# Patient Record
Sex: FEMALE | Race: WHITE | NOT HISPANIC OR LATINO | Employment: UNEMPLOYED | URBAN - METROPOLITAN AREA
[De-identification: names, ages, dates, MRNs, and addresses within clinical notes are randomized per-mention and may not be internally consistent; named-entity substitution may affect disease eponyms.]

---

## 2017-06-10 ENCOUNTER — HOSPITAL ENCOUNTER (OUTPATIENT)
Dept: MEDSURG UNIT | Facility: HOSPITAL | Age: 82
End: 2017-06-11
Attending: INTERNAL MEDICINE | Admitting: INTERNAL MEDICINE

## 2017-06-10 LAB
ABS NEUT (OLG): 1.69 X10(3)/MCL (ref 2.1–9.2)
ALBUMIN SERPL-MCNC: 3.4 GM/DL (ref 3.4–5)
ALBUMIN/GLOB SERPL: 0.5 {RATIO}
ALP SERPL-CCNC: 72 UNIT/L (ref 38–126)
ALT SERPL-CCNC: 15 UNIT/L (ref 12–78)
APTT PPP: 29.5 SECOND(S) (ref 20.6–36)
AST SERPL-CCNC: 20 UNIT/L (ref 15–37)
BILIRUB SERPL-MCNC: 0.3 MG/DL (ref 0.2–1)
BILIRUBIN DIRECT+TOT PNL SERPL-MCNC: 0.1 MG/DL (ref 0–0.5)
BILIRUBIN DIRECT+TOT PNL SERPL-MCNC: 0.2 MG/DL (ref 0–0.8)
BNP BLD-MCNC: 46 PG/ML (ref 0–125)
BUN SERPL-MCNC: 16 MG/DL (ref 7–18)
CALCIUM SERPL-MCNC: 8.8 MG/DL (ref 8.5–10.1)
CHLORIDE SERPL-SCNC: 107 MMOL/L (ref 98–107)
CHOLEST SERPL-MCNC: 177 MG/DL (ref 0–200)
CHOLEST/HDLC SERPL: 4.7 {RATIO} (ref 0–4)
CK MB SERPL-MCNC: 0.8 NG/ML (ref 0.5–3.6)
CK MB SERPL-MCNC: <0.5 NG/ML (ref 0.5–3.6)
CK SERPL-CCNC: 74 UNIT/L (ref 26–192)
CK SERPL-CCNC: 94 UNIT/L (ref 26–192)
CO2 SERPL-SCNC: 23 MMOL/L (ref 21–32)
CREAT SERPL-MCNC: 1.01 MG/DL (ref 0.55–1.02)
EOSINOPHIL NFR BLD MANUAL: 3 % (ref 0–8)
ERYTHROCYTE [DISTWIDTH] IN BLOOD BY AUTOMATED COUNT: 15 % (ref 11.5–17)
GLOBULIN SER-MCNC: 6.8 GM/DL (ref 2.4–3.5)
GLUCOSE SERPL-MCNC: 97 MG/DL (ref 74–106)
HCT VFR BLD AUTO: 33.1 % (ref 37–47)
HDLC SERPL-MCNC: 38 MG/DL (ref 35–60)
HGB BLD-MCNC: 10.6 GM/DL (ref 12–16)
INR PPP: 1.16 (ref 0–1.27)
LDLC SERPL CALC-MCNC: 108 MG/DL (ref 0–129)
LYMPHOCYTES NFR BLD MANUAL: 24 % (ref 13–40)
LYMPHOCYTES NFR BLD MANUAL: 6 %
MAGNESIUM SERPL-MCNC: 2 MG/DL (ref 1.8–2.4)
MCH RBC QN AUTO: 27 PG (ref 27–31)
MCHC RBC AUTO-ENTMCNC: 32 GM/DL (ref 33–36)
MCV RBC AUTO: 84.2 FL (ref 80–94)
MONOCYTES NFR BLD MANUAL: 9 % (ref 2–11)
NEUTROPHILS NFR BLD MANUAL: 58 % (ref 47–80)
PLATELET # BLD AUTO: 163 X10(3)/MCL (ref 130–400)
PLATELET # BLD EST: NORMAL 10*3/UL
PMV BLD AUTO: 10.2 FL (ref 7.4–10.4)
POTASSIUM SERPL-SCNC: 3.9 MMOL/L (ref 3.5–5.1)
PROT SERPL-MCNC: 10.2 GM/DL (ref 6.4–8.2)
PROTHROMBIN TIME: 14.6 SECOND(S) (ref 12.1–14.2)
RBC # BLD AUTO: 3.93 X10(6)/MCL (ref 4.2–5.4)
SODIUM SERPL-SCNC: 139 MMOL/L (ref 136–145)
TRIGL SERPL-MCNC: 154 MG/DL (ref 30–150)
TROPONIN I SERPL-MCNC: <0.02 NG/ML (ref 0.02–0.49)
VLDLC SERPL CALC-MCNC: 31 MG/DL
WBC # SPEC AUTO: 3.2 X10(3)/MCL (ref 4.5–11.5)

## 2017-06-11 LAB
ABS NEUT (OLG): 2.5 X10(3)/MCL (ref 2.1–9.2)
BASOPHILS # BLD AUTO: 0 X10(3)/MCL (ref 0–0.2)
BASOPHILS NFR BLD AUTO: 0 %
CK MB SERPL-MCNC: 0.6 NG/ML (ref 0.5–3.6)
CK SERPL-CCNC: 117 UNIT/L (ref 26–192)
EOSINOPHIL # BLD AUTO: 0 X10(3)/MCL (ref 0–0.9)
EOSINOPHIL NFR BLD AUTO: 1 %
ERYTHROCYTE [DISTWIDTH] IN BLOOD BY AUTOMATED COUNT: 15.1 % (ref 11.5–17)
FERRITIN SERPL-MCNC: 52.6 NG/ML (ref 8–388)
HCT VFR BLD AUTO: 33.8 % (ref 37–47)
HGB BLD-MCNC: 10.8 GM/DL (ref 12–16)
IRON SATN MFR SERPL: 20 % (ref 20–50)
IRON SERPL-MCNC: 63 MCG/DL (ref 50–175)
LYMPHOCYTES # BLD AUTO: 1.1 X10(3)/MCL (ref 0.6–4.6)
LYMPHOCYTES NFR BLD AUTO: 28 %
MCH RBC QN AUTO: 26.6 PG (ref 27–31)
MCHC RBC AUTO-ENTMCNC: 32 GM/DL (ref 33–36)
MCV RBC AUTO: 83.3 FL (ref 80–94)
MONOCYTES # BLD AUTO: 0.3 X10(3)/MCL (ref 0.1–1.3)
MONOCYTES NFR BLD AUTO: 8 %
NEUTROPHILS # BLD AUTO: 2.5 X10(3)/MCL (ref 2.1–9.2)
NEUTROPHILS NFR BLD AUTO: 62 %
PLATELET # BLD AUTO: 180 X10(3)/MCL (ref 130–400)
PMV BLD AUTO: 10.5 FL (ref 9.4–12.4)
RBC # BLD AUTO: 4.06 X10(6)/MCL (ref 4.2–5.4)
RET# (OHS): 0.04 X10^6/ML (ref 0.02–0.08)
RETICULOCYTE COUNT AUTOMATED (OLG): 1.1 % (ref 1.1–2.1)
TIBC SERPL-MCNC: 315 MCG/DL (ref 250–450)
TRANSFERRIN SERPL-MCNC: 276 MG/DL (ref 200–360)
TROPONIN I SERPL-MCNC: <0.02 NG/ML (ref 0.02–0.49)
WBC # SPEC AUTO: 4 X10(3)/MCL (ref 4.5–11.5)

## 2017-06-21 ENCOUNTER — HISTORICAL (OUTPATIENT)
Dept: URGENT CARE | Facility: CLINIC | Age: 82
End: 2017-06-21

## 2017-06-21 LAB
ABS NEUT (OLG): 2.06 X10(3)/MCL (ref 2.1–9.2)
EOSINOPHIL NFR BLD MANUAL: 4 % (ref 0–8)
ERYTHROCYTE [DISTWIDTH] IN BLOOD BY AUTOMATED COUNT: 15 % (ref 11.5–17)
HCT VFR BLD AUTO: 34.7 % (ref 37–47)
HGB BLD-MCNC: 10.7 GM/DL (ref 12–16)
LYMPHOCYTES NFR BLD MANUAL: 23 % (ref 13–40)
LYMPHOCYTES NFR BLD MANUAL: 3 %
MCH RBC QN AUTO: 27.1 PG (ref 27–31)
MCHC RBC AUTO-ENTMCNC: 30.8 GM/DL (ref 33–36)
MCV RBC AUTO: 87.8 FL (ref 80–94)
METAMYELOCYTES NFR BLD MANUAL: 1 %
MONOCYTES NFR BLD MANUAL: 7 % (ref 2–11)
NEUTROPHILS NFR BLD MANUAL: 61 % (ref 47–80)
PLATELET # BLD AUTO: 203 X10(3)/MCL (ref 130–400)
PLATELET # BLD EST: NORMAL 10*3/UL
PMV BLD AUTO: 11.2 FL (ref 7.4–10.4)
RBC # BLD AUTO: 3.95 X10(6)/MCL (ref 4.2–5.4)
TSH SERPL-ACNC: 3.49 MIU/ML (ref 0.36–3.74)
WBC # SPEC AUTO: 3.2 X10(3)/MCL (ref 4.5–11.5)

## 2017-08-02 ENCOUNTER — HISTORICAL (OUTPATIENT)
Dept: LAB | Facility: HOSPITAL | Age: 82
End: 2017-08-02

## 2017-08-02 LAB
ABS NEUT (OLG): 2.18 X10(3)/MCL (ref 2.1–9.2)
ANISOCYTOSIS BLD QL SMEAR: 1
EOSINOPHIL NFR BLD MANUAL: 2 % (ref 0–8)
ERYTHROCYTE [DISTWIDTH] IN BLOOD BY AUTOMATED COUNT: 15.6 % (ref 11.5–17)
HCT VFR BLD AUTO: 35.4 % (ref 37–47)
HGB BLD-MCNC: 11.4 GM/DL (ref 12–16)
LYMPHOCYTES NFR BLD MANUAL: 17 % (ref 13–40)
LYMPHOCYTES NFR BLD MANUAL: 3 %
MCH RBC QN AUTO: 28.1 PG (ref 27–31)
MCHC RBC AUTO-ENTMCNC: 32.2 GM/DL (ref 33–36)
MCV RBC AUTO: 87.4 FL (ref 80–94)
MONOCYTES NFR BLD MANUAL: 6 % (ref 2–11)
NEUTROPHILS NFR BLD MANUAL: 72 % (ref 47–80)
PLATELET # BLD AUTO: 173 X10(3)/MCL (ref 130–400)
PLATELET # BLD EST: NORMAL 10*3/UL
PMV BLD AUTO: 10.6 FL (ref 7.4–10.4)
RBC # BLD AUTO: 4.05 X10(6)/MCL (ref 4.2–5.4)
WBC # SPEC AUTO: 3.5 X10(3)/MCL (ref 4.5–11.5)

## 2017-10-04 ENCOUNTER — HISTORICAL (OUTPATIENT)
Dept: LAB | Facility: HOSPITAL | Age: 82
End: 2017-10-04

## 2017-10-04 LAB
ABS NEUT (OLG): 2.24 X10(3)/MCL (ref 2.1–9.2)
EOSINOPHIL NFR BLD MANUAL: 2 % (ref 0–8)
ERYTHROCYTE [DISTWIDTH] IN BLOOD BY AUTOMATED COUNT: 14.6 % (ref 11.5–17)
HCT VFR BLD AUTO: 36.3 % (ref 37–47)
HGB BLD-MCNC: 11.6 GM/DL (ref 12–16)
LYMPHOCYTES NFR BLD MANUAL: 15 % (ref 13–40)
LYMPHOCYTES NFR BLD MANUAL: 5 %
MCH RBC QN AUTO: 28.2 PG (ref 27–31)
MCHC RBC AUTO-ENTMCNC: 32 GM/DL (ref 33–36)
MCV RBC AUTO: 88.1 FL (ref 80–94)
MONOCYTES NFR BLD MANUAL: 6 % (ref 2–11)
NEUTROPHILS NFR BLD MANUAL: 71 % (ref 47–80)
PLATELET # BLD AUTO: 162 X10(3)/MCL (ref 130–400)
PLATELET # BLD EST: NORMAL 10*3/UL
PMV BLD AUTO: 10.9 FL (ref 7.4–10.4)
RBC # BLD AUTO: 4.12 X10(6)/MCL (ref 4.2–5.4)
WBC # SPEC AUTO: 3.4 X10(3)/MCL (ref 4.5–11.5)

## 2018-01-09 ENCOUNTER — HISTORICAL (OUTPATIENT)
Dept: LAB | Facility: HOSPITAL | Age: 83
End: 2018-01-09

## 2018-01-09 LAB
ABS NEUT (OLG): 2.54 X10(3)/MCL (ref 2.1–9.2)
EOSINOPHIL NFR BLD MANUAL: 1 % (ref 0–8)
ERYTHROCYTE [DISTWIDTH] IN BLOOD BY AUTOMATED COUNT: 14 % (ref 11.5–17)
HCT VFR BLD AUTO: 35.4 % (ref 37–47)
HGB BLD-MCNC: 11.3 GM/DL (ref 12–16)
LYMPHOCYTES NFR BLD MANUAL: 19 % (ref 13–40)
MCH RBC QN AUTO: 28.4 PG (ref 27–31)
MCHC RBC AUTO-ENTMCNC: 31.9 GM/DL (ref 33–36)
MCV RBC AUTO: 88.9 FL (ref 80–94)
MONOCYTES NFR BLD MANUAL: 8 % (ref 2–11)
NEUTROPHILS NFR BLD MANUAL: 72 % (ref 47–80)
PLATELET # BLD AUTO: 175 X10(3)/MCL (ref 130–400)
PLATELET # BLD EST: NORMAL 10*3/UL
PMV BLD AUTO: 10.7 FL (ref 7.4–10.4)
RBC # BLD AUTO: 3.98 X10(6)/MCL (ref 4.2–5.4)
WBC # SPEC AUTO: 3.6 X10(3)/MCL (ref 4.5–11.5)

## 2018-07-26 ENCOUNTER — HISTORICAL (OUTPATIENT)
Dept: INFUSION THERAPY | Facility: HOSPITAL | Age: 83
End: 2018-07-26

## 2018-07-26 LAB
ABS NEUT (OLG): 2.2 X10(3)/MCL (ref 1.5–6.9)
ALBUMIN SERPL-MCNC: 3.5 GM/DL (ref 3.4–5)
ALBUMIN/GLOB SERPL: 0.5 RATIO
ALP SERPL-CCNC: 56 UNIT/L (ref 30–113)
ALT SERPL-CCNC: 19 UNIT/L (ref 10–45)
AST SERPL-CCNC: 22 UNIT/L (ref 15–37)
BASOPHILS # BLD AUTO: 0 X10(3)/MCL (ref 0–0.1)
BASOPHILS NFR BLD AUTO: 0 % (ref 0–1)
BILIRUB SERPL-MCNC: 0.3 MG/DL (ref 0.1–0.9)
BILIRUBIN DIRECT+TOT PNL SERPL-MCNC: 0.1 MG/DL (ref 0–0.3)
BILIRUBIN DIRECT+TOT PNL SERPL-MCNC: 0.2 MG/DL
BUN SERPL-MCNC: 18 MG/DL (ref 10–20)
CALCIUM SERPL-MCNC: 9.2 MG/DL (ref 8–10.5)
CHLORIDE SERPL-SCNC: 104 MMOL/L (ref 100–108)
CO2 SERPL-SCNC: 28 MMOL/L (ref 21–35)
CREAT SERPL-MCNC: 1.21 MG/DL (ref 0.7–1.3)
EOSINOPHIL # BLD AUTO: 0 X10(3)/MCL (ref 0–0.6)
EOSINOPHIL NFR BLD AUTO: 2 % (ref 0–5)
ERYTHROCYTE [DISTWIDTH] IN BLOOD BY AUTOMATED COUNT: 14.6 % (ref 11.5–17)
FERRITIN SERPL-MCNC: 105 NG/ML (ref 3–252)
FOLATE SERPL-MCNC: >20 NG/ML (ref 8.6–58.9)
GLOBULIN SER-MCNC: 6.8 GM/DL
GLUCOSE SERPL-MCNC: 94 MG/DL (ref 75–116)
HCT VFR BLD AUTO: 33.9 % (ref 36–48)
HGB BLD-MCNC: 10.7 GM/DL (ref 12–16)
IRON SATN MFR SERPL: 14 % (ref 15–55)
IRON SERPL-MCNC: 36 MCG/DL (ref 50–170)
LDH SERPL-CCNC: 151 UNIT/L (ref 112–240)
LYMPHOCYTES # BLD AUTO: 0.8 X10(3)/MCL (ref 0.5–4.1)
LYMPHOCYTES NFR BLD AUTO: 23.5 % (ref 15–40)
MCH RBC QN AUTO: 28 PG (ref 27–34)
MCHC RBC AUTO-ENTMCNC: 32 GM/DL (ref 31–36)
MCV RBC AUTO: 89 FL (ref 80–99)
MONOCYTES # BLD AUTO: 0.2 X10(3)/MCL (ref 0–1.1)
MONOCYTES NFR BLD AUTO: 7 % (ref 4–12)
NEUTROPHILS # BLD AUTO: 2.2 X10(3)/MCL (ref 1.5–6.9)
NEUTROPHILS NFR BLD AUTO: 68 % (ref 43–75)
PLATELET # BLD AUTO: 180 X10(3)/MCL (ref 140–400)
PMV BLD AUTO: 10 FL (ref 6.8–10)
POTASSIUM SERPL-SCNC: 4 MMOL/L (ref 3.6–5.2)
PROT SERPL-MCNC: 10 GM/DL
PROT SERPL-MCNC: 10.3 GM/DL (ref 6.4–8.2)
RBC # BLD AUTO: 3.8 X10(6)/MCL (ref 4.2–5.4)
RET# (OHS): 0.05 X10(6)/MCL
RETICULOCYTE COUNT AUTOMATED (OLG): 1.22 % (ref 0.5–1.5)
SODIUM SERPL-SCNC: 136 MMOL/L (ref 135–145)
TIBC SERPL-MCNC: 217 MCG/DL (ref 150–375)
TIBC SERPL-MCNC: 253 MCG/DL (ref 250–450)
VIT B12 SERPL-MCNC: 795 PG/ML (ref 193–986)
WBC # SPEC AUTO: 3.2 X10(3)/MCL (ref 4.5–11.5)

## 2018-10-23 ENCOUNTER — HISTORICAL (OUTPATIENT)
Dept: ADMINISTRATIVE | Facility: HOSPITAL | Age: 83
End: 2018-10-23

## 2018-10-23 LAB
ABS NEUT (OLG): 2.79 X10(3)/MCL (ref 2.1–9.2)
EOSINOPHIL NFR BLD MANUAL: 6 % (ref 0–8)
ERYTHROCYTE [DISTWIDTH] IN BLOOD BY AUTOMATED COUNT: 13.4 % (ref 11.5–17)
FERRITIN SERPL-MCNC: 91.4 NG/ML (ref 8–388)
HCT VFR BLD AUTO: 35.4 % (ref 37–47)
HGB BLD-MCNC: 10.9 GM/DL (ref 12–16)
IRON SATN MFR SERPL: 21.4 % (ref 20–50)
IRON SERPL-MCNC: 60 MCG/DL (ref 50–175)
LYMPHOCYTES NFR BLD MANUAL: 8 % (ref 13–40)
MCH RBC QN AUTO: 27.7 PG (ref 27–31)
MCHC RBC AUTO-ENTMCNC: 30.8 GM/DL (ref 33–36)
MCV RBC AUTO: 90.1 FL (ref 80–94)
MONOCYTES NFR BLD MANUAL: 5 % (ref 2–11)
NEUTROPHILS # BLD AUTO: 2.76 X10(3)/MCL (ref 2.1–9.2)
NEUTROPHILS NFR BLD MANUAL: 80 % (ref 47–80)
PLATELET # BLD AUTO: 200 X10(3)/MCL (ref 130–400)
PLATELET # BLD EST: NORMAL 10*3/UL
PMV BLD AUTO: 10.5 FL (ref 9.4–12.4)
RBC # BLD AUTO: 3.93 X10(6)/MCL (ref 4.2–5.4)
TIBC SERPL-MCNC: 280 MCG/DL (ref 250–450)
TRANSFERRIN SERPL-MCNC: 255 MG/DL (ref 200–360)
WBC # SPEC AUTO: 3.8 X10(3)/MCL (ref 4.5–11.5)

## 2019-01-28 ENCOUNTER — HISTORICAL (OUTPATIENT)
Dept: ADMINISTRATIVE | Facility: HOSPITAL | Age: 84
End: 2019-01-28

## 2019-01-28 LAB
ABS NEUT (OLG): 2.71 X10(3)/MCL (ref 2.1–9.2)
ALBUMIN SERPL-MCNC: 3.5 GM/DL (ref 3.4–5)
ALBUMIN/GLOB SERPL: 0.5 {RATIO}
ALP SERPL-CCNC: 58 UNIT/L (ref 38–126)
ALT SERPL-CCNC: 16 UNIT/L (ref 12–78)
AST SERPL-CCNC: 16 UNIT/L (ref 15–37)
BILIRUB SERPL-MCNC: 0.4 MG/DL (ref 0.2–1)
BILIRUBIN DIRECT+TOT PNL SERPL-MCNC: 0.1 MG/DL (ref 0–0.5)
BILIRUBIN DIRECT+TOT PNL SERPL-MCNC: 0.3 MG/DL (ref 0–0.8)
BUN SERPL-MCNC: 27 MG/DL (ref 7–18)
CALCIUM SERPL-MCNC: 9.4 MG/DL (ref 8.5–10.1)
CHLORIDE SERPL-SCNC: 106 MMOL/L (ref 98–107)
CO2 SERPL-SCNC: 28 MMOL/L (ref 21–32)
CREAT SERPL-MCNC: 1.07 MG/DL (ref 0.55–1.02)
EOSINOPHIL # BLD AUTO: 0 X10(3)/MCL (ref 0–0.9)
EOSINOPHIL NFR BLD AUTO: 1 %
ERYTHROCYTE [DISTWIDTH] IN BLOOD BY AUTOMATED COUNT: 14.3 % (ref 11.5–17)
FERRITIN SERPL-MCNC: 95.9 NG/ML (ref 8–388)
GLOBULIN SER-MCNC: 6.6 GM/DL (ref 2.4–3.5)
GLUCOSE SERPL-MCNC: 81 MG/DL (ref 74–106)
HCT VFR BLD AUTO: 36.1 % (ref 37–47)
HGB BLD-MCNC: 11.3 GM/DL (ref 12–16)
IRON SATN MFR SERPL: 20.9 % (ref 20–50)
IRON SERPL-MCNC: 62 MCG/DL (ref 50–175)
LYMPHOCYTES # BLD AUTO: 0.7 X10(3)/MCL (ref 0.6–4.6)
LYMPHOCYTES NFR BLD AUTO: 20 %
MCH RBC QN AUTO: 27.8 PG (ref 27–31)
MCHC RBC AUTO-ENTMCNC: 31.3 GM/DL (ref 33–36)
MCV RBC AUTO: 88.7 FL (ref 80–94)
MONOCYTES # BLD AUTO: 0.2 X10(3)/MCL (ref 0.1–1.3)
MONOCYTES NFR BLD AUTO: 7 %
NEUTROPHILS # BLD AUTO: 2.71 X10(3)/MCL (ref 2.1–9.2)
NEUTROPHILS NFR BLD AUTO: 72 %
PLATELET # BLD AUTO: 173 X10(3)/MCL (ref 130–400)
PMV BLD AUTO: 10.2 FL (ref 9.4–12.4)
POTASSIUM SERPL-SCNC: 4.3 MMOL/L (ref 3.5–5.1)
PROT SERPL-MCNC: 10.1 GM/DL (ref 6.4–8.2)
RBC # BLD AUTO: 4.07 X10(6)/MCL (ref 4.2–5.4)
SODIUM SERPL-SCNC: 139 MMOL/L (ref 136–145)
TIBC SERPL-MCNC: 297 MCG/DL (ref 250–450)
TRANSFERRIN SERPL-MCNC: 253 MG/DL (ref 200–360)
TRANSFERRIN SERPL-MCNC: 265 MG/DL (ref 200–360)
WBC # SPEC AUTO: 3.8 X10(3)/MCL (ref 4.5–11.5)

## 2019-07-23 ENCOUNTER — HISTORICAL (OUTPATIENT)
Dept: ADMINISTRATIVE | Facility: HOSPITAL | Age: 84
End: 2019-07-23

## 2019-07-23 LAB
ABS NEUT (OLG): 2.62 X10(3)/MCL (ref 2.1–9.2)
ALBUMIN SERPL-MCNC: 3.7 GM/DL (ref 3.4–5)
ALBUMIN/GLOB SERPL: 0.6 {RATIO}
ALP SERPL-CCNC: 54 UNIT/L (ref 38–126)
ALT SERPL-CCNC: 16 UNIT/L (ref 12–78)
AST SERPL-CCNC: 17 UNIT/L (ref 15–37)
BASOPHILS # BLD AUTO: 0 X10(3)/MCL (ref 0–0.2)
BASOPHILS NFR BLD AUTO: 0 %
BILIRUB SERPL-MCNC: 0.4 MG/DL (ref 0.2–1)
BILIRUBIN DIRECT+TOT PNL SERPL-MCNC: 0.1 MG/DL (ref 0–0.2)
BILIRUBIN DIRECT+TOT PNL SERPL-MCNC: 0.3 MG/DL (ref 0–0.8)
BUN SERPL-MCNC: 26 MG/DL (ref 7–18)
CALCIUM SERPL-MCNC: 9.3 MG/DL (ref 8.5–10.1)
CHLORIDE SERPL-SCNC: 105 MMOL/L (ref 98–107)
CO2 SERPL-SCNC: 27 MMOL/L (ref 21–32)
CREAT SERPL-MCNC: 1.23 MG/DL (ref 0.55–1.02)
EOSINOPHIL # BLD AUTO: 0 X10(3)/MCL (ref 0–0.9)
EOSINOPHIL NFR BLD AUTO: 1 %
ERYTHROCYTE [DISTWIDTH] IN BLOOD BY AUTOMATED COUNT: 14.4 % (ref 11.5–17)
GLOBULIN SER-MCNC: 6.1 GM/DL (ref 2.4–3.5)
GLUCOSE SERPL-MCNC: 91 MG/DL (ref 74–106)
HCT VFR BLD AUTO: 35.3 % (ref 37–47)
HGB BLD-MCNC: 10.8 GM/DL (ref 12–16)
LYMPHOCYTES # BLD AUTO: 0.8 X10(3)/MCL (ref 0.6–4.6)
LYMPHOCYTES NFR BLD AUTO: 21 %
MCH RBC QN AUTO: 27.3 PG (ref 27–31)
MCHC RBC AUTO-ENTMCNC: 30.6 GM/DL (ref 33–36)
MCV RBC AUTO: 89.4 FL (ref 80–94)
MONOCYTES # BLD AUTO: 0.3 X10(3)/MCL (ref 0.1–1.3)
MONOCYTES NFR BLD AUTO: 7 %
NEUTROPHILS # BLD AUTO: 2.62 X10(3)/MCL (ref 2.1–9.2)
NEUTROPHILS NFR BLD AUTO: 70 %
PLATELET # BLD AUTO: 161 X10(3)/MCL (ref 130–400)
PMV BLD AUTO: 10 FL (ref 9.4–12.4)
POTASSIUM SERPL-SCNC: 4.6 MMOL/L (ref 3.5–5.1)
PROT SERPL-MCNC: 9.8 GM/DL (ref 6.4–8.2)
RBC # BLD AUTO: 3.95 X10(6)/MCL (ref 4.2–5.4)
SODIUM SERPL-SCNC: 138 MMOL/L (ref 136–145)
WBC # SPEC AUTO: 3.8 X10(3)/MCL (ref 4.5–11.5)

## 2021-07-13 ENCOUNTER — HISTORICAL (OUTPATIENT)
Dept: CARDIOLOGY | Facility: HOSPITAL | Age: 86
End: 2021-07-13

## 2021-07-13 LAB
ABS NEUT (OLG): 2.5 X10(3)/MCL (ref 2.1–9.2)
ALBUMIN SERPL-MCNC: 3.3 GM/DL (ref 3.4–4.8)
ALBUMIN/GLOB SERPL: 0.5 RATIO (ref 1.1–2)
ALP SERPL-CCNC: 63 UNIT/L (ref 40–150)
ALT SERPL-CCNC: 10 UNIT/L (ref 0–55)
AST SERPL-CCNC: 20 UNIT/L (ref 5–34)
BILIRUB SERPL-MCNC: 0.5 MG/DL
BILIRUBIN DIRECT+TOT PNL SERPL-MCNC: 0.2 MG/DL (ref 0–0.5)
BILIRUBIN DIRECT+TOT PNL SERPL-MCNC: 0.3 MG/DL (ref 0–0.8)
BUN SERPL-MCNC: 25 MG/DL (ref 9.8–20.1)
CALCIUM SERPL-MCNC: 9.6 MG/DL (ref 8.4–10.2)
CHLORIDE SERPL-SCNC: 104 MMOL/L (ref 98–107)
CHOLEST SERPL-MCNC: 156 MG/DL
CHOLEST/HDLC SERPL: 4 {RATIO} (ref 0–5)
CO2 SERPL-SCNC: 28 MMOL/L (ref 23–31)
CREAT SERPL-MCNC: 1.16 MG/DL (ref 0.55–1.02)
EOSINOPHIL # BLD AUTO: 0.1 X10(3)/MCL (ref 0–0.9)
EOSINOPHIL NFR BLD AUTO: 3 %
ERYTHROCYTE [DISTWIDTH] IN BLOOD BY AUTOMATED COUNT: 14.4 % (ref 11.5–17)
GLOBULIN SER-MCNC: 6.5 GM/DL (ref 2.4–3.5)
GLUCOSE SERPL-MCNC: 86 MG/DL (ref 82–115)
HCT VFR BLD AUTO: 33.7 % (ref 37–47)
HDLC SERPL-MCNC: 39 MG/DL (ref 35–60)
HGB BLD-MCNC: 10.5 GM/DL (ref 12–16)
INR PPP: 1.2 (ref 0–1.3)
LDLC SERPL CALC-MCNC: 100 MG/DL (ref 50–140)
LYMPHOCYTES # BLD AUTO: 0.5 X10(3)/MCL (ref 0.6–4.6)
LYMPHOCYTES NFR BLD AUTO: 16 %
MCH RBC QN AUTO: 27.8 PG (ref 27–31)
MCHC RBC AUTO-ENTMCNC: 31.2 GM/DL (ref 33–36)
MCV RBC AUTO: 89.2 FL (ref 80–94)
MONOCYTES # BLD AUTO: 0.2 X10(3)/MCL (ref 0.1–1.3)
MONOCYTES NFR BLD AUTO: 5 %
NEUTROPHILS # BLD AUTO: 2.5 X10(3)/MCL (ref 2.1–9.2)
NEUTROPHILS NFR BLD AUTO: 75 %
PLATELET # BLD AUTO: 155 X10(3)/MCL (ref 130–400)
PMV BLD AUTO: 10.6 FL (ref 9.4–12.4)
POTASSIUM SERPL-SCNC: 4.3 MMOL/L (ref 3.5–5.1)
PROT SERPL-MCNC: 9.8 GM/DL (ref 5.8–7.6)
PROTHROMBIN TIME: 14.6 SECOND(S) (ref 12.5–14.5)
RBC # BLD AUTO: 3.78 X10(6)/MCL (ref 4.2–5.4)
SODIUM SERPL-SCNC: 140 MMOL/L (ref 136–145)
TRIGL SERPL-MCNC: 86 MG/DL (ref 37–140)
VLDLC SERPL CALC-MCNC: 17 MG/DL
WBC # SPEC AUTO: 3.3 X10(3)/MCL (ref 4.5–11.5)

## 2022-04-11 ENCOUNTER — HISTORICAL (OUTPATIENT)
Dept: ADMINISTRATIVE | Facility: HOSPITAL | Age: 87
End: 2022-04-11
Payer: MEDICARE

## 2022-04-27 VITALS
DIASTOLIC BLOOD PRESSURE: 68 MMHG | SYSTOLIC BLOOD PRESSURE: 128 MMHG | HEIGHT: 64 IN | OXYGEN SATURATION: 98 % | BODY MASS INDEX: 24.1 KG/M2 | WEIGHT: 141.13 LBS

## 2022-05-02 ENCOUNTER — HOSPITAL ENCOUNTER (EMERGENCY)
Facility: HOSPITAL | Age: 87
Discharge: HOME | End: 2022-05-03
Attending: STUDENT IN AN ORGANIZED HEALTH CARE EDUCATION/TRAINING PROGRAM
Payer: MEDICARE

## 2022-05-02 DIAGNOSIS — N93.9 VAGINAL BLEEDING: Primary | ICD-10-CM

## 2022-05-02 DIAGNOSIS — R53.1 WEAK: ICD-10-CM

## 2022-05-02 DIAGNOSIS — N39.0 URINARY TRACT INFECTION WITHOUT HEMATURIA, SITE UNSPECIFIED: ICD-10-CM

## 2022-05-02 LAB
ALBUMIN SERPL-MCNC: 3.1 GM/DL (ref 3.4–4.8)
ALBUMIN/GLOB SERPL: 0.5 RATIO (ref 1.1–2)
ALP SERPL-CCNC: 57 UNIT/L (ref 40–150)
ALT SERPL-CCNC: 6 UNIT/L (ref 0–55)
APPEARANCE UR: ABNORMAL
AST SERPL-CCNC: 19 UNIT/L (ref 5–34)
BACTERIA #/AREA URNS AUTO: ABNORMAL /HPF
BASOPHILS # BLD AUTO: 0.01 X10(3)/MCL (ref 0–0.2)
BASOPHILS NFR BLD AUTO: 0.3 %
BILIRUB UR QL STRIP.AUTO: NEGATIVE MG/DL
BILIRUBIN DIRECT+TOT PNL SERPL-MCNC: 0.1 MG/DL (ref 0–0.8)
BILIRUBIN DIRECT+TOT PNL SERPL-MCNC: 0.2 MG/DL (ref 0–0.5)
BILIRUBIN DIRECT+TOT PNL SERPL-MCNC: 0.3 MG/DL
BUN SERPL-MCNC: 20.8 MG/DL (ref 9.8–20.1)
CALCIUM SERPL-MCNC: 9.6 MG/DL (ref 8.4–10.2)
CHLORIDE SERPL-SCNC: 106 MMOL/L (ref 98–107)
CO2 SERPL-SCNC: 24 MMOL/L (ref 23–31)
COLOR UR AUTO: YELLOW
CREAT SERPL-MCNC: 0.97 MG/DL (ref 0.55–1.02)
EOSINOPHIL # BLD AUTO: 0.08 X10(3)/MCL (ref 0–0.9)
EOSINOPHIL NFR BLD AUTO: 2.2 %
ERYTHROCYTE [DISTWIDTH] IN BLOOD BY AUTOMATED COUNT: 14.3 % (ref 11.5–17)
GLOBULIN SER-MCNC: 6.2 GM/DL (ref 2.4–3.5)
GLUCOSE SERPL-MCNC: 97 MG/DL (ref 82–115)
GLUCOSE UR QL STRIP.AUTO: NORMAL MG/DL
HCT VFR BLD AUTO: 31.7 % (ref 37–47)
HGB BLD-MCNC: 10.1 GM/DL (ref 12–16)
HYALINE CASTS #/AREA URNS LPF: ABNORMAL /LPF
IMM GRANULOCYTES # BLD AUTO: 0.01 X10(3)/MCL (ref 0–0.02)
IMM GRANULOCYTES NFR BLD AUTO: 0.3 % (ref 0–0.43)
KETONES UR QL STRIP.AUTO: NEGATIVE MG/DL
LEUKOCYTE ESTERASE UR QL STRIP.AUTO: 500 UNIT/L
LYMPHOCYTES # BLD AUTO: 0.61 X10(3)/MCL (ref 0.6–4.6)
LYMPHOCYTES NFR BLD AUTO: 17 %
MCH RBC QN AUTO: 27.6 PG (ref 27–31)
MCHC RBC AUTO-ENTMCNC: 31.9 MG/DL (ref 33–36)
MCV RBC AUTO: 86.6 FL (ref 80–94)
MONOCYTES # BLD AUTO: 0.27 X10(3)/MCL (ref 0.1–1.3)
MONOCYTES NFR BLD AUTO: 7.5 %
NEUTROPHILS # BLD AUTO: 2.6 X10(3)/MCL (ref 2.1–9.2)
NEUTROPHILS NFR BLD AUTO: 72.7 %
NITRITE UR QL STRIP.AUTO: NEGATIVE
NRBC BLD AUTO-RTO: 0 %
PH UR STRIP.AUTO: 5.5 [PH]
PLATELET # BLD AUTO: 153 X10(3)/MCL (ref 130–400)
PMV BLD AUTO: 10.3 FL (ref 9.4–12.4)
POTASSIUM SERPL-SCNC: 4.3 MMOL/L (ref 3.5–5.1)
PROT SERPL-MCNC: 9.3 GM/DL (ref 5.8–7.6)
PROT UR QL STRIP.AUTO: ABNORMAL MG/DL
RBC # BLD AUTO: 3.66 X10(6)/MCL (ref 4.2–5.4)
RBC UR QL AUTO: ABNORMAL UNIT/L
SODIUM SERPL-SCNC: 136 MMOL/L (ref 136–145)
SP GR UR STRIP.AUTO: 1.02
SQUAMOUS #/AREA URNS LPF: ABNORMAL /HPF
UROBILINOGEN UR STRIP-ACNC: 0.2 MG/DL
WBC # SPEC AUTO: 3.6 X10(3)/MCL (ref 4.5–11.5)

## 2022-05-02 PROCEDURE — 81001 URINALYSIS AUTO W/SCOPE: CPT | Performed by: STUDENT IN AN ORGANIZED HEALTH CARE EDUCATION/TRAINING PROGRAM

## 2022-05-02 PROCEDURE — 36415 COLL VENOUS BLD VENIPUNCTURE: CPT | Performed by: STUDENT IN AN ORGANIZED HEALTH CARE EDUCATION/TRAINING PROGRAM

## 2022-05-02 PROCEDURE — 85025 COMPLETE CBC W/AUTO DIFF WBC: CPT | Performed by: STUDENT IN AN ORGANIZED HEALTH CARE EDUCATION/TRAINING PROGRAM

## 2022-05-02 PROCEDURE — 99284 EMERGENCY DEPT VISIT MOD MDM: CPT | Mod: 25

## 2022-05-02 PROCEDURE — 80053 COMPREHEN METABOLIC PANEL: CPT | Performed by: STUDENT IN AN ORGANIZED HEALTH CARE EDUCATION/TRAINING PROGRAM

## 2022-05-02 PROCEDURE — 93005 ELECTROCARDIOGRAM TRACING: CPT

## 2022-05-03 VITALS
DIASTOLIC BLOOD PRESSURE: 70 MMHG | TEMPERATURE: 98 F | HEIGHT: 64 IN | BODY MASS INDEX: 24.81 KG/M2 | OXYGEN SATURATION: 98 % | RESPIRATION RATE: 18 BRPM | SYSTOLIC BLOOD PRESSURE: 159 MMHG | HEART RATE: 61 BPM | WEIGHT: 145.31 LBS

## 2022-05-03 RX ORDER — CEPHALEXIN 500 MG/1
500 CAPSULE ORAL 4 TIMES DAILY
Qty: 20 CAPSULE | Refills: 0 | Status: SHIPPED | OUTPATIENT
Start: 2022-05-03 | End: 2022-05-08

## 2022-05-04 NOTE — ED PROVIDER NOTES
"Encounter Date: 5/2/2022       History     Chief Complaint   Patient presents with    Female  Problem     States has vaginal device for "falling bladder or uterus" daughter unsure of which.  States unable to reinsert today due to excessive vaginal bleeding.      Weakness     86-year-old female presents to ED vaginal bleeding and feeling "off". Daughter bedside. patient states she believes she had a uterine prolapse in the past.  Requires a device inserted vaginally to prevent it from continually prolapsing. states 2-3 days ago she started feeling off.  Unable to elaborate/described.  No chest pain or pressure, no shortness of breath, no abdominal pains, no fever chills etc.  Denies any neuro deficits.  states 1-2 days ago she started to have some mild vaginal bleeding.  States she began spotting in her underwear.  She states she's advised by her doctor to occassionally remove and clean the vaginal plug. States she noticed some additional bleeding then, not gross blood. denies any new gross uterine prolapse, no vaginal trauma, no urinary changes, no dysuria hematuria or other complaints at this time. States she was concerned and did not replace the vaginal plug. Then presented. No other concerns at this time.        Review of patient's allergies indicates:   Allergen Reactions    Sulfa (sulfonamide antibiotics)      Past Medical History:   Diagnosis Date    Hypertension      Past Surgical History:   Procedure Laterality Date    APPENDECTOMY      CHOLECYSTECTOMY      HYSTERECTOMY       History reviewed. No pertinent family history.  Social History     Tobacco Use    Smoking status: Never Smoker    Smokeless tobacco: Never Used   Substance Use Topics    Alcohol use: Not Currently    Drug use: Never     Review of Systems   Constitutional: Positive for activity change and fatigue. Negative for chills, diaphoresis and fever.   HENT: Negative for congestion, rhinorrhea, sinus pain and sore throat.    Eyes: " Negative for pain, discharge and itching.   Respiratory: Negative for cough, chest tightness and shortness of breath.    Cardiovascular: Negative for chest pain and palpitations.   Gastrointestinal: Negative for abdominal pain, nausea and vomiting.   Genitourinary: Positive for vaginal bleeding. Negative for difficulty urinating, dysuria, flank pain, hematuria, vaginal discharge and vaginal pain.   Musculoskeletal: Negative for myalgias and neck pain.   Skin: Negative for color change and rash.   Neurological: Negative for dizziness and headaches.   Psychiatric/Behavioral: Negative for confusion. The patient is not hyperactive.        Physical Exam     Initial Vitals [05/02/22 2100]   BP Pulse Resp Temp SpO2   (!) 149/89 69 18 98.4 °F (36.9 °C) 98 %      MAP       --         Physical Exam    Vitals reviewed.  Constitutional: She appears well-developed and well-nourished. She is not diaphoretic. No distress.   HENT:   Head: Normocephalic and atraumatic.   Hard of hearing   Eyes: Conjunctivae and EOM are normal. Pupils are equal, round, and reactive to light.   Neck: Neck supple. No tracheal deviation present.   Cardiovascular: Normal rate, regular rhythm, normal heart sounds and intact distal pulses.   Pulmonary/Chest: Breath sounds normal. No respiratory distress. She has no wheezes. She has no rhonchi. She has no rales.   Abdominal: Abdomen is soft. She exhibits no distension. There is no abdominal tenderness. There is no rebound and no guarding.   Genitourinary:    Vagina normal.      No vaginal discharge.      Genitourinary Comments: Exam performed with nurse present. External exam benign. bi tiffani nonpainful, no prolapse appreciated, no bleeding.     Musculoskeletal:      Cervical back: Neck supple.     Neurological: She is alert and oriented to person, place, and time. GCS score is 15. GCS eye subscore is 4. GCS verbal subscore is 5. GCS motor subscore is 6.   Skin: Skin is warm and dry. Capillary refill takes  less than 2 seconds. No rash noted.   Psychiatric: She has a normal mood and affect. Her behavior is normal. Judgment and thought content normal.         ED Course   Procedures  Labs Reviewed   COMPREHENSIVE METABOLIC PANEL - Abnormal; Notable for the following components:       Result Value    Blood Urea Nitrogen 20.8 (*)     Protein Total 9.3 (*)     Albumin Level 3.1 (*)     Globulin 6.2 (*)     Albumin/Globulin Ratio 0.5 (*)     All other components within normal limits   URINALYSIS, REFLEX TO URINE CULTURE - Abnormal; Notable for the following components:    Appearance, UA Cloudy (*)     Protein, UA 1+ (*)     Blood, UA 1+ (*)     Leukocyte Esterase,   (*)     Bacteria, UA Occ (*)     Squamous Epithelial Cells, UA Trace (*)     All other components within normal limits    Narrative:     RBC=6-10   CBC WITH DIFFERENTIAL - Abnormal; Notable for the following components:    WBC 3.6 (*)     RBC 3.66 (*)     Hgb 10.1 (*)     Hct 31.7 (*)     MCHC 31.9 (*)     All other components within normal limits   CBC W/ AUTO DIFFERENTIAL    Narrative:     The following orders were created for panel order CBC auto differential.  Procedure                               Abnormality         Status                     ---------                               -----------         ------                     CBC with Differential[[id number]]        Abnormal            Final result                 Please view results for these tests on the individual orders.   EXTRA TUBES    Narrative:     The following orders were created for panel order EXTRA TUBES.  Procedure                               Abnormality         Status                     ---------                               -----------         ------                     Light Blue Top Hold[[id number]]                              In process                 Gold Top Hold[[id number]]                                                                 Please view results for these tests  "on the individual orders.   LIGHT BLUE TOP HOLD        ECG Results          EKG 12-lead (In process)  Result time 05/03/22 07:34:39    In process by Interface, Lab In [facility] (05/03/22 07:34:39)                 Narrative:    Test Reason : R53.1,    Vent. Rate : 060 BPM     Atrial Rate : 060 BPM     P-R Int : 130 ms          QRS Dur : 104 ms      QT Int : 446 ms       P-R-T Axes : 034 -24 039 degrees     QTc Int : 446 ms    Normal sinus rhythm  Moderate voltage criteria for LVH, may be normal variant  Borderline Abnormal ECG  No previous ECGs available    Referred By: [email or web address]   SELF           Confirmed By:                             Imaging Results    None          Medications - No data to display  Medical Decision Making:   Clinical Tests:   Lab Tests: Ordered and Reviewed  Medical Tests: Ordered and Reviewed  86-year-old female presents with daughter for "feeling off" and vaginal bleeding.  vitals stable.  Essentially no acute findings on exam.  Abdomen soft.  Pelvic exam no prolapse, bleeding, or tenderness.  Patient reportedly grossly at baseline per family in regards to mentation and strength.  Analysis of labs demonstrate a mild anemia as well as a urinary tract infection which could explain the patient's symptoms/fatigue.  Will treat at this time.  Patient is to use the vaginal plug again as recommended by their physician and given very strict return precautions if vaginal bleeding worsens. (jhony)                      Clinical Impression:   Final diagnoses:  [R53.1] Weak  [N93.9] Vaginal bleeding (Primary)  [N39.0] Urinary tract infection without hematuria, site unspecified          ED Disposition Condition    Discharge Stable        ED Prescriptions     Medication Sig Dispense Start Date End Date Auth. Provider    cephALEXin (KEFLEX) 500 MG capsule Take 1 capsule (500 mg total) by mouth 4 (four) times daily. for 5 days 20 capsule 5/3/2022 5/8/2022 Josias Wilhelm MD        Follow-up Information     Follow " up With Specialties Details Why Contact Info    [facility] Emergency Medicine  As needed, If symptoms worsen [address] [facility] [address] [phone]           Josias Wilhelm MD  05/16/22 212

## 2022-07-22 ENCOUNTER — HOSPITAL ENCOUNTER (EMERGENCY)
Facility: HOSPITAL | Age: 87
Discharge: HOME | End: 2022-07-22
Attending: EMERGENCY MEDICINE
Payer: MEDICARE

## 2022-07-22 VITALS
HEART RATE: 70 BPM | OXYGEN SATURATION: 98 % | TEMPERATURE: 99 F | DIASTOLIC BLOOD PRESSURE: 78 MMHG | WEIGHT: 151.69 LBS | HEIGHT: 66 IN | RESPIRATION RATE: 18 BRPM | BODY MASS INDEX: 24.38 KG/M2 | SYSTOLIC BLOOD PRESSURE: 132 MMHG

## 2022-07-22 DIAGNOSIS — N93.9 VAGINAL BLEEDING: Primary | ICD-10-CM

## 2022-07-22 DIAGNOSIS — T83.9XXA PROBLEM WITH VAGINAL PESSARY, INITIAL ENCOUNTER: ICD-10-CM

## 2022-07-22 PROCEDURE — 99282 EMERGENCY DEPT VISIT SF MDM: CPT

## 2022-07-22 RX ORDER — ESCITALOPRAM OXALATE 10 MG/1
TABLET ORAL
COMMUNITY
Start: 2021-07-13 | End: 2022-11-29 | Stop reason: SDUPTHER

## 2022-07-22 RX ORDER — PANTOPRAZOLE SODIUM 40 MG/1
TABLET, DELAYED RELEASE ORAL
COMMUNITY
End: 2022-10-31 | Stop reason: SDUPTHER

## 2022-07-22 RX ORDER — PRAVASTATIN SODIUM 20 MG/1
TABLET ORAL
COMMUNITY
End: 2022-10-31 | Stop reason: SDUPTHER

## 2022-07-22 RX ORDER — FLUTICASONE PROPIONATE 50 MCG
1 SPRAY, SUSPENSION (ML) NASAL DAILY
COMMUNITY
Start: 2022-04-22 | End: 2023-01-30 | Stop reason: SDUPTHER

## 2022-07-22 RX ORDER — AZELASTINE 1 MG/ML
SPRAY, METERED NASAL
COMMUNITY
Start: 2021-07-13 | End: 2023-01-30 | Stop reason: SDUPTHER

## 2022-07-22 RX ORDER — ACETAMINOPHEN 500 MG
10 TABLET ORAL
COMMUNITY
Start: 2021-07-13 | End: 2023-01-30 | Stop reason: SDUPTHER

## 2022-07-22 RX ORDER — AMLODIPINE AND BENAZEPRIL HYDROCHLORIDE 5; 20 MG/1; MG/1
1 CAPSULE ORAL DAILY
COMMUNITY
Start: 2022-07-19 | End: 2022-10-31 | Stop reason: SDUPTHER

## 2022-07-22 RX ORDER — PEDIATRIC MULTIVITAMIN NO.238
TABLET,CHEWABLE ORAL
COMMUNITY
End: 2023-04-24 | Stop reason: SDUPTHER

## 2022-07-22 NOTE — ED PROVIDER NOTES
Encounter Date: 7/22/2022       History     Chief Complaint   Patient presents with    Vaginal Bleeding     C/o pessary placed into vagina and taken out today to clean it. When she took it out there was vaginal bleeding with pain.      87 YO WF in ER with complaints of vaginal bleeding after taking out her pessary. She had it placed about 1 year ago and takes it out every 6 weeks to clean it. Her next GYN appt is in September. Denies fever, chills, chest pain, SOB, abdominal pain, N/V/D, HA or dizziness. No other complaints.    The history is provided by the patient.     Review of patient's allergies indicates:   Allergen Reactions    Sulfa (sulfonamide antibiotics)      Past Medical History:   Diagnosis Date    Hypertension      Past Surgical History:   Procedure Laterality Date    APPENDECTOMY      CHOLECYSTECTOMY      HYSTERECTOMY       History reviewed. No pertinent family history.  Social History     Tobacco Use    Smoking status: Never Smoker    Smokeless tobacco: Never Used   Substance Use Topics    Alcohol use: Not Currently    Drug use: Never     Review of Systems   Constitutional: Negative for chills and fever.   HENT: Negative for congestion and sore throat.    Respiratory: Negative for shortness of breath.    Cardiovascular: Negative for chest pain.   Gastrointestinal: Negative for abdominal pain, diarrhea, nausea and vomiting.   Genitourinary: Positive for vaginal bleeding. Negative for dysuria.   Musculoskeletal: Negative for back pain.   Skin: Negative for rash.   Neurological: Negative for dizziness, weakness, light-headedness and headaches.   Hematological: Does not bruise/bleed easily.   All other systems reviewed and are negative.      Physical Exam     Initial Vitals [07/22/22 1811]   BP Pulse Resp Temp SpO2   (!) 164/71 72 20 99.3 °F (37.4 °C) 97 %      MAP       --         Physical Exam    Nursing note and vitals reviewed.  Constitutional: She appears well-developed and  well-nourished. She is not diaphoretic. No distress.   HENT:   Head: Normocephalic and atraumatic.   Mouth/Throat: Oropharynx is clear and moist.   Eyes: Conjunctivae are normal.   Neck: Neck supple.   Cardiovascular: Normal rate, regular rhythm, normal heart sounds and intact distal pulses.   Pulmonary/Chest: Breath sounds normal.   Abdominal: Abdomen is soft.   Genitourinary:    Vaginal bleeding ( minimal blood noted in vaginal vault, small skin tear to left vaginal wall without active bleeding) present.      No vaginal discharge or tenderness.   There is bleeding ( minimal blood noted in vaginal vault, small skin tear to left vaginal wall without active bleeding) in the vagina. No tenderness in the vagina.    No foreign body in the vagina.         Musculoskeletal:      Cervical back: Neck supple.     Neurological: She is alert and oriented to person, place, and time. She has normal strength.   Skin: Skin is warm and dry.   Psychiatric: She has a normal mood and affect.         ED Course   Procedures  Labs Reviewed - No data to display       Imaging Results    None          Medications - No data to display                       Clinical Impression:   Final diagnoses:  [N93.9] Vaginal bleeding (Primary)  [T83.9XXA] Problem with vaginal pessary, initial encounter          ED Disposition Condition    Discharge Stable        ED Prescriptions     None        Follow-up Information     Follow up With Specialties Details Why Contact Info    [facility] Emergency Dept Emergency Medicine In 3 days As needed, If symptoms worsen [address] [facility] [address] [phone]    [facility]  Go in 1 week Gynecology Clinic, they will call you with an appointment [address] [facility] [address]           POLI Harris  07/22/22 1383

## 2022-07-23 NOTE — DISCHARGE INSTRUCTIONS
Do not put pessary back in.    Follow up with GYN next week, they will call you with an appointment.

## 2022-07-28 ENCOUNTER — HOSPITAL ENCOUNTER (EMERGENCY)
Facility: HOSPITAL | Age: 87
Discharge: HOME | End: 2022-07-28
Attending: INTERNAL MEDICINE
Payer: MEDICARE

## 2022-07-28 VITALS
HEART RATE: 60 BPM | HEIGHT: 66 IN | RESPIRATION RATE: 18 BRPM | TEMPERATURE: 99 F | BODY MASS INDEX: 24 KG/M2 | WEIGHT: 149.31 LBS | DIASTOLIC BLOOD PRESSURE: 59 MMHG | SYSTOLIC BLOOD PRESSURE: 121 MMHG | OXYGEN SATURATION: 98 %

## 2022-07-28 DIAGNOSIS — N30.01 ACUTE CYSTITIS WITH HEMATURIA: Primary | ICD-10-CM

## 2022-07-28 DIAGNOSIS — D50.9 MICROCYTIC HYPOCHROMIC ANEMIA: ICD-10-CM

## 2022-07-28 LAB
APPEARANCE UR: ABNORMAL
BACTERIA #/AREA URNS AUTO: ABNORMAL /HPF
BASOPHILS # BLD AUTO: 0.01 X10(3)/MCL (ref 0–0.2)
BASOPHILS NFR BLD AUTO: 0.3 %
BILIRUB UR QL STRIP.AUTO: NEGATIVE MG/DL
COLOR UR AUTO: ABNORMAL
EOSINOPHIL # BLD AUTO: 0.1 X10(3)/MCL (ref 0–0.9)
EOSINOPHIL NFR BLD AUTO: 2.8 %
ERYTHROCYTE [DISTWIDTH] IN BLOOD BY AUTOMATED COUNT: 14.3 % (ref 11.5–17)
GLUCOSE UR QL STRIP.AUTO: NEGATIVE MG/DL
HCT VFR BLD AUTO: 32.8 % (ref 37–47)
HGB BLD-MCNC: 10.2 GM/DL (ref 12–16)
IMM GRANULOCYTES # BLD AUTO: 0.03 X10(3)/MCL (ref 0–0.04)
IMM GRANULOCYTES NFR BLD AUTO: 0.8 %
KETONES UR QL STRIP.AUTO: NEGATIVE MG/DL
LEUKOCYTE ESTERASE UR QL STRIP.AUTO: 500 UNIT/L
LYMPHOCYTES # BLD AUTO: 0.73 X10(3)/MCL (ref 0.6–4.6)
LYMPHOCYTES NFR BLD AUTO: 20.5 %
MCH RBC QN AUTO: 27.3 PG (ref 27–31)
MCHC RBC AUTO-ENTMCNC: 31.1 MG/DL (ref 33–36)
MCV RBC AUTO: 87.7 FL (ref 80–94)
MONOCYTES # BLD AUTO: 0.2 X10(3)/MCL (ref 0.1–1.3)
MONOCYTES NFR BLD AUTO: 5.6 %
MUCOUS THREADS URNS QL MICRO: ABNORMAL /LPF
NEUTROPHILS # BLD AUTO: 2.5 X10(3)/MCL (ref 2.1–9.2)
NEUTROPHILS NFR BLD AUTO: 70 %
NITRITE UR QL STRIP.AUTO: NEGATIVE
NRBC BLD AUTO-RTO: 0 %
PH UR STRIP.AUTO: 7.5 [PH]
PLATELET # BLD AUTO: 163 X10(3)/MCL (ref 130–400)
PMV BLD AUTO: 11 FL (ref 7.4–10.4)
PROT UR QL STRIP.AUTO: ABNORMAL MG/DL
RBC # BLD AUTO: 3.74 X10(6)/MCL (ref 4.2–5.4)
RBC #/AREA URNS AUTO: >=100 /HPF
RBC UR QL AUTO: NEGATIVE UNIT/L
SP GR UR STRIP.AUTO: 1.02
SQUAMOUS #/AREA URNS LPF: ABNORMAL /HPF
UROBILINOGEN UR STRIP-ACNC: NORMAL MG/DL
WBC # SPEC AUTO: 3.6 X10(3)/MCL (ref 4.5–11.5)
WBC #/AREA URNS AUTO: >=100 /HPF

## 2022-07-28 PROCEDURE — 36415 COLL VENOUS BLD VENIPUNCTURE: CPT | Performed by: INTERNAL MEDICINE

## 2022-07-28 PROCEDURE — 85025 COMPLETE CBC W/AUTO DIFF WBC: CPT | Performed by: INTERNAL MEDICINE

## 2022-07-28 PROCEDURE — 99283 EMERGENCY DEPT VISIT LOW MDM: CPT | Mod: 25

## 2022-07-28 PROCEDURE — 81001 URINALYSIS AUTO W/SCOPE: CPT | Performed by: INTERNAL MEDICINE

## 2022-07-28 RX ORDER — AMOXICILLIN AND CLAVULANATE POTASSIUM 500; 125 MG/1; MG/1
1 TABLET, FILM COATED ORAL 2 TIMES DAILY
Qty: 14 TABLET | Refills: 0 | Status: SHIPPED | OUTPATIENT
Start: 2022-07-28 | End: 2022-08-04

## 2022-07-28 NOTE — ED PROVIDER NOTES
Encounter Date: 7/28/2022       History     Chief Complaint   Patient presents with    Vaginal Bleeding     Increasing since last ER visit for same complaint 1 week ago. Also reports increasing fatigue.      Presents with vaginal bleeding, states every time she urinate there is blood, this was associated in the past to uterine prolapse but she was instructed to stop using the pasary due to a lesion on her vaginal canal. Has an appointment with Gyn on Monday but is feeling weak.    The history is provided by the patient and a relative.   Vaginal Bleeding  This is a recurrent problem. The current episode started more than 1 week ago. The problem occurs constantly. The problem has been gradually worsening. Associated symptoms include abdominal pain. Pertinent negatives include no chest pain, no headaches and no shortness of breath. Exacerbated by: Urination. Nothing relieves the symptoms. She has tried nothing for the symptoms.     Review of patient's allergies indicates:   Allergen Reactions    Sulfa (sulfonamide antibiotics)      Past Medical History:   Diagnosis Date    Hypertension      Past Surgical History:   Procedure Laterality Date    APPENDECTOMY      CHOLECYSTECTOMY      HYSTERECTOMY       No family history on file.  Social History     Tobacco Use    Smoking status: Never Smoker    Smokeless tobacco: Never Used   Substance Use Topics    Alcohol use: Not Currently    Drug use: Never     Review of Systems   Constitutional: Positive for fatigue. Negative for fever.   HENT: Negative for sore throat.    Respiratory: Negative for shortness of breath.    Cardiovascular: Negative for chest pain.   Gastrointestinal: Positive for abdominal pain. Negative for nausea.   Genitourinary: Positive for dysuria and vaginal bleeding.   Musculoskeletal: Negative for back pain.   Skin: Negative for rash.   Neurological: Positive for weakness. Negative for headaches.   Hematological: Does not bruise/bleed easily.   All  other systems reviewed and are negative.      Physical Exam     Initial Vitals [07/28/22 0908]   BP Pulse Resp Temp SpO2   135/62 68 18 99.4 °F (37.4 °C) 97 %      MAP       --         Physical Exam    Nursing note and vitals reviewed.  Constitutional: She appears well-developed and well-nourished. No distress.   HENT:   Head: Normocephalic and atraumatic.   Mouth/Throat: Oropharynx is clear and moist.   Eyes: Conjunctivae are normal. Pupils are equal, round, and reactive to light.   Neck: Neck supple.   Normal range of motion.  Cardiovascular: Normal rate, regular rhythm and intact distal pulses.   Murmur heard.  Pulmonary/Chest: Breath sounds normal.   Abdominal: Abdomen is soft. Bowel sounds are normal. She exhibits no distension. There is no abdominal tenderness. There is no rebound and no guarding.   Genitourinary:    Vagina normal.      No vaginal discharge.      Genitourinary Comments: Pelvic exam reveal no blood on vaginal vault, small ulcerations on the wall without bleeding, normal external genitalia, no hemorrhoids     Musculoskeletal:         General: No edema. Normal range of motion.      Cervical back: Normal range of motion and neck supple.     Neurological: She is alert and oriented to person, place, and time. She has normal strength.   Skin: Skin is warm and dry. No rash noted.   Psychiatric: Her behavior is normal.         ED Course   Procedures  Labs Reviewed   CBC WITH DIFFERENTIAL - Abnormal; Notable for the following components:       Result Value    WBC 3.6 (*)     RBC 3.74 (*)     Hgb 10.2 (*)     Hct 32.8 (*)     MCHC 31.1 (*)     MPV 11.0 (*)     All other components within normal limits   URINALYSIS - Abnormal; Notable for the following components:    Color, UA Red (*)     Appearance, UA Turbid (*)     Protein, UA 3+ (*)     Leukocyte Esterase,   (*)     WBC, UA >=100 (*)     Bacteria, UA Moderate (*)     Mucous, UA Large (*)     RBC, UA >=100 (*)     All other components within  normal limits   CULTURE, URINE   CBC W/ AUTO DIFFERENTIAL    Narrative:     The following orders were created for panel order CBC auto differential.  Procedure                               Abnormality         Status                     ---------                               -----------         ------                     CBC with Differential[[id number]]        Abnormal            Final result                 Please view results for these tests on the individual orders.   EXTRA TUBES    Narrative:     The following orders were created for panel order EXTRA TUBES.  Procedure                               Abnormality         Status                     ---------                               -----------         ------                     Light Blue Top Hold[[id number]]                              In process                 Light Green Top Hold[[id number]]                             In process                 Lavender Top Hold[[id number]]                                In process                 Gold Top Hold[[id number]]                                                               Pink Top Hold[[id number]]                                    In process                   Please view results for these tests on the individual orders.   LIGHT BLUE TOP HOLD   LIGHT GREEN TOP HOLD   LAVENDER TOP HOLD   GOLD TOP HOLD   PINK TOP HOLD          Imaging Results    None          Medications - No data to display                       Clinical Impression:   Final diagnoses:  [N30.01] Acute cystitis with hematuria (Primary)  [D50.9] Microcytic hypochromic anemia          ED Disposition Condition    Discharge Stable        ED Prescriptions     Medication Sig Dispense Start Date End Date Auth. Provider    amoxicillin-clavulanate 500-125mg (AUGMENTIN) 500-125 mg Tab Take 1 tablet (500 mg total) by mouth 2 (two) times daily. for 7 days 14 tablet 7/28/2022 8/4/2022 Blue Rebollar MD        Follow-up Information     Follow up  With Specialties Details Why Contact Info    [facility] Emergency Medicine  If symptoms worsen [address] [facility] [address] [phone]    [facility]  Go in 1 week  [address] [facility] [address] [phone]           Blue Rebollar MD  07/28/22 1058

## 2022-07-30 LAB — BACTERIA UR CULT: NO GROWTH

## 2022-08-01 ENCOUNTER — OFFICE VISIT (OUTPATIENT)
Dept: GYNECOLOGY | Facility: CLINIC | Age: 87
End: 2022-08-01
Payer: MEDICARE

## 2022-08-01 VITALS
TEMPERATURE: 98 F | HEART RATE: 68 BPM | RESPIRATION RATE: 20 BRPM | OXYGEN SATURATION: 97 % | BODY MASS INDEX: 24.09 KG/M2 | DIASTOLIC BLOOD PRESSURE: 61 MMHG | WEIGHT: 149.25 LBS | SYSTOLIC BLOOD PRESSURE: 101 MMHG

## 2022-08-01 DIAGNOSIS — N95.2 ATROPHY OF VAGINA: ICD-10-CM

## 2022-08-01 DIAGNOSIS — N81.10 VAGINAL PROLAPSE: ICD-10-CM

## 2022-08-01 DIAGNOSIS — N95.2 ATROPHY OF VAGINA: Primary | ICD-10-CM

## 2022-08-01 DIAGNOSIS — N81.10 VAGINAL PROLAPSE: Primary | ICD-10-CM

## 2022-08-01 DIAGNOSIS — R31.9 HEMATURIA, UNSPECIFIED TYPE: ICD-10-CM

## 2022-08-01 DIAGNOSIS — I10 HYPERTENSION, UNSPECIFIED TYPE: ICD-10-CM

## 2022-08-01 DIAGNOSIS — Z46.89 PESSARY MAINTENANCE: ICD-10-CM

## 2022-08-01 LAB
APPEARANCE UR: ABNORMAL
BACTERIA #/AREA URNS AUTO: ABNORMAL /HPF
BILIRUB UR QL STRIP.AUTO: NEGATIVE MG/DL
COLOR UR AUTO: ABNORMAL
GLUCOSE UR QL STRIP.AUTO: NEGATIVE MG/DL
KETONES UR QL STRIP.AUTO: ABNORMAL MG/DL
LEUKOCYTE ESTERASE UR QL STRIP.AUTO: 500 UNIT/L
NITRITE UR QL STRIP.AUTO: NEGATIVE
PH UR STRIP.AUTO: 6 [PH]
PROT UR QL STRIP.AUTO: ABNORMAL MG/DL
RBC #/AREA URNS AUTO: >=100 /HPF
RBC UR QL AUTO: ABNORMAL UNIT/L
SP GR UR STRIP.AUTO: 1.03
UROBILINOGEN UR STRIP-ACNC: ABNORMAL MG/DL
WBC #/AREA URNS AUTO: ABNORMAL /HPF

## 2022-08-01 PROCEDURE — 81001 URINALYSIS AUTO W/SCOPE: CPT

## 2022-08-01 PROCEDURE — 99214 OFFICE O/P EST MOD 30 MIN: CPT | Mod: PBBFAC

## 2022-08-01 RX ORDER — ESTRADIOL 0.1 MG/G
1 CREAM VAGINAL
Qty: 42.5 G | Refills: 1 | Status: SHIPPED | OUTPATIENT
Start: 2022-08-01 | End: 2023-01-30 | Stop reason: SDUPTHER

## 2022-08-01 RX ORDER — ASPIRIN 325 MG
TABLET ORAL
COMMUNITY
End: 2022-10-31

## 2022-08-01 NOTE — PROGRESS NOTES
Premarin cream not covered by insurance. Rx for vaginal estrace cream sent.    Shonna Guardado MD, PGY-3  [facility]  08/01/2022 4:22 PM

## 2022-08-01 NOTE — ASSESSMENT & PLAN NOTE
Patient desires to transfer all care to [facility]. Referral for family medicine placed.

## 2022-08-01 NOTE — ASSESSMENT & PLAN NOTE
Previously managed with 2.5 Gellhorn pessary. Prolapse on exam today appears to be stage 2, patient without bothersome symptoms and doesn't want to replace at this time.    Counseled patient and daughter that the prolapse may return and she may desire management in the future. Daughter will call clinic if want to discuss management with pessary. Offered in office changes/cleaning in the future if needed.

## 2022-08-01 NOTE — ASSESSMENT & PLAN NOTE
No UCx sent from ED. Will send urine for culture today. Urine appears bloody. Patient referred to urology for evaluation of hematuria.

## 2022-08-01 NOTE — ASSESSMENT & PLAN NOTE
Likely cause of vaginal laceration with pessary removal identified by ED. Counseled patient that even though she does not want to replace the pessary at this time, if she does wish to use one in the future, treating with vaginal estrogen at this time will help revitalize her tissue and reduce risk of lacerations with pessary use in the future.

## 2022-08-01 NOTE — PROGRESS NOTES
[facility] GYNECOLOGY CLINIC NOTE     Fifi Chacon is a 86 y.o.  presenting to GYN clinic for ED f/u for pessary issue.    Patient took her pessary out to clean it and noticed pain and vaginal bleeding. She presented to the ED and small left vaginal wall laceration was noted. It was not bleeding at the time. She was instructed to not wear the pessary and f/u in clinic. The patient presented again to the ED  for bleeding with urination. Cath UA significant for protein, no blood but >100 RBCs, 500 LE, moderate bacteria, few squams. Patient was treated for UTI with Augmentin, currently on treatment.    Today she continues to reports bleeding with urination. She says her urine is dark and has been dark for a while. Denies vaginal pain at this time. Patient does not desire to continue to use the pessary at this time. She was previously fitted for a 2.5 Gellhorn pessary and was removing/cleaning/replacing on her own. She reports it was painful to replace the pessary. It hasn't been in since  and denies symptoms of prolapse, vaginal bulge, trouble urinating or with bowel movements. She has used vaginal estrogen in the past but hasn't in a while.     Past Medical History:   Diagnosis Date    GERD (gastroesophageal reflux disease)     Hyperlipidemia     Hypertension       Past Surgical History:   Procedure Laterality Date    APPENDECTOMY      CHOLECYSTECTOMY      HYSTERECTOMY        OB History    Para Term  AB Living   4 2 2   2     SAB IAB Ectopic Multiple Live Births                  # Outcome Date GA Lbr Wilbert/2nd Weight Sex Delivery Anes PTL Lv   4 AB            3 AB            2 Term            1 Term              Current Outpatient Medications   Medication Instructions    amlodipine-benazepril 5-20 mg (LOTREL) 5-20 mg per capsule 1 capsule, Oral, Daily    amoxicillin-clavulanate 500-125mg (AUGMENTIN) 500-125 mg Tab 500 mg, Oral, 2 times daily    aspirin 325 MG tablet aspirin 325 mg  tablet   Take 1 tablet every day by oral route.    azelastine (ASTELIN) 137 mcg (0.1 %) nasal spray Nasal    conjugated estrogens (PREMARIN) 1 g, Vaginal, Daily    EScitalopram oxalate (LEXAPRO) 10 MG tablet escitalopram 10 mg tablet   Take 1 tablet every day by oral route for 90 days.    fluticasone propionate (FLONASE) 50 mcg/actuation nasal spray 1 spray, Each Nostril, Daily    melatonin (MELATIN) 5 mg, Oral    multivit with min-folic acid 200 mcg Chew One A Day Vitamin    pantoprazole (PROTONIX) 40 MG tablet pantoprazole 40 mg tablet,delayed release    pravastatin (PRAVACHOL) 20 MG tablet pravastatin 20 mg tablet   Take 1 tablet every day by oral route.     Social History     Tobacco Use    Smoking status: Never Smoker    Smokeless tobacco: Never Used   Substance Use Topics    Alcohol use: Not Currently    Drug use: Never       Review of Systems  Pertinent items are noted in HPI.     Objective:     /61 (BP Location: Right arm, Patient Position: Sitting, BP Method: Large (Automatic))   Pulse 68   Temp 98.2 °F (36.8 °C) (Oral)   Resp 20   Wt 67.7 kg (149 lb 4 oz)   SpO2 97%   BMI 24.09 kg/m²     Physical Exam:  Gen: Well-nourished, well-developed female appearing stated age. Alert, cooperative, in no acute distress.  Extrem: Extremities normal, atraumatic, non-tender calves.  External genitalia: Normal female genitalia without lesion, discharge or tenderness.   Speculum Exam: Vaginal vault without discharge, nonodorous, no lesions/masses seen. Anterior vaginal wall visible at hymen but does not protrude past with valsalva. Cervix surgically absent. No vaginal side wall laceration visible.  Bimanual deferred  Note: RN chaperone present for entirety of exam.    Relevant Labs:   22 H/H 10.2/32.8    Per POP-Q exam on 2021:      Assessment:       86 y.o.  here for vaginal prolapse (anterior and apical) previously managed with 2.5 Gellhorn pessary.       Plan:     Problem List  Items Addressed This Visit        Cardiac/Vascular    Hypertension     Patient desires to transfer all care to [facility]. Referral for family medicine placed.           Relevant Orders    Ambulatory referral/consult to Family Practice       Renal/    Vaginal prolapse - Primary     Previously managed with 2.5 Gellhorn pessary. Prolapse on exam today appears to be stage 2, patient without bothersome symptoms and doesn't want to replace at this time.    Counseled patient and daughter that the prolapse may return and she may desire management in the future. Daughter will call clinic if want to discuss management with pessary. Offered in office changes/cleaning in the future if needed.            Relevant Medications    conjugated estrogens (PREMARIN) vaginal cream    Pessary maintenance    Hematuria     No UCx sent from ED. Will send urine for culture today. Urine appears bloody. Patient referred to urology for evaluation of hematuria.           Relevant Orders    Ambulatory referral/consult to Urology    Urinalysis    Urine Culture High Risk    Atrophy of vagina     Likely cause of vaginal laceration with pessary removal identified by ED. Counseled patient that even though she does not want to replace the pessary at this time, if she does wish to use one in the future, treating with vaginal estrogen at this time will help revitalize her tissue and reduce risk of lacerations with pessary use in the future.           Relevant Medications    conjugated estrogens (PREMARIN) vaginal cream           Return to clinic PRN    Discussed patient and plan with Dr. Provost Shonna Guardado MD, PGY-3  [facility]  08/01/2022 12:00 PM

## 2022-08-02 DIAGNOSIS — R31.9 HEMATURIA, UNSPECIFIED TYPE: Primary | ICD-10-CM

## 2022-08-03 LAB — BACTERIA UR CULT: NO GROWTH

## 2022-09-02 ENCOUNTER — OFFICE VISIT (OUTPATIENT)
Dept: GYNECOLOGY | Facility: CLINIC | Age: 87
End: 2022-09-02
Payer: MEDICARE

## 2022-09-02 VITALS
HEIGHT: 66 IN | DIASTOLIC BLOOD PRESSURE: 72 MMHG | OXYGEN SATURATION: 97 % | SYSTOLIC BLOOD PRESSURE: 138 MMHG | BODY MASS INDEX: 24.11 KG/M2 | TEMPERATURE: 63 F | WEIGHT: 150 LBS | HEART RATE: 63 BPM | RESPIRATION RATE: 18 BRPM

## 2022-09-02 DIAGNOSIS — N81.10 PELVIC ORGAN PROLAPSE QUANTIFICATION STAGE 2 CYSTOCELE: ICD-10-CM

## 2022-09-02 PROCEDURE — 99214 OFFICE O/P EST MOD 30 MIN: CPT | Mod: PBBFAC

## 2022-09-02 NOTE — PROGRESS NOTES
[facility] GYNECOLOGY CLINIC NOTE     Fifi Chacon is a 86 y.o.  with stage 2 POP previously managed with 2.5 gellhorn pessary however patient decided to remove at last visit. Presenting for follow up.     Pt presents with no complaints today, no issues. Has been using vaginal estrogen cream occasionally.       Gynecology  OB History          4    Para   2    Term   2            AB   2    Living             SAB        IAB        Ectopic        Multiple        Live Births                    Past Medical History:   Diagnosis Date    GERD (gastroesophageal reflux disease)     Hyperlipidemia     Hypertension       Past Surgical History:   Procedure Laterality Date    APPENDECTOMY      CHOLECYSTECTOMY      HYSTERECTOMY        Current Outpatient Medications   Medication Instructions    amlodipine-benazepril 5-20 mg (LOTREL) 5-20 mg per capsule 1 capsule, Oral, Daily    aspirin 325 MG tablet aspirin 325 mg tablet   Take 1 tablet every day by oral route.    azelastine (ASTELIN) 137 mcg (0.1 %) nasal spray Nasal    EScitalopram oxalate (LEXAPRO) 10 MG tablet escitalopram 10 mg tablet   Take 1 tablet every day by oral route for 90 days.    estradioL (ESTRACE) 1 g, Vaginal, Twice weekly    fluticasone propionate (FLONASE) 50 mcg/actuation nasal spray 1 spray, Each Nostril, Daily    melatonin (MELATIN) 5 mg, Oral    multivit with min-folic acid 200 mcg Chew One A Day Vitamin    pantoprazole (PROTONIX) 40 MG tablet pantoprazole 40 mg tablet,delayed release    pravastatin (PRAVACHOL) 20 MG tablet pravastatin 20 mg tablet   Take 1 tablet every day by oral route.     Social History     Tobacco Use    Smoking status: Never    Smokeless tobacco: Never   Substance Use Topics    Alcohol use: Not Currently    Drug use: Never       Review of Systems  A comprehensive review of systems was negative.     Objective:     /72 (BP Location: Left arm, Patient Position: Sitting, BP Method: Medium (Automatic))   Pulse 63   " Temp (!) 63 °F (17.2 °C)   Resp 18   Ht 5' 6" (1.676 m)   Wt 68 kg (150 lb)   SpO2 97%   BMI 24.21 kg/m²   Physical Exam:  Gen: Well-nourished, well-developed female appearing stated age. Alert, cooperative, in no acute distress.  Abdomen: Soft, non-tender, no masses.  Extrem: Extremities normal, atraumatic, non-tender calves.        Assessment:       86 y.o.  with POP presents for follow up   1. Pelvic organ prolapse quantification stage 2 cystocele  Ambulatory referral/consult to Gynecology             Plan:     No issues today, does not desire any further management at this time for POP.       Problem List Items Addressed This Visit    None  Visit Diagnoses       Pelvic organ prolapse quantification stage 2 cystocele                Return to clinic PRN    Discussed patient and plan with Provost Jeet Giang MD   [facility], PGY4        "

## 2022-09-21 NOTE — PROGRESS NOTES
Chief Complaint: No chief complaint on file.      HPI:  Patient is a 86-year-old female referred to Urology for hematuria.  Patient has a history of a stage II prolapse bladder, recently visited emergency room with an ulcer to the vaginal area due to 2.5 Gelhorn pessary which was believed to be the cause of pain & bleeding which was removed.  On 05/03/2022 patient seen in ER for vaginal bleeding, patient seen by Gyn 2 weeks later Dx with dehydration. with Today patient presents with intermitted non-sensory incontinence, denies dysuria, urinary urgency, frequency,  retention, gross hematuria, nocturia. Patient denies family history of urologic pathology.       Allergies:  Review of patient's allergies indicates:   Allergen Reactions    Sulfa (sulfonamide antibiotics)        Medications:  Current Outpatient Medications   Medication Sig Dispense Refill    amlodipine-benazepril 5-20 mg (LOTREL) 5-20 mg per capsule Take 1 capsule by mouth once daily.      aspirin 325 MG tablet aspirin 325 mg tablet   Take 1 tablet every day by oral route.      azelastine (ASTELIN) 137 mcg (0.1 %) nasal spray by Nasal route.      EScitalopram oxalate (LEXAPRO) 10 MG tablet escitalopram 10 mg tablet   Take 1 tablet every day by oral route for 90 days.      estradioL (ESTRACE) 0.01 % (0.1 mg/gram) vaginal cream Place 1 g vaginally twice a week. 42.5 g 1    fluticasone propionate (FLONASE) 50 mcg/actuation nasal spray 1 spray by Each Nostril route once daily.      melatonin (MELATIN) 5 mg Take 5 mg by mouth.      multivit with min-folic acid 200 mcg Chew One A Day Vitamin      pantoprazole (PROTONIX) 40 MG tablet pantoprazole 40 mg tablet,delayed release      pravastatin (PRAVACHOL) 20 MG tablet pravastatin 20 mg tablet   Take 1 tablet every day by oral route.       No current facility-administered medications for this visit.       Review of Systems:  General: No fever, chills, fatigability, or weight loss.  Skin: No rashes, itching, or  changes in color or texture of skin.  Chest: Denies AGUAYO, cyanosis, wheezing, cough, and sputum production.  Abdomen: Appetite fine. No weight loss. Denies diarrhea, abdominal pain, hematemesis, or blood in stool.  Musculoskeletal: No joint stiffness or swelling. Denies back pain.  : As above.  All other review of systems negative.    PMH:  Past Medical History:   Diagnosis Date    GERD (gastroesophageal reflux disease)     Hyperlipidemia     Hypertension        PSH:  Past Surgical History:   Procedure Laterality Date    APPENDECTOMY      CHOLECYSTECTOMY      HYSTERECTOMY         FamHx:  Family History   Problem Relation Age of Onset    Hypertension Mother     Cancer Mother     Hypertension Sister     Hypertension Sister        SocHx:  Social History     Socioeconomic History    Marital status:    Tobacco Use    Smoking status: Never    Smokeless tobacco: Never   Substance and Sexual Activity    Alcohol use: Not Currently    Drug use: Never    Sexual activity: Not Currently       Physical Exam:  There were no vitals filed for this visit.  General: A&Ox3, no apparent distress, no deformities  Neck: No masses, normal thyroid  Lungs: CTA montrell, no use of accessory muscles  Heart: RRR, no arrhythmias  Abdomen: Soft, NT, ND, no masses, no hernias, no hepatosplenomegaly  Lymphatic: Neck and groin nodes negative  Skin: The skin is warm and dry. No jaundice.  Ext: No c/c/e.      Labs: None         Imaging: none      Impression: Hematuria      Plan:  UA culture and sensitivity, schedule CT abdomen and pelvis with and without contrast, schedule patient for Cysto next available.   will notify patient of UA results.

## 2022-09-22 ENCOUNTER — OFFICE VISIT (OUTPATIENT)
Dept: UROLOGY | Facility: CLINIC | Age: 87
End: 2022-09-22
Payer: MEDICARE

## 2022-09-22 VITALS
HEIGHT: 66 IN | OXYGEN SATURATION: 98 % | RESPIRATION RATE: 18 BRPM | SYSTOLIC BLOOD PRESSURE: 143 MMHG | TEMPERATURE: 98 F | WEIGHT: 150 LBS | DIASTOLIC BLOOD PRESSURE: 94 MMHG | HEART RATE: 70 BPM | BODY MASS INDEX: 24.11 KG/M2

## 2022-09-22 DIAGNOSIS — R31.9 HEMATURIA, UNSPECIFIED TYPE: ICD-10-CM

## 2022-09-22 PROCEDURE — 99204 OFFICE O/P NEW MOD 45 MIN: CPT | Mod: S$PBB,,, | Performed by: NURSE PRACTITIONER

## 2022-09-22 PROCEDURE — 99204 PR OFFICE/OUTPT VISIT, NEW, LEVL IV, 45-59 MIN: ICD-10-PCS | Mod: S$PBB,,, | Performed by: NURSE PRACTITIONER

## 2022-09-22 PROCEDURE — 99215 OFFICE O/P EST HI 40 MIN: CPT | Mod: PBBFAC | Performed by: NURSE PRACTITIONER

## 2022-09-22 NOTE — PATIENT INSTRUCTIONS
Pt seen by Richmond PALMA. Orders for urine culture will be placed. CT also ordered. Informed pt that centralized scheduling would be contacting her to set up date. RTC for next available cysto. Pt education given verbal. TM

## 2022-09-28 ENCOUNTER — LAB VISIT (OUTPATIENT)
Dept: LAB | Facility: HOSPITAL | Age: 87
End: 2022-09-28
Attending: NURSE PRACTITIONER
Payer: MEDICARE

## 2022-09-28 DIAGNOSIS — R31.9 HEMATURIA, UNSPECIFIED TYPE: ICD-10-CM

## 2022-09-28 LAB
APPEARANCE UR: ABNORMAL
BACTERIA #/AREA URNS AUTO: ABNORMAL /HPF
BILIRUB UR QL STRIP.AUTO: NEGATIVE MG/DL
COLOR UR AUTO: ABNORMAL
GLUCOSE UR QL STRIP.AUTO: NORMAL MG/DL
HYALINE CASTS #/AREA URNS LPF: ABNORMAL /LPF
KETONES UR QL STRIP.AUTO: NEGATIVE MG/DL
LEUKOCYTE ESTERASE UR QL STRIP.AUTO: 500 UNIT/L
NITRITE UR QL STRIP.AUTO: NEGATIVE
PH UR STRIP.AUTO: 6 [PH]
PROT UR QL STRIP.AUTO: NEGATIVE MG/DL
RBC #/AREA URNS AUTO: ABNORMAL /HPF
RBC UR QL AUTO: NEGATIVE UNIT/L
SP GR UR STRIP.AUTO: 1.01
SQUAMOUS #/AREA URNS LPF: ABNORMAL /HPF
UROBILINOGEN UR STRIP-ACNC: NORMAL MG/DL
WBC #/AREA URNS AUTO: >100 /HPF

## 2022-09-28 PROCEDURE — 87088 URINE BACTERIA CULTURE: CPT

## 2022-09-28 PROCEDURE — 81001 URINALYSIS AUTO W/SCOPE: CPT

## 2022-09-30 LAB — BACTERIA UR CULT: NO GROWTH

## 2022-10-03 ENCOUNTER — TELEPHONE (OUTPATIENT)
Dept: UROLOGY | Facility: CLINIC | Age: 87
End: 2022-10-03
Payer: MEDICARE

## 2022-10-03 NOTE — TELEPHONE ENCOUNTER
Spoke to patient regarding UA negative for culture and sensitivity encouraged patient started to have patient follow-up with CT to be performed in also cysto when scheduled.

## 2022-10-05 ENCOUNTER — HOSPITAL ENCOUNTER (OUTPATIENT)
Dept: RADIOLOGY | Facility: HOSPITAL | Age: 87
Discharge: HOME | End: 2022-10-05
Attending: NURSE PRACTITIONER
Payer: MEDICARE

## 2022-10-05 DIAGNOSIS — R31.9 HEMATURIA, UNSPECIFIED TYPE: ICD-10-CM

## 2022-10-05 DIAGNOSIS — R31.29 OTHER MICROSCOPIC HEMATURIA: Primary | ICD-10-CM

## 2022-10-05 LAB
CREAT SERPL-MCNC: 1.4 MG/DL (ref 0.55–1.02)
GFR SERPLBLD CREATININE-BSD FMLA CKD-EPI: 37 MLS/MIN/1.73/M2

## 2022-10-05 PROCEDURE — 25500020 PHARM REV CODE 255: Performed by: NURSE PRACTITIONER

## 2022-10-05 PROCEDURE — 82565 ASSAY OF CREATININE: CPT | Performed by: NURSE PRACTITIONER

## 2022-10-05 PROCEDURE — 74178 CT ABD&PLV WO CNTR FLWD CNTR: CPT | Mod: TC

## 2022-10-05 PROCEDURE — 36415 COLL VENOUS BLD VENIPUNCTURE: CPT | Performed by: NURSE PRACTITIONER

## 2022-10-05 RX ADMIN — IOHEXOL 100 ML: 350 INJECTION, SOLUTION INTRAVENOUS at 09:10

## 2022-10-31 ENCOUNTER — OFFICE VISIT (OUTPATIENT)
Dept: FAMILY MEDICINE | Facility: CLINIC | Age: 87
End: 2022-10-31
Payer: MEDICARE

## 2022-10-31 VITALS
OXYGEN SATURATION: 98 % | HEIGHT: 66 IN | DIASTOLIC BLOOD PRESSURE: 71 MMHG | HEART RATE: 66 BPM | WEIGHT: 152.13 LBS | BODY MASS INDEX: 24.45 KG/M2 | TEMPERATURE: 98 F | SYSTOLIC BLOOD PRESSURE: 129 MMHG

## 2022-10-31 DIAGNOSIS — Z23 IMMUNIZATION DUE: ICD-10-CM

## 2022-10-31 DIAGNOSIS — Z00.00 MEDICARE ANNUAL WELLNESS VISIT, INITIAL: ICD-10-CM

## 2022-10-31 DIAGNOSIS — K21.9 GASTROESOPHAGEAL REFLUX DISEASE, UNSPECIFIED WHETHER ESOPHAGITIS PRESENT: ICD-10-CM

## 2022-10-31 DIAGNOSIS — I10 HYPERTENSION, UNSPECIFIED TYPE: Primary | ICD-10-CM

## 2022-10-31 DIAGNOSIS — R73.09 ELEVATED GLUCOSE: ICD-10-CM

## 2022-10-31 DIAGNOSIS — E55.9 VITAMIN D DEFICIENCY: ICD-10-CM

## 2022-10-31 PROBLEM — F32.A DEPRESSIVE DISORDER: Status: ACTIVE | Noted: 2022-10-31

## 2022-10-31 PROBLEM — N13.4 HYDROURETER: Status: ACTIVE | Noted: 2022-10-31

## 2022-10-31 PROBLEM — F41.9 ANXIETY: Status: ACTIVE | Noted: 2022-10-31

## 2022-10-31 PROBLEM — Z86.39 HISTORY OF IRON DEFICIENCY: Status: ACTIVE | Noted: 2022-10-31

## 2022-10-31 PROBLEM — M06.9 RHEUMATOID ARTHRITIS: Status: ACTIVE | Noted: 2022-10-31

## 2022-10-31 LAB
ALBUMIN SERPL-MCNC: 3.5 GM/DL (ref 3.4–4.8)
ALBUMIN/GLOB SERPL: 0.5 RATIO (ref 1.1–2)
ALP SERPL-CCNC: 65 UNIT/L (ref 40–150)
ALT SERPL-CCNC: 11 UNIT/L (ref 0–55)
AST SERPL-CCNC: 23 UNIT/L (ref 5–34)
BASOPHILS # BLD AUTO: 0.01 X10(3)/MCL (ref 0–0.2)
BASOPHILS NFR BLD AUTO: 0.3 %
BILIRUBIN DIRECT+TOT PNL SERPL-MCNC: 0.4 MG/DL
BUN SERPL-MCNC: 19.9 MG/DL (ref 9.8–20.1)
CALCIUM SERPL-MCNC: 9.9 MG/DL (ref 8.4–10.2)
CHLORIDE SERPL-SCNC: 104 MMOL/L (ref 98–107)
CHOLEST SERPL-MCNC: 157 MG/DL
CHOLEST/HDLC SERPL: 5 {RATIO} (ref 0–5)
CO2 SERPL-SCNC: 26 MMOL/L (ref 23–31)
CREAT SERPL-MCNC: 1.08 MG/DL (ref 0.55–1.02)
DEPRECATED CALCIDIOL+CALCIFEROL SERPL-MC: 37 NG/ML (ref 30–80)
EOSINOPHIL # BLD AUTO: 0.03 X10(3)/MCL (ref 0–0.9)
EOSINOPHIL NFR BLD AUTO: 0.9 %
ERYTHROCYTE [DISTWIDTH] IN BLOOD BY AUTOMATED COUNT: 14.4 % (ref 11.5–17)
EST. AVERAGE GLUCOSE BLD GHB EST-MCNC: 102.5 MG/DL
GFR SERPLBLD CREATININE-BSD FMLA CKD-EPI: 50 MLS/MIN/1.73/M2
GLOBULIN SER-MCNC: 6.6 GM/DL (ref 2.4–3.5)
GLUCOSE SERPL-MCNC: 87 MG/DL (ref 82–115)
HBA1C MFR BLD: 5.2 %
HCT VFR BLD AUTO: 35.6 % (ref 37–47)
HDLC SERPL-MCNC: 33 MG/DL (ref 35–60)
HGB BLD-MCNC: 11 GM/DL (ref 12–16)
IMM GRANULOCYTES # BLD AUTO: 0.01 X10(3)/MCL (ref 0–0.04)
IMM GRANULOCYTES NFR BLD AUTO: 0.3 %
LDLC SERPL CALC-MCNC: 85 MG/DL (ref 50–140)
LYMPHOCYTES # BLD AUTO: 0.61 X10(3)/MCL (ref 0.6–4.6)
LYMPHOCYTES NFR BLD AUTO: 17.9 %
MCH RBC QN AUTO: 27.7 PG (ref 27–31)
MCHC RBC AUTO-ENTMCNC: 30.9 MG/DL (ref 33–36)
MCV RBC AUTO: 89.7 FL (ref 80–94)
MONOCYTES # BLD AUTO: 0.24 X10(3)/MCL (ref 0.1–1.3)
MONOCYTES NFR BLD AUTO: 7.1 %
NEUTROPHILS # BLD AUTO: 2.5 X10(3)/MCL (ref 2.1–9.2)
NEUTROPHILS NFR BLD AUTO: 73.5 %
NRBC BLD AUTO-RTO: 0 %
PLATELET # BLD AUTO: 161 X10(3)/MCL (ref 130–400)
PMV BLD AUTO: 10.9 FL (ref 7.4–10.4)
POTASSIUM SERPL-SCNC: 4.1 MMOL/L (ref 3.5–5.1)
PROT SERPL-MCNC: 10.1 GM/DL (ref 5.8–7.6)
RBC # BLD AUTO: 3.97 X10(6)/MCL (ref 4.2–5.4)
SODIUM SERPL-SCNC: 139 MMOL/L (ref 136–145)
T4 FREE SERPL-MCNC: 0.92 NG/DL (ref 0.7–1.48)
TRIGL SERPL-MCNC: 193 MG/DL (ref 37–140)
TSH SERPL-ACNC: 5.38 UIU/ML (ref 0.35–4.94)
VLDLC SERPL CALC-MCNC: 39 MG/DL
WBC # SPEC AUTO: 3.4 X10(3)/MCL (ref 4.5–11.5)

## 2022-10-31 PROCEDURE — 80053 COMPREHEN METABOLIC PANEL: CPT

## 2022-10-31 PROCEDURE — 99215 OFFICE O/P EST HI 40 MIN: CPT | Mod: PBBFAC,25

## 2022-10-31 PROCEDURE — 84439 ASSAY OF FREE THYROXINE: CPT

## 2022-10-31 PROCEDURE — G0008 ADMIN INFLUENZA VIRUS VAC: HCPCS | Mod: PBBFAC

## 2022-10-31 PROCEDURE — 84443 ASSAY THYROID STIM HORMONE: CPT

## 2022-10-31 PROCEDURE — 83036 HEMOGLOBIN GLYCOSYLATED A1C: CPT

## 2022-10-31 PROCEDURE — 82306 VITAMIN D 25 HYDROXY: CPT

## 2022-10-31 PROCEDURE — 80061 LIPID PANEL: CPT

## 2022-10-31 PROCEDURE — 36415 COLL VENOUS BLD VENIPUNCTURE: CPT

## 2022-10-31 PROCEDURE — 85025 COMPLETE CBC W/AUTO DIFF WBC: CPT

## 2022-10-31 PROCEDURE — 90677 PCV20 VACCINE IM: CPT | Mod: PBBFAC

## 2022-10-31 RX ORDER — NAPROXEN SODIUM 220 MG/1
81 TABLET, FILM COATED ORAL DAILY
Qty: 90 TABLET | Refills: 3 | Status: SHIPPED | OUTPATIENT
Start: 2022-10-31 | End: 2023-01-30 | Stop reason: SDUPTHER

## 2022-10-31 RX ORDER — PRAVASTATIN SODIUM 20 MG/1
TABLET ORAL
Qty: 90 TABLET | Refills: 2 | Status: SHIPPED | OUTPATIENT
Start: 2022-10-31 | End: 2022-11-07

## 2022-10-31 RX ORDER — ASPIRIN 325 MG
TABLET ORAL
Qty: 90 TABLET | Refills: 2 | Status: CANCELLED | OUTPATIENT
Start: 2022-10-31

## 2022-10-31 RX ORDER — PANTOPRAZOLE SODIUM 40 MG/1
TABLET, DELAYED RELEASE ORAL
Qty: 90 TABLET | Refills: 2 | Status: SHIPPED | OUTPATIENT
Start: 2022-10-31 | End: 2022-11-07

## 2022-10-31 RX ORDER — AMLODIPINE AND BENAZEPRIL HYDROCHLORIDE 5; 20 MG/1; MG/1
1 CAPSULE ORAL DAILY
Qty: 90 CAPSULE | Refills: 2 | Status: SHIPPED | OUTPATIENT
Start: 2022-10-31 | End: 2023-01-30 | Stop reason: SDUPTHER

## 2022-10-31 NOTE — PROGRESS NOTES
"[facility] Family Medicine Office Visit Note    Subjective:      Patient ID: Fifi Chacon is a 86 y.o. female.    Chief Complaint: Sore Throat (Complain of sore throat.) and Hypertension    HPI  86 y.o. female presents to [facility] clinic to establish care. She is doing well today accompanied by her daughter who lives in [place]. Patient lives in [place] and is cared for by her son.    Acute concerns:  Pain in throat has been present for years. She does have a history of allergic rhinitis with postnasal drip for which she uses Flonase for relief. States that throat and mouth feel dry at times.     Chronic conditions  Hypertension currently on amlodipine-benzapril 5-20 mg  Also taking  however does not know why she is on such a high dose so will decrease dose today given recent hematuria receiving treatment by Urology.     Health Maintenance   Vaccinations: influenza and PCV-20 today  ASCVD: will calculate at next visit    Social Hx  Tobacco usage: never  Alcohol usage: never  Illicit drugs: never  Lives with and cared for by son    Medical Hx  Vaginal prolapse  HTN  GERD    Surgical Hx  Partial Hysterectomy 50 years ago  Cholecystectomy  Tonsillectomy  Appendectomy  Cataracts      Review of Systems:  As per HPI.    Objective:     /71 (BP Location: Left arm, Patient Position: Sitting, BP Method: Large (Automatic))   Pulse 66   Temp 98.1 °F (36.7 °C) (Oral)   Ht 5' 6" (1.676 m)   Wt 69 kg (152 lb 1.9 oz)   SpO2 98%   BMI 24.55 kg/m²     Physical Exam:  Gen: alert and oriented, NAD  CV: RRR, systolic murmur heard best at right sternal 2nd intercostal space, no LE edema, radial and dorsalis pedis pulses equal and present bilaterally  Resp: CTAB, breathing nonlabored on room air  Abd: Soft, non-distended, non-tender, bowel sounds normoactive  Skin: venous stasis changes to bilateral lower extremities.  MSK: ambulating spontaneously with appropriate ROM in all extremities      Current Outpatient " Medications:     amlodipine-benazepril 5-20 mg (LOTREL) 5-20 mg per capsule, Take 1 capsule by mouth once daily., Disp: 90 capsule, Rfl: 2    aspirin 325 MG tablet, aspirin 325 mg tablet  Take 1 tablet every day by oral route., Disp: , Rfl:     azelastine (ASTELIN) 137 mcg (0.1 %) nasal spray, by Nasal route., Disp: , Rfl:     EScitalopram oxalate (LEXAPRO) 10 MG tablet, escitalopram 10 mg tablet  Take 1 tablet every day by oral route for 90 days., Disp: , Rfl:     estradioL (ESTRACE) 0.01 % (0.1 mg/gram) vaginal cream, Place 1 g vaginally twice a week., Disp: 42.5 g, Rfl: 1    fluticasone propionate (FLONASE) 50 mcg/actuation nasal spray, 1 spray by Each Nostril route once daily., Disp: , Rfl:     melatonin (MELATIN) 5 mg, Take 5 mg by mouth., Disp: , Rfl:     multivit with min-folic acid 200 mcg Chew, One A Day Vitamin, Disp: , Rfl:     pantoprazole (PROTONIX) 40 MG tablet, pantoprazole 40 mg tablet,delayed release Strength: 40 mg, Disp: 90 tablet, Rfl: 2    pravastatin (PRAVACHOL) 20 MG tablet, pravastatin 20 mg tablet  Take 1 tablet every day by oral route. Strength: 20 mg, Disp: 90 tablet, Rfl: 2        Assessment/Plan:     1. Hypertension, unspecified type  - Discussed healthy eating, low Na diet  - Continue Lotrel 5-20 mg qd, Pravastatin 20 mg qd,    - Discontinue  mg, begin ASA 81 mg. Patient unsure why she is on high dose aspirin, cannot obtain more information after thorough chart review. Will decrease dose for now which should help with hematuria.     1. Medicare annual wellness visit, initial  - CBC, CMP, TSH, T4, A1c, Vit D, lipid panel ordered    3. Immunization due  - Influenza and PCV-20 administered today    4. Gastroesophageal reflux disease, unspecified whether esophagitis present  - Continue Protonix 40 mg, reflux well controlled      Follow-up: 3 months, will need karen Forte MD  [facility] - PGY-1

## 2022-11-02 NOTE — PROGRESS NOTES
I SAW AND EVALUATED THE PATIENT. I DISCUSSED WITH THE RESIDENT AND AGREE WITH THE RESIDENT'S FINDINGS AND CARE PLAN AS DOCUMENTED IN THE RESIDENT'S NOTE.

## 2022-11-07 RX ORDER — PRAVASTATIN SODIUM 20 MG/1
TABLET ORAL
Qty: 90 TABLET | Refills: 2 | Status: SHIPPED | OUTPATIENT
Start: 2022-11-07 | End: 2023-01-30 | Stop reason: SDUPTHER

## 2022-11-07 RX ORDER — PANTOPRAZOLE SODIUM 40 MG/1
TABLET, DELAYED RELEASE ORAL
Qty: 90 TABLET | Refills: 2 | Status: SHIPPED | OUTPATIENT
Start: 2022-11-07 | End: 2023-01-30 | Stop reason: SDUPTHER

## 2022-12-01 RX ORDER — ESCITALOPRAM OXALATE 10 MG/1
10 TABLET ORAL DAILY
Qty: 90 TABLET | Refills: 0 | Status: SHIPPED | OUTPATIENT
Start: 2022-12-01 | End: 2023-01-30 | Stop reason: SDUPTHER

## 2023-01-30 ENCOUNTER — OFFICE VISIT (OUTPATIENT)
Dept: FAMILY MEDICINE | Facility: CLINIC | Age: 88
End: 2023-01-30
Payer: MEDICARE

## 2023-01-30 VITALS
DIASTOLIC BLOOD PRESSURE: 70 MMHG | WEIGHT: 141 LBS | BODY MASS INDEX: 22.66 KG/M2 | RESPIRATION RATE: 17 BRPM | HEIGHT: 66 IN | SYSTOLIC BLOOD PRESSURE: 135 MMHG | OXYGEN SATURATION: 98 % | HEART RATE: 67 BPM | TEMPERATURE: 98 F

## 2023-01-30 DIAGNOSIS — K21.9 GASTROESOPHAGEAL REFLUX DISEASE, UNSPECIFIED WHETHER ESOPHAGITIS PRESENT: ICD-10-CM

## 2023-01-30 DIAGNOSIS — F32.A DEPRESSION, UNSPECIFIED DEPRESSION TYPE: ICD-10-CM

## 2023-01-30 DIAGNOSIS — E03.8 SUBCLINICAL HYPOTHYROIDISM: ICD-10-CM

## 2023-01-30 DIAGNOSIS — I10 HYPERTENSION, UNSPECIFIED TYPE: ICD-10-CM

## 2023-01-30 DIAGNOSIS — N81.10 VAGINAL PROLAPSE: ICD-10-CM

## 2023-01-30 DIAGNOSIS — N95.2 ATROPHY OF VAGINA: ICD-10-CM

## 2023-01-30 DIAGNOSIS — Z23 IMMUNIZATION DUE: Primary | ICD-10-CM

## 2023-01-30 PROCEDURE — 90750 HZV VACC RECOMBINANT IM: CPT | Mod: PBBFAC

## 2023-01-30 PROCEDURE — 90471 IMMUNIZATION ADMIN: CPT | Mod: PBBFAC

## 2023-01-30 PROCEDURE — 99214 OFFICE O/P EST MOD 30 MIN: CPT | Mod: PBBFAC,25

## 2023-01-30 RX ORDER — ESCITALOPRAM OXALATE 10 MG/1
10 TABLET ORAL DAILY
Qty: 90 TABLET | Refills: 4 | Status: SHIPPED | OUTPATIENT
Start: 2023-01-30 | End: 2023-04-24 | Stop reason: SDUPTHER

## 2023-01-30 RX ORDER — PRAVASTATIN SODIUM 20 MG/1
TABLET ORAL
Qty: 90 TABLET | Refills: 4 | Status: SHIPPED | OUTPATIENT
Start: 2023-01-30 | End: 2023-04-24 | Stop reason: SDUPTHER

## 2023-01-30 RX ORDER — AMLODIPINE AND BENAZEPRIL HYDROCHLORIDE 5; 20 MG/1; MG/1
1 CAPSULE ORAL DAILY
Qty: 90 CAPSULE | Refills: 4 | Status: SHIPPED | OUTPATIENT
Start: 2023-01-30 | End: 2023-04-24 | Stop reason: SDUPTHER

## 2023-01-30 RX ORDER — FLUTICASONE PROPIONATE 50 MCG
1 SPRAY, SUSPENSION (ML) NASAL DAILY
Qty: 16 G | Refills: 2 | Status: SHIPPED | OUTPATIENT
Start: 2023-01-30 | End: 2023-04-24 | Stop reason: SDUPTHER

## 2023-01-30 RX ORDER — NAPROXEN SODIUM 220 MG/1
81 TABLET, FILM COATED ORAL DAILY
Qty: 90 TABLET | Refills: 4 | Status: SHIPPED | OUTPATIENT
Start: 2023-01-30 | End: 2023-04-24 | Stop reason: SDUPTHER

## 2023-01-30 RX ORDER — AZELASTINE 1 MG/ML
2 SPRAY, METERED NASAL 2 TIMES DAILY
Qty: 30 ML | Refills: 4 | Status: SHIPPED | OUTPATIENT
Start: 2023-01-30 | End: 2023-04-24 | Stop reason: SDUPTHER

## 2023-01-30 RX ORDER — ACETAMINOPHEN 500 MG
5 TABLET ORAL NIGHTLY
Qty: 90 TABLET | Refills: 4 | Status: SHIPPED | OUTPATIENT
Start: 2023-01-30 | End: 2023-04-24 | Stop reason: SDUPTHER

## 2023-01-30 RX ORDER — ESTRADIOL 0.1 MG/G
1 CREAM VAGINAL
Qty: 42.5 G | Refills: 1 | Status: SHIPPED | OUTPATIENT
Start: 2023-01-30 | End: 2023-04-24 | Stop reason: SDUPTHER

## 2023-01-30 RX ORDER — PANTOPRAZOLE SODIUM 40 MG/1
TABLET, DELAYED RELEASE ORAL
Qty: 90 TABLET | Refills: 4 | Status: SHIPPED | OUTPATIENT
Start: 2023-01-30 | End: 2023-04-24 | Stop reason: SDUPTHER

## 2023-01-30 RX ADMIN — Medication 0.5 ML: at 12:01

## 2023-01-30 NOTE — PROGRESS NOTES
[facility] Family Medicine Office Visit Note    Subjective:      Chief Complaint: Hypertension, Gastroesophageal Reflux, and Medication Refill    HPI  Fifi Chacon is an 87 y.o. female who presents to [facility] for routine follow up and medication refills. Lives in [place] with her son, accompanied by daughter who lives in [place].    Acute concerns  Daughter reports short term memory loss; patient seems to forget what she was trying to say during conversation. This is a longstanding problem that seems to be getting gradually worse over the past year. No acute changes in mentation or loss of memory. Patient states that she has a notebook that she uses to keep track of things, occasionally reads through the notebook for improved memory. Denies acute visual changes or headaches.      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 No apparent issues at this time. Will need to monitor as Lexapro is metabolized by cytochrome P450.     Chronic conditions  - HTN: currently adherent to treatment with amlodipine-benzapril 5-20 mg. BP within normal limits today.   - Urinary issues: previously had chronic hematuria; reports no hematuria today. Has an appointment with urology, Dr. Bethany Luo on 3/10/23.   - Depression: mood stable on Lexapro 10 mg. Has good support system at home.     Review of Systems   Constitutional:  Negative for malaise/fatigue and weight loss.   HENT:  Negative for hearing loss and sore throat.    Respiratory:  Negative for cough and shortness of breath.    Cardiovascular:  Negative for chest pain, palpitations and leg swelling.   Gastrointestinal:  Negative for blood in stool, constipation, diarrhea, heartburn, nausea and vomiting.   Genitourinary:  Negative for dysuria and hematuria.        Difficulty starting stream   Musculoskeletal:  Negative for neck pain.   Neurological:  Negative for weakness and headaches.   Endo/Heme/Allergies:  Negative for  "polydipsia.   Psychiatric/Behavioral:  Positive for memory loss. Negative for depression and suicidal ideas. The patient does not have insomnia.       Objective:     /70 (BP Location: Left arm, Patient Position: Sitting, BP Method: Medium (Automatic))   Pulse 67   Temp 98.1 °F (36.7 °C) (Oral)   Resp 17   Ht 5' 5.98" (1.676 m)   Wt 64 kg (141 lb)   SpO2 98%   BMI 22.77 kg/m²     Physical Exam:  Gen: Pleasant female, mild hearing impairment, alert and oriented  HENT: thyroid midline, no palpable nodules.   CV: RRR, grade III systolic murmur heard best at right sternal 2nd intercostal space. No LE edema. DP pulses 2+ bilaterally.  Resp: CTAB, breathing nonlabored on room air  Abd: Soft, non-distended, non-tender, bowel sounds normoactive  Skin: Warm and dry  MSK: ambulating with use of quad cane      Current Outpatient Medications:     multivit with min-folic acid 200 mcg Chew, One A Day Vitamin, Disp: , Rfl:     amlodipine-benazepril 5-20 mg (LOTREL) 5-20 mg per capsule, Take 1 capsule by mouth once daily., Disp: 90 capsule, Rfl: 4    aspirin 81 MG Chew, Take 1 tablet (81 mg total) by mouth once daily., Disp: 90 tablet, Rfl: 4    azelastine (ASTELIN) 137 mcg (0.1 %) nasal spray, 2 sprays (274 mcg total) by Nasal route 2 (two) times daily., Disp: 30 mL, Rfl: 4    EScitalopram oxalate (LEXAPRO) 10 MG tablet, Take 1 tablet (10 mg total) by mouth once daily., Disp: 90 tablet, Rfl: 4    estradioL (ESTRACE) 0.01 % (0.1 mg/gram) vaginal cream, Place 1 g vaginally twice a week., Disp: 42.5 g, Rfl: 1    fluticasone propionate (FLONASE) 50 mcg/actuation nasal spray, 1 spray (50 mcg total) by Each Nostril route once daily., Disp: 16 g, Rfl: 2    melatonin (MELATIN) 5 mg, Take 1 tablet (5 mg total) by mouth nightly., Disp: 90 tablet, Rfl: 4    pantoprazole (PROTONIX) 40 MG tablet, pantoprazole 40 mg tablet,delayed release, Strength: 40 mg, Disp: 90 tablet, Rfl: 4    pravastatin (PRAVACHOL) 20 MG tablet, pravastatin " 20 mg tablet  Take 1 tablet every day by oral route. 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 Refills sent    Atrophy of vagina  - Follows with [facility] gyn  - EstradioL 0.01% vaginal cream, refills sent    Gastroesophageal reflux disease, unspecified whether esophagitis present  - No current symptoms, well controlled  - Continue Protonix 40 mg qd, refills sent    Depression  - Mood stable, no SI, good support system  - Continue Lexapro 10 mg, refills sent    Subclinical hypothyroidism  - TSH 5.3808, T4 0.92  - Patient asymptomatic, no treatment indicated  - Continue to monitor, will recheck at next visit    Healthcare maintenance   - Shingrix dose 1 given today, will need next dose at appointment in 3 months  - Previous labs reviewed with patient and daughter    Follow-up: 3 months, will need TSH, T4, and 2nd dose of Shingrix    Cayden Forte MD  [facility] Family Medicine - PGY-1    Yes

## 2023-01-31 NOTE — PROGRESS NOTES
Discussed / evaluated with resident 01-30-23.  Occasionally forgetful during conversation.  No fatigue / chest pain / dyspnea.  Resides with son.    PE  Gen: NAD, pleasant.  No abn noted during casual conversation. Wheelchair; accompanied by daughter.  HEENT: no carotid bruits / thyromegaly.  Chest: lungs clear.  CV: Reg. rhythm; 2-3/6 systolic murmur loudest at right 2nd ICS.    Resident's note reviewed 01-31-23.  Agree with assessment.  Recommend cognitive testing / workup.  Professional services provided in an outpatient primary care center affiliated with a teaching institution.

## 2023-03-10 ENCOUNTER — PROCEDURE VISIT (OUTPATIENT)
Dept: UROLOGY | Facility: CLINIC | Age: 88
End: 2023-03-10
Payer: MEDICARE

## 2023-03-10 VITALS
HEIGHT: 65 IN | OXYGEN SATURATION: 98 % | BODY MASS INDEX: 25.49 KG/M2 | RESPIRATION RATE: 18 BRPM | HEART RATE: 84 BPM | WEIGHT: 153 LBS | DIASTOLIC BLOOD PRESSURE: 82 MMHG | SYSTOLIC BLOOD PRESSURE: 151 MMHG

## 2023-03-10 DIAGNOSIS — R31.0 GROSS HEMATURIA: Primary | ICD-10-CM

## 2023-03-10 DIAGNOSIS — N81.12 CYSTOCELE, LATERAL: ICD-10-CM

## 2023-03-10 DIAGNOSIS — N28.1 RENAL CYST: ICD-10-CM

## 2023-03-10 PROCEDURE — 52000 CYSTOURETHROSCOPY: CPT | Mod: S$PBB,,, | Performed by: UROLOGY

## 2023-03-10 PROCEDURE — 99499 NO LOS: ICD-10-PCS | Mod: S$PBB,,, | Performed by: UROLOGY

## 2023-03-10 PROCEDURE — 52000 CYSTOURETHROSCOPY: CPT | Mod: PBBFAC | Performed by: UROLOGY

## 2023-03-10 PROCEDURE — 99499 UNLISTED E&M SERVICE: CPT | Mod: S$PBB,,, | Performed by: UROLOGY

## 2023-03-10 PROCEDURE — 52000 PR CYSTOURETHROSCOPY: ICD-10-PCS | Mod: S$PBB,,, | Performed by: UROLOGY

## 2023-03-10 RX ORDER — LIDOCAINE HYDROCHLORIDE 20 MG/ML
JELLY TOPICAL
Status: COMPLETED | OUTPATIENT
Start: 2023-03-10 | End: 2023-03-10

## 2023-03-10 RX ORDER — CIPROFLOXACIN 500 MG/1
500 TABLET ORAL
Status: COMPLETED | OUTPATIENT
Start: 2023-03-10 | End: 2023-03-10

## 2023-03-10 RX ADMIN — CIPROFLOXACIN 500 MG: 500 TABLET ORAL at 02:03

## 2023-03-10 RX ADMIN — LIDOCAINE HYDROCHLORIDE: 20 JELLY TOPICAL at 02:03

## 2023-03-10 NOTE — PROGRESS NOTES
Pt seen by Dr. Jameson. Cysto performed in clinic. Consents signed and obtained by staff. Pt received medication per procedure protocol. Ciprofloxacin HCl tablet 500 mg & LIDOcaine HCl 2% urojet administered and tolerated well. RTC 3 months. Pt education given both written and verbal.

## 2023-03-10 NOTE — PROCEDURES
CC:  Cystoscopy    HPI:  Fifi Chacon is a 87 y.o. female here for cystoscopy for hematuria.  She had gross hematuria and was seen in the emergency room.  She was treated for urinary tract infection at that time.  She has not had any further gross hematuria.  She does have intermittent urinary tract infections.  She has a cystocele and does have some difficulty emptying the bladder at times.    Imaging:  CT - 5 October 2022:  Simple cysts bilaterally in the kidneys.  There are some right parapelvic cysts as well.      ROS:  All systems reviewed and are negative except as documented in HPI and/or Assessment and Plan.     Patient Active Problem List:     Patient Active Problem List   Diagnosis    Vaginal prolapse    Pessary maintenance    Hematuria    Hypertension    Atrophy of vagina    Gastroesophageal reflux disease    Benign essential hypertension    History of iron deficiency    Hydroureter    Rheumatoid arthritis    Vitamin D deficiency    Anxiety    Depressive disorder        Past Medical History:  Past Medical History:   Diagnosis Date    GERD (gastroesophageal reflux disease)     Hyperlipidemia     Hypertension         Past Surgical History:  Past Surgical History:   Procedure Laterality Date    APPENDECTOMY      CHOLECYSTECTOMY      HYSTERECTOMY          Family History:  Family History   Problem Relation Age of Onset    Hypertension Mother     Cancer Mother     Hypertension Sister     Hypertension Sister         Social History:  Social History     Socioeconomic History    Marital status:    Tobacco Use    Smoking status: Never    Smokeless tobacco: Never   Substance and Sexual Activity    Alcohol use: Not Currently    Drug use: Never    Sexual activity: Not Currently        Allergies:  Review of patient's allergies indicates:   Allergen Reactions    Sulfa (sulfonamide antibiotics)         Objective:  Vitals:    03/10/23 1340   BP: (!) 151/82   Pulse: 84   Resp: 18     General:  Well developed, well  nourished adult female in no acute distress  Abdomen: Soft, nontender, no masses  Extremities:  No clubbing, cyanosis, or edema  Neurologic:  Grossly intact  Musculoskeletal:  Normal tone  :  External genitalia is normal without lesions.  Vagina is normal.      Cystoscopy:        - Urethral meatus:  No strictures        - Urethra:  Normal without strictures or lesions        - Bladder neck:  Normal        - Bladder:  No mucosal abnormalities.  There is some debris in the bladder.        - Ureteral orifices:  On the trigone with clear efflux bilaterally    The patient tolerated the procedure well without complications.  She was given Cipro 500mg, one tablet in the clinic.   The urethra was anesthetized with 2% Lidocaine Jelly, Urojet.      Assessment:  1. Gross hematuria    2. Renal cyst    3. Cystocele, lateral     Plan:  The gross hematuria was likely due to a urinary tract infection.  I can not find any urologic pathology.  Observation of the renal cysts.  The cystocele is probably responsible for incomplete bladder emptying and therefore the urinary tract infections.  She is not interested in any referrals for correction of this.

## 2023-04-24 ENCOUNTER — OFFICE VISIT (OUTPATIENT)
Dept: FAMILY MEDICINE | Facility: CLINIC | Age: 88
End: 2023-04-24
Payer: MEDICARE

## 2023-04-24 VITALS
DIASTOLIC BLOOD PRESSURE: 72 MMHG | BODY MASS INDEX: 25.39 KG/M2 | HEART RATE: 75 BPM | RESPIRATION RATE: 18 BRPM | WEIGHT: 152.38 LBS | SYSTOLIC BLOOD PRESSURE: 129 MMHG | HEIGHT: 65 IN | OXYGEN SATURATION: 97 % | TEMPERATURE: 98 F

## 2023-04-24 DIAGNOSIS — N81.10 VAGINAL PROLAPSE: ICD-10-CM

## 2023-04-24 DIAGNOSIS — E03.8 SUBCLINICAL HYPOTHYROIDISM: Primary | ICD-10-CM

## 2023-04-24 DIAGNOSIS — Z74.09 IMPAIRED MOBILITY: ICD-10-CM

## 2023-04-24 DIAGNOSIS — I10 HYPERTENSION, UNSPECIFIED TYPE: ICD-10-CM

## 2023-04-24 DIAGNOSIS — R29.6 RECURRENT FALLS WHILE WALKING: ICD-10-CM

## 2023-04-24 DIAGNOSIS — K21.9 GASTROESOPHAGEAL REFLUX DISEASE, UNSPECIFIED WHETHER ESOPHAGITIS PRESENT: ICD-10-CM

## 2023-04-24 DIAGNOSIS — N95.2 ATROPHY OF VAGINA: ICD-10-CM

## 2023-04-24 DIAGNOSIS — S50.811A ABRASION OF RIGHT FOREARM, INITIAL ENCOUNTER: ICD-10-CM

## 2023-04-24 DIAGNOSIS — Z23 IMMUNIZATION DUE: ICD-10-CM

## 2023-04-24 LAB
T4 FREE SERPL-MCNC: 1.16 NG/DL (ref 0.7–1.48)
TSH SERPL-ACNC: 4.94 UIU/ML (ref 0.35–4.94)

## 2023-04-24 PROCEDURE — 36415 COLL VENOUS BLD VENIPUNCTURE: CPT

## 2023-04-24 PROCEDURE — 84443 ASSAY THYROID STIM HORMONE: CPT

## 2023-04-24 PROCEDURE — 90750 HZV VACC RECOMBINANT IM: CPT | Mod: PBBFAC

## 2023-04-24 PROCEDURE — 90715 TDAP VACCINE 7 YRS/> IM: CPT | Mod: PBBFAC

## 2023-04-24 PROCEDURE — 84439 ASSAY OF FREE THYROXINE: CPT

## 2023-04-24 PROCEDURE — 99214 OFFICE O/P EST MOD 30 MIN: CPT | Mod: PBBFAC,25

## 2023-04-24 PROCEDURE — 90472 IMMUNIZATION ADMIN EACH ADD: CPT | Mod: PBBFAC

## 2023-04-24 PROCEDURE — 90471 IMMUNIZATION ADMIN: CPT | Mod: PBBFAC

## 2023-04-24 RX ORDER — FLUTICASONE PROPIONATE 50 MCG
1 SPRAY, SUSPENSION (ML) NASAL DAILY
Qty: 16 G | Refills: 2 | Status: SHIPPED | OUTPATIENT
Start: 2023-04-24 | End: 2023-10-23

## 2023-04-24 RX ORDER — PEDIATRIC MULTIVITAMIN NO.238
TABLET,CHEWABLE ORAL
Qty: 90 TABLET | Refills: 4 | Status: SHIPPED | OUTPATIENT
Start: 2023-04-24 | End: 2024-01-10 | Stop reason: SDUPTHER

## 2023-04-24 RX ORDER — ESCITALOPRAM OXALATE 10 MG/1
10 TABLET ORAL DAILY
Qty: 90 TABLET | Refills: 4 | Status: SHIPPED | OUTPATIENT
Start: 2023-04-24 | End: 2024-01-10 | Stop reason: SDUPTHER

## 2023-04-24 RX ORDER — ACETAMINOPHEN 500 MG
5 TABLET ORAL NIGHTLY
Qty: 90 TABLET | Refills: 4 | Status: SHIPPED | OUTPATIENT
Start: 2023-04-24 | End: 2023-10-23 | Stop reason: SDUPTHER

## 2023-04-24 RX ORDER — AZELASTINE 1 MG/ML
2 SPRAY, METERED NASAL 2 TIMES DAILY
Qty: 30 ML | Refills: 4 | Status: SHIPPED | OUTPATIENT
Start: 2023-04-24 | End: 2023-10-23

## 2023-04-24 RX ORDER — PANTOPRAZOLE SODIUM 40 MG/1
TABLET, DELAYED RELEASE ORAL
Qty: 90 TABLET | Refills: 4 | Status: SHIPPED | OUTPATIENT
Start: 2023-04-24 | End: 2024-01-10 | Stop reason: SDUPTHER

## 2023-04-24 RX ORDER — AMLODIPINE AND BENAZEPRIL HYDROCHLORIDE 5; 20 MG/1; MG/1
1 CAPSULE ORAL DAILY
Qty: 90 CAPSULE | Refills: 4 | Status: ON HOLD | OUTPATIENT
Start: 2023-04-24 | End: 2023-10-14 | Stop reason: HOSPADM

## 2023-04-24 RX ORDER — PRAVASTATIN SODIUM 20 MG/1
TABLET ORAL
Qty: 90 TABLET | Refills: 4 | Status: SHIPPED | OUTPATIENT
Start: 2023-04-24 | End: 2024-01-10 | Stop reason: SDUPTHER

## 2023-04-24 RX ORDER — NAPROXEN SODIUM 220 MG/1
81 TABLET, FILM COATED ORAL DAILY
Qty: 90 TABLET | Refills: 4 | Status: SHIPPED | OUTPATIENT
Start: 2023-04-24 | End: 2024-01-10 | Stop reason: SDUPTHER

## 2023-04-24 RX ORDER — ESTRADIOL 0.1 MG/G
1 CREAM VAGINAL
Qty: 42.5 G | Refills: 1 | Status: ON HOLD | OUTPATIENT
Start: 2023-04-24 | End: 2023-10-14 | Stop reason: HOSPADM

## 2023-04-24 RX ADMIN — TETANUS TOXOID, REDUCED DIPHTHERIA TOXOID AND ACELLULAR PERTUSSIS VACCINE, ADSORBED 0.5 ML: 5; 2.5; 8; 8; 2.5 SUSPENSION INTRAMUSCULAR at 03:04

## 2023-04-24 RX ADMIN — ZOSTER VACCINE RECOMBINANT, ADJUVANTED 0.5 ML: KIT at 02:04

## 2023-04-24 NOTE — PROGRESS NOTES
"[facility]    Subjective:      Chief Complaint: Fall and Medication Refill    HPI   Fifi Chacon is a 87 y.o. female who presents to [facility] for routine follow-up.    Acute concerns  Sustained 2 falls over the few weeks. Both times patient tripped over her feet and fell straight forward onto her knees. She was able to rise without assistance and was not down for any prolonged period of time. Has small resolving bruising over her bilateral patella but does not feel unsteady during ambulation. Denies LOC, dizziness, lightheadedness, palpitations, chest pain, changes in vision, changes in mentation, fatigue, weakness.     C/o difficulty falling asleep and staying asleep for many years, has been treating with 10-15 mg of melatonin over the same time period.     Chronic conditions  HTN: well controlled with amlodipine-benzapril 5-20 mg.    Hematuria/urinary issues: cytoscopy performed by Dr. Bethany Luo, no signs of urologic pathology to explain hematuria. Patient has had no hematuria for months. Cystocele visualized but not interested in correction at this time. Denies recent hematuria, urinary symptoms.     Depression: mood stable on Lexapro 10 mg. Has strong support system at home. Denies SI.    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 [facility] following.        Review of Systems  As per HPI.    Objective:     /72 (BP Location: Left arm, Patient Position: Sitting, BP Method: Medium (Automatic))   Pulse 75   Temp 98.4 °F (36.9 °C) (Oral)   Resp 18   Ht 5' 5" (1.651 m)   Wt 69.1 kg (152 lb 6.4 oz)   SpO2 97%   BMI 25.36 kg/m²     Physical Exam:  Gen: pleasant female accompanied by daughter. Mild hearing impairment. No acute distress.  CV: RRR, grade III systolic murmur heard best at 2nd intercostal space right sternal border. No LE edema.  Resp: CTAB, breathing non labored on room air.  Abd: Soft, non-distended, " non-tender, bowel sounds normoactive.  Skin: Warm and dry  MSK: Ambulating with use of walking cane, appropriate ROM in all extremities.      Current Outpatient Medications:     amlodipine-benazepril 5-20 mg (LOTREL) 5-20 mg per capsule, Take 1 capsule by mouth once daily., Disp: 90 capsule, Rfl: 4    aspirin 81 MG Chew, Take 1 tablet (81 mg total) by mouth once daily., Disp: 90 tablet, Rfl: 4    azelastine (ASTELIN) 137 mcg (0.1 %) nasal spray, 2 sprays (274 mcg total) by Nasal route 2 (two) times daily., Disp: 30 mL, Rfl: 4    EScitalopram oxalate (LEXAPRO) 10 MG tablet, Take 1 tablet (10 mg total) by mouth once daily., Disp: 90 tablet, Rfl: 4    estradioL (ESTRACE) 0.01 % (0.1 mg/gram) vaginal cream, Place 1 g vaginally twice a week., Disp: 42.5 g, Rfl: 1    fluticasone propionate (FLONASE) 50 mcg/actuation nasal spray, 1 spray (50 mcg total) by Each Nostril route once daily., Disp: 16 g, Rfl: 2    melatonin (MELATIN) 5 mg, Take 1 tablet (5 mg total) by mouth nightly., Disp: 90 tablet, Rfl: 4    multivit with min-folic acid 200 mcg Chew, One A Day Vitamin, Disp: 90 tablet, Rfl: 4    pantoprazole (PROTONIX) 40 MG tablet, pantoprazole 40 mg tablet,delayed release, Strength: 40 mg, Disp: 90 tablet, Rfl: 4    pravastatin (PRAVACHOL) 20 MG tablet, pravastatin 20 mg tablet  Take 1 tablet every day by oral route. Strength: 20 mg, Disp: 90 tablet, Rfl: 4     Assessment/Plan:      Systolic heart murmur  - Daughter had records sent to [facility] however I have not received. I will call [facility] to obtain records, permission granted by patient and daughter.   - May need further work-up if unable to obtain records  - ASA 81 mg qd, pravastatin 20 mg qd     Hypertension  - BP normotensive today  - Continue amlodipine-benazepril 5-20 mg qd, ASA 81 mg qd    Subclinical hypothyroidism  - Ordered today: TSH 4.944, T4 1.16  - No treatment indicated as patient is asymptomatic  - Continue to monitor for symptoms     Atrophy of vagina  -  Follows with [facility]  - EstradioL 0.01% vaginal cream     Gastroesophageal reflux disease, unspecified whether esophagitis present  - No current symptoms, well controlled  - Continue Protonix 40 mg qd     Depression  - Mood stable, no SI, good support system  - Continue Lexapro 10 mg     Memory loss  - Chronic decline in short term memory, 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

## 2023-04-26 NOTE — PROGRESS NOTES
I reviewed History, PE, A/P and medical record.  Services provided in outpatient department of a teaching hospital/facility, I was immediately available.  I agree with resident. Care provided was reasonable and necessary.   I evaluated the patient with resident at time of visit.    Patient declined gait training/PT. Uses quad cane. Has a walker. Requested wheelchair for use outside of the home.  Recommend close follow up.

## 2023-06-09 ENCOUNTER — OFFICE VISIT (OUTPATIENT)
Dept: UROLOGY | Facility: CLINIC | Age: 88
End: 2023-06-09
Payer: MEDICARE

## 2023-06-09 VITALS
HEART RATE: 77 BPM | BODY MASS INDEX: 24.66 KG/M2 | OXYGEN SATURATION: 96 % | DIASTOLIC BLOOD PRESSURE: 64 MMHG | HEIGHT: 65 IN | SYSTOLIC BLOOD PRESSURE: 131 MMHG | RESPIRATION RATE: 20 BRPM | WEIGHT: 148 LBS | TEMPERATURE: 98 F

## 2023-06-09 DIAGNOSIS — N28.1 RENAL CYST: Primary | ICD-10-CM

## 2023-06-09 PROCEDURE — 99215 OFFICE O/P EST HI 40 MIN: CPT | Mod: PBBFAC | Performed by: NURSE PRACTITIONER

## 2023-06-09 PROCEDURE — 99213 OFFICE O/P EST LOW 20 MIN: CPT | Mod: S$PBB,,, | Performed by: NURSE PRACTITIONER

## 2023-06-09 PROCEDURE — 99213 PR OFFICE/OUTPT VISIT, EST, LEVL III, 20-29 MIN: ICD-10-PCS | Mod: S$PBB,,, | Performed by: NURSE PRACTITIONER

## 2023-06-09 NOTE — PROGRESS NOTES
Chief Complaint:   Chief Complaint   Patient presents with    Hematuria       HPI:  Patient is a 7-year-old female who is here for a follow-up post cystoscope for gross hematuria, cystocele, renal cysts.  Patient underwent a cystoscope with Dr. Luo which revealed gross hematuria was likely to urinary tract infections there was no urologic pathology diagnosed.  We will observe patient for renal cyst.  Patient does have a history of a cystocele and responsible for incomplete bladder emptying and urinary tract infections patient refuses to undergo any surgical procedure to correct this due to her age.      Allergies:  Review of patient's allergies indicates:   Allergen Reactions    Sulfa (sulfonamide antibiotics)        Medications:  Current Outpatient Medications   Medication Sig Dispense Refill    amlodipine-benazepril 5-20 mg (LOTREL) 5-20 mg per capsule Take 1 capsule by mouth once daily. 90 capsule 4    aspirin 81 MG Chew Take 1 tablet (81 mg total) by mouth once daily. 90 tablet 4    azelastine (ASTELIN) 137 mcg (0.1 %) nasal spray 2 sprays (274 mcg total) by Nasal route 2 (two) times daily. 30 mL 4    EScitalopram oxalate (LEXAPRO) 10 MG tablet Take 1 tablet (10 mg total) by mouth once daily. 90 tablet 4    fluticasone propionate (FLONASE) 50 mcg/actuation nasal spray 1 spray (50 mcg total) by Each Nostril route once daily. 16 g 2    melatonin (MELATIN) 5 mg Take 1 tablet (5 mg total) by mouth nightly. 90 tablet 4    multivit with min-folic acid 200 mcg Chew One A Day Vitamin 90 tablet 4    pantoprazole (PROTONIX) 40 MG tablet pantoprazole 40 mg tablet,delayed release, Strength: 40 mg 90 tablet 4    pravastatin (PRAVACHOL) 20 MG tablet pravastatin 20 mg tablet  Take 1 tablet every day by oral route. Strength: 20 mg 90 tablet 4    estradioL (ESTRACE) 0.01 % (0.1 mg/gram) vaginal cream Place 1 g vaginally twice a week. (Patient not taking: Reported on 6/9/2023) 42.5 g 1     No current facility-administered  medications for this visit.       Review of Systems:  General: No fever, chills, fatigability, or weight loss.  Skin: No rashes, itching, or changes in color or texture of skin.  Chest: Denies AGUAYO, cyanosis, wheezing, cough, and sputum production.  Abdomen: Appetite fine. No weight loss. Denies diarrhea, abdominal pain, hematemesis, or blood in stool.  Musculoskeletal: No joint stiffness or swelling. Denies back pain.  : As above.  All other review of systems negative.    PMH:  Past Medical History:   Diagnosis Date    GERD (gastroesophageal reflux disease)     Hyperlipidemia     Hypertension        PSH:  Past Surgical History:   Procedure Laterality Date    APPENDECTOMY      CHOLECYSTECTOMY      HYSTERECTOMY         FamHx:  Family History   Problem Relation Age of Onset    Hypertension Mother     Cancer Mother     Hypertension Sister     Hypertension Sister        SocHx:  Social History     Socioeconomic History    Marital status:    Tobacco Use    Smoking status: Never     Passive exposure: Never    Smokeless tobacco: Never   Substance and Sexual Activity    Alcohol use: Not Currently    Drug use: Never    Sexual activity: Not Currently       Physical Exam:  Vitals:    06/09/23 1259   BP: 131/64   Pulse: 77   Resp: 20   Temp: 98.2 °F (36.8 °C)     General: A&Ox3, no apparent distress, no deformities  Neck: No masses, normal thyroid  Lungs: CTA montrell, no use of accessory muscles  Heart: RRR, no arrhythmias  Abdomen: Soft, NT, ND, no masses, no hernias, no hepatosplenomegaly  Lymphatic: Neck and groin nodes negative  Skin: The skin is warm and dry. No jaundice.  Ext: No c/c/e.    Impression:  Microscopic hematuria, cystocele, renal cyst      Plan:  Instructed patient will monitor renal cyst, cystocele and microscopic hematuria instructed patient on timed voiding every 2-3 hrs, double voiding, avoidance of bladder irritants such as alcohol, citrus foods, chocolate, caffeinated drinks, energy drinks, spicy  foods, sugar, caffeine products, sodas. Instructed patient to avoid drinking fluids 1-2 hours prior to bedtime & void immediately before bedtime.  RTC 6 months with renal ultrasound.

## 2023-06-19 ENCOUNTER — HOSPITAL ENCOUNTER (OUTPATIENT)
Dept: RADIOLOGY | Facility: HOSPITAL | Age: 88
Discharge: HOME | End: 2023-06-19
Attending: NURSE PRACTITIONER
Payer: MEDICARE

## 2023-06-19 DIAGNOSIS — N28.1 RENAL CYST: ICD-10-CM

## 2023-06-19 PROCEDURE — 76775 US EXAM ABDO BACK WALL LIM: CPT | Mod: TC

## 2023-09-14 ENCOUNTER — HOSPITAL ENCOUNTER (EMERGENCY)
Facility: HOSPITAL | Age: 88
Discharge: HOME | End: 2023-09-14
Attending: EMERGENCY MEDICINE
Payer: MEDICARE

## 2023-09-14 VITALS
WEIGHT: 158 LBS | TEMPERATURE: 98 F | RESPIRATION RATE: 20 BRPM | OXYGEN SATURATION: 99 % | BODY MASS INDEX: 26.32 KG/M2 | DIASTOLIC BLOOD PRESSURE: 69 MMHG | SYSTOLIC BLOOD PRESSURE: 130 MMHG | HEART RATE: 88 BPM

## 2023-09-14 DIAGNOSIS — S40.021A CONTUSION OF RIGHT UPPER ARM, INITIAL ENCOUNTER: Primary | ICD-10-CM

## 2023-09-14 DIAGNOSIS — R07.9 CHEST PAIN: ICD-10-CM

## 2023-09-14 DIAGNOSIS — M79.603 ARM PAIN: ICD-10-CM

## 2023-09-14 PROCEDURE — 99284 EMERGENCY DEPT VISIT MOD MDM: CPT | Mod: 25

## 2023-09-14 PROCEDURE — 25000003 PHARM REV CODE 250: Performed by: EMERGENCY MEDICINE

## 2023-09-14 RX ORDER — TIZANIDINE 2 MG/1
2 TABLET ORAL EVERY 8 HOURS
Qty: 15 TABLET | Refills: 0 | Status: SHIPPED | OUTPATIENT
Start: 2023-09-14 | End: 2023-09-19

## 2023-09-14 RX ORDER — ACETAMINOPHEN 500 MG
1000 TABLET ORAL
Status: COMPLETED | OUTPATIENT
Start: 2023-09-14 | End: 2023-09-14

## 2023-09-14 RX ADMIN — ACETAMINOPHEN 1000 MG: 500 TABLET ORAL at 10:09

## 2023-09-14 NOTE — ED PROVIDER NOTES
Encounter Date: 9/14/2023       History     Chief Complaint   Patient presents with    Fall     REPORTS SLIP AND FALL ON MONDAY WHILE TRYING TO GET INTO BED.  CO RT SHOULDER AND RIB PAIN.  DENIES HEAD INJURY, NO LOC.       Right shoulder, right chest wall contusion after mechanical fall from standing to bed; normal distal neurovascular symptoms      Arm Injury   The incident occurred two days ago. The injury mechanism was a fall. Context: ground level fall. No protective equipment was used. She came to the ER via personal transport. There is an injury to the Chest. There is an injury to the Right shoulder. There have been no prior injuries to these areas. She has been Behaving normally. She has received no recent medical care. Pertinent negatives include no altered mental status, no numbness, no abdominal pain, no bowel incontinence, no nausea, no vomiting, no bladder incontinence, no focal weakness and no decreased responsiveness.     Review of patient's allergies indicates:   Allergen Reactions    Sulfa (sulfonamide antibiotics)      Past Medical History:   Diagnosis Date    GERD (gastroesophageal reflux disease)     Hyperlipidemia     Hypertension      Past Surgical History:   Procedure Laterality Date    APPENDECTOMY      CHOLECYSTECTOMY      HYSTERECTOMY       Family History   Problem Relation Age of Onset    Hypertension Mother     Cancer Mother     Hypertension Sister     Hypertension Sister      Social History     Tobacco Use    Smoking status: Never     Passive exposure: Never    Smokeless tobacco: Never   Substance Use Topics    Alcohol use: Not Currently    Drug use: Never     Review of Systems   Constitutional:  Negative for decreased responsiveness.   Gastrointestinal:  Negative for abdominal pain, bowel incontinence, nausea and vomiting.   Genitourinary:  Negative for bladder incontinence.   Neurological:  Negative for focal weakness and numbness.   All other systems reviewed and are  negative.      Physical Exam     Initial Vitals [09/14/23 1006]   BP Pulse Resp Temp SpO2   130/69 74 18 98.1 °F (36.7 °C) 96 %      MAP       --         Physical Exam    Nursing note and vitals reviewed.  Constitutional: She appears well-developed and well-nourished. She is not diaphoretic. No distress.   HENT:   Head: Normocephalic and atraumatic.   Right Ear: External ear normal.   Left Ear: External ear normal.   Eyes: EOM are normal. Pupils are equal, round, and reactive to light. Right eye exhibits no discharge. Left eye exhibits no discharge.   Neck: Neck supple. No thyromegaly present. No tracheal deviation present. No JVD present.   Normal range of motion.  Cardiovascular:  Normal rate, regular rhythm, normal heart sounds and intact distal pulses.     Exam reveals no gallop and no friction rub.       No murmur heard.  Pulmonary/Chest: Breath sounds normal. No stridor. No respiratory distress. She has no wheezes. She has no rhonchi. She has no rales.   Abdominal: Abdomen is soft. Bowel sounds are normal. She exhibits no distension. There is no abdominal tenderness. There is no rebound and no guarding.   Musculoskeletal:         General: No tenderness or edema. Normal range of motion.      Cervical back: Normal range of motion and neck supple.     Neurological: She is alert and oriented to person, place, and time. She has normal strength. No cranial nerve deficit or sensory deficit. GCS score is 15. GCS eye subscore is 4. GCS verbal subscore is 5. GCS motor subscore is 6.   Skin: Skin is warm and dry. Capillary refill takes less than 2 seconds. No rash and no abscess noted. No erythema. No pallor.   Psychiatric: She has a normal mood and affect. Her behavior is normal. Judgment and thought content normal.         ED Course   Procedures  Labs Reviewed - No data to display       Imaging Results              X-Ray Shoulder Complete 2 View Right (Final result)  Result time 09/14/23 12:44:01      Final result by  Nohemy Hilton MD (09/14/23 12:44:01)                   Impression:      No acute bony abnormality.      Electronically signed by: Nohemy Hilton  Date:    09/14/2023  Time:    12:44               Narrative:    EXAMINATION:  XR SHOULDER COMPLETE 2 OR MORE VIEWS RIGHT    CLINICAL HISTORY:  Pain in arm, unspecified    COMPARISON:  None.    FINDINGS:  There is no acute fracture or malalignment.  There are mild degenerative changes of the shoulder with joint space narrowing and osteophyte formation.  The soft tissues are unremarkable.                                       X-Ray Chest AP Portable (Final result)  Result time 09/14/23 12:34:34      Final result by Garcia Nathan MD (09/14/23 12:34:34)                   Impression:      No active pulmonary disease with some increase interstitial markings.    Mild cardiomegaly      Electronically signed by: Garcia Nathan  Date:    09/14/2023  Time:    12:34               Narrative:    EXAMINATION:  XR CHEST AP PORTABLE    CLINICAL HISTORY:  Chest pain, unspecified    TECHNIQUE:  Single frontal view of the chest was performed.    COMPARISON:  None    FINDINGS:  Examination reveals mediastinal silhouette to be within normal limits cardiac silhouette is mildly enlarged lung fields reveal some increase interstitial markings with no definite focal consolidative changes atelectases effusions or pneumothoraces.    There is some prominence of the soft tissues in the right paratracheal region likely related to tortuous brachycephalic vessels                                    X-Rays:   Independently Interpreted Readings:   Other Readings:  Cxr and right shoulder xr normal    Medications   acetaminophen tablet 1,000 mg (1,000 mg Oral Given 9/14/23 1030)     Medical Decision Making  Musclr strain, contusion    Amount and/or Complexity of Data Reviewed  Radiology: ordered and independent interpretation performed. Decision-making details documented in ED  Course.    Risk  OTC drugs.  Prescription drug management.                               Clinical Impression:   Final diagnoses:  [R07.9] Chest pain  [M79.603] Arm pain  [S40.021A] Contusion of right upper arm, initial encounter (Primary)        ED Disposition Condition    Discharge Stable          ED Prescriptions       Medication Sig Dispense Start Date End Date Auth. Provider    tiZANidine (ZANAFLEX) 2 MG tablet Take 1 tablet (2 mg total) by mouth every 8 (eight) hours. for 5 days 15 tablet 9/14/2023 9/19/2023 Juan F Hopkins MD          Follow-up Information       Follow up With Specialties Details Why Contact Info    [facility] Dept Emergency Medicine  As needed, If symptoms worsen [address] [facility] [address] [phone]    Cayden Forte MD Family Medicine Call   [address] [facility] [address]  [phone]               Juan F Hopkins MD  09/14/23 4248

## 2023-10-10 ENCOUNTER — HOSPITAL ENCOUNTER (INPATIENT)
Facility: HOSPITAL | Age: 88
LOS: 4 days | Discharge: HOME HEALTH | DRG: 281 | End: 2023-10-14
Attending: STUDENT IN AN ORGANIZED HEALTH CARE EDUCATION/TRAINING PROGRAM | Admitting: FAMILY MEDICINE
Payer: MEDICARE

## 2023-10-10 DIAGNOSIS — I48.91 ATRIAL FIBRILLATION: ICD-10-CM

## 2023-10-10 DIAGNOSIS — I21.4 NSTEMI (NON-ST ELEVATED MYOCARDIAL INFARCTION): Primary | ICD-10-CM

## 2023-10-10 DIAGNOSIS — R07.9 CHEST PAIN: ICD-10-CM

## 2023-10-10 DIAGNOSIS — I48.91 ATRIAL FIBRILLATION WITH RVR: ICD-10-CM

## 2023-10-10 DIAGNOSIS — R07.9 CHEST PAIN, UNSPECIFIED TYPE: ICD-10-CM

## 2023-10-10 DIAGNOSIS — R00.0 RAPID HEART BEAT: ICD-10-CM

## 2023-10-10 DIAGNOSIS — R06.02 SOB (SHORTNESS OF BREATH): ICD-10-CM

## 2023-10-10 PROBLEM — I25.10 CAD (CORONARY ARTERY DISEASE): Status: ACTIVE | Noted: 2023-10-10

## 2023-10-10 PROBLEM — I20.0 UNSTABLE ANGINA: Status: ACTIVE | Noted: 2023-10-10

## 2023-10-10 LAB
ALBUMIN SERPL-MCNC: 3.3 G/DL (ref 3.4–4.8)
ALBUMIN/GLOB SERPL: 0.5 RATIO (ref 1.1–2)
ALP SERPL-CCNC: 62 UNIT/L (ref 40–150)
ALT SERPL-CCNC: 8 UNIT/L (ref 0–55)
APTT PPP: 33.8 SECONDS (ref 23.2–33.7)
AST SERPL-CCNC: 27 UNIT/L (ref 5–34)
BASOPHILS # BLD AUTO: 0.01 X10(3)/MCL
BASOPHILS NFR BLD AUTO: 0.3 %
BILIRUB SERPL-MCNC: 0.4 MG/DL
BNP BLD-MCNC: 166.9 PG/ML
BUN SERPL-MCNC: 22.5 MG/DL (ref 9.8–20.1)
CALCIUM SERPL-MCNC: 9.6 MG/DL (ref 8.4–10.2)
CHLORIDE SERPL-SCNC: 107 MMOL/L (ref 98–107)
CO2 SERPL-SCNC: 23 MMOL/L (ref 23–31)
CREAT SERPL-MCNC: 1.28 MG/DL (ref 0.55–1.02)
EOSINOPHIL # BLD AUTO: 0.03 X10(3)/MCL (ref 0–0.9)
EOSINOPHIL NFR BLD AUTO: 0.8 %
ERYTHROCYTE [DISTWIDTH] IN BLOOD BY AUTOMATED COUNT: 15.4 % (ref 11.5–17)
GFR SERPLBLD CREATININE-BSD FMLA CKD-EPI: 41 MLS/MIN/1.73/M2
GLOBULIN SER-MCNC: 6.6 GM/DL (ref 2.4–3.5)
GLUCOSE SERPL-MCNC: 109 MG/DL (ref 82–115)
HCT VFR BLD AUTO: 33.1 % (ref 37–47)
HGB BLD-MCNC: 10.6 G/DL (ref 12–16)
IMM GRANULOCYTES # BLD AUTO: 0.02 X10(3)/MCL (ref 0–0.04)
IMM GRANULOCYTES NFR BLD AUTO: 0.5 %
INR PPP: 1.1
INR PPP: 1.2
LYMPHOCYTES # BLD AUTO: 0.32 X10(3)/MCL (ref 0.6–4.6)
LYMPHOCYTES NFR BLD AUTO: 8.6 %
MAGNESIUM SERPL-MCNC: 1.8 MG/DL (ref 1.6–2.6)
MCH RBC QN AUTO: 27.1 PG (ref 27–31)
MCHC RBC AUTO-ENTMCNC: 32 G/DL (ref 33–36)
MCV RBC AUTO: 84.7 FL (ref 80–94)
MONOCYTES # BLD AUTO: 0.2 X10(3)/MCL (ref 0.1–1.3)
MONOCYTES NFR BLD AUTO: 5.3 %
NEUTROPHILS # BLD AUTO: 3.16 X10(3)/MCL (ref 2.1–9.2)
NEUTROPHILS NFR BLD AUTO: 84.5 %
NRBC BLD AUTO-RTO: 0 %
PHOSPHATE SERPL-MCNC: 2.5 MG/DL (ref 2.3–4.7)
PLATELET # BLD AUTO: 158 X10(3)/MCL (ref 130–400)
PMV BLD AUTO: 11.2 FL (ref 7.4–10.4)
POTASSIUM SERPL-SCNC: 4.3 MMOL/L (ref 3.5–5.1)
PROT SERPL-MCNC: 9.9 GM/DL (ref 5.8–7.6)
PROTHROMBIN TIME: 14.6 SECONDS (ref 11.4–14)
PROTHROMBIN TIME: 15.6 SECONDS (ref 11.4–14)
RBC # BLD AUTO: 3.91 X10(6)/MCL (ref 4.2–5.4)
SODIUM SERPL-SCNC: 137 MMOL/L (ref 136–145)
T4 FREE SERPL-MCNC: 1.07 NG/DL (ref 0.7–1.48)
TROPONIN I SERPL-MCNC: 0.03 NG/ML (ref 0–0.04)
TROPONIN I SERPL-MCNC: 1.49 NG/ML (ref 0–0.04)
TSH SERPL-ACNC: 5.55 UIU/ML (ref 0.35–4.94)
WBC # SPEC AUTO: 3.74 X10(3)/MCL (ref 4.5–11.5)

## 2023-10-10 PROCEDURE — 25000003 PHARM REV CODE 250: Performed by: STUDENT IN AN ORGANIZED HEALTH CARE EDUCATION/TRAINING PROGRAM

## 2023-10-10 PROCEDURE — 99285 EMERGENCY DEPT VISIT HI MDM: CPT | Mod: 25

## 2023-10-10 PROCEDURE — 83880 ASSAY OF NATRIURETIC PEPTIDE: CPT | Performed by: EMERGENCY MEDICINE

## 2023-10-10 PROCEDURE — 85025 COMPLETE CBC W/AUTO DIFF WBC: CPT | Performed by: EMERGENCY MEDICINE

## 2023-10-10 PROCEDURE — 21400001 HC TELEMETRY ROOM

## 2023-10-10 PROCEDURE — 83735 ASSAY OF MAGNESIUM: CPT | Performed by: STUDENT IN AN ORGANIZED HEALTH CARE EDUCATION/TRAINING PROGRAM

## 2023-10-10 PROCEDURE — 93005 ELECTROCARDIOGRAM TRACING: CPT

## 2023-10-10 PROCEDURE — 85610 PROTHROMBIN TIME: CPT | Performed by: EMERGENCY MEDICINE

## 2023-10-10 PROCEDURE — 63600175 PHARM REV CODE 636 W HCPCS: Performed by: STUDENT IN AN ORGANIZED HEALTH CARE EDUCATION/TRAINING PROGRAM

## 2023-10-10 PROCEDURE — 84100 ASSAY OF PHOSPHORUS: CPT | Performed by: STUDENT IN AN ORGANIZED HEALTH CARE EDUCATION/TRAINING PROGRAM

## 2023-10-10 PROCEDURE — 80053 COMPREHEN METABOLIC PANEL: CPT | Performed by: EMERGENCY MEDICINE

## 2023-10-10 PROCEDURE — 84484 ASSAY OF TROPONIN QUANT: CPT | Performed by: EMERGENCY MEDICINE

## 2023-10-10 PROCEDURE — 85730 THROMBOPLASTIN TIME PARTIAL: CPT | Performed by: STUDENT IN AN ORGANIZED HEALTH CARE EDUCATION/TRAINING PROGRAM

## 2023-10-10 PROCEDURE — 84443 ASSAY THYROID STIM HORMONE: CPT | Performed by: STUDENT IN AN ORGANIZED HEALTH CARE EDUCATION/TRAINING PROGRAM

## 2023-10-10 PROCEDURE — 84484 ASSAY OF TROPONIN QUANT: CPT | Performed by: STUDENT IN AN ORGANIZED HEALTH CARE EDUCATION/TRAINING PROGRAM

## 2023-10-10 PROCEDURE — 85610 PROTHROMBIN TIME: CPT | Performed by: STUDENT IN AN ORGANIZED HEALTH CARE EDUCATION/TRAINING PROGRAM

## 2023-10-10 PROCEDURE — 84439 ASSAY OF FREE THYROXINE: CPT | Performed by: STUDENT IN AN ORGANIZED HEALTH CARE EDUCATION/TRAINING PROGRAM

## 2023-10-10 RX ORDER — TALC
6 POWDER (GRAM) TOPICAL NIGHTLY
Status: DISCONTINUED | OUTPATIENT
Start: 2023-10-10 | End: 2023-10-14 | Stop reason: HOSPADM

## 2023-10-10 RX ORDER — METOPROLOL TARTRATE 1 MG/ML
5 INJECTION, SOLUTION INTRAVENOUS EVERY 5 MIN PRN
Status: DISCONTINUED | OUTPATIENT
Start: 2023-10-10 | End: 2023-10-14 | Stop reason: HOSPADM

## 2023-10-10 RX ORDER — IBUPROFEN 200 MG
16 TABLET ORAL
Status: DISCONTINUED | OUTPATIENT
Start: 2023-10-10 | End: 2023-10-14 | Stop reason: HOSPADM

## 2023-10-10 RX ORDER — NAPROXEN SODIUM 220 MG/1
243 TABLET, FILM COATED ORAL
Status: COMPLETED | OUTPATIENT
Start: 2023-10-10 | End: 2023-10-10

## 2023-10-10 RX ORDER — ACETAMINOPHEN 325 MG/1
650 TABLET ORAL EVERY 4 HOURS PRN
Status: DISCONTINUED | OUTPATIENT
Start: 2023-10-10 | End: 2023-10-14 | Stop reason: HOSPADM

## 2023-10-10 RX ORDER — METOPROLOL TARTRATE 25 MG/1
12.5 TABLET ORAL 2 TIMES DAILY
Status: DISCONTINUED | OUTPATIENT
Start: 2023-10-10 | End: 2023-10-12

## 2023-10-10 RX ORDER — ASPIRIN 81 MG/1
81 TABLET ORAL DAILY
Status: DISCONTINUED | OUTPATIENT
Start: 2023-10-11 | End: 2023-10-14 | Stop reason: HOSPADM

## 2023-10-10 RX ORDER — ESCITALOPRAM OXALATE 10 MG/1
10 TABLET ORAL DAILY
Status: DISCONTINUED | OUTPATIENT
Start: 2023-10-11 | End: 2023-10-14 | Stop reason: HOSPADM

## 2023-10-10 RX ORDER — LISINOPRIL 10 MG/1
20 TABLET ORAL DAILY
Status: DISCONTINUED | OUTPATIENT
Start: 2023-10-11 | End: 2023-10-12

## 2023-10-10 RX ORDER — AMLODIPINE BESYLATE 5 MG/1
5 TABLET ORAL DAILY
Status: DISCONTINUED | OUTPATIENT
Start: 2023-10-11 | End: 2023-10-11

## 2023-10-10 RX ORDER — HEPARIN SODIUM,PORCINE/D5W 25000/250
0-40 INTRAVENOUS SOLUTION INTRAVENOUS CONTINUOUS
Status: DISCONTINUED | OUTPATIENT
Start: 2023-10-10 | End: 2023-10-12

## 2023-10-10 RX ORDER — GLUCAGON 1 MG
1 KIT INJECTION
Status: DISCONTINUED | OUTPATIENT
Start: 2023-10-10 | End: 2023-10-14 | Stop reason: HOSPADM

## 2023-10-10 RX ORDER — SODIUM CHLORIDE 0.9 % (FLUSH) 0.9 %
10 SYRINGE (ML) INJECTION EVERY 12 HOURS PRN
Status: DISCONTINUED | OUTPATIENT
Start: 2023-10-10 | End: 2023-10-14 | Stop reason: HOSPADM

## 2023-10-10 RX ORDER — DOCUSATE SODIUM 100 MG/1
200 CAPSULE, LIQUID FILLED ORAL NIGHTLY
Status: DISCONTINUED | OUTPATIENT
Start: 2023-10-10 | End: 2023-10-14 | Stop reason: HOSPADM

## 2023-10-10 RX ORDER — IBUPROFEN 200 MG
24 TABLET ORAL
Status: DISCONTINUED | OUTPATIENT
Start: 2023-10-10 | End: 2023-10-14 | Stop reason: HOSPADM

## 2023-10-10 RX ORDER — NALOXONE HCL 0.4 MG/ML
0.02 VIAL (ML) INJECTION
Status: DISCONTINUED | OUTPATIENT
Start: 2023-10-10 | End: 2023-10-14 | Stop reason: HOSPADM

## 2023-10-10 RX ADMIN — APIXABAN 2.5 MG: 2.5 TABLET, FILM COATED ORAL at 08:10

## 2023-10-10 RX ADMIN — SODIUM CHLORIDE 500 ML: 9 INJECTION, SOLUTION INTRAVENOUS at 05:10

## 2023-10-10 RX ADMIN — METOPROLOL TARTRATE 12.5 MG: 25 TABLET, FILM COATED ORAL at 08:10

## 2023-10-10 RX ADMIN — ASPIRIN 243 MG: 81 TABLET, CHEWABLE ORAL at 01:10

## 2023-10-10 RX ADMIN — HEPARIN SODIUM 12 UNITS/KG/HR: 10000 INJECTION, SOLUTION INTRAVENOUS at 10:10

## 2023-10-10 RX ADMIN — DOCUSATE SODIUM 200 MG: 100 CAPSULE, LIQUID FILLED ORAL at 08:10

## 2023-10-10 RX ADMIN — Medication 6 MG: at 08:10

## 2023-10-10 NOTE — H&P
"[facility]  History & Physical      Attending Physician: Ricardo Monson,*  Resident: Krishan        Subjective:      Brief HPI:  Pt is a 86 y/o female with PMH of HTN, CAD, and GERD presents to ED with chest pain, SOB and palpitations. Symptoms began while watching TV with a sudden pressure chest pain. Pt felt " she was going to die." She reports palpitations and difficulty breathing. She confirms following with cardiology and having similar pain in the past. She denies fever, decrease intake, and syncope. Denies any recent falls or changes in medication. Denies recent illness. Until chest pain began she was at usual baseline.       Review of Systems:  ROS completed and negative except as indicated above.     Objective:     Last 24 Hour Vital Signs:  BP  Min: 111/54  Max: 140/88  Temp  Av.2 °F (36.8 °C)  Min: 98.2 °F (36.8 °C)  Max: 98.2 °F (36.8 °C)  Pulse  Av.1  Min: 58  Max: 158  Resp  Av.8  Min: 15  Max: 24  SpO2  Av.5 %  Min: 95 %  Max: 98 %  Height  Av' 5.35" (166 cm)  Min: 5' 5.35" (166 cm)  Max: 5' 5.35" (166 cm)  Weight  Av kg (156 lb 8.4 oz)  Min: 71 kg (156 lb 8.4 oz)  Max: 71 kg (156 lb 8.4 oz)  No intake/output data recorded.    Physical Exam  Constitutional:       General: She is not in acute distress.     Appearance: She is not ill-appearing.   HENT:      Head: Normocephalic and atraumatic.      Mouth/Throat:      Mouth: Mucous membranes are moist.   Cardiovascular:      Rate and Rhythm: Normal rate. Rhythm irregular.      Pulses: Normal pulses.      Heart sounds: Murmur heard.      Comments: Symmetrical pulse B/L. Decreased PT pulse. 2+ DP. Systolic murmur  Abdominal:      General: Bowel sounds are normal. There is no distension.      Palpations: Abdomen is soft.      Tenderness: There is no abdominal tenderness.   Musculoskeletal:      Right lower leg: No edema.      Left lower leg: No edema.   Skin:     General: Skin is warm and dry.   Neurological:    "   General: No focal deficit present.      Mental Status: She is oriented to person, place, and time.           Laboratory:  Most Recent Data:  CBC:   Lab Results   Component Value Date    WBC 3.74 (L) 10/10/2023    HGB 10.6 (L) 10/10/2023    HCT 33.1 (L) 10/10/2023     10/10/2023    MCV 84.7 10/10/2023    RDW 15.4 10/10/2023     WBC Differential:   Recent Labs   Lab 10/10/23  1341   WBC 3.74*   HGB 10.6*   HCT 33.1*      MCV 84.7     BMP:   Lab Results   Component Value Date     10/10/2023    K 4.3 10/10/2023     (H) 01/21/2022    CO2 23 10/10/2023    BUN 22.5 (H) 10/10/2023    CREATININE 1.28 (H) 10/10/2023    CALCIUM 9.6 10/10/2023    MG 2.0 06/10/2017     LFTs:   Lab Results   Component Value Date    ALBUMIN 3.3 (L) 10/10/2023    BILITOT 0.4 10/10/2023    AST 27 10/10/2023    ALKPHOS 62 10/10/2023    ALT 8 10/10/2023     Coags:   Lab Results   Component Value Date    INR 1.1 10/10/2023    PROTIME 14.6 (H) 10/10/2023    PTT 29.5 06/10/2017     FLP:   Lab Results   Component Value Date    CHOL 157 10/31/2022    HDL 33 (L) 10/31/2022    TRIG 193 (H) 10/31/2022     DM:   Lab Results   Component Value Date    HGBA1C 5.2 10/31/2022    CREATININE 1.28 (H) 10/10/2023     Thyroid:   Lab Results   Component Value Date    TSH 5.553 (H) 10/10/2023      Anemia:   Lab Results   Component Value Date    IRON 62 01/28/2019    TIBC 297 01/28/2019    FERRITIN 95.9 01/28/2019       Lab Results   Component Value Date    AXYKYUEM98 795 07/26/2018       Lab Results   Component Value Date    FOLATE >20.0 07/26/2018        Cardiac:   Lab Results   Component Value Date    TROPONINI 0.032 10/10/2023    .9 (H) 10/10/2023         Other Results:  EKG (my interpretation): atrial fibrillation, rate 143    Radiology:  Imaging Results              X-Ray Chest AP Portable (Final result)  Result time 10/10/23 14:05:57      Final result by Wan Abreu MD (10/10/23 14:05:57)                   Impression:      NO  ACUTE CARDIOPULMONARY PROCESS IDENTIFIED.      Electronically signed by: Wan Abreu  Date:    10/10/2023  Time:    14:05               Narrative:    EXAMINATION:  XR CHEST AP PORTABLE    CLINICAL HISTORY:  Chest pain, unspecified    TECHNIQUE:  One view    COMPARISON:  September 14, 2023.    FINDINGS:  Cardiopericardial silhouette enlarged appearance is similar.  Right upper paratracheal opacity is similar.  No acute dense focal or segmental consolidation, congestive process, pleural effusions or pneumothorax.                                      Current Medications:     Infusions:       Scheduled:   apixaban  2.5 mg Oral BID    docusate sodium  200 mg Oral QHS    [START ON 10/11/2023] EScitalopram oxalate  10 mg Oral Daily    melatonin  6 mg Oral Nightly    metoprolol tartrate  12.5 mg Oral BID    [START ON 10/11/2023] multivitamin  1 tablet Oral Daily    sodium chloride 0.9%  500 mL Intravenous Once        PRN:  acetaminophen, dextrose 10%, dextrose 10%, glucagon (human recombinant), glucose, glucose, metoprolol, naloxone, sodium chloride 0.9%        Assessment & Plan:     Atrial fibrillation w/ RVR  Unstable Angina  CAD- cath 2021  Systolic Murmur  - Admit to telemetry with continuous cardiac monitoring  - Received ASA in ED  - Metoprolol 12.5 mg  - Trend troponin and EKG q6hr  - CMP, Mg, Phos daily  - CHADSVasc 4 points. Will begin Eliquis.     HTN  - Home med not on formulary. Continue with amlodipine 5 mg and lisinopril 20 mg.     GRACIE  - 500 mL bolus given  - avoid nephrotoxic agents    Chronic Normocytic anemia  - H/H stable  - pt declines chronic use of iron supplementation    Depression  - Continue Lexapro 10 mg    Memory loss  - Delirium precautions  - PT/OT consulted  - CM consulted      CODE STATUS: Full  Access: PIV  Antibiotics: none  Diet: heart healthy  DVT Prophylaxis: Eliquis  GI Prophylaxis: Protonix  Fluids: 500 mL Bolus          Qing Nickerson MD  [facility] HO-3

## 2023-10-10 NOTE — Clinical Note
115 ml of contrast were injected throughout the case. 10 mL of contrast was the total wasted during the case. 125 mL was the total amount used during the case.

## 2023-10-10 NOTE — Clinical Note
"Subjective:       Patient ID: Tico Reeves is a 73 y.o. male.    Chief Complaint: Abdominal Pain    Patient is brought in with today with his wife Matilda.  Patient has generalized abdominal pain worse to the upper aspect of the abdomen ongoing since yesterday.  He feels bloated with frequent belching.  He has a history of chronic constipation.  He feels that his bowels are backed up.  He did not sleep last night due to the pain. No fever.  No pain generalized to the left lower quadrant.  No blood in the stool or urine.  He thinks he might have a bowel movement yesterday but he is not sure.  Ms. Grey is not sure either.      Abdominal Pain   This is a new problem. The current episode started yesterday. The onset quality is sudden. The problem occurs constantly. The problem has been waxing and waning. The pain is located in the generalized abdominal region, LUQ and RUQ. The pain is moderate. The quality of the pain is aching, cramping and a sensation of fullness. The abdominal pain does not radiate. Associated symptoms include anorexia, belching, constipation and nausea. Pertinent negatives include no arthralgias, diarrhea, dysuria, fever, flatus, frequency, headaches, hematochezia, hematuria, melena, myalgias, vomiting or weight loss. The pain is aggravated by movement. The pain is relieved by belching. He has tried nothing for the symptoms. The treatment provided no relief. There is no history of abdominal surgery or colon cancer.       BP (!) 148/70   Pulse 76   Temp 98.1 °F (36.7 °C) (Tympanic)   Ht 5' 10" (1.778 m)   Wt 72 kg (158 lb 11.7 oz)   BMI 22.78 kg/m²     Review of Systems   Constitutional: Positive for activity change, appetite change and fatigue. Negative for chills, diaphoresis, fever, unexpected weight change and weight loss.   HENT: Negative for congestion, ear pain, nosebleeds, postnasal drip, rhinorrhea, sinus pressure, sneezing, sore throat and trouble swallowing.    Eyes: Negative for " The catheter was inserted into the ostium   right coronary artery. photophobia, pain and visual disturbance.   Respiratory: Negative for apnea, cough, choking, chest tightness, shortness of breath and wheezing.    Cardiovascular: Negative for chest pain, palpitations and leg swelling.   Gastrointestinal: Positive for abdominal pain, anorexia, constipation and nausea. Negative for abdominal distention, anal bleeding, blood in stool, diarrhea, flatus, hematochezia, melena, rectal pain and vomiting.   Genitourinary: Negative for decreased urine volume, difficulty urinating, dysuria, frequency, hematuria and urgency.   Musculoskeletal: Negative for arthralgias, gait problem, joint swelling and myalgias.   Skin: Negative for rash.   Neurological: Negative for dizziness, tremors, seizures, syncope, weakness, light-headedness, numbness and headaches.   Psychiatric/Behavioral: Positive for sleep disturbance. Negative for agitation, confusion, decreased concentration and hallucinations. The patient is not nervous/anxious.        Objective:      Physical Exam   Constitutional: He is oriented to person, place, and time. He appears well-developed and well-nourished. He is cooperative. He appears ill. No distress.   In wheelchair   HENT:   Head: Normocephalic and atraumatic.   Eyes: Conjunctivae are normal. Right eye exhibits no discharge. Left eye exhibits no discharge.   Cardiovascular: Normal rate, regular rhythm and normal heart sounds.   No murmur heard.  Pulmonary/Chest: Effort normal and breath sounds normal. No respiratory distress. He has no wheezes. He has no rales. He exhibits no tenderness.   Abdominal: Soft. Normal appearance. He exhibits no distension, no pulsatile liver, no fluid wave and no ascites. Bowel sounds are decreased. There is no hepatosplenomegaly. There is generalized tenderness. There is no rigidity, no rebound, no guarding, no CVA tenderness, no tenderness at McBurney's point and negative Joel's sign.   Musculoskeletal: Normal range of motion.   At normal MS  baseline   Neurological: He is alert and oriented to person, place, and time.   Skin: Skin is warm and dry. No rash noted. He is not diaphoretic.   Psychiatric: He has a normal mood and affect. His behavior is normal. Judgment and thought content normal.   Nursing note and vitals reviewed.      Assessment:       1. Chronic constipation    2. Nausea        Plan:       Tico was seen today for abdominal pain.    Diagnoses and all orders for this visit:    Chronic constipation  Xray shows lots of retained stool and gas, will do glycerin x 1 today with 1 bottle of mg citrate for clean out. This should improve symptoms. If not cleaned out enough, will repeat glycerin tomorrow as well.     Nausea  -     ondansetron (ZOFRAN-ODT) 4 MG TbDL; Take 1 tablet (4 mg total) by mouth every 8 (eight) hours as needed (nausea).  -     ondansetron disintegrating tablet 4 mg    X-Ray Abdomen AP 1 View  Narrative: EXAMINATION:  XR ABDOMEN AP 1 VIEW    CLINICAL HISTORY:  Other constipation    TECHNIQUE:  AP View(s) of the abdomen was performed.    COMPARISON:  05/15/2018    FINDINGS:  Primarily colonic gas noted without evidence of small bowel obstruction.  Mild to moderate fecal material present as well within the colon.  There is moderate distension of the stomach.  No suspicious mass-effect or calcification.  Osseous structures demonstrate degenerative findings.  Impression: Colonic gas and constipation.    Electronically signed by: Surjit Puentes MD  Date:    11/07/2018  Time:    08:32

## 2023-10-10 NOTE — Clinical Note
The radial band was applied to the right radial artery. 15 cc's of air were inserted into the closure device. Fingers cool to touch. Cap refill <3 sec. Move fingers w/ out any pain or discomfort.

## 2023-10-10 NOTE — Clinical Note
The catheter insertion attempt was made into the and was inserted over the wire into the ostium   left main. The catheter was unable to engage the area..

## 2023-10-10 NOTE — ED PROVIDER NOTES
Encounter Date: 10/10/2023       History     Chief Complaint   Patient presents with    Palpitations     Pt reports minor palpitations that started this morning.      Patient presents to the emergency department due to chest pain and palpitations.  She states she was feeling fine last few days until this morning when she was suddenly felt like her heart was beating out of her chest and she was having pain on the left side of her chest.  She was also dizzy nauseous.  Now that she was arrived to the ER, she feels much better.  She states the chest pain has now resolved.  Has a history of hypertension high cholesterol, denies any cardiac history or history of cardiac stents.  No known history of AFib.    The history is provided by the patient and a relative. History limited by: Poor historian.     Review of patient's allergies indicates:   Allergen Reactions    Sulfa (sulfonamide antibiotics)      Other reaction(s): Not available     Past Medical History:   Diagnosis Date    GERD (gastroesophageal reflux disease)     Hyperlipidemia     Hypertension      Past Surgical History:   Procedure Laterality Date    APPENDECTOMY      CHOLECYSTECTOMY      HYSTERECTOMY       Family History   Problem Relation Age of Onset    Hypertension Mother     Cancer Mother     Hypertension Sister     Hypertension Sister      Social History     Tobacco Use    Smoking status: Never     Passive exposure: Never    Smokeless tobacco: Never   Substance Use Topics    Alcohol use: Not Currently    Drug use: Never     Review of Systems   Constitutional:  Positive for diaphoresis. Negative for chills and fever.   HENT:  Negative for congestion and sore throat.    Respiratory:  Positive for shortness of breath. Negative for cough.    Cardiovascular:  Positive for palpitations. Negative for chest pain.   Gastrointestinal:  Positive for nausea. Negative for abdominal pain.   Genitourinary:  Negative for dysuria and hematuria.   Musculoskeletal:  Negative  for arthralgias and myalgias.   Neurological:  Positive for dizziness, weakness and light-headedness.       Physical Exam     Initial Vitals [10/10/23 1223]   BP Pulse Resp Temp SpO2   (!) 140/88 (!) 158 20 98.2 °F (36.8 °C) 96 %      MAP       --         Physical Exam    Nursing note and vitals reviewed.  Constitutional: She appears well-developed and well-nourished.   HENT:   Head: Normocephalic and atraumatic.   Eyes: EOM are normal. Pupils are equal, round, and reactive to light.   Neck: Neck supple.   Normal range of motion.  Cardiovascular:  Normal rate and regular rhythm.           Pulmonary/Chest: Breath sounds normal. No respiratory distress.   Abdominal: Abdomen is soft. There is no abdominal tenderness.   Musculoskeletal:         General: No edema. Normal range of motion.      Cervical back: Normal range of motion and neck supple.     Neurological: She is alert and oriented to person, place, and time.   Skin: Skin is warm and dry.         ED Course   Procedures  Labs Reviewed   COMPREHENSIVE METABOLIC PANEL - Abnormal; Notable for the following components:       Result Value    Blood Urea Nitrogen 22.5 (*)     Creatinine 1.28 (*)     Protein Total 9.9 (*)     Albumin Level 3.3 (*)     Globulin 6.6 (*)     Albumin/Globulin Ratio 0.5 (*)     All other components within normal limits   B-TYPE NATRIURETIC PEPTIDE - Abnormal; Notable for the following components:    Natriuretic Peptide 166.9 (*)     All other components within normal limits   PROTIME-INR - Abnormal; Notable for the following components:    PT 14.6 (*)     All other components within normal limits   CBC WITH DIFFERENTIAL - Abnormal; Notable for the following components:    WBC 3.74 (*)     RBC 3.91 (*)     Hgb 10.6 (*)     Hct 33.1 (*)     MCHC 32.0 (*)     MPV 11.2 (*)     Lymph # 0.32 (*)     All other components within normal limits   TSH - Abnormal; Notable for the following components:    TSH 5.553 (*)     All other components within  normal limits   TROPONIN I - Normal   CBC W/ AUTO DIFFERENTIAL    Narrative:     The following orders were created for panel order CBC auto differential.  Procedure                               Abnormality         Status                     ---------                               -----------         ------                     CBC with Differential[[id number]]        Abnormal            Final result                 Please view results for these tests on the individual orders.   EXTRA TUBES    Narrative:     The following orders were created for panel order EXTRA TUBES.  Procedure                               Abnormality         Status                     ---------                               -----------         ------                     Red Top Hold[[id number]]                                     In process                 Gold Top Hold[[id number]]                                    In process                   Please view results for these tests on the individual orders.   RED TOP HOLD   GOLD TOP HOLD   T4, FREE     EKG Readings: (Independently Interpreted)   Initial Reading: No STEMI. Rhythm: Atrial Fibrillation. Heart Rate: 143. Ectopy: No Ectopy. Conduction: Normal. ST Segment Depression: I, II, III, AVF, V4, V5 and V6. Axis: Normal. Clinical Impression: Atrial Fibrillation with RVR   Other EKG Interpretations: 1303 - rate 80s, rhythm sinus rhythm, normal axis, PVC present     ECG Results              EKG 12-lead (Shortness of Breath) Age > 50 (In process)  Result time 10/10/23 13:56:28      In process by Interface, Lab In [facility] (10/10/23 13:56:28)                   Narrative:    Test Reason : R06.02,    Vent. Rate : 080 BPM     Atrial Rate : 080 BPM     P-R Int : 154 ms          QRS Dur : 098 ms      QT Int : 396 ms       P-R-T Axes : 065 -09 018 degrees     QTc Int : 456 ms    Sinus rhythm with occasional Premature ventricular complexes  Minimal voltage criteria for LVH, may be normal variant  Septal  infarct (cited on or before 10-OCT-2023)  Abnormal ECG  When compared with ECG of 10-OCT-2023 12:35,  Sinus rhythm has replaced Atrial fibrillation  Vent. rate has decreased BY  63 BPM  Serial changes of evolving Septal infarct Present    Referred By: [email or web address]   SELF           Confirmed By:                                      EKG 12-lead (Rapid Heart Beat / Palpitations) Age > 50 (Final result)  Result time 10/10/23 13:28:20      Final result by Interface, Lab In [facility] (10/10/23 13:28:20)                   Narrative:    Test Reason : R00.0,    Vent. Rate : 143 BPM     Atrial Rate : 141 BPM     P-R Int : 000 ms          QRS Dur : 100 ms      QT Int : 264 ms       P-R-T Axes : 000 005 209 degrees     QTc Int : 407 ms    Atrial fibrillation with rapid ventricular response  Marked ST abnormality, possible inferolateral subendocardial injury  Abnormal ECG  Confirmed by Nino Velez MD ([id number]) on 10/10/2023 1:28:09 PM    Referred By:             Confirmed By:Nino Velez MD                                  Imaging Results              X-Ray Chest AP Portable (Final result)  Result time 10/10/23 14:05:57      Final result by Wan Abreu MD (10/10/23 14:05:57)                   Impression:      NO ACUTE CARDIOPULMONARY PROCESS IDENTIFIED.      Electronically signed by: Wan Abreu  Date:    10/10/2023  Time:    14:05               Narrative:    EXAMINATION:  XR CHEST AP PORTABLE    CLINICAL HISTORY:  Chest pain, unspecified    TECHNIQUE:  One view    COMPARISON:  September 14, 2023.    FINDINGS:  Cardiopericardial silhouette enlarged appearance is similar.  Right upper paratracheal opacity is similar.  No acute dense focal or segmental consolidation, congestive process, pleural effusions or pneumothorax.                                       Medications   aspirin chewable tablet 243 mg (243 mg Oral Given 10/10/23 1322)     Medical Decision Making  On arrival patient was in AFib with RVR with rates in the 150s  otherwise vital signs are stable.  Prior to the administration of rate control agents, patient spontaneously converted to normal sinus rhythm.  Your symptoms improved greatly after this as well.  CBC, CMP were generally unremarkable and troponin with his within normal limits.  Repeat EKGs showed no acute ischemic changes.  Chest x-ray is generally clear, she was given aspirin and admitted to the family Medicine Service.    Amount and/or Complexity of Data Reviewed  Labs: ordered. Decision-making details documented in ED Course.  Radiology: ordered. Decision-making details documented in ED Course.  ECG/medicine tests: ordered and independent interpretation performed. Decision-making details documented in ED Course.  Discussion of management or test interpretation with external provider(s): Discussed with the family medicine team patient's presentation, ED findings, and treatments    Risk  OTC drugs.  Decision regarding hospitalization.                               Clinical Impression:   Final diagnoses:  [R00.0] Rapid heart beat  [R07.9] Chest pain  [R06.02] SOB (shortness of breath)  [I48.91] Atrial fibrillation with RVR (Primary)  [R07.9] Chest pain, unspecified type        ED Disposition Condition    Admit Stable                Ambrosio Rosas MD  10/10/23 [id number]

## 2023-10-10 NOTE — Clinical Note
The catheter was repositioned into the ostium   left main. An angiography was performed of the left coronary arteries. Multiple views were taken. The angiography was performed via power injection.  The result was suboptimal..

## 2023-10-11 LAB
ALBUMIN SERPL-MCNC: 2.9 G/DL (ref 3.4–4.8)
ALBUMIN/GLOB SERPL: 0.5 RATIO (ref 1.1–2)
ALP SERPL-CCNC: 53 UNIT/L (ref 40–150)
ALT SERPL-CCNC: 6 UNIT/L (ref 0–55)
APTT PPP: 116.8 SECONDS (ref 23.2–33.7)
APTT PPP: 55.4 SECONDS (ref 23.2–33.7)
APTT PPP: 66.6 SECONDS (ref 23.2–33.7)
AST SERPL-CCNC: 22 UNIT/L (ref 5–34)
AV INDEX (PROSTH): 0.22
AV MEAN GRADIENT: 59 MMHG
AV PEAK GRADIENT: 89 MMHG
AV REGURGITATION PRESSURE HALF TIME: 379.86 MS
AV VALVE AREA BY VELOCITY RATIO: 0.66 CM²
AV VALVE AREA: 0.66 CM²
AV VELOCITY RATIO: 0.22
BASOPHILS # BLD AUTO: 0.01 X10(3)/MCL
BASOPHILS NFR BLD AUTO: 0.3 %
BILIRUB SERPL-MCNC: 0.6 MG/DL
BSA FOR ECHO PROCEDURE: 1.74 M2
BUN SERPL-MCNC: 18.1 MG/DL (ref 9.8–20.1)
CALCIUM SERPL-MCNC: 9.1 MG/DL (ref 8.4–10.2)
CHLORIDE SERPL-SCNC: 110 MMOL/L (ref 98–107)
CHOLEST SERPL-MCNC: 130 MG/DL
CHOLEST/HDLC SERPL: 4 {RATIO} (ref 0–5)
CO2 SERPL-SCNC: 24 MMOL/L (ref 23–31)
CREAT SERPL-MCNC: 1.12 MG/DL (ref 0.55–1.02)
CV ECHO LV RWT: 0.37 CM
DOP CALC AO PEAK VEL: 4.72 M/S
DOP CALC AO VTI: 112.7 CM
DOP CALC LVOT AREA: 3 CM2
DOP CALC LVOT DIAMETER: 1.94 CM
DOP CALC LVOT PEAK VEL: 1.06 M/S
DOP CALC LVOT STROKE VOLUME: 74.75 CM3
DOP CALC MV VTI: 38.8 CM
DOP CALCLVOT PEAK VEL VTI: 25.3 CM
E WAVE DECELERATION TIME: 314.05 MSEC
E/A RATIO: 1.2
E/E' RATIO: 22.18 M/S
ECHO LV POSTERIOR WALL: 0.89 CM (ref 0.6–1.1)
EOSINOPHIL # BLD AUTO: 0.1 X10(3)/MCL (ref 0–0.9)
EOSINOPHIL NFR BLD AUTO: 3.1 %
ERYTHROCYTE [DISTWIDTH] IN BLOOD BY AUTOMATED COUNT: 15.3 % (ref 11.5–17)
FRACTIONAL SHORTENING: 42 % (ref 28–44)
GFR SERPLBLD CREATININE-BSD FMLA CKD-EPI: 48 MLS/MIN/1.73/M2
GLOBULIN SER-MCNC: 5.5 GM/DL (ref 2.4–3.5)
GLUCOSE SERPL-MCNC: 87 MG/DL (ref 82–115)
HCT VFR BLD AUTO: 30.7 % (ref 37–47)
HDLC SERPL-MCNC: 35 MG/DL (ref 35–60)
HGB BLD-MCNC: 9.6 G/DL (ref 12–16)
IMM GRANULOCYTES # BLD AUTO: 0.02 X10(3)/MCL (ref 0–0.04)
IMM GRANULOCYTES NFR BLD AUTO: 0.6 %
INTERVENTRICULAR SEPTUM: 1.5 CM (ref 0.6–1.1)
LDLC SERPL CALC-MCNC: 76 MG/DL (ref 50–140)
LEFT ATRIUM SIZE: 3.99 CM
LEFT INTERNAL DIMENSION IN SYSTOLE: 2.8 CM (ref 2.1–4)
LEFT VENTRICLE DIASTOLIC VOLUME INDEX: 63.44 ML/M2
LEFT VENTRICLE DIASTOLIC VOLUME: 109.75 ML
LEFT VENTRICLE MASS INDEX: 128 G/M2
LEFT VENTRICLE SYSTOLIC VOLUME INDEX: 17.1 ML/M2
LEFT VENTRICLE SYSTOLIC VOLUME: 29.53 ML
LEFT VENTRICULAR INTERNAL DIMENSION IN DIASTOLE: 4.84 CM (ref 3.5–6)
LEFT VENTRICULAR MASS: 220.71 G
LV LATERAL E/E' RATIO: 17.43 M/S
LV SEPTAL E/E' RATIO: 30.5 M/S
LVOT MG: 2.62 MMHG
LVOT MV: 0.75 CM/S
LYMPHOCYTES # BLD AUTO: 0.5 X10(3)/MCL (ref 0.6–4.6)
LYMPHOCYTES NFR BLD AUTO: 15.7 %
MAGNESIUM SERPL-MCNC: 1.7 MG/DL (ref 1.6–2.6)
MCH RBC QN AUTO: 26.8 PG (ref 27–31)
MCHC RBC AUTO-ENTMCNC: 31.3 G/DL (ref 33–36)
MCV RBC AUTO: 85.8 FL (ref 80–94)
MONOCYTES # BLD AUTO: 0.2 X10(3)/MCL (ref 0.1–1.3)
MONOCYTES NFR BLD AUTO: 6.3 %
MV MEAN GRADIENT: 2 MMHG
MV PEAK A VEL: 1.02 M/S
MV PEAK E VEL: 1.22 M/S
MV PEAK GRADIENT: 6 MMHG
MV STENOSIS PRESSURE HALF TIME: 91.07 MS
MV VALVE AREA BY CONTINUITY EQUATION: 1.93 CM2
MV VALVE AREA P 1/2 METHOD: 2.42 CM2
NEUTROPHILS # BLD AUTO: 2.36 X10(3)/MCL (ref 2.1–9.2)
NEUTROPHILS NFR BLD AUTO: 74 %
NRBC BLD AUTO-RTO: 0 %
PHOSPHATE SERPL-MCNC: 2.6 MG/DL (ref 2.3–4.7)
PISA AR MAX VEL: 4.21 M/S
PISA TR MAX VEL: 3.22 M/S
PLATELET # BLD AUTO: 142 X10(3)/MCL (ref 130–400)
PMV BLD AUTO: 10.8 FL (ref 7.4–10.4)
POTASSIUM SERPL-SCNC: 4.2 MMOL/L (ref 3.5–5.1)
PROT SERPL-MCNC: 8.4 GM/DL (ref 5.8–7.6)
RA PRESSURE ESTIMATED: 3 MMHG
RBC # BLD AUTO: 3.58 X10(6)/MCL (ref 4.2–5.4)
RIGHT VENTRICULAR END-DIASTOLIC DIMENSION: 2.9 CM
RV TB RVSP: 6 MMHG
SODIUM SERPL-SCNC: 139 MMOL/L (ref 136–145)
TDI LATERAL: 0.07 M/S
TDI SEPTAL: 0.04 M/S
TDI: 0.06 M/S
TR MAX PG: 41 MMHG
TRIGL SERPL-MCNC: 93 MG/DL (ref 37–140)
TROPONIN I SERPL-MCNC: 0.64 NG/ML (ref 0–0.04)
TROPONIN I SERPL-MCNC: 0.68 NG/ML (ref 0–0.04)
TROPONIN I SERPL-MCNC: 1.18 NG/ML (ref 0–0.04)
TV REST PULMONARY ARTERY PRESSURE: 44 MMHG
VLDLC SERPL CALC-MCNC: 19 MG/DL
WBC # SPEC AUTO: 3.19 X10(3)/MCL (ref 4.5–11.5)
Z-SCORE OF LEFT VENTRICULAR DIMENSION IN END DIASTOLE: 0.1
Z-SCORE OF LEFT VENTRICULAR DIMENSION IN END SYSTOLE: -0.46

## 2023-10-11 PROCEDURE — 80061 LIPID PANEL: CPT | Performed by: STUDENT IN AN ORGANIZED HEALTH CARE EDUCATION/TRAINING PROGRAM

## 2023-10-11 PROCEDURE — 85730 THROMBOPLASTIN TIME PARTIAL: CPT | Performed by: STUDENT IN AN ORGANIZED HEALTH CARE EDUCATION/TRAINING PROGRAM

## 2023-10-11 PROCEDURE — 85025 COMPLETE CBC W/AUTO DIFF WBC: CPT | Performed by: STUDENT IN AN ORGANIZED HEALTH CARE EDUCATION/TRAINING PROGRAM

## 2023-10-11 PROCEDURE — 93005 ELECTROCARDIOGRAM TRACING: CPT

## 2023-10-11 PROCEDURE — 84484 ASSAY OF TROPONIN QUANT: CPT | Performed by: STUDENT IN AN ORGANIZED HEALTH CARE EDUCATION/TRAINING PROGRAM

## 2023-10-11 PROCEDURE — 97162 PT EVAL MOD COMPLEX 30 MIN: CPT

## 2023-10-11 PROCEDURE — 80053 COMPREHEN METABOLIC PANEL: CPT | Performed by: STUDENT IN AN ORGANIZED HEALTH CARE EDUCATION/TRAINING PROGRAM

## 2023-10-11 PROCEDURE — 97165 OT EVAL LOW COMPLEX 30 MIN: CPT

## 2023-10-11 PROCEDURE — 21400001 HC TELEMETRY ROOM

## 2023-10-11 PROCEDURE — 83735 ASSAY OF MAGNESIUM: CPT | Performed by: STUDENT IN AN ORGANIZED HEALTH CARE EDUCATION/TRAINING PROGRAM

## 2023-10-11 PROCEDURE — 25000003 PHARM REV CODE 250: Performed by: STUDENT IN AN ORGANIZED HEALTH CARE EDUCATION/TRAINING PROGRAM

## 2023-10-11 PROCEDURE — 85730 THROMBOPLASTIN TIME PARTIAL: CPT | Performed by: FAMILY MEDICINE

## 2023-10-11 PROCEDURE — 94761 N-INVAS EAR/PLS OXIMETRY MLT: CPT

## 2023-10-11 PROCEDURE — 84100 ASSAY OF PHOSPHORUS: CPT | Performed by: STUDENT IN AN ORGANIZED HEALTH CARE EDUCATION/TRAINING PROGRAM

## 2023-10-11 RX ORDER — ATORVASTATIN CALCIUM 40 MG/1
40 TABLET, FILM COATED ORAL NIGHTLY
Status: DISCONTINUED | OUTPATIENT
Start: 2023-10-11 | End: 2023-10-12

## 2023-10-11 RX ADMIN — METOPROLOL TARTRATE 12.5 MG: 25 TABLET, FILM COATED ORAL at 09:10

## 2023-10-11 RX ADMIN — LISINOPRIL 20 MG: 10 TABLET ORAL at 09:10

## 2023-10-11 RX ADMIN — ATORVASTATIN CALCIUM 40 MG: 40 TABLET, FILM COATED ORAL at 09:10

## 2023-10-11 RX ADMIN — DOCUSATE SODIUM 200 MG: 100 CAPSULE, LIQUID FILLED ORAL at 09:10

## 2023-10-11 RX ADMIN — ASPIRIN 81 MG: 81 TABLET, COATED ORAL at 09:10

## 2023-10-11 RX ADMIN — THERA TABS 1 TABLET: TAB at 09:10

## 2023-10-11 RX ADMIN — Medication 6 MG: at 09:10

## 2023-10-11 RX ADMIN — ESCITALOPRAM OXALATE 10 MG: 10 TABLET ORAL at 09:10

## 2023-10-11 NOTE — PT/OT/SLP EVAL
Physical Therapy Evaluation    Patient Name:  Fifi Chacon   MRN:  [id number]    Recommendations:     Discharge Recommendations:   (home w/ responsible caregiver w/ HH)   Discharge Equipment Recommendations: walker, rolling, bath bench  Equipment to be obtained for discharge: rolling walker.  Barriers to discharge: fall risk, level of skilled assistance required, and decreased endurance    Assessment:     Fifi Chacon is a 87 y.o. female admitted with a medical diagnosis of Atrial fibrillation with RVR.    Atrial fibrillation w/ RVR  NSTEMI  CAD  Systolic Murmur   HTN  GRACIE  Chronic Normocytic anemia   Depression  Mild Cognitive Impairment  Patient Active Problem List   Diagnosis    Vaginal prolapse    Pessary maintenance    Hematuria    Hypertension    Atrophy of vagina    Gastroesophageal reflux disease    Benign essential hypertension    History of iron deficiency    Hydroureter    Rheumatoid arthritis    Vitamin D deficiency    Anxiety    Depressive disorder    Renal cyst    Unstable angina    CAD (coronary artery disease)    Atrial fibrillation with RVR     She presents with the following impairments/functional limitations:  impaired endurance, impaired cardiopulmonary response to activity, gait instability, impaired balance, impaired self care skills, impaired functional mobility.    Rehab Prognosis: Good.    Patient would benefit from continued skilled acute PT services to: address above listed impairments/functional limitations; receive patient/caregiver education; reduce fall risk; and maximize independency/safety with functional mobility.    -continued: sitting edge of bed, up-to-chair, ambulation, with progression of gait distance/frequency/duration and speed, as tolerated/appropriate, with assistance and supervision    Recent Surgery: * No surgery found *      Plan:     During this hospitalization, patient to be seen 3 x/week to address the identified impairments/functional limitations via therapeutic  activities, therapeutic exercises and progress toward the established goals.    Plan of Care Expires:  11/08/23    Subjective     Patients nurse Marisol not available prior to therapy session.    Patient agreeable to participate in evaluation.     Chief Complaint: none  Patient/Family Comments/goals: home  Pain/Comfort:  Pain Rating 1: 0/10  Pain Addressed 1: Nurse notified    Patients cultural, spiritual, [employer] conflicts given the current situation: no    Social History  Living Environment: Patient lives with their son in a single level home, with 3 steps steps inside, with bilat handrails, with tub-shower combo.  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  Patient does not utilized tub/shower or bath bench... patient sponge bathes and travels to [facility] home to utilize walk-in shower  Equipment Used at Home: walker, standard, wheelchair, bedside commode, bath bench, rollator, quad cane  Equipment owned (not currently used): bath bench, bedside commode, wheelchair.  Assistance Upon Discharge: family (son).    Hand dominance: right    Patient denied lightheadedness/dizziness.  Patient denied extremity tingling/numbness.  Patient denied current swallowing difficulty.  Patient denied 'new' vision impairment.    Objective:     OT Palak into room after PT initiated contact w/ patient and patients son AND after initiation of assessment (social history/PLOF/equip)    Patient found supine in bed, with HOB elevated, and bed rails up bilateral HOB, left FOB, and right FOB with telemetry, PureWick, peripheral IV, w/ patients son in room, upon PT entry to room.    General Precautions: Standard, fall   Orthopedic Precautions:N/A   Braces: N/A  Respiratory Status: room air    Vitals   At Rest  (pre-session)  BP  123/54   HR  57   O2 Sat %  96      With Activity (post-session)  BP  144/68   HR  57   O2 Sat %  98     Exams:  Orientation: Patient is oriented to Person, Place, Time (month only, NOT year), Situation  Commands: Patient follows 2-step verbal commands  Fine Motor Coordination:     -     Intact: LLE heel shin, RLE heel shin, and Rapid alternating ankle DF/PF  Sensation:    -     Intact: light/touch bilat lower extremity  BILAT UE ROM/strength - defer to OT - see OT note for details  RLE ROM: WFL  RLE Strength: WFL  LLE ROM: WFL  LLE Strength: WFL    Functional Mobility:    Bed Mobility:  Rolling Right: stand by assistance  Scooting: stand by assistance  Supine to Sit: stand by assistance  with no cues required    Transfers:  Sit to Stand: contact guard assistance with hand-held assist and rolling walker  with no cues required    Gait:  Patient ambulated 130ft with hand-held assist and rolling walker and contact guard assistance.  Patient demonstrates no loss of balance, no mis-steps, occasional unsteady gait, decreased lizbeth, decreased step length, wide base of support, decreased foot/floor clearance, and flexed posture.    Other Mobility:  not assessed    Balance:  Sit  Patient demonstrated static balance on level surface with independence with no verbal cues.  Patient demonstrated dynamic balance on level surface with supervision with no verbal cues during moderate excursions.  Stand  Patient demonstrated static balance on level surface  using hand-held assist and rolling walker with modified independence with no verbal cues.    Patient left sitting in chair with telemetry, PureWick, peripheral IV, all lines intact, call button in reach, tray table at bedside, bedside commode at bedside, pt's nurse Marisol notified, and pt's son and M.D. present.    Education     Patient was instructed to utilize staff assistance for mobility/transfers.  White board updated regarding patient's safest level of  mobility with staff assistance.    Goals     Multidisciplinary Problems       Physical Therapy Goals       Problem: Physical Therapy    Goal Priority Disciplines Outcome Goal Variances Interventions   Physical Therapy Goal     PT, PT/OT      Description: Goals to be met by: DISCHARGE     Patient will increase functional independence with mobility by performing:    -. Supine to sit with Modified [place]  -. Sit to supine with Modified [place]  -. Rolling to Left and Right with [place]  -. Sit to stand transfer with Modified [place]  -. Gait  x 130 feet with Supervision using Rolling Walker  -. Ascend/descend 3 steps with bilateral Handrails Supervision using No Assistive Device  ESTABLISHED 10/11/2023           History:     Past Medical History:   Diagnosis Date    GERD (gastroesophageal reflux disease)     Hyperlipidemia     Hypertension      Past Surgical History:   Procedure Laterality Date    APPENDECTOMY      CHOLECYSTECTOMY      HYSTERECTOMY       Time Tracking:     PT Received On: 10/11/23  PT Start Time: 1236     PT Stop Time: 1315  PT Total Time (min): 39 min     Billable Minutes: Evaluation 39    10/11/2023

## 2023-10-11 NOTE — PROGRESS NOTES
"[facility]  Progress Note      Attending Physician: Ricardo Monson,*  Resident: Krishan        Subjective:      Brief HPI:  Pt is a 86 y/o female with PMH of HTN, CAD, and GERD presents to ED with chest pain, SOB and palpitations. Symptoms began while watching TV with a sudden pressure chest pain. Pt felt " she was going to die." She reports palpitations and difficulty breathing. She confirms following with cardiology and having similar pain in the past. She denies fever, decrease intake, and syncope. Denies any recent falls or changes in medication. Denies recent illness. Until chest pain began she was at usual baseline.     Interval Hx:   No acute events overnight. No chest pain. VSS. Eating well and working with PT.     Review of Systems:  ROS completed and negative except as indicated above.     Objective:     Last 24 Hour Vital Signs:  BP  Min: 111/54  Max: 153/75  Temp  Av.1 °F (36.7 °C)  Min: 97.7 °F (36.5 °C)  Max: 98.4 °F (36.9 °C)  Pulse  Av  Min: 53  Max: 158  Resp  Av.9  Min: 15  Max: 24  SpO2  Av.2 %  Min: 94 %  Max: 98 %  Height  Av' 5.35" (166 cm)  Min: 5' 5.35" (166 cm)  Max: 5' 5.35" (166 cm)  Weight  Av.6 kg (151 lb 4.2 oz)  Min: 66.2 kg (146 lb)  Max: 71 kg (156 lb 8.4 oz)  I/O last 3 completed shifts:  In: 400 [P.O.:400]  Out: 375 [Urine:375]    Physical Exam  Constitutional:       General: She is not in acute distress.     Appearance: She is not ill-appearing.   HENT:      Head: Normocephalic and atraumatic.      Mouth/Throat:      Mouth: Mucous membranes are moist.   Cardiovascular:      Rate and Rhythm: Normal rate and regular rhythm.      Pulses: Normal pulses.      Heart sounds: Murmur heard.      Comments: Symmetrical pulse B/L. Decreased PT pulse. 2+ DP. Systolic murmur  Abdominal:      General: Bowel sounds are normal. There is no distension.      Palpations: Abdomen is soft.      Tenderness: There is no abdominal tenderness. "   Musculoskeletal:      Right lower leg: No edema.      Left lower leg: No edema.   Skin:     General: Skin is warm and dry.   Neurological:      General: No focal deficit present.      Mental Status: She is oriented to person, place, and time.           Laboratory:  Most Recent Data:  CBC:   Lab Results   Component Value Date    WBC 3.19 (L) 10/11/2023    HGB 9.6 (L) 10/11/2023    HCT 30.7 (L) 10/11/2023     10/11/2023    MCV 85.8 10/11/2023    RDW 15.3 10/11/2023     WBC Differential:   Recent Labs   Lab 10/10/23  1341 10/11/23  0359   WBC 3.74* 3.19*   HGB 10.6* 9.6*   HCT 33.1* 30.7*    142   MCV 84.7 85.8       BMP:   Lab Results   Component Value Date     10/11/2023    K 4.2 10/11/2023     (H) 01/21/2022    CO2 24 10/11/2023    BUN 18.1 10/11/2023    CREATININE 1.12 (H) 10/11/2023    CALCIUM 9.1 10/11/2023    MG 1.70 10/11/2023    PHOS 2.6 10/11/2023     LFTs:   Lab Results   Component Value Date    ALBUMIN 2.9 (L) 10/11/2023    BILITOT 0.6 10/11/2023    AST 22 10/11/2023    ALKPHOS 53 10/11/2023    ALT 6 10/11/2023     Coags:   Lab Results   Component Value Date    INR 1.2 10/10/2023    PROTIME 15.6 (H) 10/10/2023    .8 (H) 10/11/2023     FLP:   Lab Results   Component Value Date    CHOL 157 10/31/2022    HDL 33 (L) 10/31/2022    TRIG 193 (H) 10/31/2022     DM:   Lab Results   Component Value Date    HGBA1C 5.2 10/31/2022    CREATININE 1.12 (H) 10/11/2023     Thyroid:   Lab Results   Component Value Date    TSH 5.553 (H) 10/10/2023      Anemia:   Lab Results   Component Value Date    IRON 62 01/28/2019    TIBC 297 01/28/2019    FERRITIN 95.9 01/28/2019       Lab Results   Component Value Date    JRHHGBBY37 795 07/26/2018       Lab Results   Component Value Date    FOLATE >20.0 07/26/2018        Cardiac:   Lab Results   Component Value Date    TROPONINI 1.176 (H) 10/11/2023    .9 (H) 10/10/2023         Other Results:  EKG (my interpretation): atrial fibrillation, rate  143    Radiology:  Imaging Results              X-Ray Chest AP Portable (Final result)  Result time 10/10/23 14:05:57      Final result by Wan Abreu MD (10/10/23 14:05:57)                   Impression:      NO ACUTE CARDIOPULMONARY PROCESS IDENTIFIED.      Electronically signed by: Wan Abreu  Date:    10/10/2023  Time:    14:05               Narrative:    EXAMINATION:  XR CHEST AP PORTABLE    CLINICAL HISTORY:  Chest pain, unspecified    TECHNIQUE:  One view    COMPARISON:  September 14, 2023.    FINDINGS:  Cardiopericardial silhouette enlarged appearance is similar.  Right upper paratracheal opacity is similar.  No acute dense focal or segmental consolidation, congestive process, pleural effusions or pneumothorax.                                      Current Medications:     Infusions:   heparin (porcine) in D5W 10 Units/kg/hr (10/11/23 0555)        Scheduled:   amLODIPine  5 mg Oral Daily    aspirin  81 mg Oral Daily    docusate sodium  200 mg Oral QHS    EScitalopram oxalate  10 mg Oral Daily    lisinopriL  20 mg Oral Daily    melatonin  6 mg Oral Nightly    metoprolol tartrate  12.5 mg Oral BID    multivitamin  1 tablet Oral Daily        PRN:  acetaminophen, dextrose 10%, dextrose 10%, glucagon (human recombinant), glucose, glucose, heparin (PORCINE), heparin (PORCINE), metoprolol, naloxone, nitroGLYCERIN 2% TD oint, sodium chloride 0.9%        Assessment & Plan:     Atrial fibrillation w/ RVR  NSTEMI  CAD  Systolic Murmur   - Admit to telemetry with continuous cardiac monitoring  - Received ASA in ED  - Metoprolol 12.5 mg BID  - Initial troponin negative. Peak 1.48 overnight. No further chest pain.  - Heparin drip began overnight.   - CMP, Mg, Phos daily.  - CHADSVasc 4 points. HAS-BLED 2 points.  - Cardiology Consulted.   - Echo pending.    HTN  - Home med not on formulary. 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

## 2023-10-11 NOTE — CONSULTS
Cardiology Consultation    Date of Consultation: 10/11/2023    History of Present Illness:  This is a 87 y.o. female with HTN, CAD (LHC/CORs 2021 mLAD 40%, LCx luminal irreg, mRCA 70% [IFR 0.92]) and GERD who presented with chest pain, palpitation, and dyspnea.     The patient reports acute onset of nausea with chest discomfort and dyspnea with palpitations. This prompted her son to call EMS. Son reports she was clutching her chest. Denies every experiencing this in the past. Denies pre-syncope or syncope in the past or present. Denies lightheadedness or dizziness. Reports she has had AF and been on ASA therapy alone but not on anticoagulation.She was found to be in AF with RVR. ECG reviewed with inferior STD and lateral precordial STD. Troponin was initially 0.032 and peaked at 1.489. Previously underwent coronary angiogram in 2021 (LHC/CORs 2021 mLAD 40%, LCx luminal irreg, mRCA 70% [IFR 0.92]). TTE is suggestive of severe AS but awaiting final read.     Review of patient's allergies indicates:   Allergen Reactions    Sulfa (sulfonamide antibiotics)      Other reaction(s): Not available       Review of systems: 12 point review of systems conducted, negative except as stated in the HPI.     Past Medical History:   Past Medical History:   Diagnosis Date    GERD (gastroesophageal reflux disease)     Hyperlipidemia     Hypertension        Procedure History:   Past Surgical History:   Procedure Laterality Date    APPENDECTOMY      CHOLECYSTECTOMY      HYSTERECTOMY         Family History: family history includes Cancer in her mother; Hypertension in her mother, sister, and sister.    Social History:  reports that she has never smoked. She has never been exposed to tobacco smoke. She has never used smokeless tobacco. She reports that she does not currently use alcohol. She reports that she does not use drugs.    Home Medications:   Medications Prior to Admission   Medication Sig Dispense Refill Last Dose     amlodipine-benazepril 5-20 mg (LOTREL) 5-20 mg per capsule Take 1 capsule by mouth once daily. 90 capsule 4     aspirin 81 MG Chew Take 1 tablet (81 mg total) by mouth once daily. 90 tablet 4     azelastine (ASTELIN) 137 mcg (0.1 %) nasal spray 2 sprays (274 mcg total) by Nasal route 2 (two) times daily. 30 mL 4     EScitalopram oxalate (LEXAPRO) 10 MG tablet Take 1 tablet (10 mg total) by mouth once daily. 90 tablet 4     estradioL (ESTRACE) 0.01 % (0.1 mg/gram) vaginal cream Place 1 g vaginally twice a week. (Patient not taking: Reported on 6/9/2023) 42.5 g 1     fluticasone propionate (FLONASE) 50 mcg/actuation nasal spray 1 spray (50 mcg total) by Each Nostril route once daily. 16 g 2     melatonin (MELATIN) 5 mg Take 1 tablet (5 mg total) by mouth nightly. 90 tablet 4     multivit with min-folic acid 200 mcg Chew One A Day Vitamin 90 tablet 4     pantoprazole (PROTONIX) 40 MG tablet pantoprazole 40 mg tablet,delayed release, Strength: 40 mg 90 tablet 4     pravastatin (PRAVACHOL) 20 MG tablet pravastatin 20 mg tablet  Take 1 tablet every day by oral route. Strength: 20 mg 90 tablet 4        Inpatient Medications:     Current Facility-Administered Medications:     acetaminophen tablet 650 mg, 650 mg, Oral, Q4H PRN, Qing Nickerson MD    aspirin EC tablet 81 mg, 81 mg, Oral, Daily, Qing Nickerson MD, 81 mg at 10/11/23 0900    dextrose 10% bolus 125 mL 125 mL, 12.5 g, Intravenous, PRN, Ricardo Monson DO    dextrose 10% bolus 250 mL 250 mL, 25 g, Intravenous, PRN, Ricadro Monson DO    docusate sodium capsule 200 mg, 200 mg, Oral, QHS, Qing Nickesron MD, 200 mg at 10/10/23 2030    EScitalopram oxalate tablet 10 mg, 10 mg, Oral, Daily, Qing Nickerson MD, 10 mg at 10/11/23 0900    glucagon (human recombinant) injection 1 mg, 1 mg, Intramuscular, PRN, Qing Nickerson MD    glucose chewable tablet 16 g, 16 g, Oral, PRN, Qing Nickerson MD    glucose chewable tablet 24 g, 24 g, Oral,  PRN, Qing Nickerson MD    heparin 25,000 units in dextrose 5% (100 units/ml) IV bolus from bag - ADDITIONAL PRN BOLUS - 30 units/kg (max bolus 4000 units), 30 Units/kg (Adjusted), Intravenous, PRNKrishan Rachel I, MD    heparin 25,000 units in dextrose 5% (100 units/ml) IV bolus from bag - ADDITIONAL PRN BOLUS - 60 units/kg (max bolus 4000 units), 60 Units/kg (Adjusted), Intravenous, PRKrishan CUEVA Rachel I, MD    heparin 25,000 units in dextrose 5% 250 mL (100 units/mL) infusion LOW INTENSITY nomogram - LAF, 0-40 Units/kg/hr (Adjusted), Intravenous, Continuous, Qing Nickerson MD, Last Rate: 6.3 mL/hr at 10/11/23 0555, 10 Units/kg/hr at 10/11/23 0555    lisinopriL tablet 20 mg, 20 mg, Oral, Daily, Qing Nickerson MD, 20 mg at 10/11/23 0900    melatonin tablet 6 mg, 6 mg, Oral, Nightly, Qing Nickerson MD, 6 mg at 10/10/23 2030    metoprolol injection 5 mg, 5 mg, Intravenous, Q5 Min PRN, Qing Nickerson MD    metoprolol tartrate (LOPRESSOR) split tablet 12.5 mg, 12.5 mg, Oral, BID, Qing Nickerson MD, 12.5 mg at 10/11/23 0901    multivitamin tablet, 1 tablet, Oral, Daily, Qing Nickerson MD, 1 tablet at 10/11/23 0901    naloxone 0.4 mg/mL injection 0.02 mg, 0.02 mg, Intravenous, PRN, Qing Nickerson MD    nitroGLYCERIN 2% TD oint ointment 0.5 inch, 0.5 inch, Topical (Top), Q6H PRNKrishan Rachel I, MD    sodium chloride 0.9% flush 10 mL, 10 mL, Intravenous, Q12H PRN, Qing Nickerson MD     Laboratory Data:   Recent Results (from the past 24 hour(s))   Comprehensive metabolic panel    Collection Time: 10/10/23  1:40 PM   Result Value Ref Range    Sodium Level 137 136 - 145 mmol/L    Potassium Level 4.3 3.5 - 5.1 mmol/L    Chloride 107 98 - 107 mmol/L    Carbon Dioxide 23 23 - 31 mmol/L    Glucose Level 109 82 - 115 mg/dL    Blood Urea Nitrogen 22.5 (H) 9.8 - 20.1 mg/dL    Creatinine 1.28 (H) 0.55 - 1.02 mg/dL    Calcium Level Total 9.6 8.4 - 10.2 mg/dL    Protein Total 9.9 (H) 5.8 - 7.6 gm/dL     Albumin Level 3.3 (L) 3.4 - 4.8 g/dL    Globulin 6.6 (H) 2.4 - 3.5 gm/dL    Albumin/Globulin Ratio 0.5 (L) 1.1 - 2.0 ratio    Bilirubin Total 0.4 <=1.5 mg/dL    Alkaline Phosphatase 62 40 - 150 unit/L    Alanine Aminotransferase 8 0 - 55 unit/L    Aspartate Aminotransferase 27 5 - 34 unit/L    eGFR 41 mls/min/1.73/m2   Troponin I    Collection Time: 10/10/23  1:40 PM   Result Value Ref Range    Troponin-I 0.032 0.000 - 0.045 ng/mL   Protime-INR    Collection Time: 10/10/23  1:40 PM   Result Value Ref Range    PT 14.6 (H) 11.4 - 14.0 seconds    INR 1.1 <=1.3   TSH    Collection Time: 10/10/23  1:40 PM   Result Value Ref Range    TSH 5.553 (H) 0.350 - 4.940 uIU/mL   T4, Free    Collection Time: 10/10/23  1:40 PM   Result Value Ref Range    Thyroxine Free 1.07 0.70 - 1.48 ng/dL   Magnesium    Collection Time: 10/10/23  1:40 PM   Result Value Ref Range    Magnesium Level 1.80 1.60 - 2.60 mg/dL   Phosphorus    Collection Time: 10/10/23  1:40 PM   Result Value Ref Range    Phosphorus Level 2.5 2.3 - 4.7 mg/dL   Brain natriuretic peptide    Collection Time: 10/10/23  1:41 PM   Result Value Ref Range    Natriuretic Peptide 166.9 (H) <=100.0 pg/mL   CBC with Differential    Collection Time: 10/10/23  1:41 PM   Result Value Ref Range    WBC 3.74 (L) 4.50 - 11.50 x10(3)/mcL    RBC 3.91 (L) 4.20 - 5.40 x10(6)/mcL    Hgb 10.6 (L) 12.0 - 16.0 g/dL    Hct 33.1 (L) 37.0 - 47.0 %    MCV 84.7 80.0 - 94.0 fL    MCH 27.1 27.0 - 31.0 pg    MCHC 32.0 (L) 33.0 - 36.0 g/dL    RDW 15.4 11.5 - 17.0 %    Platelet 158 130 - 400 x10(3)/mcL    MPV 11.2 (H) 7.4 - 10.4 fL    Neut % 84.5 %    Lymph % 8.6 %    Mono % 5.3 %    Eos % 0.8 %    Basophil % 0.3 %    Lymph # 0.32 (L) 0.6 - 4.6 x10(3)/mcL    Neut # 3.16 2.1 - 9.2 x10(3)/mcL    Mono # 0.20 0.1 - 1.3 x10(3)/mcL    Eos # 0.03 0 - 0.9 x10(3)/mcL    Baso # 0.01 <=0.2 x10(3)/mcL    IG# 0.02 0 - 0.04 x10(3)/mcL    IG% 0.5 %    NRBC% 0.0 %   Troponin I    Collection Time: 10/10/23  7:03 PM   Result  Value Ref Range    Troponin-I 1.489 (H) 0.000 - 0.045 ng/mL   APTT    Collection Time: 10/10/23  9:28 PM   Result Value Ref Range    PTT 33.8 (H) 23.2 - 33.7 seconds   Protime-INR    Collection Time: 10/10/23  9:28 PM   Result Value Ref Range    PT 15.6 (H) 11.4 - 14.0 seconds    INR 1.2 <=1.3   Troponin I    Collection Time: 10/11/23 12:51 AM   Result Value Ref Range    Troponin-I 1.176 (H) 0.000 - 0.045 ng/mL   Comprehensive Metabolic Panel (CMP)    Collection Time: 10/11/23  3:59 AM   Result Value Ref Range    Sodium Level 139 136 - 145 mmol/L    Potassium Level 4.2 3.5 - 5.1 mmol/L    Chloride 110 (H) 98 - 107 mmol/L    Carbon Dioxide 24 23 - 31 mmol/L    Glucose Level 87 82 - 115 mg/dL    Blood Urea Nitrogen 18.1 9.8 - 20.1 mg/dL    Creatinine 1.12 (H) 0.55 - 1.02 mg/dL    Calcium Level Total 9.1 8.4 - 10.2 mg/dL    Protein Total 8.4 (H) 5.8 - 7.6 gm/dL    Albumin Level 2.9 (L) 3.4 - 4.8 g/dL    Globulin 5.5 (H) 2.4 - 3.5 gm/dL    Albumin/Globulin Ratio 0.5 (L) 1.1 - 2.0 ratio    Bilirubin Total 0.6 <=1.5 mg/dL    Alkaline Phosphatase 53 40 - 150 unit/L    Alanine Aminotransferase 6 0 - 55 unit/L    Aspartate Aminotransferase 22 5 - 34 unit/L    eGFR 48 mls/min/1.73/m2   Magnesium    Collection Time: 10/11/23  3:59 AM   Result Value Ref Range    Magnesium Level 1.70 1.60 - 2.60 mg/dL   Phosphorus    Collection Time: 10/11/23  3:59 AM   Result Value Ref Range    Phosphorus Level 2.6 2.3 - 4.7 mg/dL   APTT    Collection Time: 10/11/23  3:59 AM   Result Value Ref Range    .8 (H) 23.2 - 33.7 seconds   CBC with Differential    Collection Time: 10/11/23  3:59 AM   Result Value Ref Range    WBC 3.19 (L) 4.50 - 11.50 x10(3)/mcL    RBC 3.58 (L) 4.20 - 5.40 x10(6)/mcL    Hgb 9.6 (L) 12.0 - 16.0 g/dL    Hct 30.7 (L) 37.0 - 47.0 %    MCV 85.8 80.0 - 94.0 fL    MCH 26.8 (L) 27.0 - 31.0 pg    MCHC 31.3 (L) 33.0 - 36.0 g/dL    RDW 15.3 11.5 - 17.0 %    Platelet 142 130 - 400 x10(3)/mcL    MPV 10.8 (H) 7.4 - 10.4 fL     "Neut % 74.0 %    Lymph % 15.7 %    Mono % 6.3 %    Eos % 3.1 %    Basophil % 0.3 %    Lymph # 0.50 (L) 0.6 - 4.6 x10(3)/mcL    Neut # 2.36 2.1 - 9.2 x10(3)/mcL    Mono # 0.20 0.1 - 1.3 x10(3)/mcL    Eos # 0.10 0 - 0.9 x10(3)/mcL    Baso # 0.01 <=0.2 x10(3)/mcL    IG# 0.02 0 - 0.04 x10(3)/mcL    IG% 0.6 %    NRBC% 0.0 %   Troponin I    Collection Time: 10/11/23  7:31 AM   Result Value Ref Range    Troponin-I 0.682 (H) 0.000 - 0.045 ng/mL       Imaging:  X-Ray Chest AP Portable   Final Result      NO ACUTE CARDIOPULMONARY PROCESS IDENTIFIED.         Electronically signed by: Wan Abreu   Date:    10/10/2023   Time:    14:05          Physical Exam:   /62   Pulse (!) 54   Temp 98.4 °F (36.9 °C) (Oral)   Resp 20   Ht 5' 5.35" (1.66 m)   Wt 66.2 kg (146 lb)   SpO2 (!) 94%   BMI 24.03 kg/m²  Body mass index is 24.03 kg/m².  Physical Exam  Vitals reviewed.   Constitutional:       General: She is not in acute distress.     Appearance: Normal appearance. She is normal weight. She is not ill-appearing.   HENT:      Head: Normocephalic and atraumatic.      Right Ear: External ear normal.      Left Ear: External ear normal.      Nose: Nose normal.      Mouth/Throat:      Mouth: Mucous membranes are moist.   Eyes:      Conjunctiva/sclera: Conjunctivae normal.   Cardiovascular:      Rate and Rhythm: Normal rate. Rhythm irregular.      Pulses: Normal pulses.      Heart sounds: Murmur heard.   Pulmonary:      Effort: Pulmonary effort is normal. No respiratory distress.      Breath sounds: Normal breath sounds. No stridor. No wheezing, rhonchi or rales.   Chest:      Chest wall: No tenderness.   Abdominal:      General: Abdomen is flat. Bowel sounds are normal. There is no distension.      Palpations: Abdomen is soft.   Musculoskeletal:      Cervical back: Neck supple.      Right lower leg: No edema.      Left lower leg: No edema.   Skin:     General: Skin is warm and dry.      Capillary Refill: Capillary refill takes " less than 2 seconds.   Neurological:      Mental Status: She is alert and oriented to person, place, and time.   Psychiatric:         Mood and Affect: Mood normal.         Behavior: Behavior normal.       Impression:   87 y.o. female with HTN, CAD (C/CORs 2021 mLAD 40%, LCx luminal irreg, mRCA 70% [IFR 0.92]) and GERD who presented with chest pain, palpitation, and dyspnea found to be in AF with RVR and with an NSTEMI.     Plan:   AF with RVR  -Continue Lopressor 12.5 mg BID or consider switch to Toprol XL 25 mg daily.   -CV2 score of at least 4. Recommend AC. Consider use of Eliquis 5 mg BID for CVA risk reduction. Can use heparin or treatment dose Lovenox while inpatient.     NSTEMI  -Given clinical symptoms and troponin delta between initial and subsequent concern for Type I NSTEMI although in the setting of known CAD it is not unreasonable to conclude this may be a Type II event. For time being continue heparin ggt or treatment dose Lovenox.   -ASA 81 mg daily and high intensity statin.   -Plan for coronary angiogram on Friday.     CAD  -ASA 81 mg daily.   -Obtain lipid panel. Start high intensity statin. She is older and statin benefit at this point is multifactorial but has pleiotropic effect that is important irrespective of age.     HTN  -Continue Lisinopril 20 mg daily. Allow her to be high normal in setting of aortic stenosis.     Thank you very much for your consultation    Ike Marcos MD  [employer] [facility] PGY-5

## 2023-10-11 NOTE — PLAN OF CARE
10/11/23 1249   Discharge Assessment   Assessment Type Discharge Planning Assessment   Confirmed/corrected address, phone number and insurance Yes   Confirmed Demographics Correct on Facesheet   Source of Information family;patient   When was your last doctors appointment?   ([facility])   Reason For Admission Atrial fibrillation   R00.0 Rapid heart beat  R06.02 SOB (shortness of breath)  R07.9 Chest pain  I48.91 Atrial fibrillation with RVR  R07.9 Chest pain, unspecified type   People in Home child(kathy), adult   Facility Arrived From: Home   Do you expect to return to your current living situation? Yes   Do you have help at home or someone to help you manage your care at home? Yes   Who are your caregiver(s) and their phone number(s)? SavannahCurtis (Son)   [phone]; Balbina Brand ([phone])   Prior to hospitilization cognitive status: Alert/Oriented   Current cognitive status: Alert/Oriented   Walking or Climbing Stairs ambulation difficulty, requires equipment   Mobility Management RW, Rollator, W/C, Cane   Dressing/Bathing bathing difficulty, assistance 1 person   Dressing/Bathing Management Family   Home Accessibility wheelchair accessible   Equipment Currently Used at Home walker, rolling;rollator;wheelchair;cane, straight;bedside commode;bath bench   Readmission within 30 days? No   Patient currently being followed by outpatient case management? No   Do you currently have service(s) that help you manage your care at home? No   Do you take prescription medications? Yes   Do you have prescription coverage? Yes   Coverage [employer]   Do you have any problems affording any of your prescribed medications? No   Is the patient taking medications as prescribed? yes   Who is going to help you get home at discharge? Son   How do you get to doctors appointments? family or friend will provide   Are you on dialysis? No   DME Needed Upon Discharge  none   Discharge Plan discussed with: Patient;Adult  children   Transition of Care Barriers None   Discharge Plan A Home with family   Physical Activity   On average, how many days per week do you engage in moderate to strenuous exercise (like a brisk walk)? 0 days   On average, how many minutes do you engage in exercise at this level? 0 min   Financial Resource Strain   How hard is it for you to pay for the very basics like food, housing, medical care, and heating? Not hard   Housing Stability   In the last 12 months, was there a time when you were not able to pay the mortgage or rent on time? N   In the last 12 months, how many places have you lived? 1   In the last 12 months, was there a time when you did not have a steady place to sleep or slept in a shelter (including now)? N   Transportation Needs   In the past 12 months, has lack of transportation kept you from medical appointments or from getting medications? no   In the past 12 months, has lack of transportation kept you from meetings, work, or from getting things needed for daily living? No   Food Insecurity   Within the past 12 months, you worried that your food would run out before you got the money to buy more. Never true   Stress   Do you feel stress - tense, restless, nervous, or anxious, or unable to sleep at night because your mind is troubled all the time - these days? Not at all   Social Connections   In a typical week, how many times do you talk on the phone with family, friends, or neighbors? More than 3   How often do you get together with friends or relatives? More than 3   Do you belong to any clubs or organizations such as Jewish groups, unions, fraternal or athletic groups, or school groups? No   How often do you attend meetings of the clubs or organizations you belong to? Never   Are you , , , , never , or living with a partner?    Alcohol Use   Q1: How often do you have a drink containing alcohol? Never   Q2: How many drinks containing alcohol do  you have on a typical day when you are drinking? None   Q3: How often do you have six or more drinks on one occasion? Never   OTHER   Name(s) of People in Home Curtis Nuñez (Son)   [phone]     Pt  with 2 children; SonCurtis resides with pt as her caregiver; All DME in place; Pt receives SS benefits; CM to follow for d/c planning needs.

## 2023-10-11 NOTE — PT/OT/SLP EVAL
"Occupational Therapy   Evaluation and Discharge Note    Name: Fifi Chacon  MRN: [id number]  ED due to: chest pain, SOB, palpitations   Admitting Diagnosis: Atrial fibrillation with RVR, NSTEMI, HTN, GRACIE  Med hx: memory loss, HTN, CAD, GERD  Recent Surgery: * No surgery found *      Recommendations:     Discharge Recommendations:  (home with family support)  Discharge Equipment Recommendations: walker, rolling  Barriers to discharge:       Assessment:     Fifi Chacon is a 87 y.o. female with a medical diagnosis of Atrial fibrillation with RVR. At this time, patient is functioning at their prior level of function/ highest practical level and does not require further acute OT services.     Plan:     During this hospitalization, patient does not require further acute OT services.  Please re-consult if situation changes.    Plan of Care Reviewed with: patient, son    Subjective     Chief Complaint: no c/o  Patient/Family Comments/goals: return home    Occupational Profile:  Living Environment: resides at home , son resides with pt, 3 indoor steps with B railings (sunken living room), tub/shower combo although pt does not use  Previous level of function: pt I with ADL tasks except bathing, pt goes to daughter's house for use of walk in shower; ambulatory with quad cane, "does not like the big one (rollator)" son is available to assist as needed  Roles and Routines:   Equipment Used at home:  (w/c, BSC, rollator, quad cane, standard walker)  Assistance upon Discharge: son     Pain/Comfort:  Pain Rating 1: 0/10    Patients cultural, spiritual, Protestant conflicts given the current situation:      Objective:     Communicated with: nurse prior to session.  Patient found  sitting EOB with PT services  with peripheral IV, PureWick upon OT entry to room.    General Precautions: Standard, fall  Orthopedic Precautions:    Braces:    Respiratory Status: Room air   /54 start of session   End of session 144/68, HR 58, O2 " 98%   HR with ambulation 68-70  Occupational Performance:    Bed Mobility:    Not assessed, pt sitting EOB upon OT arrival    Functional Mobility/Transfers:  Patient completed Sit <> Stand Transfer with contact guard assistance  with  rolling walker   Patient completed Toilet Transfer Step Transfer technique with contact guard assistance with  rolling walker and grab bars  Functional Mobility: pt ambulated 130 ft with RW CGA    Activities of Daily Living:  Feeding:  modified independence .  Grooming: supervision standing  Lower Body Dressing: supervision donned B socks supervision sitting EOB  Toileting: pt currently using Purewick, education on utilizing bathroom with son/staff    Cognitive/Visual Perceptual:  Cognitive/Psychosocial Skills:     -       Follows Commands/attention:Follows one-step commands and Follows two-step commands  -       Mood/Affect/Coping skills/emotional control: Cooperative and Pleasant    Physical Exam:  Balance: -       dynamic sitting balance supervision  Upper Extremity Strength:    -       Right Upper Extremity: WNL  -       Left Upper Extremity: WNL    AMPAC 6 Click ADL:  AMPAC Total Score:      Treatment & Education:  Education on OT POC, dc from OT, recommend SBA/CGA for all mobility due to new environment, pt history of falls at home    Patient left up in chair with all lines intact, call button in reach, and son, MD rounding present    GOALS:   Multidisciplinary Problems       Occupational Therapy Goals       Not on file                    History:     Past Medical History:   Diagnosis Date    GERD (gastroesophageal reflux disease)     Hyperlipidemia     Hypertension          Past Surgical History:   Procedure Laterality Date    APPENDECTOMY      CHOLECYSTECTOMY      HYSTERECTOMY         Time Tracking:     OT Date of Treatment:    OT Start Time: 1251  OT Stop Time: 1313  OT Total Time (min): 22 min    Billable Minutes:Evaluation low comp    10/11/2023

## 2023-10-11 NOTE — PROGRESS NOTES
"Inpatient Nutrition Evaluation    Admit Date: 10/10/2023   Total duration of encounter: 1 day    Nutrition Recommendation/Prescription     Continue cardiac diet  Will order strawberry/vanilla boost --1 per day; Boost (provides 240 kcal, 10 g protein per serving)   MVI/fe  Biweekly wt  Will monitor nutrition status     Nutrition Assessment     Chart Review    Reason Seen: continuous nutrition monitoring    Malnutrition Screening Tool Results   Have you recently lost weight without trying?: No  Have you been eating poorly because of a decreased appetite?: No   MST Score: 0     Diagnosis:  Afib/ RVR, angina, CAD, systolic murmur, HTN, GRACIE, anemia, depression, memory loss     Relevant Medical History: HTN, CAD, GERD     Nutrition-Related Medications: heparin, aspirin, lisinopril, lopressor, MVI     Nutrition-Related Labs:  (10-11) H/h 9.6/30.7 (L) Gluc 87 Bun 18.1 Cr 1.1 GFR 48 K 4.2 P 2.6     Diet Order: Diet heart healthy  Oral Supplement Order: none  Appetite/Oral Intake: fair/50-75% of meals  Factors Affecting Nutritional Intake: shortness of breath  Food/Mandaen/Cultural Preferences: none reported  Food Allergies: none reported    Skin Integrity: intact  Wound(s):   none    Comments    (10/11) Pt in bed; son at bedside; reported pt "small eater"; little bits at a time; drinks 1 boost/equivalent supplement per day; no wt loss. Pt likes sweets/dessert. Uses metamucil at home to prevent constipation.     Anthropometrics    Height: 5' 5" (165.1 cm) Height Method: Stated  Last Weight: 66.2 kg (146 lb) (10/11/23 0901) Weight Method: Standard Scale  BMI (Calculated): 24.3  BMI Classification: normal (BMI 18.5-24.9)     Ideal Body Weight (IBW), Female: 125 lb     % Ideal Body Weight, Female (lb): 116.8 %                    Usual Body Weight (UBW), k.2 kg  % Usual Body Weight: 100.25     Usual Weight Provided By: patient, family/caregiver, and EMR weight history    Wt Readings from Last 5 Encounters:   10/11/23 66.2 " kg (146 lb)   09/14/23 71.7 kg (158 lb)   06/09/23 67.1 kg (148 lb)   04/24/23 69.1 kg (152 lb 6.4 oz)   03/10/23 69.4 kg (153 lb)     Weight Change(s) Since Admission:  Admit Weight: 71 kg (156 lb 8.4 oz) (10/10/23 1223)  No recetn wt loss reported     Patient Education    Education Provided: heart healthy diet  Teaching Method: explanation and printed materials  Comprehension: verbalizes understanding  Barriers to Learning: none evident  Expected Compliance: fair  Comments: All questions were answered and dietitian's contact information was provided.     Monitoring & Evaluation     Dietitian will monitor food and beverage intake, weight, food/nutrition knowledge skill, and glucose/endocrine profile.  Nutrition Risk/Follow-Up: low (follow-up in 5-7 days)  Patients assigned 'low nutrition risk' status do not qualify for a full nutritional assessment but will be monitored and re-evaluated in a 5-7 day time period. Please consult if re-evaluation needed sooner.

## 2023-10-11 NOTE — CARE UPDATE
TRANSITION OF CARE PROGRESS NOTE    I have received checkout from Dr. Qing Nickerson regarding this patient.     HO-I assessment:  Admission diagnosis: Atrial fibrillation [I48.91]  Rapid heart beat [R00.0]  SOB (shortness of breath) [R06.02]  Chest pain [R07.9]  Atrial fibrillation with RVR [I48.91]  Chest pain, unspecified type [R07.9]   Overnight management: Continue inpatient cardiac monitoring. Trending troponins. Lopressor as scheduled with additional prn ordered for heart rate greater than 145.   Code status: Full    Please call Dr. Emi Kohli at [phone] from 10/10/23 7PM to 10/11/23 7AM if any issues arise.    Kenyatta Dos Santos,   [facility], HO-I

## 2023-10-12 LAB
ALBUMIN SERPL-MCNC: 3 G/DL (ref 3.4–4.8)
ALBUMIN/GLOB SERPL: 0.5 RATIO (ref 1.1–2)
ALP SERPL-CCNC: 59 UNIT/L (ref 40–150)
ALT SERPL-CCNC: 7 UNIT/L (ref 0–55)
APTT PPP: 79 SECONDS (ref 23.2–33.7)
AST SERPL-CCNC: 22 UNIT/L (ref 5–34)
BASOPHILS # BLD AUTO: 0.01 X10(3)/MCL
BASOPHILS NFR BLD AUTO: 0.4 %
BILIRUB SERPL-MCNC: 0.4 MG/DL
BUN SERPL-MCNC: 19.9 MG/DL (ref 9.8–20.1)
CALCIUM SERPL-MCNC: 9.4 MG/DL (ref 8.4–10.2)
CHLORIDE SERPL-SCNC: 109 MMOL/L (ref 98–107)
CHOLEST SERPL-MCNC: 129 MG/DL
CHOLEST/HDLC SERPL: 4 {RATIO} (ref 0–5)
CO2 SERPL-SCNC: 22 MMOL/L (ref 23–31)
CREAT SERPL-MCNC: 1.21 MG/DL (ref 0.55–1.02)
EOSINOPHIL # BLD AUTO: 0.12 X10(3)/MCL (ref 0–0.9)
EOSINOPHIL NFR BLD AUTO: 4.9 %
ERYTHROCYTE [DISTWIDTH] IN BLOOD BY AUTOMATED COUNT: 15.3 % (ref 11.5–17)
GFR SERPLBLD CREATININE-BSD FMLA CKD-EPI: 44 MLS/MIN/1.73/M2
GLOBULIN SER-MCNC: 5.6 GM/DL (ref 2.4–3.5)
GLUCOSE SERPL-MCNC: 93 MG/DL (ref 82–115)
HCT VFR BLD AUTO: 31.4 % (ref 37–47)
HDLC SERPL-MCNC: 33 MG/DL (ref 35–60)
HGB BLD-MCNC: 9.6 G/DL (ref 12–16)
IMM GRANULOCYTES # BLD AUTO: 0.01 X10(3)/MCL (ref 0–0.04)
IMM GRANULOCYTES NFR BLD AUTO: 0.4 %
LDLC SERPL CALC-MCNC: 75 MG/DL (ref 50–140)
LYMPHOCYTES # BLD AUTO: 0.52 X10(3)/MCL (ref 0.6–4.6)
LYMPHOCYTES NFR BLD AUTO: 21.2 %
MAGNESIUM SERPL-MCNC: 1.7 MG/DL (ref 1.6–2.6)
MCH RBC QN AUTO: 26.5 PG (ref 27–31)
MCHC RBC AUTO-ENTMCNC: 30.6 G/DL (ref 33–36)
MCV RBC AUTO: 86.7 FL (ref 80–94)
MONOCYTES # BLD AUTO: 0.17 X10(3)/MCL (ref 0.1–1.3)
MONOCYTES NFR BLD AUTO: 6.9 %
NEUTROPHILS # BLD AUTO: 1.62 X10(3)/MCL (ref 2.1–9.2)
NEUTROPHILS NFR BLD AUTO: 66.2 %
NRBC BLD AUTO-RTO: 0 %
PHOSPHATE SERPL-MCNC: 2.8 MG/DL (ref 2.3–4.7)
PLATELET # BLD AUTO: 143 X10(3)/MCL (ref 130–400)
PLATELETS.RETICULATED NFR BLD AUTO: 3.7 % (ref 0.9–11.2)
PMV BLD AUTO: 10.6 FL (ref 7.4–10.4)
POTASSIUM SERPL-SCNC: 3.9 MMOL/L (ref 3.5–5.1)
PROT SERPL-MCNC: 8.6 GM/DL (ref 5.8–7.6)
RBC # BLD AUTO: 3.62 X10(6)/MCL (ref 4.2–5.4)
SODIUM SERPL-SCNC: 138 MMOL/L (ref 136–145)
TRIGL SERPL-MCNC: 103 MG/DL (ref 37–140)
VLDLC SERPL CALC-MCNC: 21 MG/DL
WBC # SPEC AUTO: 2.45 X10(3)/MCL (ref 4.5–11.5)

## 2023-10-12 PROCEDURE — 85730 THROMBOPLASTIN TIME PARTIAL: CPT | Performed by: FAMILY MEDICINE

## 2023-10-12 PROCEDURE — 84100 ASSAY OF PHOSPHORUS: CPT | Performed by: STUDENT IN AN ORGANIZED HEALTH CARE EDUCATION/TRAINING PROGRAM

## 2023-10-12 PROCEDURE — 63600175 PHARM REV CODE 636 W HCPCS: Performed by: STUDENT IN AN ORGANIZED HEALTH CARE EDUCATION/TRAINING PROGRAM

## 2023-10-12 PROCEDURE — 21400001 HC TELEMETRY ROOM

## 2023-10-12 PROCEDURE — 80053 COMPREHEN METABOLIC PANEL: CPT | Performed by: STUDENT IN AN ORGANIZED HEALTH CARE EDUCATION/TRAINING PROGRAM

## 2023-10-12 PROCEDURE — 80061 LIPID PANEL: CPT | Performed by: STUDENT IN AN ORGANIZED HEALTH CARE EDUCATION/TRAINING PROGRAM

## 2023-10-12 PROCEDURE — 25000003 PHARM REV CODE 250: Performed by: STUDENT IN AN ORGANIZED HEALTH CARE EDUCATION/TRAINING PROGRAM

## 2023-10-12 PROCEDURE — 97116 GAIT TRAINING THERAPY: CPT

## 2023-10-12 PROCEDURE — 94761 N-INVAS EAR/PLS OXIMETRY MLT: CPT

## 2023-10-12 PROCEDURE — 83735 ASSAY OF MAGNESIUM: CPT | Performed by: STUDENT IN AN ORGANIZED HEALTH CARE EDUCATION/TRAINING PROGRAM

## 2023-10-12 PROCEDURE — 85025 COMPLETE CBC W/AUTO DIFF WBC: CPT | Performed by: STUDENT IN AN ORGANIZED HEALTH CARE EDUCATION/TRAINING PROGRAM

## 2023-10-12 PROCEDURE — 97530 THERAPEUTIC ACTIVITIES: CPT

## 2023-10-12 RX ORDER — ATORVASTATIN CALCIUM 40 MG/1
80 TABLET, FILM COATED ORAL NIGHTLY
Status: DISCONTINUED | OUTPATIENT
Start: 2023-10-12 | End: 2023-10-14 | Stop reason: HOSPADM

## 2023-10-12 RX ORDER — METOPROLOL SUCCINATE 25 MG/1
25 TABLET, EXTENDED RELEASE ORAL DAILY
Status: DISCONTINUED | OUTPATIENT
Start: 2023-10-12 | End: 2023-10-13

## 2023-10-12 RX ORDER — ENOXAPARIN SODIUM 100 MG/ML
1 INJECTION SUBCUTANEOUS ONCE
Status: COMPLETED | OUTPATIENT
Start: 2023-10-12 | End: 2023-10-12

## 2023-10-12 RX ORDER — SODIUM CHLORIDE 0.9 % (FLUSH) 0.9 %
10 SYRINGE (ML) INJECTION
Status: DISCONTINUED | OUTPATIENT
Start: 2023-10-12 | End: 2023-10-14 | Stop reason: HOSPADM

## 2023-10-12 RX ORDER — SODIUM CHLORIDE 9 MG/ML
INJECTION, SOLUTION INTRAVENOUS ONCE
Status: CANCELLED | OUTPATIENT
Start: 2023-10-12 | End: 2023-10-12

## 2023-10-12 RX ORDER — LISINOPRIL 10 MG/1
10 TABLET ORAL DAILY
Status: DISCONTINUED | OUTPATIENT
Start: 2023-10-12 | End: 2023-10-14 | Stop reason: HOSPADM

## 2023-10-12 RX ADMIN — HEPARIN SODIUM 12 UNITS/KG/HR: 10000 INJECTION, SOLUTION INTRAVENOUS at 02:10

## 2023-10-12 RX ADMIN — ENOXAPARIN SODIUM 70 MG: 80 INJECTION SUBCUTANEOUS at 02:10

## 2023-10-12 RX ADMIN — LISINOPRIL 10 MG: 10 TABLET ORAL at 10:10

## 2023-10-12 RX ADMIN — DOCUSATE SODIUM 200 MG: 100 CAPSULE, LIQUID FILLED ORAL at 08:10

## 2023-10-12 RX ADMIN — ESCITALOPRAM OXALATE 10 MG: 10 TABLET ORAL at 10:10

## 2023-10-12 RX ADMIN — METOPROLOL SUCCINATE 25 MG: 25 TABLET, EXTENDED RELEASE ORAL at 09:10

## 2023-10-12 RX ADMIN — Medication 6 MG: at 08:10

## 2023-10-12 RX ADMIN — ASPIRIN 81 MG: 81 TABLET, COATED ORAL at 09:10

## 2023-10-12 RX ADMIN — ATORVASTATIN CALCIUM 80 MG: 40 TABLET, FILM COATED ORAL at 08:10

## 2023-10-12 RX ADMIN — THERA TABS 1 TABLET: TAB at 09:10

## 2023-10-12 NOTE — PROGRESS NOTES
Cardiology Progress Note    Date of Consultation: 10/11/2023    History of Present Illness:  This is a 87 y.o. female with HTN, CAD (LHC/CORs 2021 mLAD 40%, LCx luminal irreg, mRCA 70% [IFR 0.92]) and GERD who presented with chest pain, palpitation, and dyspnea.     No acute events overnight. Planning for coronary angiogram in the morning. Patient without acute complaint.     Review of patient's allergies indicates:   Allergen Reactions    Sulfa (sulfonamide antibiotics)      Other reaction(s): Not available       Review of systems: 12 point review of systems conducted, negative except as stated in the HPI.     Past Medical History:   Past Medical History:   Diagnosis Date    GERD (gastroesophageal reflux disease)     Hyperlipidemia     Hypertension        Procedure History:   Past Surgical History:   Procedure Laterality Date    APPENDECTOMY      CHOLECYSTECTOMY      HYSTERECTOMY         Family History: family history includes Cancer in her mother; Hypertension in her mother, sister, and sister.    Social History:  reports that she has never smoked. She has never been exposed to tobacco smoke. She has never used smokeless tobacco. She reports that she does not currently use alcohol. She reports that she does not use drugs.    Home Medications:   Medications Prior to Admission   Medication Sig Dispense Refill Last Dose    amlodipine-benazepril 5-20 mg (LOTREL) 5-20 mg per capsule Take 1 capsule by mouth once daily. 90 capsule 4     aspirin 81 MG Chew Take 1 tablet (81 mg total) by mouth once daily. 90 tablet 4     azelastine (ASTELIN) 137 mcg (0.1 %) nasal spray 2 sprays (274 mcg total) by Nasal route 2 (two) times daily. 30 mL 4     EScitalopram oxalate (LEXAPRO) 10 MG tablet Take 1 tablet (10 mg total) by mouth once daily. 90 tablet 4     estradioL (ESTRACE) 0.01 % (0.1 mg/gram) vaginal cream Place 1 g vaginally twice a week. (Patient not taking: Reported on 6/9/2023) 42.5 g 1     fluticasone propionate (FLONASE)  50 mcg/actuation nasal spray 1 spray (50 mcg total) by Each Nostril route once daily. 16 g 2     melatonin (MELATIN) 5 mg Take 1 tablet (5 mg total) by mouth nightly. 90 tablet 4     multivit with min-folic acid 200 mcg Chew One A Day Vitamin 90 tablet 4     pantoprazole (PROTONIX) 40 MG tablet pantoprazole 40 mg tablet,delayed release, Strength: 40 mg 90 tablet 4     pravastatin (PRAVACHOL) 20 MG tablet pravastatin 20 mg tablet  Take 1 tablet every day by oral route. Strength: 20 mg 90 tablet 4        Inpatient Medications:     Current Facility-Administered Medications:     acetaminophen tablet 650 mg, 650 mg, Oral, Q4H PRN, Qing Nickerson MD    aspirin EC tablet 81 mg, 81 mg, Oral, Daily, Qing Nickerson MD, 81 mg at 10/12/23 0959    atorvastatin tablet 40 mg, 40 mg, Oral, QHS, Ike Marcos MD, 40 mg at 10/11/23 2120    dextrose 10% bolus 125 mL 125 mL, 12.5 g, Intravenous, PRN, Ricardo Monson,     dextrose 10% bolus 250 mL 250 mL, 25 g, Intravenous, PRN, Ricardo Monson,     docusate sodium capsule 200 mg, 200 mg, Oral, QHS, Qing Nickerson MD, 200 mg at 10/11/23 2120    EScitalopram oxalate tablet 10 mg, 10 mg, Oral, Daily, Qing Nickerson MD, 10 mg at 10/12/23 1000    glucagon (human recombinant) injection 1 mg, 1 mg, Intramuscular, PRN, Qing Nickerson MD    glucose chewable tablet 16 g, 16 g, Oral, PRN, Qing Nickerson MD    glucose chewable tablet 24 g, 24 g, Oral, PRN, Qing Nickerson MD    lisinopriL tablet 10 mg, 10 mg, Oral, Daily, Qing Nickerson MD, 10 mg at 10/12/23 1000    melatonin tablet 6 mg, 6 mg, Oral, Nightly, Qing Nickerson MD, 6 mg at 10/11/23 2119    metoprolol injection 5 mg, 5 mg, Intravenous, Q5 Min PRN, Qing Nickerson MD    metoprolol succinate (TOPROL-XL) 24 hr tablet 25 mg, 25 mg, Oral, Daily, Qing Nickerson MD, 25 mg at 10/12/23 0959    multivitamin tablet, 1 tablet, Oral, Daily, Qing Nickerson MD, 1 tablet at 10/12/23 0959     naloxone 0.4 mg/mL injection 0.02 mg, 0.02 mg, Intravenous, PRN, Qing Nickerson MD    nitroGLYCERIN 2% TD oint ointment 0.5 inch, 0.5 inch, Topical (Top), Q6H PRNKrishan Rachel I, MD    sodium chloride 0.9% flush 10 mL, 10 mL, Intravenous, Q12H PRN, Qing Nickerson MD    sodium chloride 0.9% flush 10 mL, 10 mL, Intravenous, PRN, Ike Marcos MD     Laboratory Data:   Recent Results (from the past 24 hour(s))   APTT    Collection Time: 10/11/23  7:13 PM   Result Value Ref Range    PTT 55.4 (H) 23.2 - 33.7 seconds   Comprehensive Metabolic Panel (CMP)    Collection Time: 10/12/23  3:36 AM   Result Value Ref Range    Sodium Level 138 136 - 145 mmol/L    Potassium Level 3.9 3.5 - 5.1 mmol/L    Chloride 109 (H) 98 - 107 mmol/L    Carbon Dioxide 22 (L) 23 - 31 mmol/L    Glucose Level 93 82 - 115 mg/dL    Blood Urea Nitrogen 19.9 9.8 - 20.1 mg/dL    Creatinine 1.21 (H) 0.55 - 1.02 mg/dL    Calcium Level Total 9.4 8.4 - 10.2 mg/dL    Protein Total 8.6 (H) 5.8 - 7.6 gm/dL    Albumin Level 3.0 (L) 3.4 - 4.8 g/dL    Globulin 5.6 (H) 2.4 - 3.5 gm/dL    Albumin/Globulin Ratio 0.5 (L) 1.1 - 2.0 ratio    Bilirubin Total 0.4 <=1.5 mg/dL    Alkaline Phosphatase 59 40 - 150 unit/L    Alanine Aminotransferase 7 0 - 55 unit/L    Aspartate Aminotransferase 22 5 - 34 unit/L    eGFR 44 mls/min/1.73/m2   Magnesium    Collection Time: 10/12/23  3:36 AM   Result Value Ref Range    Magnesium Level 1.70 1.60 - 2.60 mg/dL   Phosphorus    Collection Time: 10/12/23  3:36 AM   Result Value Ref Range    Phosphorus Level 2.8 2.3 - 4.7 mg/dL   APTT    Collection Time: 10/12/23  3:36 AM   Result Value Ref Range    PTT 79.0 (H) 23.2 - 33.7 seconds   CBC with Differential    Collection Time: 10/12/23  3:36 AM   Result Value Ref Range    WBC 2.45 (L) 4.50 - 11.50 x10(3)/mcL    RBC 3.62 (L) 4.20 - 5.40 x10(6)/mcL    Hgb 9.6 (L) 12.0 - 16.0 g/dL    Hct 31.4 (L) 37.0 - 47.0 %    MCV 86.7 80.0 - 94.0 fL    MCH 26.5 (L) 27.0 - 31.0 pg    MCHC 30.6  "(L) 33.0 - 36.0 g/dL    RDW 15.3 11.5 - 17.0 %    Platelet 143 130 - 400 x10(3)/mcL    MPV 10.6 (H) 7.4 - 10.4 fL    IPF 3.7 0.9 - 11.2 %    Neut % 66.2 %    Lymph % 21.2 %    Mono % 6.9 %    Eos % 4.9 %    Basophil % 0.4 %    Lymph # 0.52 (L) 0.6 - 4.6 x10(3)/mcL    Neut # 1.62 (L) 2.1 - 9.2 x10(3)/mcL    Mono # 0.17 0.1 - 1.3 x10(3)/mcL    Eos # 0.12 0 - 0.9 x10(3)/mcL    Baso # 0.01 <=0.2 x10(3)/mcL    IG# 0.01 0 - 0.04 x10(3)/mcL    IG% 0.4 %    NRBC% 0.0 %   Lipid Panel    Collection Time: 10/12/23  3:36 AM   Result Value Ref Range    Cholesterol Total 129 <=200 mg/dL    HDL Cholesterol 33 (L) 35 - 60 mg/dL    Triglyceride 103 37 - 140 mg/dL    Cholesterol/HDL Ratio 4 0 - 5    Very Low Density Lipoprotein 21     LDL Cholesterol 75.00 50.00 - 140.00 mg/dL       Imaging:  X-Ray Chest AP Portable   Final Result      NO ACUTE CARDIOPULMONARY PROCESS IDENTIFIED.         Electronically signed by: Wan Abreu   Date:    10/10/2023   Time:    14:05          Physical Exam:   /61   Pulse 61   Temp 98.3 °F (36.8 °C) (Oral)   Resp 16   Ht 5' 5" (1.651 m)   Wt 66.2 kg (146 lb)   SpO2 96%   BMI 24.30 kg/m²  Body mass index is 24.3 kg/m².  Physical Exam  Vitals reviewed.   Constitutional:       General: She is not in acute distress.     Appearance: Normal appearance. She is normal weight. She is not ill-appearing.   HENT:      Head: Normocephalic and atraumatic.      Right Ear: External ear normal.      Left Ear: External ear normal.      Nose: Nose normal.      Mouth/Throat:      Mouth: Mucous membranes are moist.   Eyes:      Conjunctiva/sclera: Conjunctivae normal.   Cardiovascular:      Rate and Rhythm: Normal rate. Rhythm irregular.      Pulses: Normal pulses.      Heart sounds: Murmur heard.   Pulmonary:      Effort: Pulmonary effort is normal. No respiratory distress.      Breath sounds: Normal breath sounds. No stridor. No wheezing, rhonchi or rales.   Chest:      Chest wall: No tenderness.   Abdominal: "      General: Abdomen is flat. Bowel sounds are normal. There is no distension.      Palpations: Abdomen is soft.   Musculoskeletal:      Cervical back: Neck supple.      Right lower leg: No edema.      Left lower leg: No edema.   Skin:     General: Skin is warm and dry.      Capillary Refill: Capillary refill takes less than 2 seconds.   Neurological:      Mental Status: She is alert and oriented to person, place, and time.   Psychiatric:         Mood and Affect: Mood normal.         Behavior: Behavior normal.       Impression:   87 y.o. female with HTN, CAD (LHC/CORs 2021 mLAD 40%, LCx luminal irreg, mRCA 70% [IFR 0.92]) and GERD who presented with chest pain, palpitation, and dyspnea found to be in AF with RVR and with an NSTEMI.     Plan:   AF with RVR  -Continue Toprol XL 25 mg daily.   -CV2 score of at least 4. Recommend AC. Do not give AM dose of Lovenox until after angiogram.      NSTEMI  -Given clinical symptoms and troponin delta between initial and subsequent concern for Type I NSTEMI although in the setting of known CAD it is not unreasonable to conclude this may be a Type II event. For time being continue heparin ggt or treatment dose Lovenox.   -ASA 81 mg daily and high intensity statin.   -Plan for coronary angiogram on Friday.     Severe Aortic Stenosis  -Plan for coronary evaluation tomorrow.   -Needs consideration for TAVR. Will discuss with structural specialist.     CAD  -ASA 81 mg daily.   -Lipitor 80. Target LDL at least < 70.     HTN  -Continue Lisinopril 20 mg daily. Allow her to be high normal in setting of aortic stenosis.     Ike Marcos MD  [employer] [facility] PGY-5

## 2023-10-12 NOTE — CARE UPDATE
TRANSITION OF CARE PROGRESS NOTE    I have received checkout from Dr. Qing Nickerson regarding this patient.     HO-I assessment:  Admission diagnosis: Atrial fibrillation [I48.91]  Rapid heart beat [R00.0]  SOB (shortness of breath) [R06.02]  Chest pain [R07.9]  Atrial fibrillation with RVR [I48.91]  Chest pain, unspecified type [R07.9]   Overnight management: 86 yo F with PMH of HTN, CAD. Presented with Afib with RVR and NSTEMI. Heparin ggt. Lopressor 12.5 mg BID. Cardiology consulted; pending TTE and recommendations.   Code status: full code    Please call Dr. Emi Kohli at [phone] from 10/11/2023 7PM to 10/12/23 7AM if any issues arise.    Sierra Brady MD  [facility], HO-I

## 2023-10-12 NOTE — PROGRESS NOTES
"[facility]  Progress Note      Attending Physician: Ricardo Monson,*  Resident: Krishan        Subjective:      Brief HPI:  Pt is a 88 y/o female with PMH of HTN, CAD, and GERD presents to ED with chest pain, SOB and palpitations. Symptoms began while watching TV with a sudden pressure chest pain. Pt felt " she was going to die." She reports palpitations and difficulty breathing. She confirms following with cardiology and having similar pain in the past. She denies fever, decrease intake, and syncope. Denies any recent falls or changes in medication. Denies recent illness. Until chest pain began she was at usual baseline.     Hospital course:  10/10/23- heparin initiated with increased in troponin      Interval Hx:   No acute events. VSS. HR remains controlled. Pt feeling well. Seen by cardiology yesterday who is planning for angio tomorrow.       Review of Systems:  ROS completed and negative except as indicated above.     Objective:     Last 24 Hour Vital Signs:  BP  Min: 117/58  Max: 139/70  Temp  Av.7 °F (37.1 °C)  Min: 98.4 °F (36.9 °C)  Max: 99 °F (37.2 °C)  Pulse  Av  Min: 53  Max: 63  Resp  Av.2  Min: 15  Max: 20  SpO2  Av.1 %  Min: 94 %  Max: 97 %  Height  Av' 5" (165.1 cm)  Min: 5' 5" (165.1 cm)  Max: 5' 5" (165.1 cm)  Weight  Av.2 kg (146 lb)  Min: 66.2 kg (146 lb)  Max: 66.2 kg (146 lb)  I/O last 3 completed shifts:  In: 637 [P.O.:637]  Out: 375 [Urine:375]    Physical Exam  Constitutional:       General: She is not in acute distress.     Appearance: She is not ill-appearing.   HENT:      Head: Normocephalic and atraumatic.      Mouth/Throat:      Mouth: Mucous membranes are moist.   Cardiovascular:      Rate and Rhythm: Normal rate and regular rhythm.      Pulses: Normal pulses.      Heart sounds: Murmur heard.      Comments: Symmetrical pulse B/L. Decreased PT pulse. 2+ DP. Systolic murmur  Abdominal:      General: Bowel sounds are normal. There is no " distension.      Palpations: Abdomen is soft.      Tenderness: There is no abdominal tenderness.   Musculoskeletal:      Right lower leg: No edema.      Left lower leg: No edema.   Skin:     General: Skin is warm and dry.   Neurological:      General: No focal deficit present.      Mental Status: She is oriented to person, place, and time.           Laboratory:  Most Recent Data:  CBC:   Lab Results   Component Value Date    WBC 2.45 (L) 10/12/2023    HGB 9.6 (L) 10/12/2023    HCT 31.4 (L) 10/12/2023     10/12/2023    MCV 86.7 10/12/2023    RDW 15.3 10/12/2023     WBC Differential:   Recent Labs   Lab 10/10/23  1341 10/11/23  0359 10/12/23  0336   WBC 3.74* 3.19* 2.45*   HGB 10.6* 9.6* 9.6*   HCT 33.1* 30.7* 31.4*    142 143   MCV 84.7 85.8 86.7       BMP:   Lab Results   Component Value Date     10/12/2023    K 3.9 10/12/2023     (H) 01/21/2022    CO2 22 (L) 10/12/2023    BUN 19.9 10/12/2023    CREATININE 1.21 (H) 10/12/2023    CALCIUM 9.4 10/12/2023    MG 1.70 10/12/2023    PHOS 2.8 10/12/2023     LFTs:   Lab Results   Component Value Date    ALBUMIN 3.0 (L) 10/12/2023    BILITOT 0.4 10/12/2023    AST 22 10/12/2023    ALKPHOS 59 10/12/2023    ALT 7 10/12/2023     Coags:   Lab Results   Component Value Date    INR 1.2 10/10/2023    PROTIME 15.6 (H) 10/10/2023    PTT 79.0 (H) 10/12/2023     FLP:   Lab Results   Component Value Date    CHOL 130 10/11/2023    HDL 35 10/11/2023    TRIG 93 10/11/2023     DM:   Lab Results   Component Value Date    HGBA1C 5.2 10/31/2022    CREATININE 1.21 (H) 10/12/2023     Thyroid:   Lab Results   Component Value Date    TSH 5.553 (H) 10/10/2023      Anemia:   Lab Results   Component Value Date    IRON 62 01/28/2019    TIBC 297 01/28/2019    FERRITIN 95.9 01/28/2019       Lab Results   Component Value Date    HWLUTTZS40 795 07/26/2018       Lab Results   Component Value Date    FOLATE >20.0 07/26/2018        Cardiac:   Lab Results   Component Value Date     TROPONINI 0.639 (H) 10/11/2023    .9 (H) 10/10/2023         Other Results:  EKG (my interpretation): atrial fibrillation, rate 143    Radiology:  Imaging Results              X-Ray Chest AP Portable (Final result)  Result time 10/10/23 14:05:57      Final result by Wan Abreu MD (10/10/23 14:05:57)                   Impression:      NO ACUTE CARDIOPULMONARY PROCESS IDENTIFIED.      Electronically signed by: Wan Abreu  Date:    10/10/2023  Time:    14:05               Narrative:    EXAMINATION:  XR CHEST AP PORTABLE    CLINICAL HISTORY:  Chest pain, unspecified    TECHNIQUE:  One view    COMPARISON:  September 14, 2023.    FINDINGS:  Cardiopericardial silhouette enlarged appearance is similar.  Right upper paratracheal opacity is similar.  No acute dense focal or segmental consolidation, congestive process, pleural effusions or pneumothorax.                                      Current Medications:     Infusions:   heparin (porcine) in D5W 12 Units/kg/hr (10/12/23 0258)        Scheduled:   aspirin  81 mg Oral Daily    atorvastatin  40 mg Oral QHS    docusate sodium  200 mg Oral QHS    EScitalopram oxalate  10 mg Oral Daily    lisinopriL  20 mg Oral Daily    melatonin  6 mg Oral Nightly    metoprolol tartrate  12.5 mg Oral BID    multivitamin  1 tablet Oral Daily        PRN:  acetaminophen, dextrose 10%, dextrose 10%, glucagon (human recombinant), glucose, glucose, heparin (PORCINE), heparin (PORCINE), metoprolol, naloxone, nitroGLYCERIN 2% TD oint, sodium chloride 0.9%        Assessment & Plan:       Atrial fibrillation w/ RVR  NSTEMI  CAD  Aortic stenosis  - Admit to telemetry with continuous cardiac monitoring  - Continue ASA and statin  - Metoprolol 12.5 mg BID change to 25 mg succinate today.   - Initial troponin negative. Peak 1.48 overnight. No further chest pain.  - Heparin drip while inpatient. Transition to [facility] after [facility]  - CHADSVasc 4 points. HAS-BLED 2 points.  - Echo with EF 65-70%  severe aortic stenosis.  - Cardiology following. Planning for [facility] tomorrow.  - CMP, Mg, Phos daily.    HTN  - Home med not on formulary.   - D/c Amlodipine 5 mg w/ initiation of metoprolol  - Decrease Lisinopril 20 mg to 10 mg with goal high normal BP given Aortic stenosis; per cards recs.     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 Will D/c and give dose Lovenox today. Hold Lovenox after day's dose in preparation for [facility] tomorrow        Qing Nickerson MD  [facility] Medicine HO-3

## 2023-10-12 NOTE — PT/OT/SLP PROGRESS
Physical Therapy Treatment    Patient Name:  Fifi Chacon   MRN:  [id number]    Recommendations     Discharge Recommendations:   (home w/ responsible caregiver (24/7 supervision/assistance) w/ HH)   Discharge Equipment Recommendations: walker, rolling, bath bench   Barriers to discharge:  fall risk, level of skilled assistance required, and decreased endurance    Assessment     Fifi Chacon is a 87 y.o. female admitted with a medical diagnosis of Atrial fibrillation with RVR.    Atrial fibrillation w/ RVR  NSTEMI  CAD  Systolic Murmur   HTN  GRACIE  Chronic Normocytic anemia   Depression  Mild Cognitive Impairment      Patient Active Problem List   Diagnosis    Vaginal prolapse    Pessary maintenance    Hematuria    Hypertension    Atrophy of vagina    Gastroesophageal reflux disease    Benign essential hypertension    History of iron deficiency    Hydroureter    Rheumatoid arthritis    Vitamin D deficiency    Anxiety    Depressive disorder    Renal cyst    Unstable angina    CAD (coronary artery disease)    Atrial fibrillation with RVR     She presents with the following impairments/functional limitations:  impaired endurance, impaired cardiopulmonary response to activity, gait instability, impaired balance, impaired self care skills, impaired functional mobility.    Rehab Prognosis: Good.    Patient would benefit from continued skilled acute PT services to: address above listed impairments/functional limitations; receive patient/caregiver education; reduce fall risk; and maximize independency/safety with functional mobility.    -continued: sitting edge of bed, up-to-chair, ambulation, with progression of gait distance/frequency/duration and speed, as tolerated/appropriate, with assistance and supervision    Recent Surgery: * No surgery found *      Plan     During this hospitalization, patient to be seen 3 x/week to address the identified impairments/functional limitations via gait training, therapeutic activities,  therapeutic exercises and progress toward the established goals.    Plan of Care Expires:  11/08/23    Subjective     Communicated with patient's nurse Marisol prior to session.    Patient agreeable to participate in treatment session.    Chief Complaint: none  Patient/Family Comments/goals: home  Pain/Comfort:  Pain Rating 1: 0/10  Pain Addressed 1: Nurse notified  Pain Rating Post-Intervention 1: 0/10    Objective     PM&R OSKAR Gaming in room for therapy session    Patient found supine in bed, with HOB elevated, and bed rails up bilateral HOB, left FOB, and right FOB with pt's daughter in room, peripheral IV, PureWick, telemetry  upon PT entry to room.    General Precautions: Standard, fall   Orthopedic Precautions:N/A   Braces: N/A  Respiratory Status: room air    Functional Mobility:    Pt's daughter mentioned patient needs her nurse (patient may be soiled)  Patients nurse Marisol into room  Patient slide to Lt w/ draw sheet  Patient rolled Rt w/ draw sheet assist  Patients nurse checked patients diaper and discontinued purewick  Patients diaper not soiled    Bed Mobility:  Rolling Right: stand by assistance  Scooting: stand by assistance  Supine to Sit: stand by assistance  with cues for proper technique and UE management    Transfers:  Sit to Stand: contact guard assistance and of 2 persons with hand-held assist and rolling walker  with cues for hand placement    Gait:  Patient ambulated 130ft  Sitting rest x4 minutes  Patient ambulated 130ft  With hand-held assist and rolling walker and contact guard assistance.  Patient demonstrates  loss of balance backward during 1st gait session requiring minimal assistance from therapist to correct, no mis-steps, decreased lizbeth, decreased step length, wide base of support, and decreased foot/floor clearance.    Other Mobility:  Therapeutic Exercises performed:   1 set times 10 repetitions  active range of motion  bilat lower extremity       -seated exercises:               ankle pumps  Therapeutic Activities performed:        -sitting balance activities:              scooting:                   -forward                 -backward            repositioning at edge of bed  Donned bilat  socks  Donned 2nd 'back' gown    Balance:  Sit  Patient demonstrated static balance on level surface with independence with no verbal cues.  Patient demonstrated dynamic balance on level surface with supervision with no verbal cues during moderate excursions.  Stand  Patient demonstrated static balance on level surface  using hand-held assist and rolling walker with modified independence with no verbal cues.    Patient left sitting in chair with peripheral IV, telemetry, all lines intact, call button in reach, tray table at bedside, pt's nurse Marisol notified, and pt's daughter present.    Goals     Multidisciplinary Problems       Physical Therapy Goals       Problem: Physical Therapy    Goal Priority Disciplines Outcome Goal Variances Interventions   Physical Therapy Goal     PT, PT/OT Ongoing, Progressing     Description: Goals to be met by: DISCHARGE     Patient will increase functional independence with mobility by performing:    -. Supine to sit with Modified [place] - ONGOING  -. Sit to supine with Modified [place] - ONGOING  -. Rolling to Left and Right with [place] - ONGOING  -. Sit to stand transfer with Modified [place] - ONGOING  -. Gait  x 130 feet with Supervision using Rolling Walker - ONGOING  -. Ascend/descend 3 steps with bilateral Handrails Supervision using No Assistive Device - ONGOING  REVIEWED 10/12/2023           Time Tracking     PT Received On: 10/12/23  PT Start Time: 0748     PT Stop Time: 0820  PT Total Time (min): 32 min     Billable Minutes: Gait Training 20 and Therapeutic Activity 12    10/12/2023

## 2023-10-13 LAB
ALBUMIN SERPL-MCNC: 2.8 G/DL (ref 3.4–4.8)
ALBUMIN/GLOB SERPL: 0.5 RATIO (ref 1.1–2)
ALP SERPL-CCNC: 55 UNIT/L (ref 40–150)
ALT SERPL-CCNC: 7 UNIT/L (ref 0–55)
AST SERPL-CCNC: 21 UNIT/L (ref 5–34)
BASOPHILS # BLD AUTO: 0.01 X10(3)/MCL
BASOPHILS NFR BLD AUTO: 0.4 %
BILIRUB SERPL-MCNC: 0.4 MG/DL
BUN SERPL-MCNC: 21.8 MG/DL (ref 9.8–20.1)
CALCIUM SERPL-MCNC: 9.3 MG/DL (ref 8.4–10.2)
CHLORIDE SERPL-SCNC: 108 MMOL/L (ref 98–107)
CO2 SERPL-SCNC: 25 MMOL/L (ref 23–31)
CREAT SERPL-MCNC: 1.05 MG/DL (ref 0.55–1.02)
EOSINOPHIL # BLD AUTO: 0.13 X10(3)/MCL (ref 0–0.9)
EOSINOPHIL NFR BLD AUTO: 4.9 %
ERYTHROCYTE [DISTWIDTH] IN BLOOD BY AUTOMATED COUNT: 15.4 % (ref 11.5–17)
GFR SERPLBLD CREATININE-BSD FMLA CKD-EPI: 52 MLS/MIN/1.73/M2
GLOBULIN SER-MCNC: 5.5 GM/DL (ref 2.4–3.5)
GLUCOSE SERPL-MCNC: 93 MG/DL (ref 82–115)
HCT VFR BLD AUTO: 30.3 % (ref 37–47)
HGB BLD-MCNC: 9.5 G/DL (ref 12–16)
IMM GRANULOCYTES # BLD AUTO: 0.01 X10(3)/MCL (ref 0–0.04)
IMM GRANULOCYTES NFR BLD AUTO: 0.4 %
LYMPHOCYTES # BLD AUTO: 0.47 X10(3)/MCL (ref 0.6–4.6)
LYMPHOCYTES NFR BLD AUTO: 17.7 %
MAGNESIUM SERPL-MCNC: 1.7 MG/DL (ref 1.6–2.6)
MCH RBC QN AUTO: 27.1 PG (ref 27–31)
MCHC RBC AUTO-ENTMCNC: 31.4 G/DL (ref 33–36)
MCV RBC AUTO: 86.3 FL (ref 80–94)
MONOCYTES # BLD AUTO: 0.23 X10(3)/MCL (ref 0.1–1.3)
MONOCYTES NFR BLD AUTO: 8.6 %
NEUTROPHILS # BLD AUTO: 1.81 X10(3)/MCL (ref 2.1–9.2)
NEUTROPHILS NFR BLD AUTO: 68 %
NRBC BLD AUTO-RTO: 0 %
PHOSPHATE SERPL-MCNC: 3.2 MG/DL (ref 2.3–4.7)
PLATELET # BLD AUTO: 137 X10(3)/MCL (ref 130–400)
PLATELETS.RETICULATED NFR BLD AUTO: 4.5 % (ref 0.9–11.2)
PMV BLD AUTO: 11.5 FL (ref 7.4–10.4)
POTASSIUM SERPL-SCNC: 4.4 MMOL/L (ref 3.5–5.1)
PROT SERPL-MCNC: 8.3 GM/DL (ref 5.8–7.6)
RBC # BLD AUTO: 3.51 X10(6)/MCL (ref 4.2–5.4)
SODIUM SERPL-SCNC: 139 MMOL/L (ref 136–145)
WBC # SPEC AUTO: 2.66 X10(3)/MCL (ref 4.5–11.5)

## 2023-10-13 PROCEDURE — 83735 ASSAY OF MAGNESIUM: CPT | Performed by: STUDENT IN AN ORGANIZED HEALTH CARE EDUCATION/TRAINING PROGRAM

## 2023-10-13 PROCEDURE — C1769 GUIDE WIRE: HCPCS | Performed by: INTERNAL MEDICINE

## 2023-10-13 PROCEDURE — 93454 CORONARY ARTERY ANGIO S&I: CPT | Performed by: STUDENT IN AN ORGANIZED HEALTH CARE EDUCATION/TRAINING PROGRAM

## 2023-10-13 PROCEDURE — 94761 N-INVAS EAR/PLS OXIMETRY MLT: CPT

## 2023-10-13 PROCEDURE — 80053 COMPREHEN METABOLIC PANEL: CPT | Performed by: STUDENT IN AN ORGANIZED HEALTH CARE EDUCATION/TRAINING PROGRAM

## 2023-10-13 PROCEDURE — 99153 MOD SED SAME PHYS/QHP EA: CPT | Performed by: INTERNAL MEDICINE

## 2023-10-13 PROCEDURE — C1760 CLOSURE DEV, VASC: HCPCS | Performed by: INTERNAL MEDICINE

## 2023-10-13 PROCEDURE — 25000003 PHARM REV CODE 250: Performed by: INTERNAL MEDICINE

## 2023-10-13 PROCEDURE — 25000003 PHARM REV CODE 250: Performed by: STUDENT IN AN ORGANIZED HEALTH CARE EDUCATION/TRAINING PROGRAM

## 2023-10-13 PROCEDURE — 21400001 HC TELEMETRY ROOM

## 2023-10-13 PROCEDURE — 93799 UNLISTED CV SVC/PROCEDURE: CPT | Performed by: STUDENT IN AN ORGANIZED HEALTH CARE EDUCATION/TRAINING PROGRAM

## 2023-10-13 PROCEDURE — C1894 INTRO/SHEATH, NON-LASER: HCPCS | Performed by: INTERNAL MEDICINE

## 2023-10-13 PROCEDURE — 85025 COMPLETE CBC W/AUTO DIFF WBC: CPT | Performed by: STUDENT IN AN ORGANIZED HEALTH CARE EDUCATION/TRAINING PROGRAM

## 2023-10-13 PROCEDURE — 99152 MOD SED SAME PHYS/QHP 5/>YRS: CPT | Performed by: INTERNAL MEDICINE

## 2023-10-13 PROCEDURE — 27201423 OPTIME MED/SURG SUP & DEVICES STERILE SUPPLY: Performed by: INTERNAL MEDICINE

## 2023-10-13 PROCEDURE — C1887 CATHETER, GUIDING: HCPCS | Performed by: INTERNAL MEDICINE

## 2023-10-13 PROCEDURE — 63600175 PHARM REV CODE 636 W HCPCS: Performed by: INTERNAL MEDICINE

## 2023-10-13 PROCEDURE — 25500020 PHARM REV CODE 255: Performed by: INTERNAL MEDICINE

## 2023-10-13 PROCEDURE — 84100 ASSAY OF PHOSPHORUS: CPT | Performed by: STUDENT IN AN ORGANIZED HEALTH CARE EDUCATION/TRAINING PROGRAM

## 2023-10-13 RX ORDER — HEPARIN SODIUM 5000 [USP'U]/ML
INJECTION, SOLUTION INTRAVENOUS; SUBCUTANEOUS
Status: DISCONTINUED | OUTPATIENT
Start: 2023-10-13 | End: 2023-10-13 | Stop reason: HOSPADM

## 2023-10-13 RX ORDER — MIDAZOLAM HYDROCHLORIDE 1 MG/ML
INJECTION INTRAMUSCULAR; INTRAVENOUS
Status: DISCONTINUED | OUTPATIENT
Start: 2023-10-13 | End: 2023-10-13 | Stop reason: HOSPADM

## 2023-10-13 RX ORDER — NITROGLYCERIN 20 MG/100ML
INJECTION INTRAVENOUS
Status: DISCONTINUED | OUTPATIENT
Start: 2023-10-13 | End: 2023-10-14 | Stop reason: HOSPADM

## 2023-10-13 RX ORDER — LIDOCAINE HYDROCHLORIDE 10 MG/ML
INJECTION INFILTRATION; PERINEURAL
Status: DISCONTINUED | OUTPATIENT
Start: 2023-10-13 | End: 2023-10-13 | Stop reason: HOSPADM

## 2023-10-13 RX ORDER — VERAPAMIL HYDROCHLORIDE 2.5 MG/ML
INJECTION, SOLUTION INTRAVENOUS
Status: DISCONTINUED | OUTPATIENT
Start: 2023-10-13 | End: 2023-10-13 | Stop reason: HOSPADM

## 2023-10-13 RX ORDER — FENTANYL CITRATE 50 UG/ML
INJECTION, SOLUTION INTRAMUSCULAR; INTRAVENOUS
Status: DISCONTINUED | OUTPATIENT
Start: 2023-10-13 | End: 2023-10-13 | Stop reason: HOSPADM

## 2023-10-13 RX ADMIN — DOCUSATE SODIUM 200 MG: 100 CAPSULE, LIQUID FILLED ORAL at 08:10

## 2023-10-13 RX ADMIN — LISINOPRIL 10 MG: 10 TABLET ORAL at 09:10

## 2023-10-13 RX ADMIN — ATORVASTATIN CALCIUM 80 MG: 40 TABLET, FILM COATED ORAL at 08:10

## 2023-10-13 RX ADMIN — THERA TABS 1 TABLET: TAB at 09:10

## 2023-10-13 RX ADMIN — Medication 6 MG: at 08:10

## 2023-10-13 RX ADMIN — ESCITALOPRAM OXALATE 10 MG: 10 TABLET ORAL at 09:10

## 2023-10-13 RX ADMIN — ASPIRIN 81 MG: 81 TABLET, COATED ORAL at 09:10

## 2023-10-13 RX ADMIN — ACETAMINOPHEN 650 MG: 325 TABLET, FILM COATED ORAL at 08:10

## 2023-10-13 RX ADMIN — METOPROLOL SUCCINATE 12.5 MG: 25 TABLET, EXTENDED RELEASE ORAL at 10:10

## 2023-10-13 NOTE — PLAN OF CARE
10/13/23 1137   Medicare Message   Important Message from Medicare regarding Discharge Appeal Rights Given to patient/caregiver;Explained to patient/caregiver;Signed/date by patient/caregiver   Date IMM was signed 10/13/23   Time IMM was signed [id number]

## 2023-10-13 NOTE — PROGRESS NOTES
Cardiology Progress Note    Date of Consultation: 10/11/2023    History of Present Illness:  This is a 87 y.o. female with HTN, CAD (LHC/CORs 2021 mLAD 40%, LCx luminal irreg, mRCA 70% [IFR 0.92]) and GERD who presented with chest pain, palpitation, and dyspnea.     No acute events overnight. Planning for coronary angiogram. Patient without acute complaint. sCr down trended this AM.     Review of patient's allergies indicates:   Allergen Reactions    Sulfa (sulfonamide antibiotics)      Other reaction(s): Not available       Review of systems: 12 point review of systems conducted, negative except as stated in the HPI.     Past Medical History:   Past Medical History:   Diagnosis Date    GERD (gastroesophageal reflux disease)     Hyperlipidemia     Hypertension        Procedure History:   Past Surgical History:   Procedure Laterality Date    APPENDECTOMY      CHOLECYSTECTOMY      HYSTERECTOMY         Family History: family history includes Cancer in her mother; Hypertension in her mother, sister, and sister.    Social History:  reports that she has never smoked. She has never been exposed to tobacco smoke. She has never used smokeless tobacco. She reports that she does not currently use alcohol. She reports that she does not use drugs.    Home Medications:   Medications Prior to Admission   Medication Sig Dispense Refill Last Dose    amlodipine-benazepril 5-20 mg (LOTREL) 5-20 mg per capsule Take 1 capsule by mouth once daily. 90 capsule 4     aspirin 81 MG Chew Take 1 tablet (81 mg total) by mouth once daily. 90 tablet 4     azelastine (ASTELIN) 137 mcg (0.1 %) nasal spray 2 sprays (274 mcg total) by Nasal route 2 (two) times daily. 30 mL 4     EScitalopram oxalate (LEXAPRO) 10 MG tablet Take 1 tablet (10 mg total) by mouth once daily. 90 tablet 4     estradioL (ESTRACE) 0.01 % (0.1 mg/gram) vaginal cream Place 1 g vaginally twice a week. (Patient not taking: Reported on 6/9/2023) 42.5 g 1     fluticasone propionate  (FLONASE) 50 mcg/actuation nasal spray 1 spray (50 mcg total) by Each Nostril route once daily. 16 g 2     melatonin (MELATIN) 5 mg Take 1 tablet (5 mg total) by mouth nightly. 90 tablet 4     multivit with min-folic acid 200 mcg Chew One A Day Vitamin 90 tablet 4     pantoprazole (PROTONIX) 40 MG tablet pantoprazole 40 mg tablet,delayed release, Strength: 40 mg 90 tablet 4     pravastatin (PRAVACHOL) 20 MG tablet pravastatin 20 mg tablet  Take 1 tablet every day by oral route. Strength: 20 mg 90 tablet 4        Inpatient Medications:     Current Facility-Administered Medications:     acetaminophen tablet 650 mg, 650 mg, Oral, Q4H PRN, Qing Nickerson MD    aspirin EC tablet 81 mg, 81 mg, Oral, Daily, Qing Nickerson MD, 81 mg at 10/12/23 0959    atorvastatin tablet 80 mg, 80 mg, Oral, QHS, Ike Marcos MD, 80 mg at 10/12/23 2032    dextrose 10% bolus 125 mL 125 mL, 12.5 g, Intravenous, PRN, Ricardo Monson,     dextrose 10% bolus 250 mL 250 mL, 25 g, Intravenous, PRN, Ricardo Monson DO    docusate sodium capsule 200 mg, 200 mg, Oral, QHS, Qing Nickerson MD, 200 mg at 10/12/23 2032    EScitalopram oxalate tablet 10 mg, 10 mg, Oral, Daily, Qing Nickerson MD, 10 mg at 10/12/23 1000    glucagon (human recombinant) injection 1 mg, 1 mg, Intramuscular, PRN, Qing Nickerson MD    glucose chewable tablet 16 g, 16 g, Oral, PRN, Qing Nickerson MD    glucose chewable tablet 24 g, 24 g, Oral, PRN, Qing Nickerson MD    lisinopriL tablet 10 mg, 10 mg, Oral, Daily, Qing Nickerson MD, 10 mg at 10/12/23 1000    melatonin tablet 6 mg, 6 mg, Oral, Nightly, Qing Nickerson MD, 6 mg at 10/12/23 2032    metoprolol injection 5 mg, 5 mg, Intravenous, Q5 Min PRN, Qing Nickerson MD    metoprolol succinate (TOPROL-XL) 24 hr split tablet 12.5 mg, 12.5 mg, Oral, Daily, Ike Marcos MD    multivitamin tablet, 1 tablet, Oral, Daily, Qing Nickerson MD, 1 tablet at 10/12/23 0959    naloxone  0.4 mg/mL injection 0.02 mg, 0.02 mg, Intravenous, PRN, Qing Nickerson MD    nitroGLYCERIN 2% TD oint ointment 0.5 inch, 0.5 inch, Topical (Top), Q6H PRN, Qing Nickerson MD    sodium chloride 0.9% flush 10 mL, 10 mL, Intravenous, Q12H PRN, Qing Nickerson MD    sodium chloride 0.9% flush 10 mL, 10 mL, Intravenous, PRN, Ike Marcos MD     Laboratory Data:   Recent Results (from the past 24 hour(s))   Comprehensive Metabolic Panel (CMP)    Collection Time: 10/13/23  3:19 AM   Result Value Ref Range    Sodium Level 139 136 - 145 mmol/L    Potassium Level 4.4 3.5 - 5.1 mmol/L    Chloride 108 (H) 98 - 107 mmol/L    Carbon Dioxide 25 23 - 31 mmol/L    Glucose Level 93 82 - 115 mg/dL    Blood Urea Nitrogen 21.8 (H) 9.8 - 20.1 mg/dL    Creatinine 1.05 (H) 0.55 - 1.02 mg/dL    Calcium Level Total 9.3 8.4 - 10.2 mg/dL    Protein Total 8.3 (H) 5.8 - 7.6 gm/dL    Albumin Level 2.8 (L) 3.4 - 4.8 g/dL    Globulin 5.5 (H) 2.4 - 3.5 gm/dL    Albumin/Globulin Ratio 0.5 (L) 1.1 - 2.0 ratio    Bilirubin Total 0.4 <=1.5 mg/dL    Alkaline Phosphatase 55 40 - 150 unit/L    Alanine Aminotransferase 7 0 - 55 unit/L    Aspartate Aminotransferase 21 5 - 34 unit/L    eGFR 52 mls/min/1.73/m2   Magnesium    Collection Time: 10/13/23  3:19 AM   Result Value Ref Range    Magnesium Level 1.70 1.60 - 2.60 mg/dL   Phosphorus    Collection Time: 10/13/23  3:19 AM   Result Value Ref Range    Phosphorus Level 3.2 2.3 - 4.7 mg/dL   CBC with Differential    Collection Time: 10/13/23  3:19 AM   Result Value Ref Range    WBC 2.66 (L) 4.50 - 11.50 x10(3)/mcL    RBC 3.51 (L) 4.20 - 5.40 x10(6)/mcL    Hgb 9.5 (L) 12.0 - 16.0 g/dL    Hct 30.3 (L) 37.0 - 47.0 %    MCV 86.3 80.0 - 94.0 fL    MCH 27.1 27.0 - 31.0 pg    MCHC 31.4 (L) 33.0 - 36.0 g/dL    RDW 15.4 11.5 - 17.0 %    Platelet 137 130 - 400 x10(3)/mcL    MPV 11.5 (H) 7.4 - 10.4 fL    IPF 4.5 0.9 - 11.2 %    Neut % 68.0 %    Lymph % 17.7 %    Mono % 8.6 %    Eos % 4.9 %    Basophil % 0.4 %     "Lymph # 0.47 (L) 0.6 - 4.6 x10(3)/mcL    Neut # 1.81 (L) 2.1 - 9.2 x10(3)/mcL    Mono # 0.23 0.1 - 1.3 x10(3)/mcL    Eos # 0.13 0 - 0.9 x10(3)/mcL    Baso # 0.01 <=0.2 x10(3)/mcL    IG# 0.01 0 - 0.04 x10(3)/mcL    IG% 0.4 %    NRBC% 0.0 %       Imaging:  X-Ray Chest AP Portable   Final Result      NO ACUTE CARDIOPULMONARY PROCESS IDENTIFIED.         Electronically signed by: Wan Abreu   Date:    10/10/2023   Time:    14:05          Physical Exam:   BP (!) 118/58   Pulse (!) 54   Temp 98.1 °F (36.7 °C) (Oral)   Resp 16   Ht 5' 5" (1.651 m)   Wt 66.2 kg (146 lb)   SpO2 (!) 93%   BMI 24.30 kg/m²  Body mass index is 24.3 kg/m².  Physical Exam  Vitals reviewed.   Constitutional:       General: She is not in acute distress.     Appearance: Normal appearance. She is normal weight. She is not ill-appearing.   HENT:      Head: Normocephalic and atraumatic.      Right Ear: External ear normal.      Left Ear: External ear normal.      Nose: Nose normal.      Mouth/Throat:      Mouth: Mucous membranes are moist.   Eyes:      Conjunctiva/sclera: Conjunctivae normal.   Cardiovascular:      Rate and Rhythm: Normal rate. Rhythm irregular.      Pulses: Normal pulses.      Heart sounds: Murmur heard.   Pulmonary:      Effort: Pulmonary effort is normal. No respiratory distress.      Breath sounds: Normal breath sounds. No stridor. No wheezing, rhonchi or rales.   Chest:      Chest wall: No tenderness.   Abdominal:      General: Abdomen is flat. Bowel sounds are normal. There is no distension.      Palpations: Abdomen is soft.   Musculoskeletal:      Cervical back: Neck supple.      Right lower leg: No edema.      Left lower leg: No edema.   Skin:     General: Skin is warm and dry.      Capillary Refill: Capillary refill takes less than 2 seconds.   Neurological:      Mental Status: She is alert and oriented to person, place, and time.   Psychiatric:         Mood and Affect: Mood normal.         Behavior: Behavior normal. "       Impression:   87 y.o. female with HTN, CAD (LHC/CORs 2021 mLAD 40%, LCx luminal irreg, mRCA 70% [IFR 0.92]) and GERD who presented with chest pain, palpitation, and dyspnea found to be in AF with RVR and with an NSTEMI.     Plan:   AF with RVR now in NSR  -Toprol XL 12.5 mg daily.   -CV2 score of at least 4. Recommend AC. Eliquis 5 mg BID.        NSTEMI  -Given clinical symptoms and troponin delta between initial and subsequent concern for Type I NSTEMI although in the setting of known CAD it is not unreasonable to conclude this may be a Type II event. For time being continue heparin ggt or treatment dose Lovenox.   -ASA 81 mg daily and high intensity statin.   -Plan for coronary angiogram.     Severe Aortic Stenosis  -Plan for coronary evaluation.   -Needs consideration for TAVR. Will discuss with structural specialist.     CAD  -ASA 81 mg daily.   -Lipitor 80. Target LDL at least < 70.     HTN  -Continue Lisinopril 10 mg daily. Allow her to be high normal in setting of aortic stenosis.     Ike Marcos MD  [employer] [facility] PGY-5

## 2023-10-13 NOTE — CARE UPDATE
TRANSITION OF CARE PROGRESS NOTE    I have received checkout from Dr. Qing Nickerson regarding this patient.     HO-I assessment:  Admission diagnosis: Atrial fibrillation [I48.91]  Rapid heart beat [R00.0]  SOB (shortness of breath) [R06.02]  Chest pain [R07.9]  Atrial fibrillation with RVR [I48.91]  Chest pain, unspecified type [R07.9]   Overnight management:  88 yo F with PMH of HTN, CAD. Presented with Afib with RVR and NSTEMI. Blood pressure management with metoprolol and lisinopril. S/P heart cath on 10/13/23  Code status: full code    Please call Dr. Cayden Forte at [phone] from 10/13/2023 4PM to 10/16/2023 7AM if any issues arise.    Federico Murphy DO  [facility], HO-I

## 2023-10-13 NOTE — PROGRESS NOTES
Return call from sonCurtis, who stated fly in agreement with HH services. As discussed, [facility].

## 2023-10-13 NOTE — PROGRESS NOTES
"[facility]  Progress Note      Attending Physician: Ricardo Monson,*  Resident: Krishan        Subjective:      Brief HPI:  Pt is a 86 y/o female with PMH of HTN, CAD, and GERD presents to ED with chest pain, SOB and palpitations. Symptoms began while watching TV with a sudden pressure chest pain. Pt felt " she was going to die." She reports palpitations and difficulty breathing. She confirms following with cardiology and having similar pain in the past. She denies fever, decrease intake, and syncope. Denies any recent falls or changes in medication. Denies recent illness. Until chest pain began she was at usual baseline.     Hospital course:  10/10/23- heparin initiated with increased in troponin      Interval Hx:   No acute events. VSS. HR remains controlled. Pt feeling well. NPO for LHC today. No SOB or CP.       Review of Systems:  ROS completed and negative except as indicated above.     Objective:     Last 24 Hour Vital Signs:  BP  Min: 115/46  Max: 136/63  Temp  Av.5 °F (36.9 °C)  Min: 98.1 °F (36.7 °C)  Max: 99.3 °F (37.4 °C)  Pulse  Av.7  Min: 54  Max: 66  Resp  Av  Min: 16  Max: 16  SpO2  Av.1 %  Min: 93 %  Max: 97 %  I/O last 3 completed shifts:  In: 256.1 [P.O.:237; I.V.:19.1]  Out: 800 [Urine:800]    Physical Exam  Constitutional:       General: She is not in acute distress.     Appearance: She is not ill-appearing.   HENT:      Head: Normocephalic and atraumatic.      Mouth/Throat:      Mouth: Mucous membranes are moist.   Cardiovascular:      Rate and Rhythm: Normal rate and regular rhythm.      Pulses: Normal pulses.      Heart sounds: Murmur heard.      Comments: Symmetrical pulse B/L. Decreased PT pulse. 2+ DP. Systolic murmur  Abdominal:      General: Bowel sounds are normal. There is no distension.      Palpations: Abdomen is soft.      Tenderness: There is no abdominal tenderness.   Musculoskeletal:      Right lower leg: No edema.      Left lower leg: No " edema.   Skin:     General: Skin is warm and dry.   Neurological:      General: No focal deficit present.      Mental Status: She is oriented to person, place, and time.           Laboratory:  Most Recent Data:  CBC:   Lab Results   Component Value Date    WBC 2.66 (L) 10/13/2023    HGB 9.5 (L) 10/13/2023    HCT 30.3 (L) 10/13/2023     10/13/2023    MCV 86.3 10/13/2023    RDW 15.4 10/13/2023     WBC Differential:   Recent Labs   Lab 10/10/23  1341 10/11/23  0359 10/12/23  0336 10/13/23  0319   WBC 3.74* 3.19* 2.45* 2.66*   HGB 10.6* 9.6* 9.6* 9.5*   HCT 33.1* 30.7* 31.4* 30.3*    142 143 137   MCV 84.7 85.8 86.7 86.3       BMP:   Lab Results   Component Value Date     10/13/2023    K 4.4 10/13/2023     (H) 01/21/2022    CO2 25 10/13/2023    BUN 21.8 (H) 10/13/2023    CREATININE 1.05 (H) 10/13/2023    CALCIUM 9.3 10/13/2023    MG 1.70 10/13/2023    PHOS 3.2 10/13/2023     LFTs:   Lab Results   Component Value Date    ALBUMIN 2.8 (L) 10/13/2023    BILITOT 0.4 10/13/2023    AST 21 10/13/2023    ALKPHOS 55 10/13/2023    ALT 7 10/13/2023     Coags:   Lab Results   Component Value Date    INR 1.2 10/10/2023    PROTIME 15.6 (H) 10/10/2023    PTT 79.0 (H) 10/12/2023     FLP:   Lab Results   Component Value Date    CHOL 129 10/12/2023    HDL 33 (L) 10/12/2023    TRIG 103 10/12/2023     DM:   Lab Results   Component Value Date    HGBA1C 5.2 10/31/2022    CREATININE 1.05 (H) 10/13/2023     Thyroid:   Lab Results   Component Value Date    TSH 5.553 (H) 10/10/2023      Anemia:   Lab Results   Component Value Date    IRON 62 01/28/2019    TIBC 297 01/28/2019    FERRITIN 95.9 01/28/2019       Lab Results   Component Value Date    TLWEPCIN20 795 07/26/2018       Lab Results   Component Value Date    FOLATE >20.0 07/26/2018        Cardiac:   Lab Results   Component Value Date    TROPONINI 0.639 (H) 10/11/2023    .9 (H) 10/10/2023         Other Results:  EKG (my interpretation): atrial fibrillation,  rate 143    Radiology:  Imaging Results              X-Ray Chest AP Portable (Final result)  Result time 10/10/23 14:05:57      Final result by Wan Abreu MD (10/10/23 14:05:57)                   Impression:      NO ACUTE CARDIOPULMONARY PROCESS IDENTIFIED.      Electronically signed by: Wan Abreu  Date:    10/10/2023  Time:    14:05               Narrative:    EXAMINATION:  XR CHEST AP PORTABLE    CLINICAL HISTORY:  Chest pain, unspecified    TECHNIQUE:  One view    COMPARISON:  September 14, 2023.    FINDINGS:  Cardiopericardial silhouette enlarged appearance is similar.  Right upper paratracheal opacity is similar.  No acute dense focal or segmental consolidation, congestive process, pleural effusions or pneumothorax.                                      Current Medications:     Infusions:         Scheduled:   aspirin  81 mg Oral Daily    atorvastatin  80 mg Oral QHS    docusate sodium  200 mg Oral QHS    EScitalopram oxalate  10 mg Oral Daily    lisinopriL  10 mg Oral Daily    melatonin  6 mg Oral Nightly    metoprolol succinate  12.5 mg Oral Daily    multivitamin  1 tablet Oral Daily        PRN:  acetaminophen, dextrose 10%, dextrose 10%, glucagon (human recombinant), glucose, glucose, metoprolol, naloxone, nitroGLYCERIN 2% TD oint, sodium chloride 0.9%, sodium chloride 0.9%        Assessment & Plan:       Atrial fibrillation w/ RVR  NSTEMI  CAD  Aortic stenosis  - Admit to telemetry with continuous cardiac monitoring  - Continue ASA and statin  - Metoprolol 12.5 mg BID change to 25 mg succinate today.   - Initial troponin negative. Peak 1.48. No further chest pain.  - Transition to Eliquis after [facility]  - CHADSVasc 4 points. HAS-BLED 2 points.  - Echo with EF 65-70% severe aortic stenosis.  - [facility] today  - CMP, Mg, Phos daily.    HTN  - Home med not on formulary.   - D/c Amlodipine 5 mg w/ initiation of metoprolol  - Decrease Lisinopril 20 mg to 10 mg with goal high normal BP given Aortic stenosis; per  cards recs.     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 Discharge today or tomorrow with family care and HH.        Qing Nickerson MD  [facility] HO-3

## 2023-10-13 NOTE — PROGRESS NOTES
As discussed with sonCurtis, order for RW faxed to [facility] at [phone]. Fly will let me know if interested in HH services, as recommended by PT.

## 2023-10-13 NOTE — INTERVAL H&P NOTE
The patient has been examined and the H&P has been reviewed:    I concur with the findings and changes have been noted since the H&P was written: Patient presented in AF with RVR. Noted to have elevated troponin with concerning for Type I NSTEMI. She has known CAD with a 70% lesion and IFR of 0.92 in 2021. Proceed with coronary angiogram to evaluate for obstructive CAD and further investigation of Type I NSTEMI.    Procedure and sedation risks, benefits and alternative options discussed and understood by patient/family.      Active Hospital Problems    Diagnosis  POA    *Atrial fibrillation with RVR [I48.91]  Yes     Priority: Low    Unstable angina [I20.0]  Yes     Priority: Low    CAD (coronary artery disease) [I25.10]  Yes     Priority: Low    Benign essential hypertension [I10]  Yes     Priority: Low      Resolved Hospital Problems   No resolved problems to display.

## 2023-10-14 VITALS
WEIGHT: 146 LBS | HEART RATE: 62 BPM | HEIGHT: 65 IN | BODY MASS INDEX: 24.32 KG/M2 | OXYGEN SATURATION: 93 % | DIASTOLIC BLOOD PRESSURE: 52 MMHG | SYSTOLIC BLOOD PRESSURE: 128 MMHG | TEMPERATURE: 99 F | RESPIRATION RATE: 18 BRPM

## 2023-10-14 PROBLEM — I20.0 UNSTABLE ANGINA: Status: RESOLVED | Noted: 2023-10-10 | Resolved: 2023-10-14

## 2023-10-14 PROBLEM — I48.91 ATRIAL FIBRILLATION WITH RVR: Status: RESOLVED | Noted: 2023-10-10 | Resolved: 2023-10-14

## 2023-10-14 PROCEDURE — 25000003 PHARM REV CODE 250: Performed by: STUDENT IN AN ORGANIZED HEALTH CARE EDUCATION/TRAINING PROGRAM

## 2023-10-14 PROCEDURE — 94761 N-INVAS EAR/PLS OXIMETRY MLT: CPT

## 2023-10-14 RX ORDER — METOPROLOL SUCCINATE 25 MG/1
12.5 TABLET, EXTENDED RELEASE ORAL DAILY
Qty: 45 TABLET | Refills: 0 | Status: SHIPPED | OUTPATIENT
Start: 2023-10-14 | End: 2023-10-23 | Stop reason: SDUPTHER

## 2023-10-14 RX ORDER — LISINOPRIL 5 MG/1
5 TABLET ORAL DAILY
Qty: 90 TABLET | Refills: 0 | Status: SHIPPED | OUTPATIENT
Start: 2023-10-14 | End: 2023-10-23 | Stop reason: SDUPTHER

## 2023-10-14 RX ADMIN — LISINOPRIL 10 MG: 10 TABLET ORAL at 09:10

## 2023-10-14 RX ADMIN — ASPIRIN 81 MG: 81 TABLET, COATED ORAL at 08:10

## 2023-10-14 RX ADMIN — METOPROLOL SUCCINATE 12.5 MG: 25 TABLET, EXTENDED RELEASE ORAL at 09:10

## 2023-10-14 RX ADMIN — THERA TABS 1 TABLET: TAB at 08:10

## 2023-10-14 RX ADMIN — ESCITALOPRAM OXALATE 10 MG: 10 TABLET ORAL at 08:10

## 2023-10-14 NOTE — DISCHARGE SUMMARY
"[facility] Discharge Summary    Admitting Physician: Ricardo Monson DO  Attending Physician: Ricardo Monson,*  Date of Admit: 10/10/2023  Date of Discharge: 10/14/2023    Condition: Stable  Outcome: Patient tolerated treatment/procedure well without complication and is now ready for discharge.  DISPOSITION: Home or Self Care    Discharge Diagnoses     Principal Problem:  Atrial fibrillation with RVR  Active Hospital Problems    Diagnosis  POA    CAD (coronary artery disease) [I25.10]  Yes    Benign essential hypertension [I10]  Yes      Resolved Hospital Problems    Diagnosis Date Resolved POA    *Atrial fibrillation with RVR [I48.91] 10/14/2023 Yes    Unstable angina [I20.0] 10/14/2023 Yes       Patient Active Problem List   Diagnosis    Vaginal prolapse    Pessary maintenance    Hematuria    Hypertension    Atrophy of vagina    Gastroesophageal reflux disease    Benign essential hypertension    History of iron deficiency    Hydroureter    Rheumatoid arthritis    Vitamin D deficiency    Anxiety    Depressive disorder    Renal cyst    CAD (coronary artery disease)       Consultants and Procedures     Consultants:  IP CONSULT TO HOSPITAL MEDICINE  IP CONSULT TO SOCIAL WORK/CASE MANAGEMENT  IP CONSULT TO CARDIOLOGY  IP CONSULT TO SOCIAL WORK/CASE MANAGEMENT  IP CONSULT TO SOCIAL WORK/CASE MANAGEMENT    Procedures:   Procedure(s) (LRB):  Angiogram, Coronary, with Left Heart Cath (N/A)  Instantaneous Wave-Free Ratio (IFR) (N/A)     Brief Admission History      Pt is a 86 y/o female with PMH of HTN, CAD, and GERD presents to ED with chest pain, SOB and palpitations. Symptoms began while watching TV with a sudden pressure chest pain. Pt felt " she was going to die." She reports palpitations and difficulty breathing. She confirms following with cardiology and having similar pain in the past. She denies fever, decrease intake, and syncope. Denies any recent falls or changes in medication. Denies " "recent illness. Until chest pain began she was at usual baseline.    Hospital Course with Pertinent Findings     No acute events overnight. Pulse is soft, but due to tachycardia in the past is within optimal range and patient is asymptomatic. S/p coronary angiogram on 10/13/23 blockage remains stable and similar to previous study in 2020. She tolerated procedure and is stable for discharge.       Discharge physical exam:  Vitals  BP: (!) 128/52  Temp: 98.7 °F (37.1 °C)  Temp Source: Oral  Pulse: (!) 59  Resp: 18  SpO2: (!) 93 %  Height: 5' 5" (165.1 cm)  Weight: 66.2 kg (146 lb)    Physical Exam  Constitutional:       General: She is not in acute distress.     Comments: pleasant   HENT:      Mouth/Throat:      Mouth: Mucous membranes are moist.   Eyes:      Extraocular Movements: Extraocular movements intact.   Cardiovascular:      Rate and Rhythm: Normal rate and regular rhythm.      Pulses: Normal pulses.      Heart sounds: Normal heart sounds.   Pulmonary:      Effort: No respiratory distress.      Breath sounds: Normal breath sounds.   Abdominal:      Palpations: Abdomen is soft.      Tenderness: There is no abdominal tenderness.   Neurological:      General: No focal deficit present.      Mental Status: She is alert.         TIME SPENT ON DISCHARGE: 60 minutes    Discharge Medications        Medication List        START taking these medications      apixaban 5 mg Tab  Commonly known as: ELIQUIS  Take 1 tablet (5 mg total) by mouth 2 (two) times daily.     lisinopriL 5 MG tablet  Commonly known as: PRINIVIL,ZESTRIL  Take 1 tablet (5 mg total) by mouth once daily.     metoprolol succinate 25 MG 24 hr tablet  Commonly known as: TOPROL-XL  Take 0.5 tablets (12.5 mg total) by mouth once daily.            CONTINUE taking these medications      aspirin 81 MG Chew  Take 1 tablet (81 mg total) by mouth once daily.     azelastine 137 mcg (0.1 %) nasal spray  Commonly known as: ASTELIN  2 sprays (274 mcg total) by Nasal " route 2 (two) times daily.     EScitalopram oxalate 10 MG tablet  Commonly known as: LEXAPRO  Take 1 tablet (10 mg total) by mouth once daily.     fluticasone propionate 50 mcg/actuation nasal spray  Commonly known as: FLONASE  1 spray (50 mcg total) by Each Nostril route once daily.     melatonin 5 mg  Commonly known as: MELATIN  Take 1 tablet (5 mg total) by mouth nightly.     multivit with min-folic acid 200 mcg Chew  One A Day Vitamin     pantoprazole 40 MG tablet  Commonly known as: PROTONIX  pantoprazole 40 mg tablet,delayed release, Strength: 40 mg     pravastatin 20 MG tablet  Commonly known as: PRAVACHOL  pravastatin 20 mg tablet  Take 1 tablet every day by oral route. Strength: 20 mg            STOP taking these medications      amlodipine-benazepril 5-20 mg 5-20 mg per capsule  Commonly known as: LOTREL     estradioL 0.01 % (0.1 mg/gram) vaginal cream  Commonly known as: ESTRACE               Where to Get Your Medications        These medications were sent to [facility]  Pharmacy - [facility] [address] [place] [address]  [address] [facility] [address]      Phone: [phone]   apixaban 5 mg Tab  lisinopriL 5 MG tablet  metoprolol succinate 25 MG 24 hr tablet         Discharge Information:     Begin taking Elliquis 5 mg BID, linospril 5m, and metoprolol succinate 12.5mg. Complete all medications as prescribed.   ED precautions discussed including but not limited to chest pain, weakness, lightheadedness, and falls. Continue to monitor blood pressure if systolic exceed 160 call clinic.    Maintain the following appointments:   Follow-up Information       Cayden Forte MD. Call.    Specialty: Family Medicine  Why: Call to make an appointment within 2 weeks from date of discharge.  Contact information:  [address] [address] [phone]                             DO YARA Trotter  Resident, HO-1

## 2023-10-16 LAB
POC ACTIVATED CLOTTING TIME K: 209 SEC (ref 74–137)
SAMPLE: ABNORMAL

## 2023-10-16 NOTE — PT/OT/SLP DISCHARGE
POST DISCHARGE DOCUMENTATION - 10/16/2023 7:35 AM    Physical Therapy Discharge Summary    Name: Fifi Chacon  MRN: [id number]   Principal Problem: Atrial fibrillation with RVR     Atrial fibrillation w/ RVR  NSTEMI  CAD  Systolic Murmur   HTN  GRACIE  Chronic Normocytic anemia   Depression  Mild Cognitive Impairment        Patient Active Problem List   Diagnosis    Vaginal prolapse    Pessary maintenance    Hematuria    Hypertension    Atrophy of vagina    Gastroesophageal reflux disease    Benign essential hypertension    History of iron deficiency    Hydroureter    Rheumatoid arthritis    Vitamin D deficiency    Anxiety    Depressive disorder    Renal cyst    Unstable angina    CAD (coronary artery disease)    Atrial fibrillation with RVR     Recommendations - per last treatment session     Discharge Recommendations:   (home w/ responsible caregiver (24/7 supervision/assistance) w/ HH)   Discharge Equipment Recommendations: walker, rolling, bath bench     Assessment:     Refer to prior Physical Therapy note dated 10/12/2023 for last known functional status of patient.    Patient was unexpectedly discharged from hospital.  Refer to therapy's initial evaluation or last treatment note for patient's most recent functional status and goal achievement and therapists' recommendations.    -continued: sitting edge of bed, up-to-chair, ambulation, with progression of gait distance/frequency/duration and speed, as tolerated/appropriate, with assistance and supervision (AS MET BY PATIENT)    Objective     GOALS: 0 OUT OF 6 STG's met by patient    Multidisciplinary Problems       Physical Therapy Goals       Problem: Physical Therapy    Goal Priority Disciplines Outcome Goal Variances Interventions   Physical Therapy Goal     PT, PT/OT Ongoing, Progressing     Description: Goals to be met by: DISCHARGE     Patient will increase functional independence with mobility by performing:    -. Supine to sit with Modified [place] -  ONGOING - NOT MET  -. Sit to supine with Modified [place] - ONGOING - NOT MET  -. Rolling to Left and Right with [place] - ONGOING - NOT MET  -. Sit to stand transfer with Modified [place] - ONGOING - NOT MET  -. Gait  x 130 feet with Supervision using Rolling Walker - ONGOING - NOT MET  -. Ascend/descend 3 steps with bilateral Handrails Supervision using No Assistive Device - ONGOING - NOT MET  REVIEWED 10/12/2023           Plan     Patient Discharged to: home or self care per chart.    10/16/2023

## 2023-10-17 ENCOUNTER — PATIENT OUTREACH (OUTPATIENT)
Dept: ADMINISTRATIVE | Facility: CLINIC | Age: 88
End: 2023-10-17
Payer: MEDICARE

## 2023-10-17 NOTE — PROGRESS NOTES
C3 nurse attempted to contact Fifi DIAZ Ines  for a TCC post hospital discharge follow up call. No answer. Left voicemail with callback information. The patient has a scheduled HOSFU appointment with Cayden Forte MD ([facility]) on 10/23/23 @ 2 PM

## 2023-10-17 NOTE — PROGRESS NOTES
C3 nurse (returned call) attempted to contact Fifi Chacon  for a TCC post hospital discharge follow up call. No answer. Left voicemail with callback information. The patient has a scheduled HOSFU appointment with Cayden Forte MD ([facility]) on 10/23/23 @ 2 PM.

## 2023-10-18 ENCOUNTER — TELEPHONE (OUTPATIENT)
Dept: FAMILY MEDICINE | Facility: CLINIC | Age: 88
End: 2023-10-18
Payer: MEDICARE

## 2023-10-18 NOTE — TELEPHONE ENCOUNTER
[facility] nurse left message stating patient would benefit with physical therapy,asking for order.

## 2023-10-18 NOTE — PROGRESS NOTES
C3 nurse returned call for Fifi Chacon, call went straight to , Left  with TCC call back info and OOC # ([phone]) provided for acute needs or concerns

## 2023-10-18 NOTE — PROGRESS NOTES
C3 nurse attempted to contact Fifi DIAZ Ines  for a TCC post hospital discharge follow up call. No answer. Left voicemail with callback information. The patient has a scheduled HOSFU appointment with Cayden Forte MD ([facility]) on 10/23/23 @ 2 PM.

## 2023-10-20 DIAGNOSIS — Z74.09 IMPAIRED MOBILITY: Primary | ICD-10-CM

## 2023-10-23 ENCOUNTER — OFFICE VISIT (OUTPATIENT)
Dept: FAMILY MEDICINE | Facility: CLINIC | Age: 88
End: 2023-10-23
Payer: MEDICARE

## 2023-10-23 VITALS
HEIGHT: 65 IN | OXYGEN SATURATION: 96 % | WEIGHT: 143.63 LBS | SYSTOLIC BLOOD PRESSURE: 118 MMHG | BODY MASS INDEX: 23.93 KG/M2 | TEMPERATURE: 98 F | RESPIRATION RATE: 20 BRPM | HEART RATE: 63 BPM | DIASTOLIC BLOOD PRESSURE: 69 MMHG

## 2023-10-23 DIAGNOSIS — I10 HYPERTENSION, UNSPECIFIED TYPE: ICD-10-CM

## 2023-10-23 DIAGNOSIS — I48.0 PAROXYSMAL A-FIB: ICD-10-CM

## 2023-10-23 DIAGNOSIS — Z12.31 ENCOUNTER FOR SCREENING MAMMOGRAM FOR MALIGNANT NEOPLASM OF BREAST: ICD-10-CM

## 2023-10-23 DIAGNOSIS — I25.10 CORONARY ARTERY DISEASE INVOLVING NATIVE CORONARY ARTERY OF NATIVE HEART, UNSPECIFIED WHETHER ANGINA PRESENT: ICD-10-CM

## 2023-10-23 DIAGNOSIS — K21.9 GASTROESOPHAGEAL REFLUX DISEASE, UNSPECIFIED WHETHER ESOPHAGITIS PRESENT: ICD-10-CM

## 2023-10-23 DIAGNOSIS — N95.9 UNSPECIFIED MENOPAUSAL AND PERIMENOPAUSAL DISORDER: ICD-10-CM

## 2023-10-23 DIAGNOSIS — Z23 IMMUNIZATION DUE: Primary | ICD-10-CM

## 2023-10-23 DIAGNOSIS — I35.0 SEVERE AORTIC STENOSIS: ICD-10-CM

## 2023-10-23 PROCEDURE — 90694 VACC AIIV4 NO PRSRV 0.5ML IM: CPT | Mod: PBBFAC

## 2023-10-23 PROCEDURE — 99214 OFFICE O/P EST MOD 30 MIN: CPT | Mod: PBBFAC

## 2023-10-23 PROCEDURE — G0008 ADMIN INFLUENZA VIRUS VAC: HCPCS | Mod: PBBFAC

## 2023-10-23 RX ORDER — METOPROLOL SUCCINATE 25 MG/1
12.5 TABLET, EXTENDED RELEASE ORAL DAILY
Qty: 45 TABLET | Refills: 1 | Status: SHIPPED | OUTPATIENT
Start: 2023-10-23 | End: 2024-01-10 | Stop reason: SDUPTHER

## 2023-10-23 RX ORDER — LISINOPRIL 5 MG/1
5 TABLET ORAL DAILY
Qty: 90 TABLET | Refills: 1 | Status: SHIPPED | OUTPATIENT
Start: 2023-10-23 | End: 2024-01-10 | Stop reason: SDUPTHER

## 2023-10-23 RX ORDER — ACETAMINOPHEN 500 MG
5 TABLET ORAL NIGHTLY
Qty: 90 TABLET | Refills: 4 | Status: SHIPPED | OUTPATIENT
Start: 2023-10-23 | End: 2024-01-10 | Stop reason: SDUPTHER

## 2023-10-23 RX ADMIN — INFLUENZA A VIRUS A/VICTORIA/4897/2022 IVR-238 (H1N1) ANTIGEN (FORMALDEHYDE INACTIVATED), INFLUENZA A VIRUS A/DARWIN/6/2021 IVR-227 (H3N2) ANTIGEN (FORMALDEHYDE INACTIVATED), INFLUENZA B VIRUS B/AUSTRIA/1359417/2021 BVR-26 ANTIGEN (FORMALDEHYDE INACTIVATED), INFLUENZA B VIRUS B/PHUKET/3073/2013 BVR-1B ANTIGEN (FORMALDEHYDE INACTIVATED) 0.5 ML: 15; 15; 15; 15 INJECTION, SUSPENSION INTRAMUSCULAR at 03:10

## 2023-10-23 NOTE — PHYSICIAN QUERY
PT Name: Fifi Chacon  MR #: [id number]     DOCUMENTATION CLARIFICATION      CDS/: Nicole Page rn              Contact information:[email or web address]  This form is a permanent document in the medical record.    Query Date: October 23, 2023    By submitting this query, we are merely seeking further clarification of documentation to reflect the severity of illness of your patient. Please utilize your independent clinical judgment when addressing the question(s) below.     The Medical Record contains the following:   Indicators   Supporting Clinical Findings Location in Medical Record   x Chest Pain, Angina Chest pain HM note 10-11   x Coronary Artery Disease CAD HM note 10-11    EKG     x Troponin Troponin=0.032--1.489--1.176--  0.682--0.639 Lab 10-10 to 10-11    Echo Results     x Angiography There is non-obstructive coronary artery disease.  Mid Left Anterior Descending has a 40% stenosis.  Mid Right Coronary Artery has a 70% stenosis. Instantaneous wave-free ratio (iFR) was performed on the lesion, and found to be non-hemodynamically significant at 0.90.   Cardiac cath 10-13   x Documentation of acute cardiac condition NSTEMI    Given clinical symptoms and troponin delta between initial and subsequent concern for Type I NSTEMI although in the setting of known CAD it is not unreasonable to conclude this may be a Type II event. HM note 10-11    Cardiology note 10-11    Medication/Treatment      Other        Provider, please validate and clarify the cardiac diagnosis related to the above documentation:  [   ] NSTEMI    Validation..Specify indicators here;_________________     [  x ] NSTEMI/Myocardial Infarction Type 2 due to a-fib   [   ] NSTEMI/Myocardial Infarction Type 2 due to (please specify): __________   [   ] Other Cardiac Diagnosis (please specify): _______________       Please document in your progress notes daily for the duration of treatment until resolved, and include in your  discharge summary.    Form No. [id number]

## 2023-10-23 NOTE — PHYSICIAN QUERY
PT Name: Fifi Chacon  MR #: [id number]    DOCUMENTATION CLARIFICATION     CDS/: Nicole Page rn              Contact information:[email or web address]     This form is a permanent document in the medical record.     Query Date: October 23, 2023    By submitting this query, we are merely seeking further clarification of documentation. Please utilize your independent clinical judgment when addressing the question(s) below.    The Medical Record contains the following:   Indicators Supporting Clinical Findings Location in Medical Record   x Atrial Fibrillation AF with RVR now in NSR   Cardiology note 10-13   x EKG Results EKG (my interpretation): atrial fibrillation, rate 143 HM note 10-12   x Medication -Toprol XL 12.5 mg daily. Cardiology note 10-13    Treatment      Other       The clinical guidelines noted are only a system guideline. It does not replace the provider's clinical judgment.    Provider, please specify the type of Atrial Fibrillation (AF):    [  X ] Paroxysmal - defined as AF that terminates spontaneously or with intervention within < 7 days of onset, episodes may recur with variable frequency   [  ] Other cardiac diagnosis (please specify): ____________     Please document in your progress notes daily for the duration of treatment until resolved, and include in your discharge summary.    Reference:  ADILENE Lee MD. (2020, September 14). Overview of atrial fibrillation (ROSA ELENA Graves MD & KENNA Pina MD, Eds.). Retrieved October 22, 2020, from https://www.[employer]/contents/gisjrqhf-ll-uvcptb-fibrillation?search=atrial%20fibrillation&source=search_result&selectedTitle=1~150&usage_type=default&display_rank=1    Form No. [id number]

## 2023-10-23 NOTE — PHYSICIAN QUERY
PT Name: Fifi Chacon  MR #: [id number]     Documentation Clarification      CDS/: Nicole Page rn              Contact information:[email or web address]    This form is a permanent document in the medical record.     Query Date: October 23, 2023    By submitting this query, we are merely seeking further clarification of documentation. Please utilize your independent clinical judgment when addressing the question(s) below.    The Medical Record reflects the following:    Clinical Findings Location in Medical Records   GRACIE  - Creatinine improving  - avoid nephrotoxic agents  note 10-13   Bun=22.5--18.1--19.9--21.8  Creatinine=1.28--1.12--1.21--1.05  GFR=41--48--44--52 Lab 10-10 to 10-13     [facility] approved diagnostic criteria for acute kidney injury is based on KDIGO criteria:    An increase in serum creatinine > 0.3mg/dl within 48 hours  OR  Increase in serum creatinine to > 1.5x baseline, which is known or presumed to have occurred within the prior 7 days  OR  Urine volume <0.5 ml/kg/hr for 6 hours      Due to the conflicting clinical picture, please clinically validate the diagnosis of GRACIE.  If validated, please provide additional clinical support for the diagnosis.       [   ] Above stated diagnosis is not confirmed and/or it has been ruled out..  CKD 3     [   ] Above stated diagnosis is not confirmed and/or it has been ruled out, other diagnosis ruled in (please          specify):_______________     [ x  ] Above stated diagnosis is confirmed. Please specify clinical support (signs, symptoms, and treatment) for  the confirmed diagnosis: ____________________     [   ] Other clarification (please specify): ___________________         Form No. [id number]

## 2023-10-23 NOTE — PROGRESS NOTES
"[facility]    Subjective:      Chief Complaint: Follow-up (Admitted on 10/10/23 for mild heart attack)    HPI   Fifi Chacon is a 87 y.o. female who presents to [facility] for hospital discharge follow-up.    Acute concerns  Doing well with no issues today. Recently hospitalized for NSTEMI where LHC showed stenosis of RCA and LAD as discussed below. Patient has had no issues since discharge. Does also have known severe AS and was recommended inpatient to consider aortic valve replacement. She was also noted to be in AFib RVR during her hospitalization    Chronic conditions  HTN  - Stopped amlodipine-benzapril 5-20 mg at previous hospitalization due to low BP in the setting of CAD and aortic stenosis  - Reporting home -120/50-70s  - Well controlled on lisinopril 5 mg  - Denies HA, palpitations, chest pain    CAD  - [facility] 10/2023 shows 70% stenosis of mid RCA non hemodynamically significant, mid LAD 40% stenosis  - Rec'd medical management  - Compliant with Toprol 12.5 mg    AS  - Patient and family undecided whether she wants aortic valve replacement  - She is followed by [facility] but may still need referral in the future    Problem list  - HTN  - CAD  - Severe AS  - 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    Objective:     /69 (BP Location: Right arm, Patient Position: Sitting, BP Method: X-Large (Automatic))   Pulse 63   Temp 98.3 °F (36.8 °C) (Oral)   Resp 20   Ht 5' 5" (1.651 m)   Wt 65.1 kg (143 lb 9.6 oz)   SpO2 96%   BMI 23.90 kg/m²     Physical Exam:  Gen: No acute distress, accompanied by daughter and son  CV: RRR, 4/6 systolic murmur. Bilateral 1+ pitting LE edema (at baseline per patient)  Resp: CTAB, breathing non labored on room air.  Abd: Soft, non-distended, non-tender, bowel sounds normoactive.  MSK: ambulating spontaneously with assistance of rolling walker      Current Outpatient Medications:     apixaban (ELIQUIS) 5 mg Tab, Take 1 tablet " (5 mg total) by mouth 2 (two) times daily., Disp: 30 tablet, Rfl: 0    aspirin 81 MG Chew, Take 1 tablet (81 mg total) by mouth once daily., Disp: 90 tablet, Rfl: 4    azelastine (ASTELIN) 137 mcg (0.1 %) nasal spray, 2 sprays (274 mcg total) by Nasal route 2 (two) times daily., Disp: 30 mL, Rfl: 4    EScitalopram oxalate (LEXAPRO) 10 MG tablet, Take 1 tablet (10 mg total) by mouth once daily., Disp: 90 tablet, Rfl: 4    fluticasone propionate (FLONASE) 50 mcg/actuation nasal spray, 1 spray (50 mcg total) by Each Nostril route once daily., Disp: 16 g, Rfl: 2    lisinopriL (PRINIVIL,ZESTRIL) 5 MG tablet, Take 1 tablet (5 mg total) by mouth once daily., Disp: 90 tablet, Rfl: 0    melatonin (MELATIN) 5 mg, Take 1 tablet (5 mg total) by mouth nightly., Disp: 90 tablet, Rfl: 4    metoprolol succinate (TOPROL-XL) 25 MG 24 hr tablet, Take 0.5 tablets (12.5 mg total) by mouth once daily., Disp: 45 tablet, Rfl: 0    multivit with min-folic acid 200 mcg Chew, One A Day Vitamin, Disp: 90 tablet, Rfl: 4    pantoprazole (PROTONIX) 40 MG tablet, pantoprazole 40 mg tablet,delayed release, Strength: 40 mg, Disp: 90 tablet, Rfl: 4    pravastatin (PRAVACHOL) 20 MG tablet, pravastatin 20 mg tablet  Take 1 tablet every day by oral route. Strength: 20 mg, Disp: 90 tablet, Rfl: 4     Assessment/Plan:     Paroxysmal A-fib  CAD  Severe AS  - No episodes of palpitations or chest pain since discharge  - Reviewed data from previous admission including [facility]  - Continue Eliquis 5 mg BID, Toprol 12.5 mg qd. Refills sent  - Continue to monitor AS for now, patient and family to consider valvular replacement after discussions today    HTN  - Continue lisinopril 5 mg qd, refills sent  - Keep daily BP log and bring to next visit  - Advised to stay well hydrated due to occasional orthostatic symptoms    Health Maintenance  - Pap Smear: s/p total hysterectomy  - Mammogram: ordered after discussion with patient and family  - DEXA: ordered after  discussion with patient and family  - Vaccinations: UTD, influenza administered today    Follow-up: 3 months for HTN, discuss TAVR     Cayden Forte MD   [facility] - PGY-II

## 2023-10-23 NOTE — PHYSICIAN QUERY
PT Name: Fifi Chacon  MR #: [id number]     DOCUMENTATION CLARIFICATION     CDS/: Nicole Page rn              Contact information:[email or web address]  This form is a permanent document in the medical record.     Query Date: October 23, 2023    By submitting this query, we are merely seeking further clarification of documentation.  Please utilize your independent clinical judgment when addressing the question(s) below.    The Medical Record contains the following             Clinical Findings   Location in Medical Record   GRACIE  - Creatinine improving  - avoid nephrotoxic agents HM note 10-13   Bun=22.5--18.1--19.9--21.8  Creatinine=1.28--1.12--1.21--1.05  GFR=41--48--44--52 Lab 10-10 to 10-13     (GRACIE) has been confirmed as a diagnosis via query signed (10/23/23@8:39AM).    Based on your medical judgment and in order to clinically support the documented diagnosis, please document the clinical indicators (signs, symptoms, & treatment) that were utilized to support this diagnosis:    [   ]  Signs, Symptoms, & Treatment: ________________________________________     [   ]  Above stated diagnosis is not confirmed and/or it has been ruled out   [   ]  Above stated diagnosis is not confirmed and/or it has been ruled out, other diagnosis ruled in (please          specify):_______________     [  x ]  Other clarification (please specify): ___________AKI________________________       Form No. [id number]

## 2023-10-23 NOTE — PROGRESS NOTES
I reviewed History, PE, A/P and medical record.  Services provided in outpatient department of a teaching hospital/facility, I was immediately available.  I agree with resident, care reasonable and necessary.   I evaluated the patient with resident at time of visit, participated in key parts of H/P and management was discussed.  Looks great  Here with kid, steady gait with regular walker, no cognitive concerns  Agree with need for DEXA  Consider shared decision making for Mammo or breast exam    Leatha Banda MD  [facility]

## 2023-11-06 DIAGNOSIS — Z74.09 IMPAIRED MOBILITY AND ADLS: Primary | ICD-10-CM

## 2023-11-06 DIAGNOSIS — Z78.9 IMPAIRED MOBILITY AND ADLS: Primary | ICD-10-CM

## 2023-11-09 DIAGNOSIS — I48.0 PAROXYSMAL A-FIB: ICD-10-CM

## 2023-11-21 NOTE — PHYSICIAN QUERY
PT Name: Fifi Chacon  MR #: [id number]     DOCUMENTATION CLARIFICATION     CDS/: Nicole Page rn              Contact information:[email or web address]  This form is a permanent document in the medical record.     Query Date: November 21, 2023    By submitting this query, we are merely seeking further clarification of documentation.  Please utilize your independent clinical judgment when addressing the question(s) below.    The Medical Record contains the following             Clinical Findings   Location in Medical Record   GRACIE  - Creatinine improving  - avoid nephrotoxic agents HM note 10-13   Bun=22.5--18.1--19.9--21.8  Creatinine=1.28--1.12--1.21--1.05  GFR=41--48--44--52 Lab 10-10 to 10-13     (GRACIE) has been confirmed as a diagnosis via query signed (10/23/23@8:39AM).    Based on your medical judgment and in order to clinically support the documented diagnosis, please document the clinical indicators (signs, symptoms, & treatment) that were utilized to support this diagnosis:    [   ]  Signs, Symptoms, & Treatment: ________________________________________     [ x  ]  Above stated diagnosis is not confirmed and/or it has been ruled out   [   ]  Above stated diagnosis is not confirmed and/or it has been ruled out, other diagnosis ruled in (please          specify):_______________     [   ]  Other clarification (please specify): _________________________       Form No. [id number]

## 2023-11-24 DIAGNOSIS — I48.0 PAROXYSMAL A-FIB: ICD-10-CM

## 2023-11-27 DIAGNOSIS — I48.0 PAROXYSMAL A-FIB: ICD-10-CM

## 2023-12-19 DIAGNOSIS — N28.1 RENAL CYST: Primary | ICD-10-CM

## 2023-12-27 ENCOUNTER — HOSPITAL ENCOUNTER (OUTPATIENT)
Dept: RADIOLOGY | Facility: HOSPITAL | Age: 88
Discharge: HOME | End: 2023-12-27
Attending: NURSE PRACTITIONER
Payer: MEDICARE

## 2023-12-27 DIAGNOSIS — N28.1 RENAL CYST: ICD-10-CM

## 2023-12-27 PROCEDURE — 76775 US EXAM ABDO BACK WALL LIM: CPT | Mod: TC

## 2023-12-28 ENCOUNTER — TELEPHONE (OUTPATIENT)
Dept: FAMILY MEDICINE | Facility: CLINIC | Age: 88
End: 2023-12-28
Payer: MEDICARE

## 2024-01-02 ENCOUNTER — OFFICE VISIT (OUTPATIENT)
Dept: UROLOGY | Facility: CLINIC | Age: 89
End: 2024-01-02
Payer: MEDICARE

## 2024-01-02 VITALS
HEART RATE: 59 BPM | DIASTOLIC BLOOD PRESSURE: 56 MMHG | SYSTOLIC BLOOD PRESSURE: 117 MMHG | BODY MASS INDEX: 23.46 KG/M2 | TEMPERATURE: 98 F | HEIGHT: 65 IN | OXYGEN SATURATION: 100 % | WEIGHT: 140.81 LBS | RESPIRATION RATE: 20 BRPM

## 2024-01-02 DIAGNOSIS — N28.1 RENAL CYST: Primary | ICD-10-CM

## 2024-01-02 PROCEDURE — 99214 OFFICE O/P EST MOD 30 MIN: CPT | Mod: PBBFAC | Performed by: NURSE PRACTITIONER

## 2024-01-02 PROCEDURE — 99213 OFFICE O/P EST LOW 20 MIN: CPT | Mod: S$PBB,,, | Performed by: NURSE PRACTITIONER

## 2024-01-02 NOTE — PROGRESS NOTES
Placed in room. Seen by Benjamin Fragoso NP. Spoke with patient. RTC in 6months with a renal U/S.

## 2024-01-02 NOTE — PROGRESS NOTES
Chief Complaint: No chief complaint on file.      HPI: Patient is a 88-year-old female who is here for a follow-up post cystoscope for gross hematuria, cystocele, renal cysts.  Patient refuses to have any surgery done to correct cystocele.  Today patient presents without any urinary frequency, urgency, nocturia, incontinence, urinary retention, gross hematuria.  I discussed in detail patient results of renal ultrasound patient satisfied with results.    Allergies:  Review of patient's allergies indicates:   Allergen Reactions    Sulfa (sulfonamide antibiotics)      Other reaction(s): Not available       Medications:  Current Outpatient Medications   Medication Sig Dispense Refill    apixaban (ELIQUIS) 5 mg Tab Take 1 tablet (5 mg total) by mouth 2 (two) times daily. 180 tablet 1    aspirin 81 MG Chew Take 1 tablet (81 mg total) by mouth once daily. 90 tablet 4    EScitalopram oxalate (LEXAPRO) 10 MG tablet Take 1 tablet (10 mg total) by mouth once daily. 90 tablet 4    lisinopriL (PRINIVIL,ZESTRIL) 5 MG tablet Take 1 tablet (5 mg total) by mouth once daily. 90 tablet 1    melatonin (MELATIN) 5 mg Take 1 tablet (5 mg total) by mouth nightly. 90 tablet 4    metoprolol succinate (TOPROL-XL) 25 MG 24 hr tablet Take 0.5 tablets (12.5 mg total) by mouth once daily. 45 tablet 1    multivit with min-folic acid 200 mcg Chew One A Day Vitamin 90 tablet 4    pantoprazole (PROTONIX) 40 MG tablet pantoprazole 40 mg tablet,delayed release, Strength: 40 mg 90 tablet 4    pravastatin (PRAVACHOL) 20 MG tablet pravastatin 20 mg tablet  Take 1 tablet every day by oral route. Strength: 20 mg 90 tablet 4     No current facility-administered medications for this visit.       Review of Systems:  General: No fever, chills, fatigability, or weight loss.  Skin: No rashes, itching, or changes in color or texture of skin.  Chest: Denies AGUAYO, cyanosis, wheezing, cough, and sputum production.  Abdomen: Appetite fine. No weight loss. Denies  diarrhea, abdominal pain, hematemesis, or blood in stool.  Musculoskeletal: No joint stiffness or swelling. Denies back pain.  : As above.  All other review of systems negative.    PMH:  Past Medical History:   Diagnosis Date    GERD (gastroesophageal reflux disease)     Hyperlipidemia     Hypertension        PSH:  Past Surgical History:   Procedure Laterality Date    ANGIOGRAM, CORONARY, WITH LEFT HEART CATHETERIZATION N/A 10/13/2023    Procedure: Angiogram, Coronary, with Left Heart Cath;  Surgeon: Marlon Myers Jr., MD;  Location: [facility] CATH LAB;  Service: Cardiology;  Laterality: N/A;    APPENDECTOMY      CHOLECYSTECTOMY      HYSTERECTOMY      INSTANTANEOUS WAVE-FREE RATIO (IFR) N/A 10/13/2023    Procedure: Instantaneous Wave-Free Ratio (IFR);  Surgeon: Marlon Myers Jr., MD;  Location: [facility] CATH LAB;  Service: Cardiology;  Laterality: N/A;       FamHx:  Family History   Problem Relation Age of Onset    Hypertension Mother     Cancer Mother     Hypertension Sister     Hypertension Sister        SocHx:  Social History     Socioeconomic History    Marital status:    Tobacco Use    Smoking status: Never     Passive exposure: Never    Smokeless tobacco: Never   Substance and Sexual Activity    Alcohol use: Not Currently    Drug use: Never    Sexual activity: Not Currently     Social Determinants of Health     Financial Resource Strain: Low Risk  (10/11/2023)    Overall Financial Resource Strain (CARDIA)     Difficulty of Paying Living Expenses: Not hard at all   Food Insecurity: Unknown (10/11/2023)    Hunger Vital Sign     Worried About Running Out of Food in the Last Year: Never true   Transportation Needs: No Transportation Needs (10/11/2023)    PRAPARE - Transportation     Lack of Transportation (Medical): No     Lack of Transportation (Non-Medical): No   Physical Activity: Inactive (10/11/2023)    Exercise Vital Sign     Days of Exercise per Week: 0 days     Minutes of Exercise per Session: 0  min   Stress: No Stress Concern Present (10/11/2023)    [employer] - Occupational Stress Questionnaire     Feeling of Stress : Not at all   Social Connections: Unknown (10/11/2023)    Social Connection and Isolation Panel [NHANES]     Frequency of Communication with Friends and Family: More than three times a week     Frequency of Social Gatherings with Friends and Family: More than three times a week     Active Member of Clubs or Organizations: No     Attends Club or Organization Meetings: Never     Marital Status:    Housing Stability: Low Risk  (10/11/2023)    Housing Stability Vital Sign     Unable to Pay for Housing in the Last Year: No     Number of Places Lived in the Last Year: 1     Unstable Housing in the Last Year: No       Physical Exam:  There were no vitals filed for this visit.  General: A&Ox3, no apparent distress, no deformities  Neck: No masses, normal thyroid  Lungs: CTA montrell, no use of accessory muscles  Heart: RRR, no arrhythmias  Abdomen: Soft, NT, ND, no masses, no hernias, no hepatosplenomegaly  Lymphatic: Neck and groin nodes negative  Skin: The skin is warm and dry. No jaundice.  Ext: No c/c/e.          Imaging:  Renal ultrasound on 12/27/2023 revealed:  Simple renal cysts require no specific follow-up. Echogenic kidneys suggestive of renal parenchymal disease      Impression:  1. Renal cyst      Plan:  Instructed patient RTC 6 months with renal ultrasound.  Instructed patient if develops any abnormal urologic symptoms notify clinic to be re-evaluate treated.

## 2024-01-10 ENCOUNTER — OFFICE VISIT (OUTPATIENT)
Dept: FAMILY MEDICINE | Facility: CLINIC | Age: 89
End: 2024-01-10
Payer: MEDICARE

## 2024-01-10 VITALS
OXYGEN SATURATION: 97 % | RESPIRATION RATE: 18 BRPM | HEART RATE: 61 BPM | DIASTOLIC BLOOD PRESSURE: 76 MMHG | TEMPERATURE: 98 F | SYSTOLIC BLOOD PRESSURE: 130 MMHG | BODY MASS INDEX: 23.63 KG/M2 | HEIGHT: 65 IN | WEIGHT: 141.81 LBS

## 2024-01-10 DIAGNOSIS — I10 HYPERTENSION, UNSPECIFIED TYPE: ICD-10-CM

## 2024-01-10 DIAGNOSIS — I48.0 PAROXYSMAL A-FIB: ICD-10-CM

## 2024-01-10 DIAGNOSIS — K21.9 GASTROESOPHAGEAL REFLUX DISEASE, UNSPECIFIED WHETHER ESOPHAGITIS PRESENT: ICD-10-CM

## 2024-01-10 PROCEDURE — 99213 OFFICE O/P EST LOW 20 MIN: CPT | Mod: PBBFAC

## 2024-01-10 RX ORDER — GUAIFENESIN 100 MG/5ML
81 LIQUID (ML) ORAL DAILY
Qty: 90 TABLET | Refills: 4 | Status: SHIPPED | OUTPATIENT
Start: 2024-01-10 | End: 2025-01-09

## 2024-01-10 RX ORDER — ACETAMINOPHEN 500 MG
5 TABLET ORAL NIGHTLY
Qty: 90 TABLET | Refills: 4 | Status: SHIPPED | OUTPATIENT
Start: 2024-01-10

## 2024-01-10 RX ORDER — PANTOPRAZOLE SODIUM 40 MG/1
TABLET, DELAYED RELEASE ORAL
Qty: 90 TABLET | Refills: 4 | Status: SHIPPED | OUTPATIENT
Start: 2024-01-10

## 2024-01-10 RX ORDER — PRAVASTATIN SODIUM 20 MG/1
TABLET ORAL
Qty: 90 TABLET | Refills: 4 | Status: SHIPPED | OUTPATIENT
Start: 2024-01-10

## 2024-01-10 RX ORDER — LISINOPRIL 5 MG/1
5 TABLET ORAL DAILY
Qty: 90 TABLET | Refills: 1 | Status: SHIPPED | OUTPATIENT
Start: 2024-01-10

## 2024-01-10 RX ORDER — METOPROLOL SUCCINATE 25 MG/1
12.5 TABLET, EXTENDED RELEASE ORAL DAILY
Qty: 45 TABLET | Refills: 1 | Status: SHIPPED | OUTPATIENT
Start: 2024-01-10

## 2024-01-10 RX ORDER — ESCITALOPRAM OXALATE 10 MG/1
10 TABLET ORAL DAILY
Qty: 90 TABLET | Refills: 1 | Status: SHIPPED | OUTPATIENT
Start: 2024-01-10

## 2024-01-10 NOTE — PROGRESS NOTES
"[facility]    Subjective:      Chief Complaint: Follow-up (Routine follow up, no complaints.)    HPI   Fifi Chacon is a 88 y.o. female who presents to [facility] for chronic medical conditions.    Acute concerns  Doing well with no acute issues, no recent issues with unsteady gait, getting around without walking cane or walker at times. Recently discharged from home health. Denies chest pain, palpitations, dizziness, SOB.    Chronic conditions  HTN  - Stopped amlodipine-benzapril 5-20 mg at previous hospitalization due to low BP in the setting of CAD and aortic stenosis  - Well controlled on lisinopril 5 mg  - Denies HA, palpitations, chest pain     CAD  - [facility] 10/2023 shows 70% stenosis of mid RCA non hemodynamically significant, mid LAD 40% stenosis  - Rec'd medical management  - Compliant with Toprol 12.5 mg     AS  - Patient and family undecided whether she wants aortic valve replacement  - She is followed by [facility] but may need referral in the future    Paroxysmal A-fib  - Adherent to treatment with Eliquis, denies abnormal bleeding or blood in stool  - No palpitations, chest pain, dizziness, confusion, abdominal pain    Problem list  - HTN  - CAD  - Pafib  - Severe AS    Review of Systems  As per HPI.    Objective:     /76 (BP Location: Left arm, Patient Position: Sitting, BP Method: Large (Automatic))   Pulse 61   Temp 98.2 °F (36.8 °C) (Oral)   Resp 18   Ht 5' 5.35" (1.66 m)   Wt 64.3 kg (141 lb 12.8 oz)   SpO2 97%   BMI 23.34 kg/m²     Physical Exam:  Gen: No acute distress, accompanied by son and daughter. Well appearing  CV: RRR, harsh 4/6 systolic murmur. Bilateral 1+ nonpitting edema (improving per patient). Bilateral venous stasis changes to mid shins, no ulcers.  Resp: CTAB, breathing non labored on room air.  Abd: Soft, non-distended, non-tender, bowel sounds normoactive.  MSK: ambulating spontaneously with assistance of rolling walker, kyphotic. Ambulation appears less " cumbersome than previously. Steady gait      Current Outpatient Medications:     apixaban (ELIQUIS) 5 mg Tab, Take 1 tablet (5 mg total) by mouth 2 (two) times daily., Disp: 60 tablet, Rfl: 5    aspirin 81 MG Chew, Take 1 tablet (81 mg total) by mouth once daily., Disp: 90 tablet, Rfl: 4    EScitalopram oxalate (LEXAPRO) 10 MG tablet, Take 1 tablet (10 mg total) by mouth once daily., Disp: 90 tablet, Rfl: 1    lisinopriL (PRINIVIL,ZESTRIL) 5 MG tablet, Take 1 tablet (5 mg total) by mouth once daily., Disp: 90 tablet, Rfl: 1    melatonin (MELATIN) 5 mg, Take 1 tablet (5 mg total) by mouth nightly., Disp: 90 tablet, Rfl: 4    metoprolol succinate (TOPROL-XL) 25 MG 24 hr tablet, Take 0.5 tablets (12.5 mg total) by mouth once daily., Disp: 45 tablet, Rfl: 1    multivit with min-folic acid 200 mcg Chew, One A Day Vitamin, Disp: 90 tablet, Rfl: 4    pantoprazole (PROTONIX) 40 MG tablet, pantoprazole 40 mg tablet,delayed release, Strength: 40 mg, Disp: 90 tablet, Rfl: 4    pravastatin (PRAVACHOL) 20 MG tablet, pravastatin 20 mg tablet  Take 1 tablet every day by oral route. Strength: 20 mg, Disp: 90 tablet, Rfl: 4     Assessment/Plan:     Severe aortic stenosis  - Extensive discussion with patient and family regarding valve replacement, family would like to discuss and consider options    Paroxysmal A-fib  - Eliquis 5 mg BID and Toprol XL 12.5 mg qd, refills sent    HTN  - ASA 81 mg, lisinopril 5 mg qd, refills sent    GERD  - Protonix 40 mg, refills sent  - Avoid triggers    Follow-up: 3 months for chronic medical conditions    Cayden Forte MD   \[facility] [place] [facility]\"" Family Medicine - PGY-II

## 2024-01-16 NOTE — PROGRESS NOTES
Patient was seen and evaluated with the resident on the DOS.   Services were provided at a teaching facility. I have reviewed the resident's note. The assessment, plan and management are reasonable and appropriate.      Rosana Graham MD  Family Medicine  [facility] / [facility]

## 2024-03-19 ENCOUNTER — OFFICE VISIT (OUTPATIENT)
Dept: FAMILY MEDICINE | Facility: CLINIC | Age: 89
End: 2024-03-19
Payer: MEDICARE

## 2024-03-19 VITALS
RESPIRATION RATE: 18 BRPM | TEMPERATURE: 98 F | HEART RATE: 68 BPM | HEIGHT: 65 IN | OXYGEN SATURATION: 98 % | WEIGHT: 143 LBS | SYSTOLIC BLOOD PRESSURE: 136 MMHG | BODY MASS INDEX: 23.82 KG/M2 | DIASTOLIC BLOOD PRESSURE: 70 MMHG

## 2024-03-19 DIAGNOSIS — K21.9 GASTROESOPHAGEAL REFLUX DISEASE, UNSPECIFIED WHETHER ESOPHAGITIS PRESENT: ICD-10-CM

## 2024-03-19 DIAGNOSIS — I35.0 SEVERE AORTIC STENOSIS: ICD-10-CM

## 2024-03-19 DIAGNOSIS — I10 HYPERTENSION, UNSPECIFIED TYPE: Primary | ICD-10-CM

## 2024-03-19 DIAGNOSIS — I48.0 PAROXYSMAL A-FIB: ICD-10-CM

## 2024-03-19 PROCEDURE — 99213 OFFICE O/P EST LOW 20 MIN: CPT | Mod: PBBFAC

## 2024-03-19 RX ORDER — METOPROLOL SUCCINATE 25 MG/1
12.5 TABLET, EXTENDED RELEASE ORAL DAILY
Qty: 45 TABLET | Refills: 1 | Status: SHIPPED | OUTPATIENT
Start: 2024-03-19

## 2024-03-19 RX ORDER — FAMOTIDINE 20 MG/1
20 TABLET, FILM COATED ORAL 2 TIMES DAILY
Qty: 30 TABLET | Refills: 0 | Status: SHIPPED | OUTPATIENT
Start: 2024-03-19 | End: 2025-03-19

## 2024-03-19 RX ORDER — PANTOPRAZOLE SODIUM 40 MG/1
TABLET, DELAYED RELEASE ORAL
Qty: 90 TABLET | Refills: 0 | Status: SHIPPED | OUTPATIENT
Start: 2024-03-19

## 2024-03-19 RX ORDER — LISINOPRIL 5 MG/1
5 TABLET ORAL DAILY
Qty: 90 TABLET | Refills: 1 | Status: SHIPPED | OUTPATIENT
Start: 2024-03-19

## 2024-03-19 RX ORDER — ACETAMINOPHEN 500 MG
5 TABLET ORAL NIGHTLY
Qty: 90 TABLET | Refills: 4 | Status: SHIPPED | OUTPATIENT
Start: 2024-03-19

## 2024-03-19 RX ORDER — NAPROXEN SODIUM 220 MG/1
81 TABLET, FILM COATED ORAL DAILY
Qty: 90 TABLET | Refills: 4 | Status: SHIPPED | OUTPATIENT
Start: 2024-03-19 | End: 2025-03-19

## 2024-03-19 RX ORDER — PEDIATRIC MULTIVITAMIN NO.238
TABLET,CHEWABLE ORAL
Qty: 90 TABLET | Refills: 4 | Status: SHIPPED | OUTPATIENT
Start: 2024-03-19

## 2024-03-19 RX ORDER — ESCITALOPRAM OXALATE 10 MG/1
10 TABLET ORAL DAILY
Qty: 90 TABLET | Refills: 1 | Status: SHIPPED | OUTPATIENT
Start: 2024-03-19

## 2024-03-19 RX ORDER — PRAVASTATIN SODIUM 20 MG/1
TABLET ORAL
Qty: 90 TABLET | Refills: 4 | Status: SHIPPED | OUTPATIENT
Start: 2024-03-19

## 2024-03-19 NOTE — PROGRESS NOTES
"[facility]    Subjective:      Chief Complaint: Follow-up (3 month follow up. No complaints or concerns.)    HPI   Fifi Chacon is a 88 y.o. female who presents to [facility] for chronic medical problems.    Acute concerns  Doing well with no acute issues today. Patient is amendable to attempt weaning of Protonix. Ambulating well with no more episodes of dizziness since reducing blood pressure medication burden.     Chronic conditions  HTN  - Previously discontinued amlodipine-benzapril 5-20 mg due to hypotension in the setting of CAD and aortic stenosis  - Well controlled on lisinopril 5 mg  - Denies HA, palpitations, chest pain     CAD  - [facility] 10/2023 shows 70% stenosis of mid RCA not hemodynamically significant, mid LAD 40% stenosis  - Rec'd medical management  - Compliant with Toprol 12.5 mg  - Denies chest pain, dizziness, palpitations     Severe AS  - After discussions with family, patient does not desire surgical correction of valve     Paroxysmal A-fib  - Adherent on Eliquis  - Denies abnormal bleeding or blood in stool  - Denies palpitations    GERD  - Currently on Protonix 40 mg  - Will attempt to wean and replace with Pepcid PRN  - Denies acid reflux symptoms currently     Problem list  - HTN  - CAD  - P afib  - Severe AS  - GERD    Review of Systems  As per HPI.    Objective:     /70 (BP Location: Right arm, Patient Position: Sitting, BP Method: Medium (Automatic))   Pulse 68   Temp 97.9 °F (36.6 °C) (Oral)   Resp 18   Ht 5' 5" (1.651 m)   Wt 64.9 kg (143 lb)   SpO2 98%   BMI 23.80 kg/m²     Physical Exam:  Gen: No acute distress, accompanied by daughter  CV: RRR, harsh 4/6 systolic murmur. 1+ chronic bilateral LE edema.  Resp: CTAB, breathing non labored on room air.  Abd: Soft, non-distended, non-tender, bowel sounds normoactive.  Skin: Venous stasis dermatitis to BLE just above ankles, no apparent ulcerations or wounds.  MSK: ambulating spontaneously with assistance of a " Rolator      Current Outpatient Medications:     apixaban (ELIQUIS) 5 mg Tab, Take 1 tablet (5 mg total) by mouth 2 (two) times daily., Disp: 60 tablet, Rfl: 5    aspirin 81 MG Chew, Take 1 tablet (81 mg total) by mouth once daily., Disp: 90 tablet, Rfl: 4    EScitalopram oxalate (LEXAPRO) 10 MG tablet, Take 1 tablet (10 mg total) by mouth once daily., Disp: 90 tablet, Rfl: 1    famotidine (PEPCID) 20 MG tablet, Take 1 tablet (20 mg total) by mouth 2 (two) times daily., Disp: 30 tablet, Rfl: 0    lisinopriL (PRINIVIL,ZESTRIL) 5 MG tablet, Take 1 tablet (5 mg total) by mouth once daily., Disp: 90 tablet, Rfl: 1    melatonin (MELATIN) 5 mg, Take 1 tablet (5 mg total) by mouth nightly., Disp: 90 tablet, Rfl: 4    metoprolol succinate (TOPROL-XL) 25 MG 24 hr tablet, Take 0.5 tablets (12.5 mg total) by mouth once daily., Disp: 45 tablet, Rfl: 1    multivit with min-folic acid 200 mcg Chew, One A Day Vitamin, Disp: 90 tablet, Rfl: 4    pantoprazole (PROTONIX) 40 MG tablet, pantoprazole 40 mg tablet,delayed release, Strength: 40 mg, Disp: 90 tablet, Rfl: 0    pravastatin (PRAVACHOL) 20 MG tablet, pravastatin 20 mg tablet  Take 1 tablet every day by oral route. Strength: 20 mg, Disp: 90 tablet, Rfl: 4     Assessment/Plan:     HTN  - Lisinopril 5 mg qd, refills sent  - Discussed continuing PT exercises for exercise and proper diet low in Na    GERD  - Will attempt to wean PPI, instructions discussed over phone call with caregiver (son) and provided in writing with patient education  - Refill for Protonix provided  - Pepcid PRN, advised to use sparingly due to adverse effects especially in elderly population    Severe aortic stenosis  - Does not desire surgical intervention    Paroxysmal A-fib  - Eliquis 5 mg BID and Toprol 12.5 mg qd, refills sent      - Opting for no aortic valve replacement  - Does not desire mammogram, DEXA, colonoscopy    Health Maintenance  - Mammogram: Does not desire with shared decision making  -  DEXA: Does not desire with shared decision making  - Vaccinations: UTD    Follow-up:  3 months for chronic medical problems    Cayden Forte MD   [facility] - PGY-II

## 2024-03-19 NOTE — PATIENT INSTRUCTIONS
Decrease pantoprazole to every other day for the next 2-3 weeks.  If tolerated, decrease to every 3rd day for 1 week then to every 4th day for an additional week until medication is no longer needed.    It is okay to use famotidine (Pepcid) as needed in place of pantoprazole for acid reflux reduction.  Please try to limit famotidine use as it can cause confusion especially in elderly individuals.

## 2024-04-02 ENCOUNTER — HOSPITAL ENCOUNTER (OUTPATIENT)
Dept: RADIOLOGY | Facility: HOSPITAL | Age: 89
Discharge: HOME | End: 2024-04-02
Attending: NURSE PRACTITIONER
Payer: MEDICARE

## 2024-04-02 DIAGNOSIS — N28.1 RENAL CYST: ICD-10-CM

## 2024-04-02 PROCEDURE — 76775 US EXAM ABDO BACK WALL LIM: CPT | Mod: TC

## 2024-06-26 ENCOUNTER — HOSPITAL ENCOUNTER (INPATIENT)
Facility: HOSPITAL | Age: 89
LOS: 5 days | Discharge: NURSING, REHAB OR LONG-TERM CARE | DRG: 690 | End: 2024-07-01
Attending: STUDENT IN AN ORGANIZED HEALTH CARE EDUCATION/TRAINING PROGRAM | Admitting: FAMILY MEDICINE
Payer: MEDICARE

## 2024-06-26 DIAGNOSIS — N39.0 URINARY TRACT INFECTION WITHOUT HEMATURIA, SITE UNSPECIFIED: Primary | ICD-10-CM
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
## 2024-06-26 DIAGNOSIS — R07.9 CHEST PAIN: ICD-10-CM

## 2024-06-26 DIAGNOSIS — I10 HYPERTENSION, UNSPECIFIED TYPE: ICD-10-CM
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
## 2024-06-26 DIAGNOSIS — R53.1 WEAKNESS: ICD-10-CM

## 2024-06-26 DIAGNOSIS — R52 PAIN: ICD-10-CM

## 2024-06-26 DIAGNOSIS — R11.10 VOMITING, UNSPECIFIED VOMITING TYPE, UNSPECIFIED WHETHER NAUSEA PRESENT: ICD-10-CM

## 2024-06-26 PROBLEM — N30.00 ACUTE CYSTITIS WITHOUT HEMATURIA: Status: ACTIVE | Noted: 2024-06-26

## 2024-06-26 LAB
ALBUMIN SERPL-MCNC: 3.3 G/DL (ref 3.4–4.8)
ALBUMIN/GLOB SERPL: 0.6 RATIO (ref 1.1–2)
ALP SERPL-CCNC: 54 UNIT/L (ref 40–150)
ALT SERPL-CCNC: 9 UNIT/L (ref 0–55)
ANION GAP SERPL CALC-SCNC: 8 MEQ/L
AST SERPL-CCNC: 21 UNIT/L (ref 5–34)
BACTERIA #/AREA URNS AUTO: ABNORMAL /HPF
BASOPHILS # BLD AUTO: 0.01 X10(3)/MCL
BASOPHILS NFR BLD AUTO: 0.2 %
BILIRUB SERPL-MCNC: 0.5 MG/DL
BILIRUB UR QL STRIP.AUTO: NEGATIVE
BNP BLD-MCNC: 476.6 PG/ML
BUN SERPL-MCNC: 21.2 MG/DL (ref 9.8–20.1)
CALCIUM SERPL-MCNC: 9.4 MG/DL (ref 8.4–10.2)
CHLORIDE SERPL-SCNC: 110 MMOL/L (ref 98–107)
CLARITY UR: ABNORMAL
CO2 SERPL-SCNC: 21 MMOL/L (ref 23–31)
COLOR UR AUTO: YELLOW
CREAT SERPL-MCNC: 1.19 MG/DL (ref 0.55–1.02)
CREAT/UREA NIT SERPL: 18
EOSINOPHIL # BLD AUTO: 0.03 X10(3)/MCL (ref 0–0.9)
EOSINOPHIL NFR BLD AUTO: 0.6 %
ERYTHROCYTE [DISTWIDTH] IN BLOOD BY AUTOMATED COUNT: 15.3 % (ref 11.5–17)
GFR SERPLBLD CREATININE-BSD FMLA CKD-EPI: 44 ML/MIN/1.73/M2
GLOBULIN SER-MCNC: 5.4 GM/DL (ref 2.4–3.5)
GLUCOSE SERPL-MCNC: 127 MG/DL (ref 82–115)
GLUCOSE UR QL STRIP: NORMAL
HCT VFR BLD AUTO: 29.7 % (ref 37–47)
HGB BLD-MCNC: 10 G/DL (ref 12–16)
HGB UR QL STRIP: NEGATIVE
HOLD SPECIMEN: NORMAL
HYALINE CASTS #/AREA URNS LPF: ABNORMAL /LPF
IMM GRANULOCYTES # BLD AUTO: 0.02 X10(3)/MCL (ref 0–0.04)
IMM GRANULOCYTES NFR BLD AUTO: 0.4 %
INR PPP: 1.3
KETONES UR QL STRIP: NEGATIVE
LACTATE SERPL-SCNC: 1.7 MMOL/L (ref 0.5–2.2)
LEUKOCYTE ESTERASE UR QL STRIP: 500
LIPASE SERPL-CCNC: 30 U/L
LYMPHOCYTES # BLD AUTO: 0.26 X10(3)/MCL (ref 0.6–4.6)
LYMPHOCYTES NFR BLD AUTO: 5.5 %
MAGNESIUM SERPL-MCNC: 2 MG/DL (ref 1.6–2.6)
MCH RBC QN AUTO: 28.3 PG (ref 27–31)
MCHC RBC AUTO-ENTMCNC: 33.7 G/DL (ref 33–36)
MCV RBC AUTO: 84.1 FL (ref 80–94)
MONOCYTES # BLD AUTO: 0.22 X10(3)/MCL (ref 0.1–1.3)
MONOCYTES NFR BLD AUTO: 4.7 %
MUCOUS THREADS URNS QL MICRO: ABNORMAL /LPF
NEUTROPHILS # BLD AUTO: 4.17 X10(3)/MCL (ref 2.1–9.2)
NEUTROPHILS NFR BLD AUTO: 88.6 %
NITRITE UR QL STRIP: NEGATIVE
NRBC BLD AUTO-RTO: 0 %
OHS QRS DURATION: 102 MS
OHS QTC CALCULATION: 463 MS
PH UR STRIP: 6 [PH]
PLATELET # BLD AUTO: 142 X10(3)/MCL (ref 130–400)
PMV BLD AUTO: 10.7 FL (ref 7.4–10.4)
POCT GLUCOSE: 152 MG/DL (ref 70–110)
POTASSIUM SERPL-SCNC: 4 MMOL/L (ref 3.5–5.1)
PROT SERPL-MCNC: 8.7 GM/DL (ref 5.8–7.6)
PROT UR QL STRIP: ABNORMAL
PROTHROMBIN TIME: 16.6 SECONDS (ref 11.4–14)
RBC # BLD AUTO: 3.53 X10(6)/MCL (ref 4.2–5.4)
RBC #/AREA URNS AUTO: ABNORMAL /HPF
SODIUM SERPL-SCNC: 139 MMOL/L (ref 136–145)
SP GR UR STRIP.AUTO: 1.02 (ref 1–1.03)
SQUAMOUS #/AREA URNS LPF: ABNORMAL /HPF
T4 FREE SERPL-MCNC: 0.92 NG/DL (ref 0.7–1.48)
TROPONIN I SERPL-MCNC: 0.02 NG/ML (ref 0–0.04)
TSH SERPL-ACNC: 5.06 UIU/ML (ref 0.35–4.94)
UROBILINOGEN UR STRIP-ACNC: NORMAL
WBC # BLD AUTO: 4.71 X10(3)/MCL (ref 4.5–11.5)
WBC #/AREA URNS AUTO: ABNORMAL /HPF

## 2024-06-26 PROCEDURE — 99285 EMERGENCY DEPT VISIT HI MDM: CPT | Mod: 25

## 2024-06-26 PROCEDURE — 96375 TX/PRO/DX INJ NEW DRUG ADDON: CPT

## 2024-06-26 PROCEDURE — 21400001 HC TELEMETRY ROOM

## 2024-06-26 PROCEDURE — 83880 ASSAY OF NATRIURETIC PEPTIDE: CPT | Performed by: STUDENT IN AN ORGANIZED HEALTH CARE EDUCATION/TRAINING PROGRAM

## 2024-06-26 PROCEDURE — 25000003 PHARM REV CODE 250: Performed by: STUDENT IN AN ORGANIZED HEALTH CARE EDUCATION/TRAINING PROGRAM

## 2024-06-26 PROCEDURE — 96361 HYDRATE IV INFUSION ADD-ON: CPT

## 2024-06-26 PROCEDURE — 81001 URINALYSIS AUTO W/SCOPE: CPT | Performed by: STUDENT IN AN ORGANIZED HEALTH CARE EDUCATION/TRAINING PROGRAM

## 2024-06-26 PROCEDURE — 84439 ASSAY OF FREE THYROXINE: CPT | Performed by: STUDENT IN AN ORGANIZED HEALTH CARE EDUCATION/TRAINING PROGRAM

## 2024-06-26 PROCEDURE — 83690 ASSAY OF LIPASE: CPT | Performed by: STUDENT IN AN ORGANIZED HEALTH CARE EDUCATION/TRAINING PROGRAM

## 2024-06-26 PROCEDURE — 97166 OT EVAL MOD COMPLEX 45 MIN: CPT

## 2024-06-26 PROCEDURE — 85025 COMPLETE CBC W/AUTO DIFF WBC: CPT | Performed by: STUDENT IN AN ORGANIZED HEALTH CARE EDUCATION/TRAINING PROGRAM

## 2024-06-26 PROCEDURE — 93005 ELECTROCARDIOGRAM TRACING: CPT

## 2024-06-26 PROCEDURE — 83735 ASSAY OF MAGNESIUM: CPT | Performed by: STUDENT IN AN ORGANIZED HEALTH CARE EDUCATION/TRAINING PROGRAM

## 2024-06-26 PROCEDURE — 84443 ASSAY THYROID STIM HORMONE: CPT | Performed by: STUDENT IN AN ORGANIZED HEALTH CARE EDUCATION/TRAINING PROGRAM

## 2024-06-26 PROCEDURE — 96365 THER/PROPH/DIAG IV INF INIT: CPT

## 2024-06-26 PROCEDURE — 85610 PROTHROMBIN TIME: CPT | Performed by: STUDENT IN AN ORGANIZED HEALTH CARE EDUCATION/TRAINING PROGRAM

## 2024-06-26 PROCEDURE — 63600175 PHARM REV CODE 636 W HCPCS: Performed by: STUDENT IN AN ORGANIZED HEALTH CARE EDUCATION/TRAINING PROGRAM

## 2024-06-26 PROCEDURE — 87040 BLOOD CULTURE FOR BACTERIA: CPT | Performed by: STUDENT IN AN ORGANIZED HEALTH CARE EDUCATION/TRAINING PROGRAM

## 2024-06-26 PROCEDURE — 83605 ASSAY OF LACTIC ACID: CPT | Performed by: STUDENT IN AN ORGANIZED HEALTH CARE EDUCATION/TRAINING PROGRAM

## 2024-06-26 PROCEDURE — 84484 ASSAY OF TROPONIN QUANT: CPT | Performed by: STUDENT IN AN ORGANIZED HEALTH CARE EDUCATION/TRAINING PROGRAM

## 2024-06-26 PROCEDURE — 87086 URINE CULTURE/COLONY COUNT: CPT | Performed by: STUDENT IN AN ORGANIZED HEALTH CARE EDUCATION/TRAINING PROGRAM

## 2024-06-26 PROCEDURE — 94761 N-INVAS EAR/PLS OXIMETRY MLT: CPT

## 2024-06-26 PROCEDURE — 81015 MICROSCOPIC EXAM OF URINE: CPT | Performed by: STUDENT IN AN ORGANIZED HEALTH CARE EDUCATION/TRAINING PROGRAM

## 2024-06-26 PROCEDURE — 80053 COMPREHEN METABOLIC PANEL: CPT | Performed by: STUDENT IN AN ORGANIZED HEALTH CARE EDUCATION/TRAINING PROGRAM

## 2024-06-26 PROCEDURE — 97162 PT EVAL MOD COMPLEX 30 MIN: CPT

## 2024-06-26 PROCEDURE — 11000001 HC ACUTE MED/SURG PRIVATE ROOM

## 2024-06-26 RX ORDER — IBUPROFEN 200 MG
16 TABLET ORAL
Status: DISCONTINUED | OUTPATIENT
Start: 2024-06-26 | End: 2024-07-01 | Stop reason: HOSPADM

## 2024-06-26 RX ORDER — SODIUM CHLORIDE, SODIUM LACTATE, POTASSIUM CHLORIDE, CALCIUM CHLORIDE 600; 310; 30; 20 MG/100ML; MG/100ML; MG/100ML; MG/100ML
INJECTION, SOLUTION INTRAVENOUS CONTINUOUS
Status: DISCONTINUED | OUTPATIENT
Start: 2024-06-26 | End: 2024-06-30

## 2024-06-26 RX ORDER — ONDANSETRON HYDROCHLORIDE 2 MG/ML
4 INJECTION, SOLUTION INTRAVENOUS EVERY 8 HOURS PRN
Status: DISCONTINUED | OUTPATIENT
Start: 2024-06-26 | End: 2024-07-01 | Stop reason: HOSPADM

## 2024-06-26 RX ORDER — GLUCAGON 1 MG
1 KIT INJECTION
Status: DISCONTINUED | OUTPATIENT
Start: 2024-06-26 | End: 2024-07-01 | Stop reason: HOSPADM

## 2024-06-26 RX ORDER — TALC
6 POWDER (GRAM) TOPICAL NIGHTLY PRN
Status: DISCONTINUED | OUTPATIENT
Start: 2024-06-26 | End: 2024-06-30

## 2024-06-26 RX ORDER — ESCITALOPRAM OXALATE 10 MG/1
10 TABLET ORAL DAILY
Status: DISCONTINUED | OUTPATIENT
Start: 2024-06-27 | End: 2024-06-28

## 2024-06-26 RX ORDER — ONDANSETRON HYDROCHLORIDE 2 MG/ML
4 INJECTION, SOLUTION INTRAVENOUS
Status: COMPLETED | OUTPATIENT
Start: 2024-06-26 | End: 2024-06-26

## 2024-06-26 RX ORDER — LISINOPRIL 2.5 MG/1
5 TABLET ORAL DAILY
Status: DISCONTINUED | OUTPATIENT
Start: 2024-06-27 | End: 2024-06-30

## 2024-06-26 RX ORDER — ACETAMINOPHEN 325 MG/1
650 TABLET ORAL EVERY 4 HOURS PRN
Status: DISCONTINUED | OUTPATIENT
Start: 2024-06-26 | End: 2024-07-01 | Stop reason: HOSPADM

## 2024-06-26 RX ORDER — IBUPROFEN 200 MG
24 TABLET ORAL
Status: DISCONTINUED | OUTPATIENT
Start: 2024-06-26 | End: 2024-07-01 | Stop reason: HOSPADM

## 2024-06-26 RX ORDER — ATORVASTATIN CALCIUM 10 MG/1
10 TABLET, FILM COATED ORAL DAILY
Status: DISCONTINUED | OUTPATIENT
Start: 2024-06-27 | End: 2024-07-01 | Stop reason: HOSPADM

## 2024-06-26 RX ORDER — SODIUM CHLORIDE 0.9 % (FLUSH) 0.9 %
10 SYRINGE (ML) INJECTION EVERY 12 HOURS PRN
Status: DISCONTINUED | OUTPATIENT
Start: 2024-06-26 | End: 2024-07-01 | Stop reason: HOSPADM

## 2024-06-26 RX ORDER — NALOXONE HCL 0.4 MG/ML
0.02 VIAL (ML) INJECTION
Status: DISCONTINUED | OUTPATIENT
Start: 2024-06-26 | End: 2024-07-01 | Stop reason: HOSPADM

## 2024-06-26 RX ORDER — NAPROXEN SODIUM 220 MG/1
81 TABLET, FILM COATED ORAL DAILY
Status: DISCONTINUED | OUTPATIENT
Start: 2024-06-26 | End: 2024-07-01 | Stop reason: HOSPADM

## 2024-06-26 RX ADMIN — APIXABAN 5 MG: 2.5 TABLET, FILM COATED ORAL at 08:06

## 2024-06-26 RX ADMIN — SODIUM CHLORIDE 500 ML: 9 INJECTION, SOLUTION INTRAVENOUS at 08:06

## 2024-06-26 RX ADMIN — SODIUM CHLORIDE, POTASSIUM CHLORIDE, SODIUM LACTATE AND CALCIUM CHLORIDE 500 ML: 600; 310; 30; 20 INJECTION, SOLUTION INTRAVENOUS at 12:06

## 2024-06-26 RX ADMIN — ONDANSETRON 4 MG: 2 INJECTION INTRAMUSCULAR; INTRAVENOUS at 08:06

## 2024-06-26 RX ADMIN — SODIUM CHLORIDE, POTASSIUM CHLORIDE, SODIUM LACTATE AND CALCIUM CHLORIDE: 600; 310; 30; 20 INJECTION, SOLUTION INTRAVENOUS at 12:06

## 2024-06-26 RX ADMIN — SODIUM CHLORIDE, POTASSIUM CHLORIDE, SODIUM LACTATE AND CALCIUM CHLORIDE: 600; 310; 30; 20 INJECTION, SOLUTION INTRAVENOUS at 09:06

## 2024-06-26 RX ADMIN — CEFTRIAXONE SODIUM 1 G: 1 INJECTION, POWDER, FOR SOLUTION INTRAMUSCULAR; INTRAVENOUS at 09:06

## 2024-06-26 NOTE — PT/OT/SLP EVAL
Physical Therapy Evaluation    Patient Name:  Fifi Chacon   MRN:  [id number]    Recommendations:     Discharge Recommendations: low intensity therapy    Discharge Equipment Recommendations: none   Barriers to discharge:  fall risk, pt. Had previous falls    Assessment:     Fifi Chacon is a 88 y.o. female admitted with a medical diagnosis of <principal problem not specified>.  She presents with the following impairments/functional limitations: weakness, impaired functional mobility, gait instability, impaired balance, impaired self care skills, decreased lower extremity function .  1. Urinary tract infection without hematuria, site unspecified    2. Weakness    3. Pain    4. Fall in home, initial encounter    5. Vomiting, unspecified vomiting type, unspecified whether nausea present    6. Chest pain          Rehab Prognosis: Good; patient would benefit from acute skilled PT services to address these deficits and reach maximum level of function.    Recent Surgery: * No surgery found *      Plan:     During this hospitalization, patient to be seen 3 x/week to address the identified rehab impairments via gait training, therapeutic activities, therapeutic exercises and progress toward the following goals:    Plan of Care Expires:  07/25/24    Subjective     Chief Complaint: none  Patient/Family Comments/goals: return home  Pain/Comfort:  Pain Rating 1: 0/10  Pain Rating Post-Intervention 1: 0/10    Patients cultural, spiritual, Sikhism conflicts given the current situation: no    Living Environment:  Pt. Lives with her son in a house with Extended trailer (add on). Pt. Has 3 steps inside her home.  Prior to admission, patients level of function was mod I for ambulation and toiletting using assistive device.  Equipment used at home: cane, straight, walker, rolling, bedside commode, rollator, wheelchair.  DME owned (not currently used): none.  Upon discharge, patient will have assistance from her son and  daughter.    Objective:     Communicated with nurse Mcclain prior to session.  Patient found HOB elevated with blood pressure cuff, peripheral IV, telemetry  upon PT entry to room.    General Precautions: Standard, fall  Orthopedic Precautions:N/A   Braces: N/A  Respiratory Status: Room air    Exams:  Pt. Is alert and oriented to person and situation, she knew her age.   LE R and L are WFL for ROM and strength  Coordination WFL    Functional Mobility:  Bed Mobility:     Supine to Sit: contact guard assistance  Transfers:     Sit to Stand:  minimum assistance with no AD  Gait: HHA x 2 person min A for ambulation in the room.      Patient left  with all lines intact, call button in reach, nurse notified, and daughter present.    GOALS:   Multidisciplinary Problems       Physical Therapy Goals          Problem: Physical Therapy    Goal Priority Disciplines Outcome Goal Variances Interventions   Physical Therapy Goal     PT, PT/OT Progressing     Description: Goals to be met by: dc     Patient will increase functional independence with mobility by performin. Supine to sit with Modified [place]  2. Sit to stand transfer with Modified [place]  3. Gait  x 130 feet with Supervision using Rolling Walker.                          History:     Past Medical History:   Diagnosis Date    GERD (gastroesophageal reflux disease)     Hyperlipidemia     Hypertension        Past Surgical History:   Procedure Laterality Date    ANGIOGRAM, CORONARY, WITH LEFT HEART CATHETERIZATION N/A 10/13/2023    Procedure: Angiogram, Coronary, with Left Heart Cath;  Surgeon: Marlon Myers Jr., MD;  Location: [facility] CATH LAB;  Service: Cardiology;  Laterality: N/A;    APPENDECTOMY      CHOLECYSTECTOMY      HYSTERECTOMY      INSTANTANEOUS WAVE-FREE RATIO (IFR) N/A 10/13/2023    Procedure: Instantaneous Wave-Free Ratio (IFR);  Surgeon: Marlon Myers Jr., MD;  Location: [facility] CATH LAB;  Service: Cardiology;  Laterality: N/A;        Time Tracking:     PT Received On: 06/26/24  PT Start Time: 1254     PT Stop Time: 1328  PT Total Time (min): 34 min     Billable Minutes: Evaluation 34      06/26/2024

## 2024-06-26 NOTE — PT/OT/SLP EVAL
"Occupational Therapy   Evaluation    Name: Fifi Chacon  MRN: [id number]  ED due to: fall   Admitting Diagnosis: slip and fall, UTI, dehydration  Recent Surgery: * No surgery found *      Recommendations:     Discharge Recommendations: Low Intensity Therapy  Discharge Equipment Recommendations:   (TTB)  Barriers to discharge:  None    Assessment:     Fifi Chacon is a 88 y.o. female with a medical diagnosis of slip and fall, UTI, dehydration. Pt seen in ED room. She presents with decreased balance, generalized weakness, decreased activity tolerance. Performance deficits affecting function: weakness, impaired self care skills, gait instability, impaired balance, impaired functional mobility.  Pt appears close to baseline, slight decline in mobility due to recent fall. Recommend 24/7 supervision at home from family due to reports of frequent falls and pt's advanced age.     Rehab Prognosis: Good; patient would benefit from acute skilled OT services to address these deficits and reach maximum level of function.       Plan:     Patient to be seen 3 x/week to address the above listed problems via self-care/home management, therapeutic exercises, therapeutic activities  Plan of Care Expires:    Plan of Care Reviewed with: patient, daughter    Subjective     Chief Complaint: no c/o "Thank you for helping me"  Patient/Family Comments/goals: family would like for pt to return home    Occupational Profile:  Living Environment: pt resides at home with son, lives in "additional room built from mobile home/extension from home", has to navigate 3 steps  Previous level of function: pt slight poor historian due to memory deficits, daughter reports pt resides with son, has 24 hour supervision available, ambulates with cane indoors and RW out of home, has tub/shower combo at home although does not use, daughter brings pt to her house to use walk in shower; reports able to complete ADL tasks I'ly (except bathing)  Roles and " "Routines:   Equipment Used at Home:  (RW, rollator, cane, BSC, w/c)  Assistance upon Discharge: daughter and son    Pain/Comfort:  Pain Rating 1: 0/10    Patients cultural, spiritual, Religion conflicts given the current situation:      Objective:     Communicated with: nurse prior to session.  Patient found  on stretcher bed in ED  with telemetry, blood pressure cuff, pulse ox (continuous), peripheral IV upon OT entry to room.    General Precautions: Standard, fall  Orthopedic Precautions:    Braces:    Respiratory Status: Room air  /59, HR 66, O2 98%   Occupational Performance:    Bed Mobility:    Patient completed Scooting/Bridging with contact guard assistance  Patient completed Supine to Sit with contact guard assistance    Functional Mobility/Transfers:  Patient completed Sit <> Stand Transfer with minimum assistance  with  hand-held assist   Patient completed Bed <> Chair Transfer using Step Transfer technique with minimum assistance with hand-held assist  Functional Mobility: pt ambulated in room min A x 2 hand held assist (scott 20 ft) due to RW not available at time of evaluation  Pt evaluated in ED room, no bathroom available to assess toilet t/f, pt exhibited min A for transfers no concerns pt will need greater than min A for toilet t/f    Activities of Daily Living:  Lower Body Dressing: pt able to doff/neal B socks SBA      Cognitive/Visual Perceptual:  Cognitive/Psychosocial Skills:     -       Oriented to: Person and type of place "hospital" unable to recall name of hospital, unable to recall time, aware of primary situation   -       Follows Commands/attention:Follows two-step commands  -       Memory: Impaired STM and Impaired LTM  -       Mood/Affect/Coping skills/emotional control: Cooperative and Pleasant    Physical Exam:  Balance: -       sitting balance supervision, standing balance min A  Upper Extremity Range of Motion:     -       Right Upper Extremity: WNL  -       Left Upper " Extremity: WFL  Upper Extremity Strength:    -       Right Upper Extremity: WFL  -       Left Upper Extremity: WFL, pt c/o discomfort in L mid humerus       Treatment & Education:  Education on OT POC, use of call button, use of RW for mobility, discussion with daughter on dc disposition    Patient left up in chair with all lines intact, call button in reach, nurse notified, and daughter present    GOALS:   Multidisciplinary Problems       Occupational Therapy Goals          Problem: Occupational Therapy    Goal Priority Disciplines Outcome Interventions   Occupational Therapy Goal     OT, PT/OT Progressing    Description: Pt will ambulate to bathroom with RW supervision  Pt will complete toilet t/f supervision  Pt will complete toileting tasks SBA  Pt will complete grooming in standing at sink CGA                       History:     Past Medical History:   Diagnosis Date    GERD (gastroesophageal reflux disease)     Hyperlipidemia     Hypertension          Past Surgical History:   Procedure Laterality Date    ANGIOGRAM, CORONARY, WITH LEFT HEART CATHETERIZATION N/A 10/13/2023    Procedure: Angiogram, Coronary, with Left Heart Cath;  Surgeon: Marlon Myers Jr., MD;  [facility] LAB;  Service: Cardiology;  Laterality: N/A;    APPENDECTOMY      CHOLECYSTECTOMY      HYSTERECTOMY      INSTANTANEOUS WAVE-FREE RATIO (IFR) N/A 10/13/2023    Procedure: Instantaneous Wave-Free Ratio (IFR);  Surgeon: Marlon Myers Jr., MD;  Location: [facility] CATH LAB;  Service: Cardiology;  Laterality: N/A;       Time Tracking:     OT Date of Treatment:    OT Start Time: 1253  OT Stop Time: 1326  OT Total Time (min): 33 min    Billable Minutes:Evaluation mod comp    6/26/2024

## 2024-06-26 NOTE — PLAN OF CARE
Problem: Physical Therapy  Goal: Physical Therapy Goal  Description: Goals to be met by: dc     Patient will increase functional independence with mobility by performin. Supine to sit with Modified [place]  2. Sit to stand transfer with Modified [place]  3. Gait  x 130 feet with Supervision using Rolling Walker.     Outcome: Progressing

## 2024-06-26 NOTE — H&P
[facility] History & Physical Note   Date of Admit: 6/26/2024    Chief Complaint     Emesis (C/o n/v, foul smelling urine that started this morning. Family also reports low impact slip and fall today. Pt is on eliquis. Hard of hearing, baseline mental status. . Given 4mg zofran in route. )      Subjective:      History of Present Illness:  Fifi Chacon is a 88 y.o. female with a history of HTN, HLD, a fib on Eliquis, and anxiety who presented to [facility] ED on 6/26/2024 after a slip and fall while in the bathroom this morning. Patient was in the bathroom when she got weak and dizzy and slipped into tub. Hit head. Denies LOC. Son who lives with her was there to assist. Daughter at bedside provides most history. States that the patient had been feeling nauseated with vomiting around same time as fall associated with feelings of malaise the night before. Denies fever, chills, body aches, chest pain, shortness of breath, abdominal pain, diarrhea or constipation. Urine foul smelling over last few days with increased urinary frequency. Denies dysuria. Daughter notes that patient has had a few mechanical falls recently as well as decline in memory. Overall independent in ADLs with son living with patient and assisting as needed. Took medications this morning.     Upon arrival to the ED, patient's vitals were temp 97.2, HR 69, RR 18, /49 and SpO2 97% on room air. Labs notable for normal WBC, baseline microcytic anemia, baseline renal indices (BUN/Cr 21.2/1.19), , troponin negative, TSH 5.063 and Free T4 0.92. UA concerning for UTI. Patient reporting right shoulder and leg pain from previous fall 1 week ago so imaging was ordered of her right shoulder, pelvis and right femur and was without any acute findings. CT Cervical Spine without fracture and CT Head with chronic microvascular ischemic changes, but without acute intracranial abnormalities. Patient received 1 dose of Rocephin, IV Zofran and  500 mL IVF bolus. Family Medicine consulted for admission.     Past Medical History:  Past Medical History:   Diagnosis Date    GERD (gastroesophageal reflux disease)     Hyperlipidemia     Hypertension    Paroxsymal A fib   Anxiety    Past Surgical History:  Past Surgical History:   Procedure Laterality Date    ANGIOGRAM, CORONARY, WITH LEFT HEART CATHETERIZATION N/A 10/13/2023    Procedure: Angiogram, Coronary, with Left Heart Cath;  Surgeon: Marlon Myers Jr., MD;  Location: [facility] CATH LAB;  Service: Cardiology;  Laterality: N/A;    APPENDECTOMY      CHOLECYSTECTOMY      HYSTERECTOMY      INSTANTANEOUS WAVE-FREE RATIO (IFR) N/A 10/13/2023    Procedure: Instantaneous Wave-Free Ratio (IFR);  Surgeon: Marlon Myers Jr., MD;  Location: [facility] CATH LAB;  Service: Cardiology;  Laterality: N/A;       Family History:  Family History   Problem Relation Name Age of Onset    Hypertension Mother      Cancer Mother      Hypertension Sister      Hypertension Sister         Social History:  Social History     Tobacco Use    Smoking status: Never     Passive exposure: Never    Smokeless tobacco: Never   Substance Use Topics    Alcohol use: Not Currently    Drug use: Never       Allergies:  Review of patient's allergies indicates:   Allergen Reactions    Sulfa (sulfonamide antibiotics)      Other reaction(s): Not available       Home Medications:  Prior to Admission medications    Medication Sig Start Date End Date Taking? Authorizing Provider   apixaban (ELIQUIS) 5 mg Tab Take 1 tablet (5 mg total) by mouth 2 (two) times daily. 3/19/24 9/15/24  Cayden Forte MD   aspirin 81 MG Chew Take 1 tablet (81 mg total) by mouth once daily. 3/19/24 3/19/25  Cayden Forte MD   EScitalopram oxalate (LEXAPRO) 10 MG tablet Take 1 tablet (10 mg total) by mouth once daily. 3/19/24   Cayden Forte MD           lisinopriL (PRINIVIL,ZESTRIL) 5 MG tablet Take 1 tablet (5 mg total) by mouth once daily. 3/19/24   Cayden Forte MD  "  melatonin (MELATIN) 5 mg Take 1 tablet (5 mg total) by mouth nightly. 3/19/24   Cayden Forte MD   metoprolol succinate (TOPROL-XL) 25 MG 24 hr tablet Take 0.5 tablets (12.5 mg total) by mouth once daily. 3/19/24   Cayden Forte MD   multivit with min-folic acid 200 mcg Chew One A Day Vitamin 3/19/24   Cayden Forte MD           pravastatin (PRAVACHOL) 20 MG tablet pravastatin 20 mg tablet  Take 1 tablet every day by oral route. Strength: 20 mg 3/19/24   Cayden Forte MD     Review of Systems:  Review of Systems   Constitutional:  Positive for malaise/fatigue. Negative for fever.   Respiratory:  Negative for shortness of breath.    Cardiovascular:  Negative for chest pain.   Gastrointestinal:  Positive for nausea and vomiting. Negative for abdominal pain, constipation and diarrhea.   Genitourinary:  Negative for dysuria and hematuria.   Musculoskeletal:  Negative for myalgias.   Neurological:  Positive for dizziness and weakness. Negative for focal weakness.   Psychiatric/Behavioral:  The patient is nervous/anxious.         Objective:   Last 24 Hour Vital Signs:  BP  Min: 130/49  Max: 160/82  Temp  Av.2 °F (36.2 °C)  Min: 97.2 °F (36.2 °C)  Max: 97.2 °F (36.2 °C)  Pulse  Av.6  Min: 58  Max: 69  Resp  Av  Min: 17  Max: 21  SpO2  Av.6 %  Min: 97 %  Max: 98 %  Height  Av' 2" (157.5 cm)  Min: 5' 2" (157.5 cm)  Max: 5' 2" (157.5 cm)  Weight  Av.5 kg (140 lb)  Min: 63.5 kg (140 lb)  Max: 63.5 kg (140 lb)  Body mass index is 25.61 kg/m².  No intake/output data recorded.    Physical Examination:  GEN: appears ill, in mild distress, appears pale, deconditioned, seen vomiting at bedside    HEENT: EOMI, PERRL, dry mucous membranes  CARDIO: regular rate, regular rhythm, normal S1, S2, 2/6 systolic murmur most prominent to right 2nd intercostal space  PULM/CHEST: clear to auscultation bilaterally, no wheezes, rales or rhonchi, symmetric air entry, no accessory muscle use or " distress  ABDOMEN: + BS, soft, non tender, non-distended, no guarding and no rebound  GENITOURINARY: no CVA or suprapubic tenderness  DERM: ecchymosis to right elbow and wrist from previous fall  EXT: 2+ dorsal pedal pulses, venous stasis dermatitis bilaterally, < 2 second capillary refill   NEURO: alert and oriented to person, place and situation, but not time, face symmetric, tongue midline, sensation intact, hearing decreased (baseline), moves all extremities equally, no dizziness on exam  PSYCH: appears anxious    Laboratory:  Most Recent Data:  CBC:   Lab Results   Component Value Date    WBC 4.71 06/26/2024    HGB 10.0 (L) 06/26/2024    HCT 29.7 (L) 06/26/2024     06/26/2024    MCV 84.1 06/26/2024    RDW 15.3 06/26/2024     WBC Differential:   Recent Labs   Lab 06/26/24  0857   WBC 4.71   HGB 10.0*   HCT 29.7*      MCV 84.1     BMP:   Lab Results   Component Value Date     06/26/2024    K 4.0 06/26/2024     (H) 06/26/2024    CO2 21 (L) 06/26/2024    BUN 21.2 (H) 06/26/2024    CREATININE 1.19 (H) 06/26/2024    CALCIUM 9.4 06/26/2024    MG 2.00 06/26/2024    PHOS 3.2 10/13/2023     LFTs:   Lab Results   Component Value Date    ALBUMIN 3.3 (L) 06/26/2024    BILITOT 0.5 06/26/2024    AST 21 06/26/2024    ALKPHOS 54 06/26/2024    ALT 9 06/26/2024     Coags:   Lab Results   Component Value Date    INR 1.3 06/26/2024     FLP:   Lab Results   Component Value Date    CHOL 129 10/12/2023    HDL 33 (L) 10/12/2023    TRIG 103 10/12/2023     DM:   Lab Results   Component Value Date    HGBA1C 5.2 10/31/2022    CREATININE 1.19 (H) 06/26/2024     Thyroid:   Lab Results   Component Value Date    TSH 5.063 (H) 06/26/2024     Anemia:   Lab Results   Component Value Date    IRON 62 01/28/2019    TIBC 297 01/28/2019    FERRITIN 95.9 01/28/2019    [id number] 795 07/26/2018    FOLATE >20.0 07/26/2018     Cardiac:   Lab Results   Component Value Date    TROPONINI 0.020 06/26/2024    .6 (H) 06/26/2024      Urinalysis:   Lab Results   Component Value Date    LABURIN No Growth 09/28/2022    COLORU Yellow 06/26/2024    NITRITE Negative 06/26/2024    KETONESU Negative 10/23/2021    UROBILINOGEN Normal 06/26/2024    WBCUA 51-99 (A) 06/26/2024       Trended Lab Data:  Recent Labs   Lab 06/26/24  0857   WBC 4.71   HGB 10.0*   HCT 29.7*      MCV 84.1   RDW 15.3      K 4.0   *   CO2 21*   BUN 21.2*   CREATININE 1.19*   ALBUMIN 3.3*   BILITOT 0.5   AST 21   ALKPHOS 54   ALT 9       Trended Cardiac Data:  Recent Labs   Lab 06/26/24  0857   TROPONINI 0.020   .6*       Microbiology Data:  Blood culture x 2 pending  Urine culture pending     Other Results:  EKG (my interpretation): normal sinus, no acute changes     Radiology:  Imaging Results              CT Cervical Spine Without Contrast (Final result)  Result time 06/26/24 09:53:44      Final result by Nohemy Hilton MD (06/26/24 09:53:44)                   Impression:      No acute fracture identified.      Electronically signed by: Nohemy Hilton  Date:    06/26/2024  Time:    09:53               Narrative:    EXAMINATION:  CT CERVICAL SPINE WITHOUT CONTRAST    CLINICAL HISTORY:  Polytrauma, blunt;    TECHNIQUE:  Noncontrast CT images of the cervical spine. Axial, coronal, and sagittal reformatted images were obtained. Dose length product is 1203 mGycm. Automatic exposure control, adjustment of mA/kV or iterative reconstruction technique was used to limit radiation dose.    COMPARISON:  None    FINDINGS:  The cervical spine is visualized through the level of T1.    There is no acute fracture identified.  There are multilevel degenerative changes with marginal osteophytes and facet arthropathy.  There is no paraspinal hematoma.                                       CT Head Without Contrast (Final result)  Result time 06/26/24 09:50:44      Final result by Nohemy Hilton MD (06/26/24 09:50:44)                   Impression:      1. No  acute intracranial abnormality.  2. Chronic microvascular ischemic changes.      Electronically signed by: Nohemy Hilton  Date:    06/26/2024  Time:    09:50               Narrative:    EXAMINATION:  CT HEAD WITHOUT CONTRAST    CLINICAL HISTORY:  Head trauma, minor (Age >= 65y);    TECHNIQUE:  Axial scans were obtained from skull base to the vertex.    Coronal and sagittal reconstructions obtained from the axial data.    Automatic exposure control was utilized to limit radiation dose.    Contrast: None    Radiation Dose:    Total DLP: 1203 mGy*cm    COMPARISON:  None    FINDINGS:  There is no acute intracranial hemorrhage or edema. The gray-white matter differentiation is preserved.  Patchy hypodensities in the subcortical and periventricular white matter likely represent chronic microvascular ischemic changes.    There is no mass effect or midline shift.  There is diffuse parenchymal volume loss.  The basal cisterns are patent. There is no abnormal extra-axial fluid collection.    The calvarium and skull base are intact. The visualized paranasal sinuses and the mastoid air cells are clear.                                       X-Ray Femur 2 View Right (Final result)  Result time 06/26/24 09:52:56      Final result by Garcia Nathan MD (06/26/24 09:52:56)                   Impression:      Degenerative changes.      Electronically signed by: Garcia Nathan  Date:    06/26/2024  Time:    09:52               Narrative:    EXAMINATION:  XR FEMUR 2 VIEW RIGHT    CLINICAL HISTORY:  Leg pain;    COMPARISON:  None.    FINDINGS:  No acute displaced fractures or dislocations.    There is some narrowing of the inferior medial aspect of the left hip joint with some degenerative changes of the lateral compartment of the knee joint articular spaces are otherwise preserved with smooth articular surfaces    No blastic or lytic lesions.    Soft tissues within normal limits.                                       X-Ray  Pelvis Routine AP (Final result)  Result time 06/26/24 09:54:44      Final result by Pascual Mesa MD (06/26/24 09:54:44)                   Impression:      As above.      Electronically signed by: Pascual Mesa  Date:    06/26/2024  Time:    09:54               Narrative:    EXAMINATION:  XR PELVIS ROUTINE AP    CLINICAL HISTORY:  R hip pain;    TECHNIQUE:  Single view of the pelvis.    COMPARISON:  No prior imaging available for comparison    FINDINGS:  The sacroiliac joints are symmetric.  The pubic symphysis is congruent.  No displaced fracture appreciated.                                       X-Ray Shoulder Complete 2 View Right (Final result)  Result time 06/26/24 10:00:04      Final result by John Dixon MD (06/26/24 10:00:04)                   Impression:      No acute findings.      Electronically signed by: John Dixon  Date:    06/26/2024  Time:    10:00               Narrative:    EXAMINATION:  XR SHOULDER COMPLETE 2 OR MORE VIEWS RIGHT    CLINICAL HISTORY:  Pain, unspecified    COMPARISON:  14 September 2023    FINDINGS:  Three views of the right shoulder demonstrate no fracture or dislocation.  Mild glenohumeral and AC joint degenerative changes.                                       X-Ray Chest AP Portable (Final result)  Result time 06/26/24 09:35:17      Final result by Pascual Meas MD (06/26/24 09:35:17)                   Impression:      No acute cardiopulmonary process.  Findings of chronic lung disease.      Electronically signed by: Pascual Mesa  Date:    06/26/2024  Time:    09:35               Narrative:    EXAMINATION:  XR CHEST AP PORTABLE    CLINICAL HISTORY:  Weakness;    TECHNIQUE:  Single view of the chest    COMPARISON:  10/10/2023    FINDINGS:  Prominent interstitial markings with no focal opacification.    The cardiomediastinal silhouette is within normal limits.    No acute osseous abnormality.                                       Assessment & Plan:     UTI  -  continue Rocephin 1 g q 24 hours  - urine culture pending, adjust Abx regimen based on sensitivities   - will give an additional 500 mL IVF bolus, then continue at 100 mL/hr   - Zofran prn     Recurrent mechanical falls   - imaging without acute findings   - PT/OT  - case management for discharge planning    HTN  HLD  Paroxysmal a fib  - continue home aspirin, lisinopril 5 mg, Eliquis 5 mg BID and metoprolol succinate 12.5 mg daily   - on pravastatin at home, will use formulary equivalent atorvastatin    Anxiety  - continue home escitalopram 10 mg daily    CODE STATUS: FULL  Access: PIV right hand  Antibiotics: Rocephin  Diet: heart healthy   DVT Prophylaxis: Eliquis 5 mg BID  GI Prophylaxis: none  Fluids: additional 500 mL bolus (1L total), then LR @ 100 mL/hr    Disposition: Admit to family medicine service for treatment of UTI. PT/OT evaluation pending. Case management consulted for discharge planning. Pending course.     Shante Elizabeth MD  [facility]

## 2024-06-26 NOTE — ED PROVIDER NOTES
Encounter Date: 6/26/2024       History     Chief Complaint   Patient presents with    Emesis     C/o n/v, foul smelling urine that started this morning. Family also reports low impact slip and fall today. Pt is on eliquis. Hard of hearing, baseline mental status. . Given 4mg zofran in route.      Patient presents to the emergency department due to dizziness and a fall.  Patient it was generally independent, lives with her son.  Apparently this morning she was getting up to go to the bathroom when she fell into her tub.  She did strike her head, unknown loss of consciousness.  Is on Eliquis.  Since then she has been dizzy and had multiple episodes of emesis.  Patient is also very anxious, daughter reports patient has a history of anxiety and panic attacks.  Patient is complaining of pain in her right shoulder and right upper leg as well.    The history is provided by the patient and a relative. The history is limited by the condition of the patient.     Review of patient's allergies indicates:   Allergen Reactions    Sulfa (sulfonamide antibiotics)      Other reaction(s): Not available     Past Medical History:   Diagnosis Date    GERD (gastroesophageal reflux disease)     Hyperlipidemia     Hypertension      Past Surgical History:   Procedure Laterality Date    ANGIOGRAM, CORONARY, WITH LEFT HEART CATHETERIZATION N/A 10/13/2023    Procedure: Angiogram, Coronary, with Left Heart Cath;  Surgeon: Marlon Myers Jr., MD;  Location: [facility] CATH LAB;  Service: Cardiology;  Laterality: N/A;    APPENDECTOMY      CHOLECYSTECTOMY      HYSTERECTOMY      INSTANTANEOUS WAVE-FREE RATIO (IFR) N/A 10/13/2023    Procedure: Instantaneous Wave-Free Ratio (IFR);  Surgeon: Marlon Myers Jr., MD;  Location: [facility] CATH LAB;  Service: Cardiology;  Laterality: N/A;     Family History   Problem Relation Name Age of Onset    Hypertension Mother      Cancer Mother      Hypertension Sister      Hypertension Sister       Social  History     Tobacco Use    Smoking status: Never     Passive exposure: Never    Smokeless tobacco: Never   Substance Use Topics    Alcohol use: Not Currently    Drug use: Never     Review of Systems   Constitutional:  Negative for chills and fever.   HENT:  Negative for congestion and sore throat.    Respiratory:  Negative for cough and shortness of breath.    Cardiovascular:  Negative for chest pain and palpitations.   Gastrointestinal:  Positive for nausea and vomiting. Negative for abdominal pain.   Genitourinary:  Negative for dysuria and hematuria.   Musculoskeletal:  Positive for arthralgias. Negative for myalgias.   Neurological:  Positive for dizziness, weakness and light-headedness.       Physical Exam     Initial Vitals [06/26/24 0824]   BP Pulse Resp Temp SpO2   (!) 130/49 69 18 97.2 °F (36.2 °C) 97 %      MAP       --         Physical Exam    Nursing note and vitals reviewed.  Constitutional: She appears well-developed and well-nourished.   HENT:   Head: Normocephalic and atraumatic.   Eyes: EOM are normal. Pupils are equal, round, and reactive to light.   Neck: Neck supple.   Normal range of motion.  Cardiovascular:  Normal rate, regular rhythm and normal heart sounds.           Pulmonary/Chest: Breath sounds normal. No respiratory distress.   Abdominal: Abdomen is soft. There is no abdominal tenderness.   Musculoskeletal:         General: Tenderness (Right shoulder, right hip and right upper leg) present. No edema. Normal range of motion.      Cervical back: Normal range of motion and neck supple.     Neurological: She is alert and oriented to person, place, and time.   Generalized but equal weakness throughout extremities   Skin: Skin is warm and dry.         ED Course   Procedures  Labs Reviewed   B-TYPE NATRIURETIC PEPTIDE - Abnormal; Notable for the following components:       Result Value    Natriuretic Peptide 476.6 (*)     All other components within normal limits   COMPREHENSIVE METABOLIC PANEL  - Abnormal; Notable for the following components:    Chloride 110 (*)     CO2 21 (*)     Glucose 127 (*)     Blood Urea Nitrogen 21.2 (*)     Creatinine 1.19 (*)     Protein Total 8.7 (*)     Albumin 3.3 (*)     Globulin 5.4 (*)     Albumin/Globulin Ratio 0.6 (*)     All other components within normal limits   PROTIME-INR - Abnormal; Notable for the following components:    PT 16.6 (*)     All other components within normal limits   TSH - Abnormal; Notable for the following components:    TSH 5.063 (*)     All other components within normal limits   URINALYSIS, REFLEX TO URINE CULTURE - Abnormal; Notable for the following components:    Appearance, UA Turbid (*)     Protein, UA 1+ (*)     Leukocyte Esterase,  (*)     RBC, UA 6-10 (*)     WBC, UA 51-99 (*)     Bacteria, UA Trace (*)     Squamous Epithelial Cells, UA Trace (*)     Mucous, UA Trace (*)     All other components within normal limits   CBC WITH DIFFERENTIAL - Abnormal; Notable for the following components:    RBC 3.53 (*)     Hgb 10.0 (*)     Hct 29.7 (*)     MPV 10.7 (*)     Lymph # 0.26 (*)     All other components within normal limits   LACTIC ACID, PLASMA - Normal   LIPASE - Normal   MAGNESIUM - Normal   TROPONIN I - Normal   T4, FREE - Normal   BLOOD CULTURE OLG   BLOOD CULTURE [facility]   CULTURE, URINE   CBC W/ AUTO DIFFERENTIAL    Narrative:     The following orders were created for panel order CBC auto differential.  Procedure                               Abnormality         Status                     ---------                               -----------         ------                     CBC with Differential[[id number]]       Abnormal            Final result                 Please view results for these tests on the individual orders.   EXTRA TUBES    Narrative:     The following orders were created for panel order EXTRA TUBES.  Procedure                               Abnormality         Status                     ---------                                -----------         ------                     Red Top Hold[[id number]]                                    Final result               Gold Top Hold[[id number]]                                   Final result               Pink Top Hold[[id number]]                                   Final result                 Please view results for these tests on the individual orders.   RED TOP HOLD   GOLD TOP HOLD   PINK TOP HOLD     EKG Readings: (Independently Interpreted)   Initial Reading: No STEMI. Rhythm: Normal Sinus Rhythm. Heart Rate: 61. Ectopy: No Ectopy. Conduction: Normal. Axis: Normal.     ECG Results              EKG 12-lead (In process)        Collection Time Result Time QRS Duration OHS QTC Calculation    06/26/24 08:29:22 06/26/24 08:41:48 102 463                     In process by Interface, Lab In [facility] (06/26/24 08:41:55)                   Narrative:    Test Reason : R53.1,    Vent. Rate : 061 BPM     Atrial Rate : 061 BPM     P-R Int : 160 ms          QRS Dur : 102 ms      QT Int : 460 ms       P-R-T Axes : 050 -18 -15 degrees     QTc Int : 463 ms    Normal sinus rhythm  LVH with repolarization abnormality  Cannot rule out Septal infarct ,age undetermined  Abnormal ECG  When compared with ECG of 11-OCT-2023 02:14,  Minimal criteria for Septal infarct are now Present  T wave inversion less evident in Lateral leads    Referred By: [email or web address]   SELF           Confirmed By:                                   Imaging Results              CT Cervical Spine Without Contrast (Final result)  Result time 06/26/24 09:53:44      Final result by Nohemy Hilton MD (06/26/24 09:53:44)                   Impression:      No acute fracture identified.      Electronically signed by: Nohemy Hilton  Date:    06/26/2024  Time:    09:53               Narrative:    EXAMINATION:  CT CERVICAL SPINE WITHOUT CONTRAST    CLINICAL HISTORY:  Polytrauma, blunt;    TECHNIQUE:  Noncontrast CT images of the cervical spine.  Axial, coronal, and sagittal reformatted images were obtained. Dose length product is 1203 mGycm. Automatic exposure control, adjustment of mA/kV or iterative reconstruction technique was used to limit radiation dose.    COMPARISON:  None    FINDINGS:  The cervical spine is visualized through the level of T1.    There is no acute fracture identified.  There are multilevel degenerative changes with marginal osteophytes and facet arthropathy.  There is no paraspinal hematoma.                                       CT Head Without Contrast (Final result)  Result time 06/26/24 09:50:44      Final result by Nohemy Hilton MD (06/26/24 09:50:44)                   Impression:      1. No acute intracranial abnormality.  2. Chronic microvascular ischemic changes.      Electronically signed by: Nohemy Hilton  Date:    06/26/2024  Time:    09:50               Narrative:    EXAMINATION:  CT HEAD WITHOUT CONTRAST    CLINICAL HISTORY:  Head trauma, minor (Age >= 65y);    TECHNIQUE:  Axial scans were obtained from skull base to the vertex.    Coronal and sagittal reconstructions obtained from the axial data.    Automatic exposure control was utilized to limit radiation dose.    Contrast: None    Radiation Dose:    Total DLP: 1203 mGy*cm    COMPARISON:  None    FINDINGS:  There is no acute intracranial hemorrhage or edema. The gray-white matter differentiation is preserved.  Patchy hypodensities in the subcortical and periventricular white matter likely represent chronic microvascular ischemic changes.    There is no mass effect or midline shift.  There is diffuse parenchymal volume loss.  The basal cisterns are patent. There is no abnormal extra-axial fluid collection.    The calvarium and skull base are intact. The visualized paranasal sinuses and the mastoid air cells are clear.                                       X-Ray Femur 2 View Right (Final result)  Result time 06/26/24 09:52:56      Final result by Venita  MD Garcia (06/26/24 09:52:56)                   Impression:      Degenerative changes.      Electronically signed by: Garcia Nathan  Date:    06/26/2024  Time:    09:52               Narrative:    EXAMINATION:  XR FEMUR 2 VIEW RIGHT    CLINICAL HISTORY:  Leg pain;    COMPARISON:  None.    FINDINGS:  No acute displaced fractures or dislocations.    There is some narrowing of the inferior medial aspect of the left hip joint with some degenerative changes of the lateral compartment of the knee joint articular spaces are otherwise preserved with smooth articular surfaces    No blastic or lytic lesions.    Soft tissues within normal limits.                                       X-Ray Pelvis Routine AP (Final result)  Result time 06/26/24 09:54:44      Final result by Pascual Mesa MD (06/26/24 09:54:44)                   Impression:      As above.      Electronically signed by: Pascual Mesa  Date:    06/26/2024  Time:    09:54               Narrative:    EXAMINATION:  XR PELVIS ROUTINE AP    CLINICAL HISTORY:  R hip pain;    TECHNIQUE:  Single view of the pelvis.    COMPARISON:  No prior imaging available for comparison    FINDINGS:  The sacroiliac joints are symmetric.  The pubic symphysis is congruent.  No displaced fracture appreciated.                                       X-Ray Shoulder Complete 2 View Right (Final result)  Result time 06/26/24 10:00:04      Final result by John Dixon MD (06/26/24 10:00:04)                   Impression:      No acute findings.      Electronically signed by: John Dixon  Date:    06/26/2024  Time:    10:00               Narrative:    EXAMINATION:  XR SHOULDER COMPLETE 2 OR MORE VIEWS RIGHT    CLINICAL HISTORY:  Pain, unspecified    COMPARISON:  14 September 2023    FINDINGS:  Three views of the right shoulder demonstrate no fracture or dislocation.  Mild glenohumeral and AC joint degenerative changes.                                       X-Ray Chest AP Portable  (Final result)  Result time 06/26/24 09:35:17      Final result by Pascual Mesa MD (06/26/24 09:35:17)                   Impression:      No acute cardiopulmonary process.  Findings of chronic lung disease.      Electronically signed by: Pascual Mesa  Date:    06/26/2024  Time:    09:35               Narrative:    EXAMINATION:  XR CHEST AP PORTABLE    CLINICAL HISTORY:  Weakness;    TECHNIQUE:  Single view of the chest    COMPARISON:  10/10/2023    FINDINGS:  Prominent interstitial markings with no focal opacification.    The cardiomediastinal silhouette is within normal limits.    No acute osseous abnormality.                                       Medications   sodium chloride 0.9% flush 10 mL (has no administration in time range)   naloxone 0.4 mg/mL injection 0.02 mg (has no administration in time range)   glucose chewable tablet 16 g (has no administration in time range)   glucose chewable tablet 24 g (has no administration in time range)   glucagon (human recombinant) injection 1 mg (has no administration in time range)   acetaminophen tablet 650 mg (has no administration in time range)   ondansetron injection 4 mg (has no administration in time range)   melatonin tablet 6 mg (has no administration in time range)   dextrose 10% bolus 125 mL 125 mL (has no administration in time range)   dextrose 10% bolus 250 mL 250 mL (has no administration in time range)   apixaban tablet 5 mg (has no administration in time range)   EScitalopram oxalate tablet 10 mg (has no administration in time range)   metoprolol succinate (TOPROL-XL) 24 hr split tablet 12.5 mg (has no administration in time range)   atorvastatin tablet 10 mg (has no administration in time range)   lisinopriL tablet 5 mg (has no administration in time range)   aspirin chewable tablet 81 mg (has no administration in time range)   lactated ringers infusion (has no administration in time range)   lactated ringers bolus 500 mL (has no administration in  time range)   ondansetron injection 4 mg (4 mg Intravenous Given 6/26/24 0849)   sodium chloride 0.9% bolus 500 mL 500 mL (0 mLs Intravenous Stopped 6/26/24 0954)   cefTRIAXone (Rocephin) 1 g in D5W 100 mL IVPB (MB+) (0 g Intravenous Stopped 6/26/24 1028)     Medical Decision Making  Vital signs are stable.  CT imaging of the head and neck as well as x-rays of the extremities and chest are unremarkable for severe injuries.  CBC, CMP reassuring.  EKGs without concerning changes.  Troponin within normal limits.  Urine is somewhat concerning for UTI, will initiate on IV Rocephin.  Given patients dizziness vomiting and frequent falls, discussed the patient with family Medicine who will admit for further evaluation.    Amount and/or Complexity of Data Reviewed  Labs: ordered. Decision-making details documented in ED Course.  Radiology: ordered. Decision-making details documented in ED Course.  ECG/medicine tests: ordered and independent interpretation performed. Decision-making details documented in ED Course.    Risk  Prescription drug management.  Decision regarding hospitalization.      Additional MDM:   Differential Diagnosis:   Hypothyroidism, medication side effect, orthostatic weakness, kidney failure, stroke, among others                                     Clinical Impression:  Final diagnoses:  [R53.1] Weakness  [R52] Pain  [N39.0] Urinary tract infection without hematuria, site unspecified (Primary)  [W19.XXXA, Y92.009] Fall in home, initial encounter  [R11.10] Vomiting, unspecified vomiting type, unspecified whether nausea present          ED Disposition Condition    Admit Stable                Ambrosio Rosas MD  06/26/24 6694

## 2024-06-27 LAB
ALBUMIN SERPL-MCNC: 2.9 G/DL (ref 3.4–4.8)
ALBUMIN/GLOB SERPL: 0.6 RATIO (ref 1.1–2)
ALP SERPL-CCNC: 48 UNIT/L (ref 40–150)
ALT SERPL-CCNC: 10 UNIT/L (ref 0–55)
ANION GAP SERPL CALC-SCNC: 6 MEQ/L
AST SERPL-CCNC: 27 UNIT/L (ref 5–34)
BASOPHILS # BLD AUTO: 0 X10(3)/MCL
BASOPHILS NFR BLD AUTO: 0 %
BILIRUB SERPL-MCNC: 0.6 MG/DL
BUN SERPL-MCNC: 21.3 MG/DL (ref 9.8–20.1)
CALCIUM SERPL-MCNC: 8.9 MG/DL (ref 8.4–10.2)
CHLORIDE SERPL-SCNC: 109 MMOL/L (ref 98–107)
CO2 SERPL-SCNC: 22 MMOL/L (ref 23–31)
CREAT SERPL-MCNC: 1.11 MG/DL (ref 0.55–1.02)
CREAT/UREA NIT SERPL: 19
EOSINOPHIL # BLD AUTO: 0.01 X10(3)/MCL (ref 0–0.9)
EOSINOPHIL NFR BLD AUTO: 0.3 %
ERYTHROCYTE [DISTWIDTH] IN BLOOD BY AUTOMATED COUNT: 15.5 % (ref 11.5–17)
GFR SERPLBLD CREATININE-BSD FMLA CKD-EPI: 48 ML/MIN/1.73/M2
GLOBULIN SER-MCNC: 4.8 GM/DL (ref 2.4–3.5)
GLUCOSE SERPL-MCNC: 95 MG/DL (ref 82–115)
HCT VFR BLD AUTO: 26.2 % (ref 37–47)
HGB BLD-MCNC: 8.7 G/DL (ref 12–16)
HOLD SPECIMEN: NORMAL
IMM GRANULOCYTES # BLD AUTO: 0.01 X10(3)/MCL (ref 0–0.04)
IMM GRANULOCYTES NFR BLD AUTO: 0.3 %
LYMPHOCYTES # BLD AUTO: 0.23 X10(3)/MCL (ref 0.6–4.6)
LYMPHOCYTES NFR BLD AUTO: 5.9 %
MAGNESIUM SERPL-MCNC: 1.9 MG/DL (ref 1.6–2.6)
MCH RBC QN AUTO: 28.3 PG (ref 27–31)
MCHC RBC AUTO-ENTMCNC: 33.2 G/DL (ref 33–36)
MCV RBC AUTO: 85.3 FL (ref 80–94)
MONOCYTES # BLD AUTO: 0.16 X10(3)/MCL (ref 0.1–1.3)
MONOCYTES NFR BLD AUTO: 4.1 %
NEUTROPHILS # BLD AUTO: 3.47 X10(3)/MCL (ref 2.1–9.2)
NEUTROPHILS NFR BLD AUTO: 89.4 %
NRBC BLD AUTO-RTO: 0 %
PHOSPHATE SERPL-MCNC: 3.1 MG/DL (ref 2.3–4.7)
PLATELET # BLD AUTO: 148 X10(3)/MCL (ref 130–400)
PMV BLD AUTO: 11.7 FL (ref 7.4–10.4)
POTASSIUM SERPL-SCNC: 4.4 MMOL/L (ref 3.5–5.1)
PROT SERPL-MCNC: 7.7 GM/DL (ref 5.8–7.6)
RBC # BLD AUTO: 3.07 X10(6)/MCL (ref 4.2–5.4)
SODIUM SERPL-SCNC: 137 MMOL/L (ref 136–145)
WBC # BLD AUTO: 3.88 X10(3)/MCL (ref 4.5–11.5)

## 2024-06-27 PROCEDURE — 25000242 PHARM REV CODE 250 ALT 637 W/ HCPCS: Performed by: STUDENT IN AN ORGANIZED HEALTH CARE EDUCATION/TRAINING PROGRAM

## 2024-06-27 PROCEDURE — 97530 THERAPEUTIC ACTIVITIES: CPT

## 2024-06-27 PROCEDURE — 94761 N-INVAS EAR/PLS OXIMETRY MLT: CPT

## 2024-06-27 PROCEDURE — 36415 COLL VENOUS BLD VENIPUNCTURE: CPT | Performed by: STUDENT IN AN ORGANIZED HEALTH CARE EDUCATION/TRAINING PROGRAM

## 2024-06-27 PROCEDURE — 84100 ASSAY OF PHOSPHORUS: CPT | Performed by: STUDENT IN AN ORGANIZED HEALTH CARE EDUCATION/TRAINING PROGRAM

## 2024-06-27 PROCEDURE — 97116 GAIT TRAINING THERAPY: CPT

## 2024-06-27 PROCEDURE — 83735 ASSAY OF MAGNESIUM: CPT | Performed by: STUDENT IN AN ORGANIZED HEALTH CARE EDUCATION/TRAINING PROGRAM

## 2024-06-27 PROCEDURE — 25000003 PHARM REV CODE 250: Performed by: STUDENT IN AN ORGANIZED HEALTH CARE EDUCATION/TRAINING PROGRAM

## 2024-06-27 PROCEDURE — 63600175 PHARM REV CODE 636 W HCPCS: Performed by: STUDENT IN AN ORGANIZED HEALTH CARE EDUCATION/TRAINING PROGRAM

## 2024-06-27 PROCEDURE — 80053 COMPREHEN METABOLIC PANEL: CPT | Performed by: STUDENT IN AN ORGANIZED HEALTH CARE EDUCATION/TRAINING PROGRAM

## 2024-06-27 PROCEDURE — 21400001 HC TELEMETRY ROOM

## 2024-06-27 PROCEDURE — 85025 COMPLETE CBC W/AUTO DIFF WBC: CPT | Performed by: STUDENT IN AN ORGANIZED HEALTH CARE EDUCATION/TRAINING PROGRAM

## 2024-06-27 RX ORDER — HYDRALAZINE HYDROCHLORIDE 20 MG/ML
10 INJECTION INTRAMUSCULAR; INTRAVENOUS EVERY 6 HOURS PRN
Status: DISCONTINUED | OUTPATIENT
Start: 2024-06-27 | End: 2024-06-29

## 2024-06-27 RX ORDER — LABETALOL HCL 20 MG/4 ML
10 SYRINGE (ML) INTRAVENOUS EVERY 4 HOURS PRN
Status: DISCONTINUED | OUTPATIENT
Start: 2024-06-27 | End: 2024-06-29

## 2024-06-27 RX ADMIN — Medication 6 MG: at 09:06

## 2024-06-27 RX ADMIN — ESCITALOPRAM OXALATE 10 MG: 10 TABLET ORAL at 09:06

## 2024-06-27 RX ADMIN — PSYLLIUM HUSK 1 PACKET: 3.4 POWDER ORAL at 10:06

## 2024-06-27 RX ADMIN — ATORVASTATIN CALCIUM 10 MG: 10 TABLET, FILM COATED ORAL at 09:06

## 2024-06-27 RX ADMIN — ACETAMINOPHEN 650 MG: 325 TABLET, FILM COATED ORAL at 09:06

## 2024-06-27 RX ADMIN — ONDANSETRON 4 MG: 2 INJECTION INTRAMUSCULAR; INTRAVENOUS at 09:06

## 2024-06-27 RX ADMIN — ASPIRIN 81 MG CHEWABLE TABLET 81 MG: 81 TABLET CHEWABLE at 09:06

## 2024-06-27 RX ADMIN — CEFTRIAXONE SODIUM 1 G: 1 INJECTION, POWDER, FOR SOLUTION INTRAMUSCULAR; INTRAVENOUS at 06:06

## 2024-06-27 RX ADMIN — APIXABAN 5 MG: 2.5 TABLET, FILM COATED ORAL at 08:06

## 2024-06-27 RX ADMIN — SODIUM CHLORIDE, POTASSIUM CHLORIDE, SODIUM LACTATE AND CALCIUM CHLORIDE 1000 ML: 600; 310; 30; 20 INJECTION, SOLUTION INTRAVENOUS at 01:06

## 2024-06-27 RX ADMIN — ACETAMINOPHEN 650 MG: 325 TABLET, FILM COATED ORAL at 01:06

## 2024-06-27 RX ADMIN — Medication 6 MG: at 01:06

## 2024-06-27 RX ADMIN — SODIUM CHLORIDE, POTASSIUM CHLORIDE, SODIUM LACTATE AND CALCIUM CHLORIDE: 600; 310; 30; 20 INJECTION, SOLUTION INTRAVENOUS at 02:06

## 2024-06-27 RX ADMIN — LISINOPRIL 5 MG: 2.5 TABLET ORAL at 09:06

## 2024-06-27 RX ADMIN — ONDANSETRON 4 MG: 2 INJECTION INTRAMUSCULAR; INTRAVENOUS at 02:06

## 2024-06-27 RX ADMIN — SODIUM CHLORIDE, POTASSIUM CHLORIDE, SODIUM LACTATE AND CALCIUM CHLORIDE: 600; 310; 30; 20 INJECTION, SOLUTION INTRAVENOUS at 06:06

## 2024-06-27 RX ADMIN — METOPROLOL SUCCINATE 12.5 MG: 25 TABLET, EXTENDED RELEASE ORAL at 09:06

## 2024-06-27 RX ADMIN — APIXABAN 5 MG: 2.5 TABLET, FILM COATED ORAL at 09:06

## 2024-06-27 NOTE — CONSULTS
Spoke with pt & her children, Curtis & Balbina, would like [facility]. Referral submitted via [facility], acceptance pending.

## 2024-06-27 NOTE — PLAN OF CARE
Problem: Skin Injury Risk Increased  Goal: Skin Health and Integrity  Outcome: Progressing     Problem: Adult Inpatient Plan of Care  Goal: Plan of Care Review  Outcome: Progressing  Goal: Patient-Specific Goal (Individualized)  Outcome: Progressing  Goal: Absence of Hospital-Acquired Illness or Injury  Outcome: Progressing  Goal: Optimal Comfort and Wellbeing  Outcome: Progressing  Goal: Readiness for Transition of Care  Outcome: Progressing     Problem: Fall Injury Risk  Goal: Absence of Fall and Fall-Related Injury  Outcome: Progressing

## 2024-06-27 NOTE — PT/OT/SLP PROGRESS
Physical Therapy Treatment    Patient Name:  Fifi Chacon   MRN:  [id number]    Recommendations     Therapy Intensity Recommendations at Discharge: Low Intensity Therapy  Discharge Equipment Recommendations: walker, rolling, bedside commode, bath bench   Barriers to discharge: fall risk and decreased endurance    Assessment     Fifi Chacon is a 88 y.o. female admitted with a medical diagnosis of Acute cystitis without hematuria.  1. Urinary tract infection without hematuria, site unspecified    2. Weakness    3. Pain    4. Fall in home, initial encounter    5. Vomiting, unspecified vomiting type, unspecified whether nausea present    6. Chest pain       Patient Active Problem List   Diagnosis    Vaginal prolapse    Pessary maintenance    Hematuria    Hypertension    Atrophy of vagina    Gastroesophageal reflux disease    History of iron deficiency    Hydroureter    Rheumatoid arthritis    Vitamin D deficiency    Anxiety    Depressive disorder    Renal cyst    CAD (coronary artery disease)    Paroxysmal A-fib    Severe aortic stenosis    Acute cystitis without hematuria      She presents with the following impairments/functional limitations:  weakness, impaired endurance, gait instability, impaired balance, impaired self care skills, impaired functional mobility.    Rehab Prognosis: Good.    Patient would benefit from continued skilled acute PT services to: address above listed impairments/functional limitations; receive patient/caregiver education; reduce fall risk; and maximize independency/safety with functional mobility.    -continued: cardiac chair positioning in bed, up-to-chair, ambulation, with progression of gait distance/frequency/duration and speed, as tolerated/appropriate, with assistance and supervision    Recent Surgery: * No surgery found *      Plan     During this hospitalization, patient to be seen 5 x/week to address the identified impairments/functional limitations via gait training,  therapeutic activities, therapeutic exercises and progress toward the established goals.    Plan of Care Expires:  07/25/24    Subjective     Communicated with patient's nurse More prior to session and into room at beginning of therapy session to saline lock IV.    Patient agreeable to participate in treatment session.    Chief Complaint: need the restroom  Patient/Family Comments/goals: home  Pain/Comfort:  Pain Rating 1: 0/10  Pain Addressed 1: Nurse notified  Pain Rating Post-Intervention 1: 0/10    Objective     Patient found supine in bed, with HOB elevated, and with bed rails up bilateral HOB, left FOB, and right FOB with peripheral IV  upon PT entry to room.    General Precautions: Standard, fall   Orthopedic Precautions:N/A   Braces: N/A  Respiratory Status: room air  SAT O2 Check: n/a    Functional Mobility:    Bed Mobility:  Rolling Right: contact guard assistance  Scooting: contact guard assistance  Supine to Sit: contact guard assistance-minimum assistance  with cues for proper technique  with HOB elevated, hand rail, and firm mattress    *patients daughter to room    Transfers:  Sit to Stand: minimum assistance with hand-held assist and rolling walker  with cues for hand placement and posture  with firm mattress    Gait:  Patient ambulated 15ft to restroom  Patient sat on toilet  Patients nurse Tanja notified of patient need for assistance  Patient ambulated 20ft from restroom to sink  Patient performed hand hygiene at sink  Patient sat in bedside chair for sitting rest x3 minutes  Patient  ambulated 190ft  Patient sat on edge of bed at FOB  Sitting rest x2 minutes  Patient stood and transitioned to bedside chair (5ft w/ FWD/BWD/side steps)  with hand-held assist and rolling walker and contact guard assistance-minimum assistance.  Patient demonstrates :       occasional unsteady gait       decreased lizbeth       decreased step length       decreased foot/floor clearance       flexed posture        generally slowed mvmts - all transitional activities       paused x3 x20-30 seconds during gait session    Other Mobility:  Therapeutic Activities performed:        -sitting balance activities:              weight shifting:                   -laterally (left)                 -laterally (right)            scooting:                   -forward                 -backward            repositioning at edge of bed       -standing balance activities:              weight shifting:                   -laterally (left)                 -laterally (right)            static standing x2 minutes    Balance:  Sit  Patient demonstrated static balance on level surface with modified independence with no verbal cues.  Patient demonstrated dynamic balance on level surface with supervision with no verbal cues during moderate excursions.  Stand  Patient demonstrated static balance on level surface  using rolling walker with modified independence with no verbal cues.    Patient left sitting in chair (w/ seat surface 'build-up' x4 inches) with peripheral IV with all lines intact, call button in reach, tray table at bedside, patient's nurse Tanja notified, and patients daughter present.    *patients daughter instructed to notify nursing staff if she has to leave room, patients daughter verbalized understanding and patients nurse Tanja notified    Goals     Multidisciplinary Problems       Physical Therapy Goals       Problem: Physical Therapy    Goal Priority Disciplines Outcome Goal Variances Interventions   Physical Therapy Goal     PT, PT/OT Progressing     Description: REVIEWED 2024  Goals to be met by: DISCHARGE  Patient will increase functional independence with mobility by performin. Supine to sit with Modified [place] - ONGOING  2. Sit to stand transfer with Modified [place] - ONGOING  3. Gait  x 130 feet with Supervision using Rolling Walker - ONGOING           Time Tracking     PT Received On: 24  PT  Start Time: 1536     PT Stop Time: 1614  PT Total Time (min): 38 min     Billable Minutes: Gait Training 28 and Therapeutic Activity 10  Non-Billable Minutes: n/a  06/27/2024

## 2024-06-27 NOTE — PT/OT/SLP PROGRESS
"Occupational Therapy      Patient Name:  Fifi Chacon   MRN:  [id number]    Patient not seen today secondary to Patient unwilling to participate, Patient fatigue. OT attempted at 1147 pt in supine, declined due to fatigue, "I'm so sleepy". Pt reports ambulated with PT services this am, daughter at bedside reports pt did not sleep well last night. Education on voiding on toilet versus use of Purewick, education on sitting EOB with family later this afternoon if rest is obtained. /67 HR 59 O2 sat 91%  Will follow-up tomorrow.    6/27/2024  "

## 2024-06-27 NOTE — PROGRESS NOTES
"[facility] Progress Note     Subjective:      Brief HPI:  88 y.o. female with a history of HTN, HLD, a fib on Eliquis, and anxiety who presented to [facility] ED on 2024 after a slip and fall while in the bathroom. Hit head. Denied LOC. Patient with nausea and vomiting starting the morning of arrival and generalized malaise with night before. Also with foul smelling urine and increased urinary frequency. Daughter concerned that the patient has had a few mechanical falls recently, last being a week prior, as well as a decline in memory. Overall independent in ADLs with son living with patient and assisting as needed. UA in ED concerning for UTI. CT Head with chronic microvascular ischemic changes, but without acute intracranial abnormalities. Patient received 1 dose of Rocephin, IV Zofran and 500 mL IVF bolus. Family Medicine consulted for admission.     Interval History: Patient with episode of nausea and dizziness overnight resolved with Zofran. States dizziness occurs when she is laying in bed and moves her head side to side. Walked with therapy yesterday and to bathroom this morning without dizziness. Due to persistent dizziness despite fluid resuscitation and treatment of UTI MRI Brain was recommended although patient and family decline. Patient without chest pain, shortness of breath or abdominal pain. Urinating without issue. Tolerating PO intake. Discharge planning discussed and patient and family open to case management consult for more information regarding SNF placement.       Review of Systems:  As per HPI      Objective:     Last 24 Hour Vital Signs:  BP  Min: 122/66  Max: 176/73  Temp  Av.1 °F (36.7 °C)  Min: 97.5 °F (36.4 °C)  Max: 98.6 °F (37 °C)  Pulse  Av.1  Min: 58  Max: 63  Resp  Av.3  Min: 17  Max: 18  SpO2  Av %  Min: 91 %  Max: 97 %  Height  Av' 2" (157.5 cm)  Min: 5' 2" (157.5 cm)  Max: 5' 2" (157.5 cm)  Weight  Av.5 kg (140 lb)  Min: 63.5 kg (140 lb)  Max: " 63.5 kg (140 lb)  I/O last 3 completed shifts:  In: 951.6 [I.V.:951.6]  Out: 500 [Urine:500]    Physical Examination:  GEN: appears ill, in no distress, pallor improved, overall improvement in clinical appearance compared to yesterday's exam    HEENT: EOMI without nystagmus, becomes dizzy while laying in bed and moving head left to right, dry mucous membranes  CARDIO: regular rate, regular rhythm, normal S1, S2, 2/6 systolic murmur most prominent to right 2nd intercostal space  PULM/CHEST: clear to auscultation bilaterally, no wheezes, rales or rhonchi, symmetric air entry, no accessory muscle use or distress  ABDOMEN: + BS, soft, non tender, non-distended, no guarding and no rebound  GENITOURINARY: no suprapubic tenderness  EXT: 2+ dorsal pedal pulses, venous stasis dermatitis bilaterally, < 1 second capillary refill   NEURO: alert and oriented to person and situation, but not place or time, face symmetric, tongue midline, sensation intact, hearing decreased (baseline), moves all extremities equally, finger nose finger wnl, speech clear  PSYCH: appears intermittently anxious    Laboratory:  Most Recent Data:  CBC:     Recent Labs   Lab 06/26/24  0857 06/27/24  0512   WBC 4.71 3.88*   HGB 10.0* 8.7*   HCT 29.7* 26.2*    148   MCV 84.1 85.3     BMP:   Lab Results   Component Value Date     06/27/2024    K 4.4 06/27/2024     (H) 06/27/2024    CO2 22 (L) 06/27/2024    BUN 21.3 (H) 06/27/2024    CREATININE 1.11 (H) 06/27/2024    CALCIUM 8.9 06/27/2024    MG 1.90 06/27/2024    PHOS 3.1 06/27/2024     LFTs:   Lab Results   Component Value Date    ALBUMIN 2.9 (L) 06/27/2024    BILITOT 0.6 06/27/2024    AST 27 06/27/2024    ALKPHOS 48 06/27/2024    ALT 10 06/27/2024     Microbiology Data Reviewed: yes  Pertinent Findings:  Urine culture: no growth @ 24 hours  Blood cultures: no growth @ 24 hours      Radiology:  Imaging Results              CT Cervical Spine Without Contrast (Final result)  Result time  06/26/24 09:53:44      Final result by Nohemy Hilton MD (06/26/24 09:53:44)                   Impression:      No acute fracture identified.      Electronically signed by: Nohemy Hilton  Date:    06/26/2024  Time:    09:53               Narrative:    EXAMINATION:  CT CERVICAL SPINE WITHOUT CONTRAST    CLINICAL HISTORY:  Polytrauma, blunt;    TECHNIQUE:  Noncontrast CT images of the cervical spine. Axial, coronal, and sagittal reformatted images were obtained. Dose length product is 1203 mGycm. Automatic exposure control, adjustment of mA/kV or iterative reconstruction technique was used to limit radiation dose.    COMPARISON:  None    FINDINGS:  The cervical spine is visualized through the level of T1.    There is no acute fracture identified.  There are multilevel degenerative changes with marginal osteophytes and facet arthropathy.  There is no paraspinal hematoma.                                       CT Head Without Contrast (Final result)  Result time 06/26/24 09:50:44      Final result by Nohemy Hilton MD (06/26/24 09:50:44)                   Impression:      1. No acute intracranial abnormality.  2. Chronic microvascular ischemic changes.      Electronically signed by: Nohemy Hilton  Date:    06/26/2024  Time:    09:50               Narrative:    EXAMINATION:  CT HEAD WITHOUT CONTRAST    CLINICAL HISTORY:  Head trauma, minor (Age >= 65y);    TECHNIQUE:  Axial scans were obtained from skull base to the vertex.    Coronal and sagittal reconstructions obtained from the axial data.    Automatic exposure control was utilized to limit radiation dose.    Contrast: None    Radiation Dose:    Total DLP: 1203 mGy*cm    COMPARISON:  None    FINDINGS:  There is no acute intracranial hemorrhage or edema. The gray-white matter differentiation is preserved.  Patchy hypodensities in the subcortical and periventricular white matter likely represent chronic microvascular ischemic changes.    There is no  mass effect or midline shift.  There is diffuse parenchymal volume loss.  The basal cisterns are patent. There is no abnormal extra-axial fluid collection.    The calvarium and skull base are intact. The visualized paranasal sinuses and the mastoid air cells are clear.                                       X-Ray Femur 2 View Right (Final result)  Result time 06/26/24 09:52:56      Final result by Garcia Nathan MD (06/26/24 09:52:56)                   Impression:      Degenerative changes.      Electronically signed by: Garcia Nathan  Date:    06/26/2024  Time:    09:52               Narrative:    EXAMINATION:  XR FEMUR 2 VIEW RIGHT    CLINICAL HISTORY:  Leg pain;    COMPARISON:  None.    FINDINGS:  No acute displaced fractures or dislocations.    There is some narrowing of the inferior medial aspect of the left hip joint with some degenerative changes of the lateral compartment of the knee joint articular spaces are otherwise preserved with smooth articular surfaces    No blastic or lytic lesions.    Soft tissues within normal limits.                                       X-Ray Pelvis Routine AP (Final result)  Result time 06/26/24 09:54:44      Final result by Pascual Mesa MD (06/26/24 09:54:44)                   Impression:      As above.      Electronically signed by: Pascual Mesa  Date:    06/26/2024  Time:    09:54               Narrative:    EXAMINATION:  XR PELVIS ROUTINE AP    CLINICAL HISTORY:  R hip pain;    TECHNIQUE:  Single view of the pelvis.    COMPARISON:  No prior imaging available for comparison    FINDINGS:  The sacroiliac joints are symmetric.  The pubic symphysis is congruent.  No displaced fracture appreciated.                                       X-Ray Shoulder Complete 2 View Right (Final result)  Result time 06/26/24 10:00:04      Final result by John Dixon MD (06/26/24 10:00:04)                   Impression:      No acute findings.      Electronically signed  by: John Dixon  Date:    06/26/2024  Time:    10:00               Narrative:    EXAMINATION:  XR SHOULDER COMPLETE 2 OR MORE VIEWS RIGHT    CLINICAL HISTORY:  Pain, unspecified    COMPARISON:  14 September 2023    FINDINGS:  Three views of the right shoulder demonstrate no fracture or dislocation.  Mild glenohumeral and AC joint degenerative changes.                                       X-Ray Chest AP Portable (Final result)  Result time 06/26/24 09:35:17      Final result by Pascual Mesa MD (06/26/24 09:35:17)                   Impression:      No acute cardiopulmonary process.  Findings of chronic lung disease.      Electronically signed by: Pascual Mesa  Date:    06/26/2024  Time:    09:35               Narrative:    EXAMINATION:  XR CHEST AP PORTABLE    CLINICAL HISTORY:  Weakness;    TECHNIQUE:  Single view of the chest    COMPARISON:  10/10/2023    FINDINGS:  Prominent interstitial markings with no focal opacification.    The cardiomediastinal silhouette is within normal limits.    No acute osseous abnormality.                                      Current Medications:     Infusions:   lactated ringers   Intravenous Continuous 100 mL/hr at 06/27/24 0631 Rate Verify at 06/27/24 0631        Scheduled:   apixaban  5 mg Oral BID    aspirin  81 mg Oral Daily    atorvastatin  10 mg Oral Daily    cefTRIAXone (Rocephin) IV (PEDS and ADULTS)  1 g Intravenous Q24H    EScitalopram oxalate  10 mg Oral Daily    lactated ringers  1,000 mL Intravenous Once    lisinopriL  5 mg Oral Daily    metoprolol succinate  12.5 mg Oral Daily    psyllium husk (aspartame)  1 packet Oral Daily        PRN:    Current Facility-Administered Medications:     acetaminophen, 650 mg, Oral, Q4H PRN    dextrose 10%, 12.5 g, Intravenous, PRN    dextrose 10%, 25 g, Intravenous, PRN    glucagon (human recombinant), 1 mg, Intramuscular, PRN    glucose, 16 g, Oral, PRN    glucose, 24 g, Oral, PRN    melatonin, 6 mg, Oral, Nightly PRN     naloxone, 0.02 mg, Intravenous, PRN    ondansetron, 4 mg, Intravenous, Q8H PRN    sodium chloride 0.9%, 10 mL, Intravenous, Q12H PRN    Assessment & Plan:     UTI  - continue Rocephin 1 g q 24 hours (Day 2)  - urine culture no growth at this time   - blood culture no growth @ 24 hours  - will give an additional 1L IVF bolus, then continue at 100 mL/hr   - Zofran prn     Vertigo  - suspect BPPV at this time although unable to rule out other pathology  - patient and family informed refusal MRI     Recurrent mechanical falls   - imaging in ED without acute findings   - PT/OT  - case management for discharge planning, family interested in SNF     HTN  HLD  Paroxysmal a fib  - continue home aspirin, lisinopril 5 mg, Eliquis 5 mg BID and metoprolol succinate 12.5 mg daily   - on pravastatin at home, will use formulary equivalent atorvastatin     Anxiety  - continue home escitalopram 10 mg daily     CODE STATUS: FULL  Access: PIV right hand  Antibiotics: Rocephin  Diet: heart healthy   DVT Prophylaxis: Eliquis 5 mg BID  GI Prophylaxis: none  Fluids: 1L bolus today then LR @ 100 mL/hr    Disposition: Continue antibiotic for suspected UTI. Informed refusal MRI Brain for further evaluation of intermittent vertigo. Continue PT/OT. Case management consulted for SNF placement. Pending course.       Shante Elizabeth MD  [facility]

## 2024-06-27 NOTE — PLAN OF CARE
PT C/O intermittent nausea & dizziness. MD notified. MD at bedside to see pt & believes the symptoms are r/t the UTI. Tylenol & zofran given.

## 2024-06-27 NOTE — PLAN OF CARE
06/27/24 1500   Discharge Assessment   Assessment Type Discharge Planning Assessment   Confirmed/corrected address, phone number and insurance Yes   Confirmed Demographics Correct on Facesheet   Source of Information patient;family   When was your last doctors appointment?   (Cayden Forte)   Reason For Admission weakness, pain, fall at home   People in Home child(kathy), adult  (Son lives with her Curtis Nuñez [phone])   Facility Arrived From: home   Do you expect to return to your current living situation? No   Do you have help at home or someone to help you manage your care at home? Yes   Who are your caregiver(s) and their phone number(s)? Curtis Nuñez [phone]   Prior to hospitilization cognitive status: Unable to Assess   Current cognitive status: Alert/Oriented;No Deficits   Walking or Climbing Stairs Difficulty yes   Walking or Climbing Stairs ambulation difficulty, requires equipment   Mobility Management RW, rollator, cane   Dressing/Bathing Difficulty yes   Dressing/Bathing bathing difficulty, assistance 1 person   Dressing/Bathing Management family   Equipment Currently Used at Home cane, straight;walker, rolling;rollator   Readmission within 30 days? No   Patient currently being followed by outpatient case management? No   Do you currently have service(s) that help you manage your care at home? No   Do you take prescription medications? Yes   Do you have prescription coverage? Yes   Coverage medicare   Do you have any problems affording any of your prescribed medications? No   Is the patient taking medications as prescribed? yes   Who is going to help you get home at discharge? SNF   How do you get to doctors appointments? family or friend will provide   Are you on dialysis? No   Do you take coumadin? No   Discharge Plan A Skilled Nursing Facility   DME Needed Upon Discharge  none   Discharge Plan discussed with: Patient;Adult children   Transition of Care Barriers None   Physical Activity    On average, how many days per week do you engage in moderate to strenuous exercise (like a brisk walk)? 0 days   On average, how many minutes do you engage in exercise at this level? 0 min   Financial Resource Strain   How hard is it for you to pay for the very basics like food, housing, medical care, and heating? Not hard   Housing Stability   In the last 12 months, was there a time when you were not able to pay the mortgage or rent on time? N   At any time in the past 12 months, were you homeless or living in a shelter (including now)? N   Transportation Needs   Has the lack of transportation kept you from medical appointments, meetings, work or from getting things needed for daily living? No   Food Insecurity   Within the past 12 months, you worried that your food would run out before you got the money to buy more. Never true   Within the past 12 months, the food you bought just didn't last and you didn't have money to get more. Never true   Stress   Do you feel stress - tense, restless, nervous, or anxious, or unable to sleep at night because your mind is troubled all the time - these days? Not at all   Social Isolation   How often do you feel lonely or isolated from those around you?  Never   Alcohol Use   Q1: How often do you have a drink containing alcohol? Never   Q2: How many drinks containing alcohol do you have on a typical day when you are drinking? None   Q3: How often do you have six or more drinks on one occasion? Never   Utilities   In the past 12 months has the electric, gas, oil, or water company threatened to shut off services in your home? No   Health Literacy   How often do you need to have someone help you when you read instructions, pamphlets, or other written material from your doctor or pharmacy? Never   OTHER   Name(s) of People in Home (Son) Curtis Nuñez [phone]

## 2024-06-27 NOTE — PT/OT/SLP PROGRESS
Physical Therapy Treatment    Patient Name:  Fifi Chacon   MRN:  [id number]    Recommendations     Therapy Intensity Recommendations at Discharge: Low Intensity Therapy  Discharge Equipment Recommendations: walker, rolling   Barriers to discharge: fall risk    Assessment     Fifi Chacon is a 88 y.o. female admitted with a medical diagnosis of  Acute cystitis without hematuria.    She presents with the following impairments/functional limitations:  weakness, impaired functional mobility, gait instability, impaired balance, decreased lower extremity function.    Rehab Prognosis: Fair.    Patient would benefit from continued skilled acute PT services to: address above listed impairments/functional limitations; receive patient/caregiver education; reduce fall risk; and maximize independency/safety with functional mobility.    Recent Surgery: * No surgery found *      Plan     During this hospitalization, patient to be seen 3 x/week to address the identified impairments/functional limitations via gait training, therapeutic activities, therapeutic exercises and progress toward the established goals.    Plan of Care Expires:  07/25/24    Subjective     Communicated with patient's nurse More prior to session.    Patient agreeable to participate in treatment session.    Chief Complaint: headache  Patient/Family Comments/goals: none stated  Pain/Comfort:  Pain Rating 1: 10/10  Location 1: head  Pain Addressed 1: Pre-medicate for activity, Distraction, Nurse notified  Pain Rating Post-Intervention 1: 8/10    Objective     Patient found supine in bed with blood pressure cuff, peripheral IV, telemetry  upon PT entry to room.    General Precautions: Standard, fall   Orthopedic Precautions:N/A   Braces: N/A  Respiratory Status: room air    Functional Mobility:    Bed Mobility:  Supine to Sit: minimum assistance  Sit to Supine: minimum assistance    Transfers:  Sit to Stand: minimum assistance with rolling  walker    Gait:  Patient ambulated 130ft with rolling walker and minimum assistance.  Patient demonstrates :       unsteady gait       ambulates outside JOSE MANUEL of RW.    Other Mobility:  not assessed    Patient left supine in bed with  CNA present to bathe the patient.    Goals     Multidisciplinary Problems       Physical Therapy Goals          Problem: Physical Therapy    Goal Priority Disciplines Outcome Goal Variances Interventions   Physical Therapy Goal     PT, PT/OT Progressing     Description: Reviewed on 2024  Goals to be met by: dc     Patient will increase functional independence with mobility by performin. Supine to sit with Modified [place]  2. Sit to stand transfer with Modified [place]  3. Gait  x 130 feet with Supervision using Rolling Walker.                        Time Tracking     PT Received On: 24  PT Start Time: 0947     PT Stop Time: 1011  PT Total Time (min): 24 min     Billable Minutes: Gait Training 12 and Therapeutic Activity 12    2024

## 2024-06-28 LAB
ALBUMIN SERPL-MCNC: 2.7 G/DL (ref 3.4–4.8)
ALBUMIN/GLOB SERPL: 0.6 RATIO (ref 1.1–2)
ALP SERPL-CCNC: 46 UNIT/L (ref 40–150)
ALT SERPL-CCNC: 9 UNIT/L (ref 0–55)
ANION GAP SERPL CALC-SCNC: 5 MEQ/L
AST SERPL-CCNC: 31 UNIT/L (ref 5–34)
BACTERIA UR CULT: NORMAL
BASOPHILS # BLD AUTO: 0.01 X10(3)/MCL
BASOPHILS NFR BLD AUTO: 0.3 %
BILIRUB SERPL-MCNC: 0.5 MG/DL
BUN SERPL-MCNC: 16.2 MG/DL (ref 9.8–20.1)
CALCIUM SERPL-MCNC: 9 MG/DL (ref 8.4–10.2)
CHLORIDE SERPL-SCNC: 110 MMOL/L (ref 98–107)
CO2 SERPL-SCNC: 25 MMOL/L (ref 23–31)
CREAT SERPL-MCNC: 1.08 MG/DL (ref 0.55–1.02)
CREAT/UREA NIT SERPL: 15
EOSINOPHIL # BLD AUTO: 0.03 X10(3)/MCL (ref 0–0.9)
EOSINOPHIL NFR BLD AUTO: 0.9 %
ERYTHROCYTE [DISTWIDTH] IN BLOOD BY AUTOMATED COUNT: 15.5 % (ref 11.5–17)
GFR SERPLBLD CREATININE-BSD FMLA CKD-EPI: 50 ML/MIN/1.73/M2
GLOBULIN SER-MCNC: 4.5 GM/DL (ref 2.4–3.5)
GLUCOSE SERPL-MCNC: 96 MG/DL (ref 82–115)
HCT VFR BLD AUTO: 27 % (ref 37–47)
HGB BLD-MCNC: 8.8 G/DL (ref 12–16)
HOLD SPECIMEN: NORMAL
IMM GRANULOCYTES # BLD AUTO: 0.02 X10(3)/MCL (ref 0–0.04)
IMM GRANULOCYTES NFR BLD AUTO: 0.6 %
LYMPHOCYTES # BLD AUTO: 0.26 X10(3)/MCL (ref 0.6–4.6)
LYMPHOCYTES NFR BLD AUTO: 7.8 %
MAGNESIUM SERPL-MCNC: 1.7 MG/DL (ref 1.6–2.6)
MCH RBC QN AUTO: 27.9 PG (ref 27–31)
MCHC RBC AUTO-ENTMCNC: 32.6 G/DL (ref 33–36)
MCV RBC AUTO: 85.7 FL (ref 80–94)
MONOCYTES # BLD AUTO: 0.2 X10(3)/MCL (ref 0.1–1.3)
MONOCYTES NFR BLD AUTO: 6 %
NEUTROPHILS # BLD AUTO: 2.8 X10(3)/MCL (ref 2.1–9.2)
NEUTROPHILS NFR BLD AUTO: 84.4 %
NRBC BLD AUTO-RTO: 0 %
PHOSPHATE SERPL-MCNC: 3.1 MG/DL (ref 2.3–4.7)
PLATELET # BLD AUTO: 130 X10(3)/MCL (ref 130–400)
PMV BLD AUTO: 11.5 FL (ref 7.4–10.4)
POTASSIUM SERPL-SCNC: 4 MMOL/L (ref 3.5–5.1)
PROT SERPL-MCNC: 7.2 GM/DL (ref 5.8–7.6)
RBC # BLD AUTO: 3.15 X10(6)/MCL (ref 4.2–5.4)
SODIUM SERPL-SCNC: 140 MMOL/L (ref 136–145)
WBC # BLD AUTO: 3.32 X10(3)/MCL (ref 4.5–11.5)

## 2024-06-28 PROCEDURE — 84100 ASSAY OF PHOSPHORUS: CPT | Performed by: STUDENT IN AN ORGANIZED HEALTH CARE EDUCATION/TRAINING PROGRAM

## 2024-06-28 PROCEDURE — 36415 COLL VENOUS BLD VENIPUNCTURE: CPT | Performed by: STUDENT IN AN ORGANIZED HEALTH CARE EDUCATION/TRAINING PROGRAM

## 2024-06-28 PROCEDURE — 25000003 PHARM REV CODE 250: Performed by: STUDENT IN AN ORGANIZED HEALTH CARE EDUCATION/TRAINING PROGRAM

## 2024-06-28 PROCEDURE — 63600175 PHARM REV CODE 636 W HCPCS: Performed by: STUDENT IN AN ORGANIZED HEALTH CARE EDUCATION/TRAINING PROGRAM

## 2024-06-28 PROCEDURE — 85025 COMPLETE CBC W/AUTO DIFF WBC: CPT | Performed by: STUDENT IN AN ORGANIZED HEALTH CARE EDUCATION/TRAINING PROGRAM

## 2024-06-28 PROCEDURE — 94761 N-INVAS EAR/PLS OXIMETRY MLT: CPT

## 2024-06-28 PROCEDURE — 83735 ASSAY OF MAGNESIUM: CPT | Performed by: STUDENT IN AN ORGANIZED HEALTH CARE EDUCATION/TRAINING PROGRAM

## 2024-06-28 PROCEDURE — 21400001 HC TELEMETRY ROOM

## 2024-06-28 PROCEDURE — 97535 SELF CARE MNGMENT TRAINING: CPT

## 2024-06-28 PROCEDURE — 80053 COMPREHEN METABOLIC PANEL: CPT | Performed by: STUDENT IN AN ORGANIZED HEALTH CARE EDUCATION/TRAINING PROGRAM

## 2024-06-28 PROCEDURE — 25000242 PHARM REV CODE 250 ALT 637 W/ HCPCS: Performed by: STUDENT IN AN ORGANIZED HEALTH CARE EDUCATION/TRAINING PROGRAM

## 2024-06-28 PROCEDURE — 97530 THERAPEUTIC ACTIVITIES: CPT

## 2024-06-28 RX ORDER — ESCITALOPRAM OXALATE 10 MG/1
10 TABLET ORAL NIGHTLY
Status: DISCONTINUED | OUTPATIENT
Start: 2024-06-28 | End: 2024-07-01 | Stop reason: HOSPADM

## 2024-06-28 RX ORDER — ALPRAZOLAM 0.25 MG/1
0.25 TABLET ORAL ONCE AS NEEDED
Status: COMPLETED | OUTPATIENT
Start: 2024-06-28 | End: 2024-06-28

## 2024-06-28 RX ORDER — ESCITALOPRAM OXALATE 10 MG/1
10 TABLET ORAL NIGHTLY
Status: DISCONTINUED | OUTPATIENT
Start: 2024-06-29 | End: 2024-06-28

## 2024-06-28 RX ADMIN — METOPROLOL SUCCINATE 12.5 MG: 25 TABLET, EXTENDED RELEASE ORAL at 08:06

## 2024-06-28 RX ADMIN — ESCITALOPRAM OXALATE 10 MG: 10 TABLET ORAL at 10:06

## 2024-06-28 RX ADMIN — ACETAMINOPHEN 650 MG: 325 TABLET, FILM COATED ORAL at 02:06

## 2024-06-28 RX ADMIN — ACETAMINOPHEN 650 MG: 325 TABLET, FILM COATED ORAL at 07:06

## 2024-06-28 RX ADMIN — ATORVASTATIN CALCIUM 10 MG: 10 TABLET, FILM COATED ORAL at 08:06

## 2024-06-28 RX ADMIN — CEFTRIAXONE SODIUM 1 G: 1 INJECTION, POWDER, FOR SOLUTION INTRAMUSCULAR; INTRAVENOUS at 06:06

## 2024-06-28 RX ADMIN — PSYLLIUM HUSK 1 PACKET: 3.4 POWDER ORAL at 09:06

## 2024-06-28 RX ADMIN — SODIUM CHLORIDE, POTASSIUM CHLORIDE, SODIUM LACTATE AND CALCIUM CHLORIDE: 600; 310; 30; 20 INJECTION, SOLUTION INTRAVENOUS at 04:06

## 2024-06-28 RX ADMIN — ASPIRIN 81 MG CHEWABLE TABLET 81 MG: 81 TABLET CHEWABLE at 08:06

## 2024-06-28 RX ADMIN — ALPRAZOLAM 0.25 MG: 0.25 TABLET ORAL at 10:06

## 2024-06-28 RX ADMIN — LISINOPRIL 5 MG: 2.5 TABLET ORAL at 08:06

## 2024-06-28 RX ADMIN — SODIUM CHLORIDE, POTASSIUM CHLORIDE, SODIUM LACTATE AND CALCIUM CHLORIDE: 600; 310; 30; 20 INJECTION, SOLUTION INTRAVENOUS at 02:06

## 2024-06-28 RX ADMIN — APIXABAN 5 MG: 2.5 TABLET, FILM COATED ORAL at 08:06

## 2024-06-28 RX ADMIN — APIXABAN 5 MG: 2.5 TABLET, FILM COATED ORAL at 10:06

## 2024-06-28 NOTE — PT/OT/SLP PROGRESS
Physical Therapy Treatment    Patient Name:  Fifi Chacon   MRN:  [id number]    Recommendations     Therapy Intensity Recommendations at Discharge: Low Intensity Therapy  Discharge Equipment Recommendations: bedside commode, bath bench, walker, rolling   Barriers to discharge: fall risk, level of skilled assistance required, decreased safety awareness, and time since onset    Assessment     Fifi Chacon is a 88 y.o. female admitted with a medical diagnosis of  Acute cystitis without hematuria.    She presents with the following impairments/functional limitations:  weakness, impaired endurance, gait instability, impaired balance, decreased lower extremity function, impaired cardiopulmonary response to activity.    Rehab Prognosis: Good.    Patient would benefit from continued skilled acute PT services to: address above listed impairments/functional limitations; receive patient/caregiver education; reduce fall risk; and maximize independency/safety with functional mobility.    Recent Surgery: * No surgery found *      Plan     During this hospitalization, patient to be seen 3 x/week to address the identified impairments/functional limitations via gait training, therapeutic activities, therapeutic exercises, neuromuscular re-education and progress toward the established goals.    Plan of Care Expires:   (discharge)    Subjective     Communicated with patient's nurse Tanja prior to session.    Patient agreeable to participate in treatment session.    Chief Complaint: R flank pain  Patient/Family Comments/goals: To walk  Pain/Comfort:  Pain Rating 1: 5/10  Location - Side 1: Right  Location 1: flank  Pain Addressed 1: Reposition, Cessation of Activity  Pain Rating Post-Intervention 1: 5/10    Objective     Patient found with HOB elevated with peripheral IV, PureWick  upon PT entry to room.    General Precautions: Standard, fall   Orthopedic Precautions:N/A   Braces:    Respiratory Status: room air    Functional  Mobility:    Bed Mobility:  Scooting: minimum assistance  Supine to Sit: minimum assistance  Sit to Supine: minimum assistance    Transfers:  Sit to Stand: minimum assistance with rolling walker    Gait:  Patient ambulated 15ft around bed to and from bathroom with rolling walker and minimum assistance.  Patient demonstrates :       ambulates outside JOSE MANUEL of RW       flexed posture.  OLE Agrawal assisted pt with toileting     Other Mobility:  Therapeutic Exercises performed:        -seated exercises:              marches            long arc quads            toe raises            heel raises  2x10 repetitions    Patient left with HOB elevated with all lines intact, call button in reach, and patient's nurse notified.    Goals     Multidisciplinary Problems       Physical Therapy Goals          Problem: Physical Therapy    Goal Priority Disciplines Outcome Goal Variances Interventions   Physical Therapy Goal     PT, PT/OT Progressing     Description: REVIEWED 2024  Goals to be met by: DISCHARGE  Patient will increase functional independence with mobility by performin. Supine to sit with Modified [place] - ONGOING  2. Sit to stand transfer with Modified [place] - ONGOING  3. Gait  x 130 feet with Supervision using Rolling Walker - ONGOING                     Time Tracking     PT Received On: 24  PT Start Time: 1420     PT Stop Time: 1455  PT Total Time (min): 35 min     Billable Minutes: Therapeutic Activity 35 mins     2024

## 2024-06-28 NOTE — PROGRESS NOTES
[facility] Family Medicine Wards Progress Note       Subjective:      Brief HPI:  88 y.o. female with a history of HTN, HLD, a fib on Eliquis, and anxiety who presented to [facility] ED on 2024 after a slip and fall while in the bathroom. Hit head. Denied LOC. Patient with nausea and vomiting starting the morning of arrival and generalized malaise with night before. Also with foul smelling urine and increased urinary frequency. Daughter concerned that the patient has had a few mechanical falls recently, last being a week prior, as well as a decline in memory. Overall independent in ADLs with son living with patient and assisting as needed. UA in ED concerning for UTI. CT Head with chronic microvascular ischemic changes, but without acute intracranial abnormalities. Patient received 1 dose of Rocephin, IV Zofran and 500 mL IVF bolus. Family Medicine consulted for admission.     Interval History:   NAEON. Continues to have intermittent dizziness while laying in bed. Occurs when she moves head side to side. Denies dizziness with ambulation. Working with PT and OT. Tolerating PO intake without nausea or vomiting. Denies chest pain, shortness of breath, abdominal pain. Urinating without dysuria. Daughter at bedside. Patient and family agreeable to MRI in setting of continued vertigo. Insurance authorization pending for SNF placement.     Review of Systems:  As per HPI      Objective:     Last 24 Hour Vital Signs:  BP  Min: 131/53  Max: 199/71  Temp  Av.1 °F (36.7 °C)  Min: 97.7 °F (36.5 °C)  Max: 98.4 °F (36.9 °C)  Pulse  Av.3  Min: 58  Max: 88  Resp  Av.3  Min: 17  Max: 18  SpO2  Av.5 %  Min: 91 %  Max: 97 %  I/O last 3 completed shifts:  In: 3752 [P.O.:412; I.V.:2437.2; IV Piggyback:902.7]  Out: 1000 [Urine:1000]    Physical Examination:  GEN: in no distress, appears chronically ill and deconditioned     HEENT: EOMI without nystagmus, mildly dry mucous membranes  CARDIO: regular rate, regular rhythm, normal  S1, S2, 2/6 systolic murmur most prominent to right 2nd intercostal space  PULM/CHEST: clear to auscultation bilaterally, no wheezes, rales or rhonchi, symmetric air entry, no accessory muscle use or distress  ABDOMEN: + BS, soft, non tender, non-distended, no guarding and no rebound  GENITOURINARY: no suprapubic tenderness  EXT: 2+ dorsal pedal pulses, venous stasis dermatitis bilaterally, < 1 second capillary refill   NEURO: alert, face symmetric, tongue midline, sensation intact, hearing decreased (baseline), moves all extremities equally, finger nose finger wnl, speech clear, HINTs: saccade present, unidirectional nystagmus to right, normal test of skew   PSYCH: appears calm and comfortable, no anxiety     Laboratory:  Most Recent Data:  CBC:     Recent Labs   Lab 06/26/24  0857 06/27/24  0512 06/28/24  0358   WBC 4.71 3.88* 3.32*   HGB 10.0* 8.7* 8.8*   HCT 29.7* 26.2* 27.0*    148 130   MCV 84.1 85.3 85.7     BMP:   Lab Results   Component Value Date     06/28/2024    K 4.0 06/28/2024     (H) 06/28/2024    CO2 25 06/28/2024    BUN 16.2 06/28/2024    CREATININE 1.08 (H) 06/28/2024    CALCIUM 9.0 06/28/2024    MG 1.70 06/28/2024    PHOS 3.1 06/28/2024     LFTs:   Lab Results   Component Value Date    ALBUMIN 2.7 (L) 06/28/2024    BILITOT 0.5 06/28/2024    AST 31 06/28/2024    ALKPHOS 46 06/28/2024    ALT 9 06/28/2024     Microbiology Data Reviewed: yes  Pertinent Findings:  Blood cultures: no growth @ 24 hours  Urine culture: no growth @ 24 hours     Other Results:  MRI Brain 6/28/24:    EXAMINATION:  MRI BRAIN WITHOUT CONTRAST     CLINICAL HISTORY:  Dizziness, non-specific;     TECHNIQUE:  Routine unenhanced brain MRI.     COMPARISON:  26 June 2024.     FINDINGS:  Mild motion degraded scan.  Diffusion imaging is negative for acute infarct.  There is prominent patchy increased T2 and FLAIR signal in the cerebral white matter which is nonspecific but most commonly associated with chronic small  vessel ischemic changes. The ventricles are not significantly enlarged.     Signal voids are visible in the intracranial internal carotid arteries bilaterally and in the basilar artery implying gross patency.     Impression:     No acute intracranial findings.    Current Medications:     Infusions:   lactated ringers   Intravenous Continuous 100 mL/hr at 06/28/24 0427 Rate Verify at 06/28/24 0427        Scheduled:   apixaban  5 mg Oral BID    aspirin  81 mg Oral Daily    atorvastatin  10 mg Oral Daily    cefTRIAXone (Rocephin) IV (PEDS and ADULTS)  1 g Intravenous Q24H    EScitalopram oxalate  10 mg Oral Daily    lisinopriL  5 mg Oral Daily    metoprolol succinate  12.5 mg Oral Daily    psyllium husk (aspartame)  1 packet Oral Daily        PRN:    Current Facility-Administered Medications:     acetaminophen, 650 mg, Oral, Q4H PRN    dextrose 10%, 12.5 g, Intravenous, PRN    dextrose 10%, 25 g, Intravenous, PRN    glucagon (human recombinant), 1 mg, Intramuscular, PRN    glucose, 16 g, Oral, PRN    glucose, 24 g, Oral, PRN    hydrALAZINE, 10 mg, Intravenous, Q6H PRN    labetalol, 10 mg, Intravenous, Q4H PRN    melatonin, 6 mg, Oral, Nightly PRN    naloxone, 0.02 mg, Intravenous, PRN    ondansetron, 4 mg, Intravenous, Q8H PRN    sodium chloride 0.9%, 10 mL, Intravenous, Q12H PRN      Assessment & Plan:     UTI  - continue Rocephin 1 g q 24 hours (Day 3)  - urine culture no growth   - blood culture no growth @ 24 hours  - Continue LR at 100 mL/hr   - Zofran prn      Vertigo  - suspect BPPV at this time   - patient and family agreeable to MRI, MRI without acute abnormalities      Recurrent mechanical falls   - imaging in ED without acute findings   - PT/OT  - case management for SNF placement     HTN  HLD  Paroxysmal a fib  - continue home aspirin, lisinopril 5 mg, Eliquis 5 mg BID and metoprolol succinate 12.5 mg daily   - on pravastatin at home, will use formulary equivalent atorvastatin     Anxiety  - continue home  escitalopram 10 mg nightly      CODE STATUS: FULL  Access: PIV right hand  Antibiotics: Rocephin  Diet: heart healthy   DVT Prophylaxis: Eliquis 5 mg BID  GI Prophylaxis: none  Fluids: LR @ 100 mL/hr    Disposition: Continue antibiotics for suspected UTI. MRI Brain obtained for continued vertigo and was without acute changes. Continue PT/OT. SNF placement pending.      Shante Elizabeth MD  [facility] -III

## 2024-06-28 NOTE — PT/OT/SLP PROGRESS
"Occupational Therapy   Treatment    1st attempt today: 0943 pt in MRI (brain)      Name: Fifi Chacon  MRN: [id number]  Admitting Diagnosis:  Acute cystitis without hematuria   , UTI, fall, vertigo (MRI negative)    Recommendations:     Discharge Recommendations: Moderate Intensity Therapy (mod intensity therapy versus home with 24 hour supervision/assistance as needed)  Discharge Equipment Recommendations:   (TTB)  Barriers to discharge:  None    Assessment:     Fifi Chacon is a 88 y.o. female with a medical diagnosis of Acute cystitis without hematuria.  She presents with generalized weakness, decreased activity tolerance, fatigue. Performance deficits affecting function are weakness, impaired self care skills, gait instability, impaired balance, impaired functional mobility.     Rehab Prognosis:  Good; patient would benefit from acute skilled OT services to address these deficits and reach maximum level of function.       Plan:     Patient to be seen 3 x/week to address the above listed problems via self-care/home management, therapeutic exercises, therapeutic activities  Plan of Care Expires:    Plan of Care Reviewed with: patient, daughter    Subjective     Chief Complaint: c/o fatigue " I already feel tired and I didn't do anything yet"  Patient/Family Comments/goals: return home PLOF  Pain/Comfort:  Pain Rating 1: 0/10    Objective:     Communicated with: nurse prior to session.  Patient found HOB elevated sleeping hard with PureWick, peripheral IV , bed alarm upon OT entry to room. Pt agreed to participate although pt appeared fatigued, no c/o dizziness    General Precautions: Standard, fall    Orthopedic Precautions:   Braces:    Respiratory Status: Room air     Occupational Performance:     Bed Mobility:    Patient completed Rolling/Turning to Left with  supervision  Patient completed Scooting/Bridging with supervision  Patient completed Supine to Sit with stand by assistance  Patient completed Sit to " Supine with stand by assistance     Functional Mobility/Transfers:  Patient completed Sit <> Stand Transfer with minimum assistance  with  rolling walker   Patient completed Toilet Transfer Step Transfer technique with minimum assistance with  rolling walker and grab bars  Functional Mobility: pt ambulated bed<>toilet scott 10 ft with RW CGA    Activities of Daily Living:  Grooming: combed hair seated EOB mod I pt unable to tolerate grooming in standing due to c/o fatigue  Lower Body Dressing: pt able to doff brief SBA, able to thread B LE into brief SBA, mod A to pull over hips    Toileting: +void on toilet, able to complete pericleaning CGA, mod A clothing management      Treatment & Education:  Education on OT POC, use of call button    OT encouraged pt to eat lunch, Patient declined lunch tray, c/o no appetite, left HOB elevated with all lines intact, call button in reach, and bed alarm on    GOALS:   Multidisciplinary Problems       Occupational Therapy Goals          Problem: Occupational Therapy    Goal Priority Disciplines Outcome Interventions   Occupational Therapy Goal     OT, PT/OT Progressing    Description: Pt will ambulate to bathroom with RW supervision  Pt will complete toilet t/f supervision  Pt will complete toileting tasks SBA  Pt will complete grooming in standing at sink CGA                       Time Tracking:     OT Date of Treatment:    OT Start Time: 1206  OT Stop Time: 1230  OT Total Time (min): 24 min    Billable Minutes:Self Care/Home Management 2    OT/SANIYA: OT     Number of SANIYA visits since last OT visit: 0    6/28/2024

## 2024-06-29 LAB
ALBUMIN SERPL-MCNC: 2.5 G/DL (ref 3.4–4.8)
ALBUMIN/GLOB SERPL: 0.6 RATIO (ref 1.1–2)
ALP SERPL-CCNC: 44 UNIT/L (ref 40–150)
ALT SERPL-CCNC: 9 UNIT/L (ref 0–55)
ANION GAP SERPL CALC-SCNC: 5 MEQ/L
AST SERPL-CCNC: 31 UNIT/L (ref 5–34)
BASOPHILS # BLD AUTO: 0.01 X10(3)/MCL
BASOPHILS NFR BLD AUTO: 0.4 %
BILIRUB SERPL-MCNC: 0.5 MG/DL
BUN SERPL-MCNC: 13.4 MG/DL (ref 9.8–20.1)
CALCIUM SERPL-MCNC: 8.7 MG/DL (ref 8.4–10.2)
CHLORIDE SERPL-SCNC: 112 MMOL/L (ref 98–107)
CO2 SERPL-SCNC: 24 MMOL/L (ref 23–31)
CREAT SERPL-MCNC: 1.01 MG/DL (ref 0.55–1.02)
CREAT/UREA NIT SERPL: 13
EOSINOPHIL # BLD AUTO: 0.13 X10(3)/MCL (ref 0–0.9)
EOSINOPHIL NFR BLD AUTO: 5.3 %
ERYTHROCYTE [DISTWIDTH] IN BLOOD BY AUTOMATED COUNT: 15.7 % (ref 11.5–17)
GFR SERPLBLD CREATININE-BSD FMLA CKD-EPI: 54 ML/MIN/1.73/M2
GLOBULIN SER-MCNC: 4.3 GM/DL (ref 2.4–3.5)
GLUCOSE SERPL-MCNC: 90 MG/DL (ref 82–115)
HCT VFR BLD AUTO: 25.4 % (ref 37–47)
HGB BLD-MCNC: 8.4 G/DL (ref 12–16)
IMM GRANULOCYTES # BLD AUTO: 0.01 X10(3)/MCL (ref 0–0.04)
IMM GRANULOCYTES NFR BLD AUTO: 0.4 %
LYMPHOCYTES # BLD AUTO: 0.3 X10(3)/MCL (ref 0.6–4.6)
LYMPHOCYTES NFR BLD AUTO: 12.3 %
MAGNESIUM SERPL-MCNC: 1.8 MG/DL (ref 1.6–2.6)
MCH RBC QN AUTO: 28.4 PG (ref 27–31)
MCHC RBC AUTO-ENTMCNC: 33.1 G/DL (ref 33–36)
MCV RBC AUTO: 85.8 FL (ref 80–94)
MONOCYTES # BLD AUTO: 0.17 X10(3)/MCL (ref 0.1–1.3)
MONOCYTES NFR BLD AUTO: 7 %
NEUTROPHILS # BLD AUTO: 1.81 X10(3)/MCL (ref 2.1–9.2)
NEUTROPHILS NFR BLD AUTO: 74.6 %
NRBC BLD AUTO-RTO: 0 %
PHOSPHATE SERPL-MCNC: 2.9 MG/DL (ref 2.3–4.7)
PLATELET # BLD AUTO: 121 X10(3)/MCL (ref 130–400)
PLATELETS.RETICULATED NFR BLD AUTO: 4.1 % (ref 0.9–11.2)
PMV BLD AUTO: 11.5 FL (ref 7.4–10.4)
POTASSIUM SERPL-SCNC: 3.8 MMOL/L (ref 3.5–5.1)
PROT SERPL-MCNC: 6.8 GM/DL (ref 5.8–7.6)
RBC # BLD AUTO: 2.96 X10(6)/MCL (ref 4.2–5.4)
SODIUM SERPL-SCNC: 141 MMOL/L (ref 136–145)
WBC # BLD AUTO: 2.43 X10(3)/MCL (ref 4.5–11.5)

## 2024-06-29 PROCEDURE — 94761 N-INVAS EAR/PLS OXIMETRY MLT: CPT

## 2024-06-29 PROCEDURE — 25000003 PHARM REV CODE 250: Performed by: STUDENT IN AN ORGANIZED HEALTH CARE EDUCATION/TRAINING PROGRAM

## 2024-06-29 PROCEDURE — 63600175 PHARM REV CODE 636 W HCPCS

## 2024-06-29 PROCEDURE — 63600175 PHARM REV CODE 636 W HCPCS: Performed by: STUDENT IN AN ORGANIZED HEALTH CARE EDUCATION/TRAINING PROGRAM

## 2024-06-29 PROCEDURE — 84100 ASSAY OF PHOSPHORUS: CPT | Performed by: STUDENT IN AN ORGANIZED HEALTH CARE EDUCATION/TRAINING PROGRAM

## 2024-06-29 PROCEDURE — 25000003 PHARM REV CODE 250

## 2024-06-29 PROCEDURE — 36415 COLL VENOUS BLD VENIPUNCTURE: CPT | Performed by: STUDENT IN AN ORGANIZED HEALTH CARE EDUCATION/TRAINING PROGRAM

## 2024-06-29 PROCEDURE — 63600175 PHARM REV CODE 636 W HCPCS: Mod: JZ,JG

## 2024-06-29 PROCEDURE — 83735 ASSAY OF MAGNESIUM: CPT | Performed by: STUDENT IN AN ORGANIZED HEALTH CARE EDUCATION/TRAINING PROGRAM

## 2024-06-29 PROCEDURE — 80053 COMPREHEN METABOLIC PANEL: CPT | Performed by: STUDENT IN AN ORGANIZED HEALTH CARE EDUCATION/TRAINING PROGRAM

## 2024-06-29 PROCEDURE — 25000242 PHARM REV CODE 250 ALT 637 W/ HCPCS: Performed by: STUDENT IN AN ORGANIZED HEALTH CARE EDUCATION/TRAINING PROGRAM

## 2024-06-29 PROCEDURE — 85025 COMPLETE CBC W/AUTO DIFF WBC: CPT | Performed by: STUDENT IN AN ORGANIZED HEALTH CARE EDUCATION/TRAINING PROGRAM

## 2024-06-29 PROCEDURE — 21400001 HC TELEMETRY ROOM

## 2024-06-29 RX ORDER — HYDRALAZINE HYDROCHLORIDE 20 MG/ML
10 INJECTION INTRAMUSCULAR; INTRAVENOUS EVERY 6 HOURS PRN
Status: DISCONTINUED | OUTPATIENT
Start: 2024-06-29 | End: 2024-07-01 | Stop reason: HOSPADM

## 2024-06-29 RX ORDER — MAGNESIUM SULFATE 1 G/100ML
1 INJECTION INTRAVENOUS ONCE
Status: COMPLETED | OUTPATIENT
Start: 2024-06-29 | End: 2024-06-29

## 2024-06-29 RX ORDER — POTASSIUM CHLORIDE 20 MEQ/1
20 TABLET, EXTENDED RELEASE ORAL ONCE
Status: COMPLETED | OUTPATIENT
Start: 2024-06-29 | End: 2024-06-29

## 2024-06-29 RX ORDER — LABETALOL HCL 20 MG/4 ML
10 SYRINGE (ML) INTRAVENOUS EVERY 4 HOURS PRN
Status: DISCONTINUED | OUTPATIENT
Start: 2024-06-29 | End: 2024-07-01 | Stop reason: HOSPADM

## 2024-06-29 RX ADMIN — ESCITALOPRAM OXALATE 10 MG: 10 TABLET ORAL at 08:06

## 2024-06-29 RX ADMIN — LISINOPRIL 5 MG: 2.5 TABLET ORAL at 08:06

## 2024-06-29 RX ADMIN — APIXABAN 5 MG: 2.5 TABLET, FILM COATED ORAL at 08:06

## 2024-06-29 RX ADMIN — POTASSIUM CHLORIDE 20 MEQ: 1500 TABLET, EXTENDED RELEASE ORAL at 10:06

## 2024-06-29 RX ADMIN — ACETAMINOPHEN 650 MG: 325 TABLET, FILM COATED ORAL at 02:06

## 2024-06-29 RX ADMIN — PSYLLIUM HUSK 1 PACKET: 3.4 POWDER ORAL at 08:06

## 2024-06-29 RX ADMIN — ASPIRIN 81 MG CHEWABLE TABLET 81 MG: 81 TABLET CHEWABLE at 08:06

## 2024-06-29 RX ADMIN — METOPROLOL SUCCINATE 12.5 MG: 25 TABLET, EXTENDED RELEASE ORAL at 08:06

## 2024-06-29 RX ADMIN — CEFTRIAXONE SODIUM 1 G: 1 INJECTION, POWDER, FOR SOLUTION INTRAMUSCULAR; INTRAVENOUS at 06:06

## 2024-06-29 RX ADMIN — MAGNESIUM SULFATE IN DEXTROSE 1 G: 10 INJECTION, SOLUTION INTRAVENOUS at 10:06

## 2024-06-29 RX ADMIN — LABETALOL HYDROCHLORIDE 10 MG: 5 INJECTION, SOLUTION INTRAVENOUS at 08:06

## 2024-06-29 RX ADMIN — SODIUM CHLORIDE, POTASSIUM CHLORIDE, SODIUM LACTATE AND CALCIUM CHLORIDE: 600; 310; 30; 20 INJECTION, SOLUTION INTRAVENOUS at 02:06

## 2024-06-29 RX ADMIN — ATORVASTATIN CALCIUM 10 MG: 10 TABLET, FILM COATED ORAL at 08:06

## 2024-06-29 RX ADMIN — ACETAMINOPHEN 650 MG: 325 TABLET, FILM COATED ORAL at 08:06

## 2024-06-29 NOTE — PROGRESS NOTES
[facility] Family Medicine Wards Progress Note       Subjective:      Brief HPI:  88 y.o. female with a history of HTN, HLD, a fib on Eliquis, and anxiety who presented to [facility] ED on 2024 after a slip and fall while in the bathroom. Hit head. Denied LOC. Patient with nausea and vomiting starting the morning of arrival and generalized malaise with night before. Also with foul smelling urine and increased urinary frequency. Daughter concerned that the patient has had a few mechanical falls recently, last being a week prior, as well as a decline in memory. Overall independent in ADLs with son living with patient and assisting as needed. UA in ED concerning for UTI. CT Head with chronic microvascular ischemic changes, but without acute intracranial abnormalities. Patient received 1 dose of Rocephin, IV Zofran and 500 mL IVF bolus. Family Medicine consulted for admission.     Interval History:   NAEON. Daughter and granddaughter at bedside. Patient reports feeling a little better this morning. She continues to have intermittent dizziness while laying in bed, but denies any dizziness with ambulation.   MRI  revealed no acute intracranial findings.   She is tolerating PO intake without nausea or vomiting.   She had a temp of 100.3F this morning. Repeat few hours later was 98.5F. Also hypertensive this morning 140-160/60's-70's.   Patient denies any new symptoms. Denies chills, chest pain, shortness of breath, abdominal pain, diarrhea, cough. Urinating without dysuria.     Review of Systems:  As per HPI      Objective:     Last 24 Hour Vital Signs:  BP  Min: 131/53  Max: 199/71  Temp  Av.1 °F (36.7 °C)  Min: 97.7 °F (36.5 °C)  Max: 98.4 °F (36.9 °C)  Pulse  Av.3  Min: 58  Max: 88  Resp  Av.3  Min: 17  Max: 18  SpO2  Av.5 %  Min: 91 %  Max: 97 %  I/O last 3 completed shifts:  In: 3752 [P.O.:412; I.V.:2437.2; IV Piggyback:902.7]  Out: 1000 [Urine:1000]    Physical Examination:  GEN: in no distress,  appears chronically ill and deconditioned     HEENT: Mildly dry mucous membranes  CARDIO: regular rate and rhythm, normal S1 and S2, 2/6 systolic murmur most prominent to right 2nd intercostal space  PULM/CHEST: clear to auscultation bilaterally, no increased work of breathing  ABDOMEN: + BS, soft, non tender, non-distended, no guarding or  rebound  GENITOURINARY: no suprapubic tenderness  EXT: 2+ dorsal pedal pulses, venous stasis dermatitis bilaterally  NEURO: alert, face symmetric, tongue midline, sensation intact, hearing decreased (baseline), moves all extremities spontaneously.   PSYCH: appears calm and comfortable, no anxiety     Laboratory:  Most Recent Data:  CBC:     Recent Labs   Lab 06/26/24  0857 06/27/24  0512 06/28/24  0358 06/29/24  0548   WBC 4.71 3.88* 3.32* 2.43*   HGB 10.0* 8.7* 8.8* 8.4*   HCT 29.7* 26.2* 27.0* 25.4*    148 130 121*   MCV 84.1 85.3 85.7 85.8     BMP:   Lab Results   Component Value Date     06/29/2024    K 3.8 06/29/2024     (H) 06/29/2024    CO2 24 06/29/2024    BUN 13.4 06/29/2024    CREATININE 1.01 06/29/2024    CALCIUM 8.7 06/29/2024    MG 1.80 06/29/2024    PHOS 2.9 06/29/2024     LFTs:   Lab Results   Component Value Date    ALBUMIN 2.5 (L) 06/29/2024    BILITOT 0.5 06/29/2024    AST 31 06/29/2024    ALKPHOS 44 06/29/2024    ALT 9 06/29/2024     Microbiology Data Reviewed: yes  Pertinent Findings:  Blood cultures: no growth @ 48 hours  Urine culture: no growth @ 24 hours     Other Results:  MRI Brain 6/28/24:    EXAMINATION:  MRI BRAIN WITHOUT CONTRAST     CLINICAL HISTORY:  Dizziness, non-specific;     TECHNIQUE:  Routine unenhanced brain MRI.     COMPARISON:  26 June 2024.     FINDINGS:  Mild motion degraded scan.  Diffusion imaging is negative for acute infarct.  There is prominent patchy increased T2 and FLAIR signal in the cerebral white matter which is nonspecific but most commonly associated with chronic small vessel ischemic changes. The  ventricles are not significantly enlarged.     Signal voids are visible in the intracranial internal carotid arteries bilaterally and in the basilar artery implying gross patency.     Impression:     No acute intracranial findings.    Current Medications:     Infusions:   lactated ringers   Intravenous Continuous 100 mL/hr at 06/28/24 1625 New Bag at 06/28/24 1625        Scheduled:   apixaban  5 mg Oral BID    aspirin  81 mg Oral Daily    atorvastatin  10 mg Oral Daily    cefTRIAXone (Rocephin) IV (PEDS and ADULTS)  1 g Intravenous Q24H    EScitalopram oxalate  10 mg Oral QHS    lisinopriL  5 mg Oral Daily    magnesium sulfate IVPB  1 g Intravenous Once    metoprolol succinate  12.5 mg Oral Daily    psyllium husk (aspartame)  1 packet Oral Daily        PRN:    Current Facility-Administered Medications:     acetaminophen, 650 mg, Oral, Q4H PRN    dextrose 10%, 12.5 g, Intravenous, PRN    dextrose 10%, 25 g, Intravenous, PRN    glucagon (human recombinant), 1 mg, Intramuscular, PRN    glucose, 16 g, Oral, PRN    glucose, 24 g, Oral, PRN    hydrALAZINE, 10 mg, Intravenous, Q6H PRN    labetalol, 10 mg, Intravenous, Q4H PRN    melatonin, 6 mg, Oral, Nightly PRN    naloxone, 0.02 mg, Intravenous, PRN    ondansetron, 4 mg, Intravenous, Q8H PRN    sodium chloride 0.9%, 10 mL, Intravenous, Q12H PRN      Assessment & Plan:     UTI  - Continue Rocephin 1 g q 24 hours (Day 4)  - Urine culture no growth   - Prelim blood culture no growth @ 48 hours  - Continue LR at 100 mL/hr   - Zofran prn        Vertigo  - Onset after fall  - Suspect BPPV vs secondary to UTI at this time   - MRI brain without acute abnormalities   - If continues to persist, patient may need ENT work up outpatient       Recurrent mechanical falls   - imaging in ED without acute findings   - PT/OT  - case management for SNF placement       HTN  HLD  Paroxysmal a fib  - continue home aspirin, lisinopril 5 mg, Eliquis 5 mg BID and metoprolol succinate 12.5 mg  daily   - PRN antihypertensives  - on pravastatin at home, will use formulary equivalent atorvastatin       Anxiety  - continue home escitalopram 10 mg nightly        CODE STATUS: FULL  Access: PIV right hand  Antibiotics: Rocephin  Diet: heart healthy   DVT Prophylaxis: Eliquis 5 mg BID  GI Prophylaxis: none  Fluids: LR @ 100 mL/hr      Disposition: Continue antibiotics for suspected UTI. MRI Brain obtained for continued vertigo; results unremarkable. Continue PT/OT.  Plan to discharge to SNF on Monday.       Chinyere Bucio MD  [facility] Family Medicine Resident, -

## 2024-06-30 LAB
ALBUMIN SERPL-MCNC: 2.6 G/DL (ref 3.4–4.8)
ALBUMIN/GLOB SERPL: 0.6 RATIO (ref 1.1–2)
ALP SERPL-CCNC: 46 UNIT/L (ref 40–150)
ALT SERPL-CCNC: 11 UNIT/L (ref 0–55)
ANION GAP SERPL CALC-SCNC: 7 MEQ/L
AST SERPL-CCNC: 31 UNIT/L (ref 5–34)
BASOPHILS # BLD AUTO: 0.01 X10(3)/MCL
BASOPHILS NFR BLD AUTO: 0.3 %
BILIRUB SERPL-MCNC: 0.5 MG/DL
BUN SERPL-MCNC: 9.8 MG/DL (ref 9.8–20.1)
CALCIUM SERPL-MCNC: 8.8 MG/DL (ref 8.4–10.2)
CHLORIDE SERPL-SCNC: 110 MMOL/L (ref 98–107)
CO2 SERPL-SCNC: 22 MMOL/L (ref 23–31)
CREAT SERPL-MCNC: 0.94 MG/DL (ref 0.55–1.02)
CREAT/UREA NIT SERPL: 10
EOSINOPHIL # BLD AUTO: 0.05 X10(3)/MCL (ref 0–0.9)
EOSINOPHIL NFR BLD AUTO: 1.7 %
ERYTHROCYTE [DISTWIDTH] IN BLOOD BY AUTOMATED COUNT: 15.3 % (ref 11.5–17)
GFR SERPLBLD CREATININE-BSD FMLA CKD-EPI: 59 ML/MIN/1.73/M2
GLOBULIN SER-MCNC: 4.4 GM/DL (ref 2.4–3.5)
GLUCOSE SERPL-MCNC: 89 MG/DL (ref 82–115)
HCT VFR BLD AUTO: 27.4 % (ref 37–47)
HGB BLD-MCNC: 9 G/DL (ref 12–16)
IMM GRANULOCYTES # BLD AUTO: 0.02 X10(3)/MCL (ref 0–0.04)
IMM GRANULOCYTES NFR BLD AUTO: 0.7 %
LYMPHOCYTES # BLD AUTO: 0.23 X10(3)/MCL (ref 0.6–4.6)
LYMPHOCYTES NFR BLD AUTO: 7.9 %
MAGNESIUM SERPL-MCNC: 1.7 MG/DL (ref 1.6–2.6)
MCH RBC QN AUTO: 28 PG (ref 27–31)
MCHC RBC AUTO-ENTMCNC: 32.8 G/DL (ref 33–36)
MCV RBC AUTO: 85.1 FL (ref 80–94)
MONOCYTES # BLD AUTO: 0.16 X10(3)/MCL (ref 0.1–1.3)
MONOCYTES NFR BLD AUTO: 5.5 %
NEUTROPHILS # BLD AUTO: 2.43 X10(3)/MCL (ref 2.1–9.2)
NEUTROPHILS NFR BLD AUTO: 83.9 %
NRBC BLD AUTO-RTO: 0 %
PHOSPHATE SERPL-MCNC: 2.7 MG/DL (ref 2.3–4.7)
PLATELET # BLD AUTO: 132 X10(3)/MCL (ref 130–400)
PMV BLD AUTO: 10.6 FL (ref 7.4–10.4)
POTASSIUM SERPL-SCNC: 4 MMOL/L (ref 3.5–5.1)
PROT SERPL-MCNC: 7 GM/DL (ref 5.8–7.6)
RBC # BLD AUTO: 3.22 X10(6)/MCL (ref 4.2–5.4)
SODIUM SERPL-SCNC: 139 MMOL/L (ref 136–145)
WBC # BLD AUTO: 2.9 X10(3)/MCL (ref 4.5–11.5)

## 2024-06-30 PROCEDURE — 21400001 HC TELEMETRY ROOM

## 2024-06-30 PROCEDURE — 85025 COMPLETE CBC W/AUTO DIFF WBC: CPT | Performed by: STUDENT IN AN ORGANIZED HEALTH CARE EDUCATION/TRAINING PROGRAM

## 2024-06-30 PROCEDURE — 25000242 PHARM REV CODE 250 ALT 637 W/ HCPCS: Performed by: STUDENT IN AN ORGANIZED HEALTH CARE EDUCATION/TRAINING PROGRAM

## 2024-06-30 PROCEDURE — 84100 ASSAY OF PHOSPHORUS: CPT | Performed by: STUDENT IN AN ORGANIZED HEALTH CARE EDUCATION/TRAINING PROGRAM

## 2024-06-30 PROCEDURE — 63600175 PHARM REV CODE 636 W HCPCS: Performed by: STUDENT IN AN ORGANIZED HEALTH CARE EDUCATION/TRAINING PROGRAM

## 2024-06-30 PROCEDURE — 63600175 PHARM REV CODE 636 W HCPCS

## 2024-06-30 PROCEDURE — 25000003 PHARM REV CODE 250: Performed by: STUDENT IN AN ORGANIZED HEALTH CARE EDUCATION/TRAINING PROGRAM

## 2024-06-30 PROCEDURE — 25000003 PHARM REV CODE 250

## 2024-06-30 PROCEDURE — 25000003 PHARM REV CODE 250: Performed by: FAMILY MEDICINE

## 2024-06-30 PROCEDURE — 94761 N-INVAS EAR/PLS OXIMETRY MLT: CPT

## 2024-06-30 PROCEDURE — 80053 COMPREHEN METABOLIC PANEL: CPT | Performed by: STUDENT IN AN ORGANIZED HEALTH CARE EDUCATION/TRAINING PROGRAM

## 2024-06-30 PROCEDURE — 36415 COLL VENOUS BLD VENIPUNCTURE: CPT | Performed by: STUDENT IN AN ORGANIZED HEALTH CARE EDUCATION/TRAINING PROGRAM

## 2024-06-30 PROCEDURE — 97530 THERAPEUTIC ACTIVITIES: CPT

## 2024-06-30 PROCEDURE — 83735 ASSAY OF MAGNESIUM: CPT | Performed by: STUDENT IN AN ORGANIZED HEALTH CARE EDUCATION/TRAINING PROGRAM

## 2024-06-30 RX ORDER — LISINOPRIL 2.5 MG/1
5 TABLET ORAL ONCE
Status: COMPLETED | OUTPATIENT
Start: 2024-06-30 | End: 2024-06-30

## 2024-06-30 RX ORDER — SODIUM CHLORIDE 9 MG/ML
INJECTION, SOLUTION INTRAVENOUS
Status: DISCONTINUED | OUTPATIENT
Start: 2024-06-30 | End: 2024-07-01 | Stop reason: HOSPADM

## 2024-06-30 RX ORDER — MAGNESIUM SULFATE HEPTAHYDRATE 40 MG/ML
2 INJECTION, SOLUTION INTRAVENOUS ONCE
Status: COMPLETED | OUTPATIENT
Start: 2024-06-30 | End: 2024-06-30

## 2024-06-30 RX ORDER — TALC
6 POWDER (GRAM) TOPICAL NIGHTLY
Status: DISCONTINUED | OUTPATIENT
Start: 2024-06-30 | End: 2024-07-01 | Stop reason: HOSPADM

## 2024-06-30 RX ORDER — LISINOPRIL 10 MG/1
10 TABLET ORAL DAILY
Status: DISCONTINUED | OUTPATIENT
Start: 2024-07-01 | End: 2024-07-01 | Stop reason: HOSPADM

## 2024-06-30 RX ORDER — FUROSEMIDE 10 MG/ML
20 INJECTION INTRAMUSCULAR; INTRAVENOUS ONCE
Status: COMPLETED | OUTPATIENT
Start: 2024-06-30 | End: 2024-06-30

## 2024-06-30 RX ORDER — MECLIZINE HCL 12.5 MG 12.5 MG/1
12.5 TABLET ORAL 2 TIMES DAILY
Status: DISCONTINUED | OUTPATIENT
Start: 2024-06-30 | End: 2024-07-01 | Stop reason: HOSPADM

## 2024-06-30 RX ADMIN — LISINOPRIL 5 MG: 2.5 TABLET ORAL at 08:06

## 2024-06-30 RX ADMIN — Medication 6 MG: at 08:06

## 2024-06-30 RX ADMIN — PSYLLIUM HUSK 1 PACKET: 3.4 POWDER ORAL at 09:06

## 2024-06-30 RX ADMIN — SODIUM CHLORIDE: 9 INJECTION, SOLUTION INTRAVENOUS at 06:06

## 2024-06-30 RX ADMIN — FUROSEMIDE 20 MG: 10 INJECTION, SOLUTION INTRAMUSCULAR; INTRAVENOUS at 12:06

## 2024-06-30 RX ADMIN — MECLIZINE HYDROCHLORIDE 12.5 MG: 12.5 TABLET ORAL at 08:06

## 2024-06-30 RX ADMIN — ESCITALOPRAM OXALATE 10 MG: 10 TABLET ORAL at 08:06

## 2024-06-30 RX ADMIN — ACETAMINOPHEN 650 MG: 325 TABLET, FILM COATED ORAL at 09:06

## 2024-06-30 RX ADMIN — HYDRALAZINE HYDROCHLORIDE 10 MG: 20 INJECTION INTRAMUSCULAR; INTRAVENOUS at 09:06

## 2024-06-30 RX ADMIN — ATORVASTATIN CALCIUM 10 MG: 10 TABLET, FILM COATED ORAL at 08:06

## 2024-06-30 RX ADMIN — METOPROLOL SUCCINATE 12.5 MG: 25 TABLET, EXTENDED RELEASE ORAL at 08:06

## 2024-06-30 RX ADMIN — ASPIRIN 81 MG CHEWABLE TABLET 81 MG: 81 TABLET CHEWABLE at 08:06

## 2024-06-30 RX ADMIN — MAGNESIUM SULFATE HEPTAHYDRATE 2 G: 40 INJECTION, SOLUTION INTRAVENOUS at 09:06

## 2024-06-30 RX ADMIN — MECLIZINE HYDROCHLORIDE 12.5 MG: 12.5 TABLET ORAL at 12:06

## 2024-06-30 RX ADMIN — CEFTRIAXONE SODIUM 1 G: 1 INJECTION, POWDER, FOR SOLUTION INTRAMUSCULAR; INTRAVENOUS at 06:06

## 2024-06-30 RX ADMIN — APIXABAN 5 MG: 2.5 TABLET, FILM COATED ORAL at 08:06

## 2024-06-30 RX ADMIN — LISINOPRIL 5 MG: 2.5 TABLET ORAL at 11:06

## 2024-06-30 NOTE — PROGRESS NOTES
[facility] Family Medicine Wards Progress Note       Subjective:      Brief HPI:  88 y.o. female with a history of HTN, HLD, a fib on Eliquis, and anxiety who presented to [facility] ED on 2024 after a slip and fall while in the bathroom. Hit head. Denied LOC. Patient with nausea and vomiting starting the morning of arrival and generalized malaise with night before. Also with foul smelling urine and increased urinary frequency. Daughter concerned that the patient has had a few mechanical falls recently, last being a week prior, as well as a decline in memory. Overall independent in ADLs with son living with patient and assisting as needed. UA in ED concerning for UTI. CT Head with chronic microvascular ischemic changes, but without acute intracranial abnormalities. Patient received 1 dose of Rocephin, IV Zofran and 500 mL IVF bolus. Family Medicine consulted for admission.       Interval History:   Family at bedside. Patient had only one episode of dizzy spells since last night, which is improved from before. Tolerating PO intake without nausea or vomiting.   Tmax 99.3. Intermittently hypertensive -140-160/60's-70's. Patient was placed on 2L NC overnight due to low O2 sats.  Patient denies any new symptoms. Denies chills, chest pain, abdominal pain, diarrhea, cough. Urinating without dysuria.     Review of Systems:  As per HPI      Objective:     Last 24 Hour Vital Signs:  BP  Min: 131/53  Max: 199/71  Temp  Av.1 °F (36.7 °C)  Min: 97.7 °F (36.5 °C)  Max: 98.4 °F (36.9 °C)  Pulse  Av.3  Min: 58  Max: 88  Resp  Av.3  Min: 17  Max: 18  SpO2  Av.5 %  Min: 91 %  Max: 97 %  I/O last 3 completed shifts:  In: 3752 [P.O.:412; I.V.:2437.2; IV Piggyback:902.7]  Out: 1000 [Urine:1000]    Physical Examination:  GEN: in no distress, appears chronically ill and deconditioned     HEENT: Mildly dry mucous membranes  CARDIO: regular rate and rhythm, normal S1 and S2, 2/6 systolic murmur most prominent to right 2nd  intercostal space  PULM/CHEST: Mildly increased work of breathing, on 2L NC. Crackles present  ABDOMEN: + BS, soft, non tender, non-distended, no guarding or  rebound  GENITOURINARY: no suprapubic tenderness  EXT: 2+ dorsal pedal pulses, venous stasis dermatitis bilaterally  NEURO: alert, face symmetric, tongue midline, sensation intact, hearing decreased (baseline), moves all extremities spontaneously.   PSYCH: appears calm and comfortable, no anxiety     Laboratory:  Most Recent Data:  CBC:     Recent Labs   Lab 06/26/24  0857 06/27/24  0512 06/28/24  0358 06/29/24  0548 06/30/24  0344   WBC 4.71 3.88* 3.32* 2.43* 2.90*   HGB 10.0* 8.7* 8.8* 8.4* 9.0*   HCT 29.7* 26.2* 27.0* 25.4* 27.4*    148 130 121* 132   MCV 84.1 85.3 85.7 85.8 85.1     BMP:   Lab Results   Component Value Date     06/30/2024    K 4.0 06/30/2024     (H) 06/30/2024    CO2 22 (L) 06/30/2024    BUN 9.8 06/30/2024    CREATININE 0.94 06/30/2024    CALCIUM 8.8 06/30/2024    MG 1.70 06/30/2024    PHOS 2.7 06/30/2024     LFTs:   Lab Results   Component Value Date    ALBUMIN 2.6 (L) 06/30/2024    BILITOT 0.5 06/30/2024    AST 31 06/30/2024    ALKPHOS 46 06/30/2024    ALT 11 06/30/2024     Microbiology Data Reviewed: yes  Pertinent Findings:  Blood cultures: no growth @ 48 hours  Urine culture: no growth @ 24 hours     Other Results:  MRI Brain 6/28/24:    EXAMINATION:  MRI BRAIN WITHOUT CONTRAST     CLINICAL HISTORY:  Dizziness, non-specific;     TECHNIQUE:  Routine unenhanced brain MRI.     COMPARISON:  26 June 2024.     FINDINGS:  Mild motion degraded scan.  Diffusion imaging is negative for acute infarct.  There is prominent patchy increased T2 and FLAIR signal in the cerebral white matter which is nonspecific but most commonly associated with chronic small vessel ischemic changes. The ventricles are not significantly enlarged.     Signal voids are visible in the intracranial internal carotid arteries bilaterally and in the  basilar artery implying gross patency.     Impression:     No acute intracranial findings.    Current Medications:     Infusions:   lactated ringers   Intravenous Continuous 75 mL/hr at 06/30/24 0010 Rate Verify at 06/30/24 0010        Scheduled:   apixaban  5 mg Oral BID    aspirin  81 mg Oral Daily    atorvastatin  10 mg Oral Daily    cefTRIAXone (Rocephin) IV (PEDS and ADULTS)  1 g Intravenous Q24H    EScitalopram oxalate  10 mg Oral QHS    lisinopriL  5 mg Oral Daily    metoprolol succinate  12.5 mg Oral Daily    psyllium husk (aspartame)  1 packet Oral Daily        PRN:    Current Facility-Administered Medications:     0.9% NaCl, , Intravenous, PRN    acetaminophen, 650 mg, Oral, Q4H PRN    dextrose 10%, 12.5 g, Intravenous, PRN    dextrose 10%, 25 g, Intravenous, PRN    glucagon (human recombinant), 1 mg, Intramuscular, PRN    glucose, 16 g, Oral, PRN    glucose, 24 g, Oral, PRN    hydrALAZINE, 10 mg, Intravenous, Q6H PRN    labetalol, 10 mg, Intravenous, Q4H PRN    melatonin, 6 mg, Oral, Nightly PRN    naloxone, 0.02 mg, Intravenous, PRN    ondansetron, 4 mg, Intravenous, Q8H PRN    sodium chloride 0.9%, 10 mL, Intravenous, Q12H PRN      Assessment & Plan:     UTI  - Continue Rocephin 1 g q 24 hours (Day 5)  - Urine culture no growth   - Prelim blood culture no growth @ 48 hours  - Zofran prn       Shortness of breath  - On 2L NC; wean as tolerated  - Will get CXR this am, due to crackles on physical exam  - Discontinue IV fluids  - One time dose of IV lasix 20mg      Vertigo  - Onset after fall  - Suspect BPPV vs secondary to UTI at this time   - MRI brain without acute abnormalities   - Start Antivert 12.5mg BID  - If continues to persist, patient may need ENT work up outpatient       Recurrent mechanical falls   - imaging in ED without acute findings   - PT/OT  - case management for SNF placement       HTN  HLD  Paroxysmal a fib  - continue home aspirin, Eliquis 5 mg BID and metoprolol succinate 12.5 mg  daily. Increased lisinopril to 10mg qd due to elevated BP.  - PRN antihypertensives  - on pravastatin at home, will use formulary equivalent atorvastatin       Anxiety  - continue home escitalopram 10 mg nightly        CODE STATUS: FULL  Access: PIV right hand  Antibiotics: Rocephin  Diet: heart healthy   DVT Prophylaxis: Eliquis 5 mg BID  GI Prophylaxis: none  Fluids: None      Disposition: Continue antibiotics for suspected UTI. MRI Brain obtained for continued vertigo; results unremarkable. Continue PT/OT.  Plan to discharge to SNF on Monday.       Chinyere Bucio MD  [facility] Family Medicine Resident, -I

## 2024-06-30 NOTE — PT/OT/SLP PROGRESS
Physical Therapy Treatment    Patient Name:  Fifi Chacon   MRN:  [id number]    Recommendations     Therapy Intensity Recommendations at Discharge: Low Intensity Therapy  Discharge Equipment Recommendations: bedside commode, bath bench, walker, rolling   Barriers to discharge: fall risk, severity of deficits, medical diagnosis, and past medical history    Assessment     Fifi Chacon is a 88 y.o. female admitted with a medical diagnosis of  Acute cystitis without hematuria.    She presents with the following impairments/functional limitations:  weakness, impaired endurance, gait instability, impaired balance, pain.    Rehab Prognosis: Fair.    Patient would benefit from continued skilled acute PT services to: address above listed impairments/functional limitations; receive patient/caregiver education; reduce fall risk; and maximize independency/safety with functional mobility.    Recent Surgery: * No surgery found *      Plan     During this hospitalization, patient to be seen 3 x/week to address the identified impairments/functional limitations via gait training, therapeutic activities, therapeutic exercises, neuromuscular re-education and progress toward the established goals.    Plan of Care Expires:   (discharge)    Subjective     Communicated with patient's nurse  prior to session.    Patient agreeable to participate in treatment session.    Chief Complaint: Patient reports being dizzy  Patient/Family Comments/goals:  to improve strength  Pain/Comfort:  Pain Rating 1: 10/10  Location - Side 1: Bilateral  Location 1: back  Pain Addressed 1: Nurse notified  Pain Rating Post-Intervention 1: 10/10    Objective     Patient found supine in bed with peripheral IV, PureWick  upon PT entry to room.    General Precautions: Standard, fall   Orthopedic Precautions:N/A   Braces:    Respiratory Status: room air    Functional Mobility:    Bed Mobility:  Rolling Left: minimum assistance  Rolling Right: minimum  assistance  Supine to Sit: minimum assistance  Sit to Supine: minimum assistance    Transfers:  Sit to Stand: minimum assistance with rolling walker  Patient dizzy with standing and unable to progress towards gait training.  /78      Other Mobility:  not assessed    Patient left supine in bed with all lines intact, call button in reach, tray table at bedside, and patient's nurse notified.    Goals     Multidisciplinary Problems       Physical Therapy Goals          Problem: Physical Therapy    Goal Priority Disciplines Outcome Goal Variances Interventions   Physical Therapy Goal     PT, PT/OT Not Progressing     Description: REVIEWED 2024  Goals to be met by: DISCHARGE  Patient will increase functional independence with mobility by performin. Supine to sit with Modified [place] - ONGOING  2. Sit to stand transfer with Modified [place] - ONGOING  3. Gait  x 130 feet with Supervision using Rolling Walker - ONGOING                     Time Tracking     PT Received On: 24  PT Start Time: 1245     PT Stop Time: 1300  PT Total Time (min): 15 min     Billable Minutes: Therapeutic Activity 15    2024

## 2024-07-01 VITALS
RESPIRATION RATE: 18 BRPM | HEART RATE: 63 BPM | TEMPERATURE: 98 F | DIASTOLIC BLOOD PRESSURE: 76 MMHG | OXYGEN SATURATION: 94 % | BODY MASS INDEX: 25.76 KG/M2 | SYSTOLIC BLOOD PRESSURE: 136 MMHG | HEIGHT: 62 IN | WEIGHT: 140 LBS

## 2024-07-01 LAB
ALBUMIN SERPL-MCNC: 2.7 G/DL (ref 3.4–4.8)
ALBUMIN/GLOB SERPL: 0.6 RATIO (ref 1.1–2)
ALP SERPL-CCNC: 50 UNIT/L (ref 40–150)
ALT SERPL-CCNC: 10 UNIT/L (ref 0–55)
ANION GAP SERPL CALC-SCNC: 7 MEQ/L
AST SERPL-CCNC: 29 UNIT/L (ref 5–34)
BACTERIA BLD CULT: NORMAL
BACTERIA BLD CULT: NORMAL
BASOPHILS # BLD AUTO: 0.01 X10(3)/MCL
BASOPHILS NFR BLD AUTO: 0.2 %
BILIRUB SERPL-MCNC: 0.6 MG/DL
BUN SERPL-MCNC: 10.8 MG/DL (ref 9.8–20.1)
CALCIUM SERPL-MCNC: 8.8 MG/DL (ref 8.4–10.2)
CHLORIDE SERPL-SCNC: 106 MMOL/L (ref 98–107)
CO2 SERPL-SCNC: 24 MMOL/L (ref 23–31)
CREAT SERPL-MCNC: 0.91 MG/DL (ref 0.55–1.02)
CREAT/UREA NIT SERPL: 12
EOSINOPHIL # BLD AUTO: 0.02 X10(3)/MCL (ref 0–0.9)
EOSINOPHIL NFR BLD AUTO: 0.4 %
ERYTHROCYTE [DISTWIDTH] IN BLOOD BY AUTOMATED COUNT: 15.6 % (ref 11.5–17)
GFR SERPLBLD CREATININE-BSD FMLA CKD-EPI: >60 ML/MIN/1.73/M2
GLOBULIN SER-MCNC: 4.7 GM/DL (ref 2.4–3.5)
GLUCOSE SERPL-MCNC: 90 MG/DL (ref 82–115)
HCT VFR BLD AUTO: 28.7 % (ref 37–47)
HGB BLD-MCNC: 9.5 G/DL (ref 12–16)
HOLD SPECIMEN: NORMAL
IMM GRANULOCYTES # BLD AUTO: 0.02 X10(3)/MCL (ref 0–0.04)
IMM GRANULOCYTES NFR BLD AUTO: 0.4 %
LYMPHOCYTES # BLD AUTO: 0.26 X10(3)/MCL (ref 0.6–4.6)
LYMPHOCYTES NFR BLD AUTO: 5.8 %
MAGNESIUM SERPL-MCNC: 2 MG/DL (ref 1.6–2.6)
MCH RBC QN AUTO: 27.9 PG (ref 27–31)
MCHC RBC AUTO-ENTMCNC: 33.1 G/DL (ref 33–36)
MCV RBC AUTO: 84.2 FL (ref 80–94)
MONOCYTES # BLD AUTO: 0.21 X10(3)/MCL (ref 0.1–1.3)
MONOCYTES NFR BLD AUTO: 4.6 %
NEUTROPHILS # BLD AUTO: 4 X10(3)/MCL (ref 2.1–9.2)
NEUTROPHILS NFR BLD AUTO: 88.6 %
NRBC BLD AUTO-RTO: 0 %
PHOSPHATE SERPL-MCNC: 3.1 MG/DL (ref 2.3–4.7)
PLATELET # BLD AUTO: 160 X10(3)/MCL (ref 130–400)
PMV BLD AUTO: 11.1 FL (ref 7.4–10.4)
POTASSIUM SERPL-SCNC: 3.8 MMOL/L (ref 3.5–5.1)
PROT SERPL-MCNC: 7.4 GM/DL (ref 5.8–7.6)
RBC # BLD AUTO: 3.41 X10(6)/MCL (ref 4.2–5.4)
SODIUM SERPL-SCNC: 137 MMOL/L (ref 136–145)
WBC # BLD AUTO: 4.52 X10(3)/MCL (ref 4.5–11.5)

## 2024-07-01 PROCEDURE — 63600175 PHARM REV CODE 636 W HCPCS: Performed by: STUDENT IN AN ORGANIZED HEALTH CARE EDUCATION/TRAINING PROGRAM

## 2024-07-01 PROCEDURE — 36415 COLL VENOUS BLD VENIPUNCTURE: CPT | Performed by: FAMILY MEDICINE

## 2024-07-01 PROCEDURE — 85025 COMPLETE CBC W/AUTO DIFF WBC: CPT | Performed by: STUDENT IN AN ORGANIZED HEALTH CARE EDUCATION/TRAINING PROGRAM

## 2024-07-01 PROCEDURE — 25000003 PHARM REV CODE 250: Performed by: STUDENT IN AN ORGANIZED HEALTH CARE EDUCATION/TRAINING PROGRAM

## 2024-07-01 PROCEDURE — 84100 ASSAY OF PHOSPHORUS: CPT | Performed by: STUDENT IN AN ORGANIZED HEALTH CARE EDUCATION/TRAINING PROGRAM

## 2024-07-01 PROCEDURE — 27000221 HC OXYGEN, UP TO 24 HOURS

## 2024-07-01 PROCEDURE — 83735 ASSAY OF MAGNESIUM: CPT | Performed by: STUDENT IN AN ORGANIZED HEALTH CARE EDUCATION/TRAINING PROGRAM

## 2024-07-01 PROCEDURE — 36415 COLL VENOUS BLD VENIPUNCTURE: CPT | Performed by: STUDENT IN AN ORGANIZED HEALTH CARE EDUCATION/TRAINING PROGRAM

## 2024-07-01 PROCEDURE — 97530 THERAPEUTIC ACTIVITIES: CPT

## 2024-07-01 PROCEDURE — 80053 COMPREHEN METABOLIC PANEL: CPT | Performed by: STUDENT IN AN ORGANIZED HEALTH CARE EDUCATION/TRAINING PROGRAM

## 2024-07-01 PROCEDURE — 25000242 PHARM REV CODE 250 ALT 637 W/ HCPCS: Performed by: STUDENT IN AN ORGANIZED HEALTH CARE EDUCATION/TRAINING PROGRAM

## 2024-07-01 PROCEDURE — 25000003 PHARM REV CODE 250

## 2024-07-01 PROCEDURE — 97110 THERAPEUTIC EXERCISES: CPT

## 2024-07-01 RX ORDER — CEFTRIAXONE 1 G/1
1 INJECTION, POWDER, FOR SOLUTION INTRAMUSCULAR; INTRAVENOUS ONCE
Qty: 1 G | Refills: 0 | Status: SHIPPED | OUTPATIENT
Start: 2024-07-02 | End: 2024-07-02

## 2024-07-01 RX ORDER — LISINOPRIL 5 MG/1
10 TABLET ORAL DAILY
Qty: 90 TABLET | Refills: 1 | Status: SHIPPED | OUTPATIENT
Start: 2024-07-01

## 2024-07-01 RX ADMIN — PSYLLIUM HUSK 1 PACKET: 3.4 POWDER ORAL at 09:07

## 2024-07-01 RX ADMIN — APIXABAN 5 MG: 2.5 TABLET, FILM COATED ORAL at 09:07

## 2024-07-01 RX ADMIN — METOPROLOL SUCCINATE 12.5 MG: 25 TABLET, EXTENDED RELEASE ORAL at 09:07

## 2024-07-01 RX ADMIN — LISINOPRIL 10 MG: 10 TABLET ORAL at 09:07

## 2024-07-01 RX ADMIN — MECLIZINE HYDROCHLORIDE 12.5 MG: 12.5 TABLET ORAL at 09:07

## 2024-07-01 RX ADMIN — ACETAMINOPHEN 650 MG: 325 TABLET, FILM COATED ORAL at 09:07

## 2024-07-01 RX ADMIN — CEFTRIAXONE SODIUM 1 G: 1 INJECTION, POWDER, FOR SOLUTION INTRAMUSCULAR; INTRAVENOUS at 06:07

## 2024-07-01 RX ADMIN — ATORVASTATIN CALCIUM 10 MG: 10 TABLET, FILM COATED ORAL at 09:07

## 2024-07-01 RX ADMIN — ASPIRIN 81 MG CHEWABLE TABLET 81 MG: 81 TABLET CHEWABLE at 09:07

## 2024-07-01 NOTE — PLAN OF CARE
Problem: Skin Injury Risk Increased  Goal: Skin Health and Integrity  Outcome: Met     Problem: Adult Inpatient Plan of Care  Goal: Plan of Care Review  Outcome: Met  Goal: Patient-Specific Goal (Individualized)  Outcome: Met  Goal: Absence of Hospital-Acquired Illness or Injury  Outcome: Met  Goal: Optimal Comfort and Wellbeing  Outcome: Met  Goal: Readiness for Transition of Care  Outcome: Met     Problem: Fall Injury Risk  Goal: Absence of Fall and Fall-Related Injury  Outcome: Met

## 2024-07-01 NOTE — DISCHARGE SUMMARY
[facility] Discharge Summary     Admitting Physician: Priya Cheng MD  Attending Physician: Priya Cheng MD  Date of Admit: 6/26/2024  Date of Discharge: 7/1/2024    Condition: Stable  Outcome: Condition has improved and patient is now ready for discharge.  DISPOSITION: Skilled Nursing Facility    Discharge Diagnoses     Patient Active Problem List   Diagnosis    Vaginal prolapse    Pessary maintenance    Hematuria    Hypertension    Atrophy of vagina    Gastroesophageal reflux disease    History of iron deficiency    Hydroureter    Rheumatoid arthritis    Vitamin D deficiency    Anxiety    Depressive disorder    Renal cyst    CAD (coronary artery disease)    Paroxysmal A-fib    Severe aortic stenosis    Acute cystitis without hematuria     Principal Problem:  Acute cystitis without hematuria    Consultants and Procedures     Consultants:  IP CONSULT TO INTERNAL MEDICINE  IP CONSULT TO SOCIAL WORK/CASE MANAGEMENT  IP CONSULT TO SOCIAL WORK/CASE MANAGEMENT    Procedures:   * No surgery found *     Brief Admission History      88 y.o. female with a history of HTN, HLD, a fib on Eliquis, and anxiety who presented to [facility] ED on 6/26/2024 after a slip and fall while in the bathroom. Hit head. Denied LOC. Patient with nausea and vomiting starting the morning of arrival and generalized malaise with night before. Also with foul smelling urine and increased urinary frequency. Daughter concerned that the patient has had a few mechanical falls recently, last being a week prior, as well as a decline in memory. Overall independent in ADLs with son living with patient and assisting as needed. UA in ED concerning for UTI. CT Head with chronic microvascular ischemic changes, but without acute intracranial abnormalities. Patient received 1 dose of Rocephin, IV Zofran and 500 mL IVF bolus. Family Medicine consulted for admission.     Hospital Course with Pertinent Findings     Pt was admitted to the family  medicine team for concerns UTI and evaluation of recurrent falls. She was placed on Rocephin 1g for UTI treatment. Received an MRI for concerns of persistent vertigo; MRI showed chronic small vessel ischemic changes without ventricle enlargement. She was started on Antivert for persistent vertigo. PT recommended SNF placement for continued rehab. Case management was consulted for placement.   During her stay her BP remained elevated; home lisinopril was increased to 10mg. On day of discharge she is feeling well and still agreeable to SNF placement. She will be discharged with 1 day of IM rocephin to finish Abx course. She is medically stable for discharge to SNF at this time.     Discharge physical exam:  Physical Exam  Vitals and nursing note reviewed.   Constitutional:       General: She is not in acute distress.  Cardiovascular:      Rate and Rhythm: Normal rate and regular rhythm.      Comments: 3/6 systolic murmur most prominent to right 2nd intercostal space  Pulmonary:      Effort: Pulmonary effort is normal. No respiratory distress.      Breath sounds: No wheezing or rhonchi.   Abdominal:      General: Bowel sounds are normal.      Palpations: Abdomen is soft.      Tenderness: There is no abdominal tenderness. There is no guarding.   Musculoskeletal:      Cervical back: Normal range of motion and neck supple.      Right lower leg: No edema.      Left lower leg: No edema.   Neurological:      Mental Status: She is alert.       Discharge Medications        Medication List        START taking these medications      cefTRIAXone 1 gram injection  Commonly known as: Rocephin  Inject 1 g into the muscle once. for 1 dose  Start taking on: July 2, 2024            CHANGE how you take these medications      lisinopriL 5 MG tablet  Commonly known as: PRINIVILZESTRIL  Take 2 tablets (10 mg total) by mouth once daily.  What changed: how much to take            CONTINUE taking these medications      apixaban 5 mg  Tab  Commonly known as: ELIQUIS  Take 1 tablet (5 mg total) by mouth 2 (two) times daily.     aspirin 81 MG Chew  Take 1 tablet (81 mg total) by mouth once daily.     EScitalopram oxalate 10 MG tablet  Commonly known as: LEXAPRO  Take 1 tablet (10 mg total) by mouth once daily.     famotidine 20 MG tablet  Commonly known as: PEPCID  Take 1 tablet (20 mg total) by mouth 2 (two) times daily.     melatonin 5 mg  Commonly known as: MELATIN  Take 1 tablet (5 mg total) by mouth nightly.     metoprolol succinate 25 MG 24 hr tablet  Commonly known as: TOPROL-XL  Take 0.5 tablets (12.5 mg total) by mouth once daily.     multivit with min-folic acid 200 mcg Chew  One A Day Vitamin     pantoprazole 40 MG tablet  Commonly known as: PROTONIX  pantoprazole 40 mg tablet,delayed release, Strength: 40 mg     pravastatin 20 MG tablet  Commonly known as: PRAVACHOL  pravastatin 20 mg tablet  Take 1 tablet every day by oral route. Strength: 20 mg               Where to Get Your Medications        These medications were sent to [facility]  Pharmacy [facility] [address] [place] [address]  [facility] [address]      Phone: [phone]   cefTRIAXone 1 gram injection  lisinopriL 5 MG tablet       Discharge Information:     Pt is cleared to resume scheduled home medications. Will continue with increased Lisinopril at this time; reassess BP levels at follow up apt.   Strict ED precautions given; pt expressed understanding.   Follow up with C in 2-3 weeks.     TIME SPENT ON DISCHARGE: >30 minutes    Alonso Foster MD  [facility] -III

## 2024-07-01 NOTE — PT/OT/SLP PROGRESS
Physical Therapy Treatment    Patient Name:  Fifi Chacon   MRN:  [id number]    Recommendations     Therapy Intensity Recommendations at Discharge: Low Intensity Therapy  Discharge Equipment Recommendations: bedside commode, bath bench, walker, rolling   Barriers to discharge: fall risk and level of skilled assistance required    Assessment     Fifi Chacon is a 88 y.o. female admitted with a medical diagnosis of  Acute cystitis without hematuria.    She presents with the following impairments/functional limitations:  weakness, impaired endurance, gait instability, impaired balance, pain.    Rehab Prognosis: Fair.    Patient would benefit from continued skilled acute PT services to: address above listed impairments/functional limitations; receive patient/caregiver education; reduce fall risk; and maximize independency/safety with functional mobility.    Recent Surgery: * No surgery found *      Plan     During this hospitalization, patient to be seen 3 x/week to address the identified impairments/functional limitations via gait training, therapeutic activities, therapeutic exercises, neuromuscular re-education and progress toward the established goals.    Plan of Care Expires:   (discharge)    Subjective     Communicated with patient's nurse Yony prior to session.    Patient agreeable to participate in treatment session.    Chief Complaint: Headache  Patient/Family Comments/goals: none stated  Pain/Comfort:  Pain Rating 1: 8/10  Location 1: head  Pain Addressed 1: Nurse notified  Pain Rating Post-Intervention 1: 8/10    Objective     Patient found supine in bed and with HOB elevated with peripheral IV, PureWick  upon PT entry to room.    General Precautions: Standard, fall   Orthopedic Precautions:N/A   Braces: N/A  Respiratory Status: room air    Functional Mobility:    Bed Mobility:  Scooting: moderate assistance  Supine to Sit: minimum assistance    Transfers:  Sit to Stand: minimum assistance with rolling  walker    Gait:  Patient ambulated 6ft with rolling walker and minimum assistance to the chair.  Patient demonstrates :       unsteady gait       decreased step length.    Other Mobility:  Therapeutic Exercises performed:        -seated exercises:              marches            long arc quads            heel raises    Patient left sitting in chair with all lines intact, call button in reach, tray table at bedside, patient's nurse notified, and family present.    Goals     Multidisciplinary Problems       Physical Therapy Goals          Problem: Physical Therapy    Goal Priority Disciplines Outcome Goal Variances Interventions   Physical Therapy Goal     PT, PT/OT Progressing     Description: REVIEWED 2024  Goals to be met by: DISCHARGE  Patient will increase functional independence with mobility by performin. Supine to sit with Modified [place] - ONGOING  2. Sit to stand transfer with Modified [place] - ONGOING  3. Gait  x 130 feet with Supervision using Rolling Walker - ONGOING                     Time Tracking     PT Received On: 24  PT Start Time: 1001     PT Stop Time: 1025  PT Total Time (min): 24 min     Billable Minutes: Therapeutic Activity 14 and Therapeutic Exercise 10    2024

## 2024-07-01 NOTE — PLAN OF CARE
Call placed to [facility], spoke with Freda, report can be called to Kelle at [phone]. Facility van will transport.

## 2024-07-02 NOTE — PT/OT/SLP DISCHARGE
POST DISCHARGE DOCUMENTATION - 2024 10:57 AM    Physical Therapy Discharge Summary    Name: Fifi Chacon  MRN: [id number]   Principal Problem: Acute cystitis without hematuria   1. Urinary tract infection without hematuria, site unspecified    2. Weakness    3. Pain    4. Fall in home, initial encounter    5. Vomiting, unspecified vomiting type, unspecified whether nausea present    6. Chest pain    7. Hypertension, unspecified type       Patient Active Problem List   Diagnosis    Vaginal prolapse    Pessary maintenance    Hematuria    Hypertension    Atrophy of vagina    Gastroesophageal reflux disease    History of iron deficiency    Hydroureter    Rheumatoid arthritis    Vitamin D deficiency    Anxiety    Depressive disorder    Renal cyst    CAD (coronary artery disease)    Paroxysmal A-fib    Severe aortic stenosis    Acute cystitis without hematuria      Recommendations - per last treatment session     Therapy Intensity Recommendations at Discharge: Low Intensity Therapy  Discharge Equipment Recommendations: bedside commode, bath bench, walker, rolling     Assessment:     Refer to prior Physical Therapy note dated 2024 for last known functional status of patient.    Patient was unexpectedly discharged from hospital.  Refer to therapy's initial evaluation or last treatment note for patient's most recent functional status and goal achievement and therapists' recommendations.    Objective     GOALS:  Multidisciplinary Problems       Physical Therapy Goals          Problem: Physical Therapy    Goal Priority Disciplines Outcome Goal Variances Interventions   Physical Therapy Goal     PT, PT/OT Progressing     Description: REVIEWED 2024  Goals to be met by: DISCHARGE  Patient will increase functional independence with mobility by performin. Supine to sit with Modified [place] - ONGOING  2. Sit to stand transfer with Modified [place] - ONGOING  3. Gait  x 130 feet with Supervision  using Rolling Walker - ONGOING                     Plan     Patient Discharged to: skilled nursing facility per chart.    7/2/2024

## 2024-07-02 NOTE — PT/OT/SLP DISCHARGE
Occupational Therapy Discharge Summary    Fifi Chacon  MRN: [id number]   Principal Problem: Acute cystitis without hematuria      Patient Discharged from acute Occupational Therapy on 7/1/24.    Assessment:      Patient appropriate for care in another setting.Please refer to prior OT note dated 6/28/24 for functional status.    Objective:     GOALS:   Multidisciplinary Problems       Occupational Therapy Goals          Problem: Occupational Therapy    Goal Priority Disciplines Outcome Interventions   Occupational Therapy Goal     OT, PT/OT Progressing    Description: Pt will ambulate to bathroom with RW supervision  Pt will complete toilet t/f supervision  Pt will complete toileting tasks SBA  Pt will complete grooming in standing at sink CGA                       Reasons for Discontinuation of Therapy Services  Dc from acute hospital setting       Plan:     Patient Discharged to: Skilled Nursing Facility    7/2/2024

## 2024-07-18 ENCOUNTER — LAB REQUISITION (OUTPATIENT)
Dept: LAB | Facility: HOSPITAL | Age: 89
End: 2024-07-18
Payer: MEDICARE

## 2024-07-18 ENCOUNTER — HOSPITAL ENCOUNTER (INPATIENT)
Facility: HOSPITAL | Age: 89
LOS: 11 days | Discharge: HOSPICE | DRG: 871 | End: 2024-07-30
Attending: INTERNAL MEDICINE | Admitting: FAMILY MEDICINE
Payer: MEDICARE

## 2024-07-18 DIAGNOSIS — R33.8 ACUTE URINARY RETENTION: ICD-10-CM

## 2024-07-18 DIAGNOSIS — R79.89 ELEVATED TROPONIN: ICD-10-CM

## 2024-07-18 DIAGNOSIS — K56.609 SMALL BOWEL OBSTRUCTION: ICD-10-CM

## 2024-07-18 DIAGNOSIS — R11.2 NAUSEA AND VOMITING, UNSPECIFIED VOMITING TYPE: ICD-10-CM

## 2024-07-18 DIAGNOSIS — R11.10 VOMITING, UNSPECIFIED: ICD-10-CM

## 2024-07-18 DIAGNOSIS — R07.9 CHEST PAIN: ICD-10-CM

## 2024-07-18 DIAGNOSIS — E86.9 VOLUME DEPLETION, GASTROINTESTINAL LOSS: ICD-10-CM

## 2024-07-18 DIAGNOSIS — A41.9 SEVERE SEPSIS: ICD-10-CM

## 2024-07-18 DIAGNOSIS — R53.1 WEAKNESS: ICD-10-CM

## 2024-07-18 DIAGNOSIS — R11.10 VOMITING: ICD-10-CM

## 2024-07-18 DIAGNOSIS — R65.20 SEVERE SEPSIS: ICD-10-CM

## 2024-07-18 DIAGNOSIS — N17.9 AKI (ACUTE KIDNEY INJURY): Primary | ICD-10-CM

## 2024-07-18 DIAGNOSIS — R07.9 CHEST PAIN, UNSPECIFIED TYPE: ICD-10-CM

## 2024-07-18 PROBLEM — J18.9 PNEUMONIA: Status: ACTIVE | Noted: 2024-07-18

## 2024-07-18 LAB
ABS NEUT CALC (OHS): 21.41 X10(3)/MCL (ref 2.1–9.2)
ALBUMIN SERPL-MCNC: 3.1 G/DL (ref 3.4–4.8)
ALBUMIN SERPL-MCNC: 3.4 G/DL (ref 3.4–4.8)
ALBUMIN/GLOB SERPL: 0.6 RATIO (ref 1.1–2)
ALBUMIN/GLOB SERPL: 0.7 RATIO (ref 1.1–2)
ALP SERPL-CCNC: 57 UNIT/L (ref 40–150)
ALP SERPL-CCNC: 61 UNIT/L (ref 40–150)
ALT SERPL-CCNC: 12 UNIT/L (ref 0–55)
ALT SERPL-CCNC: 8 UNIT/L (ref 0–55)
ANION GAP SERPL CALC-SCNC: 12 MEQ/L
ANION GAP SERPL CALC-SCNC: 15 MEQ/L
AST SERPL-CCNC: 28 UNIT/L (ref 5–34)
AST SERPL-CCNC: 29 UNIT/L (ref 5–34)
BACTERIA #/AREA URNS AUTO: ABNORMAL /HPF
BASOPHILS # BLD AUTO: 0.01 X10(3)/MCL
BASOPHILS NFR BLD AUTO: 0.1 %
BILIRUB SERPL-MCNC: 0.7 MG/DL
BILIRUB SERPL-MCNC: 0.7 MG/DL
BILIRUB UR QL STRIP.AUTO: NEGATIVE
BUN SERPL-MCNC: 28.1 MG/DL (ref 9.8–20.1)
BUN SERPL-MCNC: 38.5 MG/DL (ref 9.8–20.1)
CALCIUM SERPL-MCNC: 10.1 MG/DL (ref 8.4–10.2)
CALCIUM SERPL-MCNC: 10.4 MG/DL (ref 8.4–10.2)
CHLORIDE SERPL-SCNC: 104 MMOL/L (ref 98–107)
CHLORIDE SERPL-SCNC: 105 MMOL/L (ref 98–107)
CLARITY UR: CLEAR
CO2 SERPL-SCNC: 19 MMOL/L (ref 23–31)
CO2 SERPL-SCNC: 23 MMOL/L (ref 23–31)
COLOR UR AUTO: YELLOW
CREAT SERPL-MCNC: 1.16 MG/DL (ref 0.55–1.02)
CREAT SERPL-MCNC: 1.89 MG/DL (ref 0.55–1.02)
CREAT/UREA NIT SERPL: 20
CREAT/UREA NIT SERPL: 24
EOSINOPHIL # BLD AUTO: 0 X10(3)/MCL (ref 0–0.9)
EOSINOPHIL NFR BLD AUTO: 0 %
ERYTHROCYTE [DISTWIDTH] IN BLOOD BY AUTOMATED COUNT: 15.6 % (ref 11.5–17)
ERYTHROCYTE [DISTWIDTH] IN BLOOD BY AUTOMATED COUNT: 15.7 % (ref 11.5–17)
GFR SERPLBLD CREATININE-BSD FMLA CKD-EPI: 25 ML/MIN/1.73/M2
GFR SERPLBLD CREATININE-BSD FMLA CKD-EPI: 46 ML/MIN/1.73/M2
GLOBULIN SER-MCNC: 5.2 GM/DL (ref 2.4–3.5)
GLOBULIN SER-MCNC: 5.3 GM/DL (ref 2.4–3.5)
GLUCOSE SERPL-MCNC: 137 MG/DL (ref 82–115)
GLUCOSE SERPL-MCNC: 142 MG/DL (ref 82–115)
GLUCOSE UR QL STRIP: NORMAL
HCT VFR BLD AUTO: 34.3 % (ref 37–47)
HCT VFR BLD AUTO: 36.7 % (ref 37–47)
HGB BLD-MCNC: 11.2 G/DL (ref 12–16)
HGB BLD-MCNC: 11.5 G/DL (ref 12–16)
HGB UR QL STRIP: NEGATIVE
HOLD SPECIMEN: NORMAL
HYALINE CASTS #/AREA URNS LPF: ABNORMAL /LPF
IMM GRANULOCYTES # BLD AUTO: 0.04 X10(3)/MCL (ref 0–0.04)
IMM GRANULOCYTES NFR BLD AUTO: 0.3 %
KETONES UR QL STRIP: ABNORMAL
LACTATE SERPL-SCNC: 2.1 MMOL/L (ref 0.5–2.2)
LACTATE SERPL-SCNC: 2.1 MMOL/L (ref 0.5–2.2)
LEUKOCYTE ESTERASE UR QL STRIP: 250
LIPASE SERPL-CCNC: 18 U/L
LYMPHOCYTES # BLD AUTO: 0.29 X10(3)/MCL (ref 0.6–4.6)
LYMPHOCYTES NFR BLD AUTO: 2.5 %
LYMPHOCYTES NFR BLD MANUAL: 0.66 X10(3)/MCL
LYMPHOCYTES NFR BLD MANUAL: 3 % (ref 13–40)
MCH RBC QN AUTO: 26.4 PG (ref 27–31)
MCH RBC QN AUTO: 27.5 PG (ref 27–31)
MCHC RBC AUTO-ENTMCNC: 31.3 G/DL (ref 33–36)
MCHC RBC AUTO-ENTMCNC: 32.7 G/DL (ref 33–36)
MCV RBC AUTO: 84.3 FL (ref 80–94)
MCV RBC AUTO: 84.4 FL (ref 80–94)
MONOCYTES # BLD AUTO: 0.18 X10(3)/MCL (ref 0.1–1.3)
MONOCYTES NFR BLD AUTO: 1.5 %
MUCOUS THREADS URNS QL MICRO: ABNORMAL /LPF
NEUTROPHILS # BLD AUTO: 11.3 X10(3)/MCL (ref 2.1–9.2)
NEUTROPHILS NFR BLD AUTO: 95.6 %
NEUTROPHILS NFR BLD MANUAL: 97 % (ref 47–80)
NITRITE UR QL STRIP: NEGATIVE
NRBC BLD AUTO-RTO: 0 %
NRBC BLD AUTO-RTO: 0 %
PH UR STRIP: 5.5 [PH]
PLATELET # BLD AUTO: 248 X10(3)/MCL (ref 130–400)
PLATELET # BLD AUTO: 254 X10(3)/MCL (ref 130–400)
PLATELET # BLD EST: NORMAL 10*3/UL
PMV BLD AUTO: 11.4 FL (ref 7.4–10.4)
PMV BLD AUTO: 12.4 FL (ref 7.4–10.4)
POTASSIUM SERPL-SCNC: 4.5 MMOL/L (ref 3.5–5.1)
POTASSIUM SERPL-SCNC: 5.1 MMOL/L (ref 3.5–5.1)
PROT SERPL-MCNC: 8.4 GM/DL (ref 5.8–7.6)
PROT SERPL-MCNC: 8.6 GM/DL (ref 5.8–7.6)
PROT UR QL STRIP: ABNORMAL
RBC # BLD AUTO: 4.07 X10(6)/MCL (ref 4.2–5.4)
RBC # BLD AUTO: 4.35 X10(6)/MCL (ref 4.2–5.4)
RBC #/AREA URNS AUTO: ABNORMAL /HPF
RBC MORPH BLD: NORMAL
SODIUM SERPL-SCNC: 139 MMOL/L (ref 136–145)
SODIUM SERPL-SCNC: 139 MMOL/L (ref 136–145)
SP GR UR STRIP.AUTO: 1.02 (ref 1–1.03)
SQUAMOUS #/AREA URNS LPF: ABNORMAL /HPF
TROPONIN I SERPL-MCNC: 0.4 NG/ML (ref 0–0.04)
UROBILINOGEN UR STRIP-ACNC: NORMAL
WBC # BLD AUTO: 11.82 X10(3)/MCL (ref 4.5–11.5)
WBC # BLD AUTO: 22.07 X10(3)/MCL (ref 4.5–11.5)
WBC #/AREA URNS AUTO: ABNORMAL /HPF

## 2024-07-18 PROCEDURE — 80053 COMPREHEN METABOLIC PANEL: CPT | Performed by: FAMILY MEDICINE

## 2024-07-18 PROCEDURE — 87077 CULTURE AEROBIC IDENTIFY: CPT

## 2024-07-18 PROCEDURE — 96365 THER/PROPH/DIAG IV INF INIT: CPT

## 2024-07-18 PROCEDURE — 87086 URINE CULTURE/COLONY COUNT: CPT

## 2024-07-18 PROCEDURE — 25000003 PHARM REV CODE 250

## 2024-07-18 PROCEDURE — 96366 THER/PROPH/DIAG IV INF ADDON: CPT

## 2024-07-18 PROCEDURE — 83690 ASSAY OF LIPASE: CPT

## 2024-07-18 PROCEDURE — 87154 CUL TYP ID BLD PTHGN 6+ TRGT: CPT

## 2024-07-18 PROCEDURE — G0378 HOSPITAL OBSERVATION PER HR: HCPCS

## 2024-07-18 PROCEDURE — 87641 MR-STAPH DNA AMP PROBE: CPT

## 2024-07-18 PROCEDURE — 0D9670Z DRAINAGE OF STOMACH WITH DRAINAGE DEVICE, VIA NATURAL OR ARTIFICIAL OPENING: ICD-10-PCS | Performed by: STUDENT IN AN ORGANIZED HEALTH CARE EDUCATION/TRAINING PROGRAM

## 2024-07-18 PROCEDURE — 0240U COVID/FLU A&B PCR: CPT

## 2024-07-18 PROCEDURE — 87040 BLOOD CULTURE FOR BACTERIA: CPT

## 2024-07-18 PROCEDURE — 84484 ASSAY OF TROPONIN QUANT: CPT

## 2024-07-18 PROCEDURE — 27000221 HC OXYGEN, UP TO 24 HOURS

## 2024-07-18 PROCEDURE — 81001 URINALYSIS AUTO W/SCOPE: CPT

## 2024-07-18 PROCEDURE — 96368 THER/DIAG CONCURRENT INF: CPT

## 2024-07-18 PROCEDURE — 93005 ELECTROCARDIOGRAM TRACING: CPT

## 2024-07-18 PROCEDURE — 96367 TX/PROPH/DG ADDL SEQ IV INF: CPT

## 2024-07-18 PROCEDURE — 83605 ASSAY OF LACTIC ACID: CPT | Mod: 91

## 2024-07-18 PROCEDURE — 80053 COMPREHEN METABOLIC PANEL: CPT

## 2024-07-18 PROCEDURE — 85025 COMPLETE CBC W/AUTO DIFF WBC: CPT | Performed by: FAMILY MEDICINE

## 2024-07-18 PROCEDURE — 96375 TX/PRO/DX INJ NEW DRUG ADDON: CPT

## 2024-07-18 PROCEDURE — 94761 N-INVAS EAR/PLS OXIMETRY MLT: CPT

## 2024-07-18 PROCEDURE — 63600175 PHARM REV CODE 636 W HCPCS

## 2024-07-18 PROCEDURE — 85027 COMPLETE CBC AUTOMATED: CPT

## 2024-07-18 PROCEDURE — 99285 EMERGENCY DEPT VISIT HI MDM: CPT | Mod: 25

## 2024-07-18 RX ORDER — IBUPROFEN 200 MG
16 TABLET ORAL
Status: DISCONTINUED | OUTPATIENT
Start: 2024-07-18 | End: 2024-07-30 | Stop reason: HOSPADM

## 2024-07-18 RX ORDER — FAMOTIDINE 20 MG/1
20 TABLET, FILM COATED ORAL
Status: DISCONTINUED | OUTPATIENT
Start: 2024-07-18 | End: 2024-07-19

## 2024-07-18 RX ORDER — NALOXONE HCL 0.4 MG/ML
0.02 VIAL (ML) INJECTION
Status: DISCONTINUED | OUTPATIENT
Start: 2024-07-18 | End: 2024-07-30 | Stop reason: HOSPADM

## 2024-07-18 RX ORDER — SODIUM CHLORIDE, SODIUM LACTATE, POTASSIUM CHLORIDE, CALCIUM CHLORIDE 600; 310; 30; 20 MG/100ML; MG/100ML; MG/100ML; MG/100ML
1000 INJECTION, SOLUTION INTRAVENOUS
Status: COMPLETED | OUTPATIENT
Start: 2024-07-18 | End: 2024-07-18

## 2024-07-18 RX ORDER — SODIUM CHLORIDE 0.9 % (FLUSH) 0.9 %
10 SYRINGE (ML) INJECTION EVERY 12 HOURS PRN
Status: DISCONTINUED | OUTPATIENT
Start: 2024-07-18 | End: 2024-07-30 | Stop reason: HOSPADM

## 2024-07-18 RX ORDER — TALC
6 POWDER (GRAM) TOPICAL NIGHTLY PRN
Status: DISCONTINUED | OUTPATIENT
Start: 2024-07-18 | End: 2024-07-30 | Stop reason: HOSPADM

## 2024-07-18 RX ORDER — ONDANSETRON HYDROCHLORIDE 2 MG/ML
4 INJECTION, SOLUTION INTRAVENOUS
Status: COMPLETED | OUTPATIENT
Start: 2024-07-18 | End: 2024-07-18

## 2024-07-18 RX ORDER — ONDANSETRON HYDROCHLORIDE 2 MG/ML
4 INJECTION, SOLUTION INTRAVENOUS EVERY 8 HOURS PRN
Status: DISCONTINUED | OUTPATIENT
Start: 2024-07-18 | End: 2024-07-30 | Stop reason: HOSPADM

## 2024-07-18 RX ORDER — IBUPROFEN 200 MG
24 TABLET ORAL
Status: DISCONTINUED | OUTPATIENT
Start: 2024-07-18 | End: 2024-07-30 | Stop reason: HOSPADM

## 2024-07-18 RX ORDER — NAPROXEN SODIUM 220 MG/1
81 TABLET, FILM COATED ORAL DAILY
Status: DISCONTINUED | OUTPATIENT
Start: 2024-07-19 | End: 2024-07-19

## 2024-07-18 RX ORDER — ATORVASTATIN CALCIUM 10 MG/1
10 TABLET, FILM COATED ORAL DAILY
Status: DISCONTINUED | OUTPATIENT
Start: 2024-07-19 | End: 2024-07-19

## 2024-07-18 RX ORDER — PROCHLORPERAZINE EDISYLATE 5 MG/ML
5 INJECTION INTRAMUSCULAR; INTRAVENOUS EVERY 6 HOURS PRN
Status: DISCONTINUED | OUTPATIENT
Start: 2024-07-18 | End: 2024-07-30 | Stop reason: HOSPADM

## 2024-07-18 RX ORDER — ACETAMINOPHEN 325 MG/1
650 TABLET ORAL EVERY 8 HOURS PRN
Status: DISCONTINUED | OUTPATIENT
Start: 2024-07-18 | End: 2024-07-30 | Stop reason: HOSPADM

## 2024-07-18 RX ORDER — SODIUM CHLORIDE, SODIUM LACTATE, POTASSIUM CHLORIDE, CALCIUM CHLORIDE 600; 310; 30; 20 MG/100ML; MG/100ML; MG/100ML; MG/100ML
1000 INJECTION, SOLUTION INTRAVENOUS
Status: DISCONTINUED | OUTPATIENT
Start: 2024-07-18 | End: 2024-07-18

## 2024-07-18 RX ORDER — GLUCAGON 1 MG
1 KIT INJECTION
Status: DISCONTINUED | OUTPATIENT
Start: 2024-07-18 | End: 2024-07-30 | Stop reason: HOSPADM

## 2024-07-18 RX ORDER — ONDANSETRON 4 MG/1
4 TABLET, FILM COATED ORAL EVERY 6 HOURS PRN
COMMUNITY

## 2024-07-18 RX ORDER — ESCITALOPRAM OXALATE 10 MG/1
10 TABLET ORAL DAILY
Status: DISCONTINUED | OUTPATIENT
Start: 2024-07-19 | End: 2024-07-19

## 2024-07-18 RX ORDER — SODIUM CHLORIDE, SODIUM LACTATE, POTASSIUM CHLORIDE, CALCIUM CHLORIDE 600; 310; 30; 20 MG/100ML; MG/100ML; MG/100ML; MG/100ML
INJECTION, SOLUTION INTRAVENOUS CONTINUOUS
Status: DISCONTINUED | OUTPATIENT
Start: 2024-07-18 | End: 2024-07-24

## 2024-07-18 RX ADMIN — CEFTRIAXONE SODIUM 1 G: 1 INJECTION, POWDER, FOR SOLUTION INTRAMUSCULAR; INTRAVENOUS at 06:07

## 2024-07-18 RX ADMIN — APIXABAN 5 MG: 2.5 TABLET, FILM COATED ORAL at 11:07

## 2024-07-18 RX ADMIN — AZITHROMYCIN MONOHYDRATE 500 MG: 500 INJECTION, POWDER, LYOPHILIZED, FOR SOLUTION INTRAVENOUS at 07:07

## 2024-07-18 RX ADMIN — SODIUM CHLORIDE, POTASSIUM CHLORIDE, SODIUM LACTATE AND CALCIUM CHLORIDE 1000 ML: 600; 310; 30; 20 INJECTION, SOLUTION INTRAVENOUS at 11:07

## 2024-07-18 RX ADMIN — ONDANSETRON 4 MG: 2 INJECTION INTRAMUSCULAR; INTRAVENOUS at 06:07

## 2024-07-18 RX ADMIN — FAMOTIDINE 20 MG: 20 TABLET, FILM COATED ORAL at 11:07

## 2024-07-18 RX ADMIN — SODIUM CHLORIDE, POTASSIUM CHLORIDE, SODIUM LACTATE AND CALCIUM CHLORIDE 1000 ML: 600; 310; 30; 20 INJECTION, SOLUTION INTRAVENOUS at 06:07

## 2024-07-18 NOTE — ED PROVIDER NOTES
Encounter Date: 7/18/2024       History     Chief Complaint   Patient presents with    Abdominal Pain    Vomiting     88 yr old in /[facility] FROM NURSING HOME W REPORT OF ABD PAIN W NV AND WEAKNESS SINCE YESTERDAY.  LBM THIS AM.  PT REPORTS DYSURIA.  SEE NURSING HOME REPORTS. EKG OBTAINED.     Fatigue     88-year-old female with PMH paroxysmal AFIB on Eliquis, CAD s/p coronary angiogram on 10/13/23, severe AS, HTN, HLD, and anxiety presents to the ED with abdominal pain, nausea and vomiting since yesterday. Patient's son at bedside provides most of the history. He states that she was in her usual state of health, working with PT at SNF, prior to symptoms starting yesterday. Reports nausea and vomiting x2 yesterday, then x3 today. Associated with generalized abdominal pain. Denies fevers, confusion, diarrhea, urine changes, cough, dyspnea. Denies sick contacts. Patient has had dizziness for the past few months which is at baseline. Last bowel movement was this AM.     The history is provided by the patient and a relative (her son). No  was used.     Review of patient's allergies indicates:   Allergen Reactions    Sulfa (sulfonamide antibiotics)      Other reaction(s): Not available     Past Medical History:   Diagnosis Date    GERD (gastroesophageal reflux disease)     Hyperlipidemia     Hypertension      Past Surgical History:   Procedure Laterality Date    ANGIOGRAM, CORONARY, WITH LEFT HEART CATHETERIZATION N/A 10/13/2023    Procedure: Angiogram, Coronary, with Left Heart Cath;  Surgeon: Marlon Myers Jr., MD;  Location: [facility] CATH LAB;  Service: Cardiology;  Laterality: N/A;    APPENDECTOMY      CHOLECYSTECTOMY      HYSTERECTOMY      INSTANTANEOUS WAVE-FREE RATIO (IFR) N/A 10/13/2023    Procedure: Instantaneous Wave-Free Ratio (IFR);  Surgeon: Marlon Myers Jr., MD;  Location: [facility] CATH LAB;  Service: Cardiology;  Laterality: N/A;     Family History   Problem Relation Name Age of Onset     Hypertension Mother      Cancer Mother      Hypertension Sister      Hypertension Sister       Social History     Tobacco Use    Smoking status: Never     Passive exposure: Never    Smokeless tobacco: Never   Substance Use Topics    Alcohol use: Not Currently    Drug use: Never     Review of Systems   Constitutional:  Positive for diaphoresis and fatigue. Negative for chills and fever.   HENT:  Negative for sore throat.    Eyes:  Negative for visual disturbance.   Respiratory:  Negative for cough, chest tightness and shortness of breath.    Cardiovascular:  Negative for chest pain.   Gastrointestinal:  Positive for abdominal pain, nausea and vomiting. Negative for abdominal distention, constipation and diarrhea.   Genitourinary:  Positive for flank pain (R > L).   Musculoskeletal:  Positive for back pain (R > L).   Skin:  Negative for color change.   Neurological:  Positive for weakness. Negative for speech difficulty.   Psychiatric/Behavioral:  Negative for confusion and decreased concentration.        Physical Exam     Initial Vitals [07/18/24 1616]   BP Pulse Resp Temp SpO2   (!) 106/59 90 20 99.1 °F (37.3 °C) 96 %      MAP       --         Physical Exam    Nursing note and vitals reviewed.  Constitutional: Vital signs are normal. She is diaphoretic. She is easily aroused. She appears ill.   HENT:   Head: Normocephalic and atraumatic.   Mouth/Throat: Uvula is midline. Mucous membranes are dry.   Mouth is dry.    Eyes: Conjunctivae and EOM are normal. Pupils are equal, round, and reactive to light.   Neck: Neck supple. No thyromegaly present.   Normal range of motion.  Cardiovascular:  Normal rate and regular rhythm.           Murmur heard.  Pulmonary/Chest: Breath sounds normal. No respiratory distress. She exhibits no tenderness.   Abdominal: Abdomen is soft and flat. Bowel sounds are normal. She exhibits no distension. There is generalized abdominal tenderness and tenderness in the right upper quadrant and  right lower quadrant.   Generalized abdominal pain most prominent in the right quadrants.   There is right CVA tenderness.There is no rebound and no tenderness at McBurney's point. negative Rovsing's sign  Musculoskeletal:         General: Normal range of motion.      Cervical back: Normal range of motion and neck supple.     Lymphadenopathy:     She has no cervical adenopathy.   Neurological: She is alert and easily aroused.   Patient is accompanied by her son who reports her mental status is at baseline.   Skin: Skin is warm.   Psychiatric: She has a normal mood and affect. Her behavior is normal. Judgment and thought content normal.   Patient is accompanied by her son who reports her mental status is at baseline.         ED Course   Critical Care    Date/Time: 7/20/2024 6:49 PM    Performed by: Blue Pettit MD  Authorized by: Rosana Graham MD  Direct patient critical care time: 12 minutes  Additional history critical care time: 5 minutes  Ordering / reviewing critical care time: 12 minutes  Documentation critical care time: 5 minutes  Consulting other physicians critical care time: 5 minutes  Total critical care time (exclusive of procedural time) : 39 minutes  Critical care was necessary to treat or prevent imminent or life-threatening deterioration of the following conditions: renal failure.  Critical care was time spent personally by me on the following activities: development of treatment plan with patient or surrogate, discussions with consultants, interpretation of cardiac output measurements, evaluation of patient's response to treatment, examination of patient, obtaining history from patient or surrogate, ordering and performing treatments and interventions, ordering and review of laboratory studies, ordering and review of radiographic studies, re-evaluation of patient's condition and review of old charts.        Labs Reviewed   TROPONIN I - Abnormal       Result Value     Troponin-I 0.402 (*)    COMPREHENSIVE METABOLIC PANEL - Abnormal    Sodium 139      Potassium 5.1      Chloride 104      CO2 23      Glucose 142 (*)     Blood Urea Nitrogen 38.5 (*)     Creatinine 1.89 (*)     Calcium 10.4 (*)     Protein Total 8.4 (*)     Albumin 3.1 (*)     Globulin 5.3 (*)     Albumin/Globulin Ratio 0.6 (*)     Bilirubin Total 0.7      ALP 57      ALT 8      AST 28      eGFR 25      Anion Gap 12.0      BUN/Creatinine Ratio 20     URINALYSIS, REFLEX TO URINE CULTURE - Abnormal    Color, UA Yellow      Appearance, UA Clear      Specific Gravity, UA 1.017      pH, UA 5.5      Protein, UA Trace (*)     Glucose, UA Normal      Ketones, UA Trace (*)     Blood, UA Negative      Bilirubin, UA Negative      Urobilinogen, UA Normal      Nitrites, UA Negative      Leukocyte Esterase,  (*)     RBC, UA 0-5      WBC, UA 21-50 (*)     Bacteria, UA Trace (*)     Squamous Epithelial Cells, UA Trace (*)     Mucous, UA Few (*)     Hyaline Casts, UA 3-5 (*)    CBC WITH DIFFERENTIAL - Abnormal    WBC 22.07 (*)     RBC 4.07 (*)     Hgb 11.2 (*)     Hct 34.3 (*)     MCV 84.3      MCH 27.5      MCHC 32.7 (*)     RDW 15.7      Platelet 254      MPV 12.4 (*)     NRBC% 0.0     MANUAL DIFFERENTIAL - Abnormal    Neutrophils % 97 (*)     Lymphs % 3 (*)     Neutrophils Abs Calc 21.4079 (*)     Lymphs Abs 0.6621      Platelets Normal      RBC Morph Normal     LACTIC ACID, PLASMA - Normal    Lactic Acid Level 2.1     LIPASE - Normal    Lipase Level 18     LACTIC ACID, PLASMA - Normal    Lactic Acid Level 2.1     EXTRA TUBES    Narrative:     The following orders were created for panel order EXTRA TUBES.  Procedure                               Abnormality         Status                     ---------                               -----------         ------                     Light Blue Top Hold[[id number]]                             Final result               Red Top Hold[[id number]]                                     Final result               Light Green Top Hold[[id number]]                            Final result               Light Green Top Hold[[id number]]                            Final result               Lavender Top Hold[[id number]]                               Final result               Lavender Top Hold[[id number]]                               Final result               Gold Top Hold[[id number]]                                   Final result                 Please view results for these tests on the individual orders.   LIGHT BLUE TOP HOLD    Extra Tube Hold for add-ons.     RED TOP HOLD    Extra Tube Hold for add-ons.     LIGHT GREEN TOP HOLD    Extra Tube Hold for add-ons.     LIGHT GREEN TOP HOLD    Extra Tube Hold for add-ons.     LAVENDER TOP HOLD    Extra Tube Hold for add-ons.     LAVENDER TOP HOLD    Extra Tube Hold for add-ons.     GOLD TOP HOLD    Extra Tube Hold for add-ons.     CBC W/ AUTO DIFFERENTIAL    Narrative:     The following orders were created for panel order CBC auto differential.  Procedure                               Abnormality         Status                     ---------                               -----------         ------                     CBC with Differential[[id number]]       Abnormal            Final result               Manual Differential[[id number]]         Abnormal            Final result                 Please view results for these tests on the individual orders.   EXTRA TUBES    Narrative:     The following orders were created for panel order EXTRA TUBES.  Procedure                               Abnormality         Status                     ---------                               -----------         ------                     Light Blue Top Hold[[id number]]                             Final result               Lavender Top Hold[[id number]]                               Final result               Lavender Top Hold[[id number]]                               Final  result               Gold Top Hold[[id number]]                                   Final result                 Please view results for these tests on the individual orders.   LIGHT BLUE TOP HOLD    Extra Tube Hold for add-ons.     LAVENDER TOP HOLD    Extra Tube Hold for add-ons.     LAVENDER TOP HOLD    Extra Tube Hold for add-ons.     GOLD TOP HOLD    Extra Tube Hold for add-ons.     GOLD TOP HOLD     EKG Readings: (Independently Interpreted)   Rhythm: Normal Sinus Rhythm. Heart Rate: 77.     ECG Results              EKG 12-lead (Final result)        Collection Time Result Time QRS Duration OHS QTC Calculation    07/18/24 19:23:12 07/19/24 10:50:03 100 493                     Final result by Interface, Lab In [facility] (07/19/24 10:50:11)                   Narrative:    Test Reason : R11.10,    Vent. Rate : 077 BPM     Atrial Rate : 077 BPM     P-R Int : 152 ms          QRS Dur : 100 ms      QT Int : 436 ms       P-R-T Axes : 068 001 -02 degrees     QTc Int : 493 ms    Normal sinus rhythm  Nonspecific ST abnormality  Prolonged QT  Abnormal ECG  No previous ECGs available  Confirmed by Jackeline Nieto MD ([id number]) on 7/19/2024 10:49:55 AM    Referred By: [email or web address]   SELF           Confirmed By:Jackeline Nieto MD                                  Imaging Results              CT Chest Without Contrast (Final result)  Result time 07/19/24 08:17:33      Final result by Wan Abreu MD (07/19/24 08:17:33)                   Impression:    Impression:    1. There are reticular, nodular and hazy opacities in the right posterior upper lobe and both lower lobes with areas of consolidation in the lower lobes, larger in the right. This is consistent with multilobar consolidation.    2. Details and other findings as discussed above.    No significant discrepancy with overnight report      Electronically signed by: Wan Abreu  Date:    07/19/2024  Time:    08:17               Narrative:      Technique: CT Scan of the chest was  performed without intravenous contrast with direct axial as well as sagittal and, coronal, reconstruction images.    Dosage Information: Automated Exposure Control was utilized.  DLP 19 9    Comparison: Correlation is with study [id number] 20:14:34.    Clinical History: Pneumonia.    Findings:    Heart: The heart size is within normal limits. Pronounced coronary artery calcification is seen.    Aorta: No thoracic aortic aneurysmal dilatation.    Lungs: Moderate streaky linear and hazy opacity is seen predominantly in the dependent portions at the lung bases consistent with dependent changes scarring and subsegmental atelectasis. There are reticular, nodular and hazy opacities in the right posterior upper lobe and both lower lobes with areas of consolidation in the lower lobes, larger in the right. This is consistent with multilobar consolidation. Subtle emphysematous change is noted in the right upper lobe.    Pleura: No effusions or pneumothorax are identified.    There is fluid-filled dilatation of the distal esophagus which may be the result of reflux disease or related to distal esophageal stricture.    Spine: Mild multilevel spondylolytic changes are seen in the thoracic spine.                        Preliminary result by Britton Kemp Jr., MD (07/19/24 01:56:35)                   Impression:    1. There are reticular, nodular and hazy opacities in the right posterior upper lobe and both lower lobes with areas of consolidation in the lower lobes, larger in the right. This is consistent with mutifocal possibly atypical pneumonia with multilobar consolidation.  2. Details and other findings as discussed above.               Narrative:    START OF REPORT:  Technique: CT Scan of the chest was performed without intravenous contrast with direct axial as well as sagittal and, coronal, reconstruction images.    Dosage Information: Automated Exposure Control was utilized.    Comparison: Correlation is with  study [id number] 20:14:34.    Clinical History: Pneumonia.    Findings:  Soft Tissues: Unremarkable.  Lines and Tubes: None.  Neck: The visualized soft tissues of the neck appear unremarkable.  Mediastinum: The mediastinal structures are within normal limits.  Heart: The heart size is within normal limits. Pronounced coronary artery calcification is seen.  Aorta: Unremarkable appearing aorta.  Pulmonary Arteries: Unremarkable.  Lungs: Moderate streaky linear and hazy opacity is seen predominantly in the dependent portions at the lung bases consistent with dependent changes scarring and subsegmental atelectasis. There are reticular, nodular and hazy opacities in the right posterior upper lobe and both lower lobes with areas of consolidation in the lower lobes, larger in the right. This is consistent with mutifocal possibly atypical pneumonia with multilobar consolidation. Subtle emphysematous change is noted in the right upper lobe.  Pleura: No effusions or pneumothorax are identified.  Bony Structures:  Spine: Mild multilevel spondylolytic changes are seen in the thoracic spine.  Ribs: The bilateral ribs appear unremarkable.                                         CT Abdomen Pelvis  Without Contrast (Final result)  Result time 07/19/24 07:35:25      Final result by Wan Abreu MD (07/19/24 07:35:25)                   Impression:    Impression:    1. There are moderate opacities seen in both lower lobes (series 4 image 8) consistent with pneumonias.    2. There are numerous moderately dilated loops of fluid-filled small bowel with numerous air-fluid levels with transition point in the mid small bowel seen on series 2 image 54. This is compatible with a small bowel obstruction.    3. No intraperitoneal free air or ascites is seen.    4. Details and other findings as discussed above.    No significant discrepancy with overnight report.      Electronically signed by: Wan  Abreu  Date:    07/19/2024  Time:    07:35               Narrative:      Technique: CT of the abdomen and pelvis was performed with axial images as well as sagittal and coronal reconstruction images without intravenous contrast.    Comparison: October 5, 2022    Clinical History: Abdominal Pain Vomiting(88 yr old in /[facility] FROM NURSING HOME W REPORT OF ABD PAIN W NV AND WEAKNESS SINCE YESTERDAY. LBM THIS AM. PT REPORTS DYSURIA. SEE NURSING HOME REPORTS. EKG OBTAINED. ) Fatigue.    Dosage Information: Automated Exposure Control was utilized.    Findings:    Lines and Tubes: None.    Thorax:    Lungs: There are moderate opacities seen in both lower lobes (series 4 image 8) consistent with pneumonias.    Pleura: No effusions or thickening.    Heart:Pronounced coronary artery calcification is seen. There is extensive aortic and mitral valve calcifications seen.    Abdomen:    Abdominal Wall: No abdominal wall pathology is seen.    Within limitations of noncontrast technique, no acute findings liver, pancreas spleen identified.    Biliary System: No intrahepatic or extrahepatic biliary duct dilatation is seen.    Gallbladder: Surgical clips are seen in the gallbladder fossa consistent with prior cholecystectomy.    Adrenals: The adrenal glands appear unremarkable.    Kidneys: The left kidney appears unremarkable with no stones or hydronephrosis. A single stable peripelvic cyst measuring 2.8 cm is seen in the right kidney and for this no specific follow-up imaging is recommended.  The right kidney otherwise appears unremarkable with no stones or hydronephrosis identified.    Aorta: There is extensive calcification of the abdominal aorta and its branches.    Bowel:    Esophagus: There is a moderate sized hiatal hernia.    Stomach: There is hiatal herniation of the cardia and part of the body of the stomach as detailed above.    Duodenum: Unremarkable appearing duodenum.    Small Bowel: There are numerous moderately  dilated loops of fluid-filled small bowel with numerous air-fluid levels with transition point in the mid small bowel seen on series 2 image 54.    Colon: Unremarkable.    Appendix: The appendix is not identified but no inflammatory changes are seen in the right lower quadrant to suggest appendicitis.    Peritoneum: No intraperitoneal free air or ascites is seen.    Pelvis:    Bladder: The bladder appears unremarkable.    Female:    Uterus: The uterus is not identified.    Ovaries: No adnexal masses are seen.    Inguinal Findings:    Inguinal Hernia: Incidental note is made of small uncomplicated mesenteric fat containing bilateral inguinal hernias.    Bony structures:    Dorsal Spine: There is mild spondylosis of the visualized dorsal spine. There are hypodense lesions with prominent trabecular markings seen in T12-L3. These may represent hemangiomas.    Bony Pelvis: The visualized bony structures of the pelvis appear unremarkable.    Notifications: The results were discussed with the physician Dr. Sanders prior to dictation at 2024-07-18 20:46:47 CDT.                        Preliminary result by Britton Kemp Jr., MD (07/18/24 20:49:10)                   Impression:    1. There are moderate opacities seen in both lower lobes (series 4 image 8) consistent with pneumonias.  2. There are numerous moderately dilated loops of fluid-filled small bowel with numerous air-fluid levels with transition point in the mid small bowel seen on series 2 image 54. This is compatible with a small bowel obstruction.  3. No intraperitoneal free air or ascites is seen.  4. Details and other findings as discussed above.               Narrative:    START OF REPORT:  Technique: CT of the abdomen and pelvis was performed with axial images as well as sagittal and coronal reconstruction images without intravenous contrast.    Comparison: None available.    Clinical History: Abdominal Pain Vomiting(88 yr old in /[facility] FROM NURSING  HOME W REPORT OF ABD PAIN W NV AND WEAKNESS SINCE YESTERDAY. LBM THIS AM. PT REPORTS DYSURIA. SEE NURSING HOME REPORTS. EKG OBTAINED. ) Fatigue.    Dosage Information: Automated Exposure Control was utilized.    Findings:  Lines and Tubes: None.  Thorax:  Lungs: There are moderate opacities seen in both lower lobes (series 4 image 8) consistent with pneumonias.  Pleura: No effusions or thickening.  Heart: Moderate cardiomegaly is seen. Pronounced coronary artery calcification is seen. There is extensive aortic and mitral valve calcifications seen.  Abdomen:  Abdominal Wall: No abdominal wall pathology is seen.  Liver: The liver appears unremarkable.  Biliary System: No intrahepatic or extrahepatic biliary duct dilatation is seen.  Gallbladder: Surgical clips are seen in the gallbladder fossa consistent with prior cholecystectomy.  Pancreas: The pancreas appears unremarkable.  Spleen: The spleen appears unremarkable.  Adrenals: The adrenal glands appear unremarkable.  Kidneys: The left kidney appears unremarkable with no stones cysts masses or hydronephrosis. A single peripelvic cyst measuring 2.8 cm is seen in the right kidney. The right kidney otherwise appears unremarkable with no stones masses or hydronephrosis identified.  Aorta: There is extensive calcification of the abdominal aorta and its branches.  IVC: Unremarkable.  Bowel:  Esophagus: There is a moderate sized hiatal hernia.  Stomach: There is hiatal herniation of the cardia and part of the body of the stomach as detailed above.  Duodenum: Unremarkable appearing duodenum.  Small Bowel: There are numerous moderately dilated loops of fluid-filled small bowel with numerous air-fluid levels with transition point in the mid small bowel seen on series 2 image 54.  Colon: Unremarkable.  Appendix: The appendix is not identified but no inflammatory changes are seen in the right lower quadrant to suggest appendicitis.  Peritoneum: No intraperitoneal free air or  ascites is seen.    Pelvis:  Bladder: The bladder appears unremarkable.  Female:  Uterus: The uterus is not identified.  Ovaries: No adnexal masses are seen.  Inguinal Findings:  Inguinal Hernia: Incidental note is made of small uncomplicated mesenteric fat containing bilateral inguinal hernias.    Bony structures:  Dorsal Spine: There is mild spondylosis of the visualized dorsal spine. There are hypodense lesions with prominent trabecular markings seen in T12-L3. These may represent hemangiomas.  Bony Pelvis: The visualized bony structures of the pelvis appear unremarkable.    Notifications: The results were discussed with the physician Dr. Sanders prior to dictation at 2024-07-18 20:46:47 CDT.                                         X-Ray Chest AP Portable (Final result)  Result time 07/18/24 18:49:26      Final result by Flor Hernandez MD (07/18/24 18:49:26)                   Impression:      Diffuse bilateral interstitial infiltrates with developing consolidation in the right lower lobe    Prominence of the right paratracheal region.  CT scan correlation is recommended.      Electronically signed by: Chip Hernandez  Date:    07/18/2024  Time:    18:49               Narrative:    EXAMINATION:  XR CHEST AP PORTABLE    CLINICAL HISTORY:  Sepsis;    TECHNIQUE:  Single frontal view of the chest was performed.    COMPARISON:  06/30/2024    FINDINGS:  There are diffuse bilateral interstitial infiltrates with a area of consolidation developing in the right lower lobe.  There is also increased opacity in the right paratracheal region.  This was seen on the prior examination as well.  The heart is normal appearance.  The pulmonary vascularity is unremarkable.  Bones and joints show no acute abnormality.                                    X-Rays:   Independently Interpreted Readings:   Chest X-Ray: Normal heart size. Bilateral infiltrates. Consolidation in the right lower lobe suspicious for PNA.        Medications   sodium chloride 0.9% flush 10 mL (has no administration in time range)   naloxone 0.4 mg/mL injection 0.02 mg (has no administration in time range)   glucose chewable tablet 16 g (has no administration in time range)   glucose chewable tablet 24 g (has no administration in time range)   glucagon (human recombinant) injection 1 mg (has no administration in time range)   ondansetron injection 4 mg (has no administration in time range)   prochlorperazine injection Soln 5 mg (has no administration in time range)   acetaminophen tablet 650 mg (has no administration in time range)   melatonin tablet 6 mg (has no administration in time range)   dextrose 10% bolus 125 mL 125 mL (has no administration in time range)   dextrose 10% bolus 250 mL 250 mL (has no administration in time range)   vancomycin - pharmacy to dose (has no administration in time range)   piperacillin-tazobactam (ZOSYN) 4.5 g in D5W 100 mL IVPB (MB+) (0 g Intravenous Stopped 7/20/24 1508)   azithromycin (ZITHROMAX) 500 mg in D5W 250 mL IVPB (Vial-Mate) (0 mg Intravenous Stopped 7/20/24 1809)   lactated ringers infusion ( Intravenous New Bag 7/20/24 1703)   famotidine (PF) injection 20 mg (20 mg Intravenous Given 7/20/24 0823)   metoprolol injection 5 mg (has no administration in time range)   vancomycin 750 mg in D5W 250 mL IVPB (0 mg Intravenous Stopped 7/20/24 0159)   heparin (porcine) injection 5,000 Units (5,000 Units Subcutaneous Given 7/20/24 0910)   diatrizoate meglumineand-diatrizoate sodium (GASTROVIEW) 66-10 % solution (has no administration in time range)   lactated ringers infusion (0 mLs Intravenous Stopped 7/18/24 2052)   cefTRIAXone (Rocephin) 1 g in D5W 100 mL IVPB (MB+) (0 g Intravenous Stopped 7/18/24 2052)   ondansetron injection 4 mg (4 mg Intravenous Given 7/18/24 1848)   azithromycin (ZITHROMAX) 500 mg in D5W 250 mL IVPB (Vial-Mate) (0 mg Intravenous Stopped 7/18/24 2052)   lactated ringers bolus 1,000 mL (0 mLs  Intravenous Stopped 7/19/24 0005)   vancomycin 750 mg in D5W 250 mL IVPB (0 mg Intravenous Stopped 7/19/24 0120)   magnesium sulfate 2g in water 50mL IVPB (premix) (0 g Intravenous Stopped 7/20/24 1740)     Medical Decision Making  On examination, she is dry and alton lethargic but otherwise alert and stable. LBM this morning, no diarrhea. Patient is afebrile, hypotensive down to 107/48 with wide pulse pressure. Shock index >0.7 indicating fluid depletion. Began infusion of LR 200mL/hr as patient is not tolerating PO. Zofran IV given. WBC of 22. BUN 38 Cr 1.89 (baseline of ~1.0), Electrolytes wnl, Lactic 2.1, Trop 0.4, EKG showed no ST changes. . UA remarkable for 250 leuk est, 21-50 bacteria, no blood. CXR revealed diffuse bilateral interstitial infiltrates with developing consolidation in the right lower lobe. Patient was give Rocephin 1 g and Azithromycin 500 mg. CT A/P without ordered. Consulted internal medicine for admission due to severe sepsis, PNA, and UTI.       Amount and/or Complexity of Data Reviewed  Labs: ordered. Decision-making details documented in ED Course.  Radiology: ordered.  ECG/medicine tests: ordered and independent interpretation performed. Decision-making details documented in ED Course.  Discussion of management or test interpretation with external provider(s): Discussed the patient with Dr. Sanders and [facility] for admission due to sepsis. Agrees with current management. Recommends evaluation in ED.       Risk  Prescription drug management.  Decision regarding hospitalization.      Additional MDM:   Differential Diagnosis:   Symptom: Abdominal pain. <> The follow diagnoses were considered and will be evaluated: Acute Myocardial Infarction, Cystitis, Gastritis, Gastroenteritis, Pyelonephritis, Small Bowel Obstruction and Urinary Tract Infection.             Attending Attestation:   Physician Attestation Statement for Resident:  As the supervising MD   Physician  Attestation Statement: I have personally seen and examined this patient.   I agree with the above history.  -:   As the supervising MD I agree with the above PE.     As the supervising MD I agree with the above treatment, course, plan, and disposition.   I was personally present during the entire procedure.  I have reviewed and agree with the residents interpretation of the following: lab data, x-rays and EKG.                                        Clinical Impression:  Final diagnoses:  [R53.1] Weakness  [R11.10] Vomiting  [N17.9] GRACIE (acute kidney injury) (Primary)  [A41.9, R65.20] Severe sepsis          ED Disposition Condition    Observation                 Sarah Dueñas MD  Resident  07/20/24 1533       Blue Pettit MD  07/20/24 0390

## 2024-07-19 PROBLEM — E46 MALNUTRITION: Status: ACTIVE | Noted: 2024-07-19

## 2024-07-19 LAB
ALBUMIN SERPL-MCNC: 2.7 G/DL (ref 3.4–4.8)
ALBUMIN/GLOB SERPL: 0.6 RATIO (ref 1.1–2)
ALP SERPL-CCNC: 51 UNIT/L (ref 40–150)
ALT SERPL-CCNC: 8 UNIT/L (ref 0–55)
ANION GAP SERPL CALC-SCNC: 6 MEQ/L
AST SERPL-CCNC: 26 UNIT/L (ref 5–34)
BASOPHILS # BLD AUTO: 0.02 X10(3)/MCL
BASOPHILS NFR BLD AUTO: 0.2 %
BILIRUB SERPL-MCNC: 0.8 MG/DL
BUN SERPL-MCNC: 37.6 MG/DL (ref 9.8–20.1)
CALCIUM SERPL-MCNC: 9.9 MG/DL (ref 8.4–10.2)
CHLORIDE SERPL-SCNC: 101 MMOL/L (ref 98–107)
CO2 SERPL-SCNC: 24 MMOL/L (ref 23–31)
CREAT SERPL-MCNC: 1.75 MG/DL (ref 0.55–1.02)
CREAT/UREA NIT SERPL: 21
EOSINOPHIL # BLD AUTO: 0.05 X10(3)/MCL (ref 0–0.9)
EOSINOPHIL NFR BLD AUTO: 0.4 %
ERYTHROCYTE [DISTWIDTH] IN BLOOD BY AUTOMATED COUNT: 15.6 % (ref 11.5–17)
FLUAV AG UPPER RESP QL IA.RAPID: NOT DETECTED
FLUBV AG UPPER RESP QL IA.RAPID: NOT DETECTED
GFR SERPLBLD CREATININE-BSD FMLA CKD-EPI: 28 ML/MIN/1.73/M2
GLOBULIN SER-MCNC: 4.6 GM/DL (ref 2.4–3.5)
GLUCOSE SERPL-MCNC: 108 MG/DL (ref 82–115)
HCT VFR BLD AUTO: 32.1 % (ref 37–47)
HGB BLD-MCNC: 10.1 G/DL (ref 12–16)
HOLD SPECIMEN: NORMAL
IMM GRANULOCYTES # BLD AUTO: 0.04 X10(3)/MCL (ref 0–0.04)
IMM GRANULOCYTES NFR BLD AUTO: 0.3 %
LACTATE SERPL-SCNC: 0.8 MMOL/L (ref 0.5–2.2)
LYMPHOCYTES # BLD AUTO: 0.36 X10(3)/MCL (ref 0.6–4.6)
LYMPHOCYTES NFR BLD AUTO: 3.1 %
MAGNESIUM SERPL-MCNC: 1.7 MG/DL (ref 1.6–2.6)
MCH RBC QN AUTO: 26.6 PG (ref 27–31)
MCHC RBC AUTO-ENTMCNC: 31.5 G/DL (ref 33–36)
MCV RBC AUTO: 84.5 FL (ref 80–94)
MONOCYTES # BLD AUTO: 0.22 X10(3)/MCL (ref 0.1–1.3)
MONOCYTES NFR BLD AUTO: 1.9 %
MRSA PCR SCRN (OHS): NOT DETECTED
NEUTROPHILS # BLD AUTO: 11.04 X10(3)/MCL (ref 2.1–9.2)
NEUTROPHILS NFR BLD AUTO: 94.1 %
NRBC BLD AUTO-RTO: 0 %
OHS QRS DURATION: 100 MS
OHS QRS DURATION: 102 MS
OHS QTC CALCULATION: 493 MS
OHS QTC CALCULATION: 548 MS
PHOSPHATE SERPL-MCNC: 3.4 MG/DL (ref 2.3–4.7)
PLATELET # BLD AUTO: 202 X10(3)/MCL (ref 130–400)
PMV BLD AUTO: 11.4 FL (ref 7.4–10.4)
POTASSIUM SERPL-SCNC: 4.2 MMOL/L (ref 3.5–5.1)
PROT SERPL-MCNC: 7.3 GM/DL (ref 5.8–7.6)
RBC # BLD AUTO: 3.8 X10(6)/MCL (ref 4.2–5.4)
SARS-COV-2 RNA RESP QL NAA+PROBE: NOT DETECTED
SODIUM SERPL-SCNC: 131 MMOL/L (ref 136–145)
TROPONIN I SERPL-MCNC: 0.31 NG/ML (ref 0–0.04)
TROPONIN I SERPL-MCNC: 0.38 NG/ML (ref 0–0.04)
TROPONIN I SERPL-MCNC: 0.41 NG/ML (ref 0–0.04)
VANCOMYCIN TROUGH SERPL-MCNC: 6.4 UG/ML (ref 15–20)
WBC # BLD AUTO: 11.73 X10(3)/MCL (ref 4.5–11.5)

## 2024-07-19 PROCEDURE — 25000003 PHARM REV CODE 250

## 2024-07-19 PROCEDURE — 97167 OT EVAL HIGH COMPLEX 60 MIN: CPT

## 2024-07-19 PROCEDURE — 97162 PT EVAL MOD COMPLEX 30 MIN: CPT

## 2024-07-19 PROCEDURE — 80053 COMPREHEN METABOLIC PANEL: CPT

## 2024-07-19 PROCEDURE — 83735 ASSAY OF MAGNESIUM: CPT

## 2024-07-19 PROCEDURE — 36415 COLL VENOUS BLD VENIPUNCTURE: CPT

## 2024-07-19 PROCEDURE — 80202 ASSAY OF VANCOMYCIN: CPT

## 2024-07-19 PROCEDURE — 51798 US URINE CAPACITY MEASURE: CPT

## 2024-07-19 PROCEDURE — 85025 COMPLETE CBC W/AUTO DIFF WBC: CPT

## 2024-07-19 PROCEDURE — 93005 ELECTROCARDIOGRAM TRACING: CPT

## 2024-07-19 PROCEDURE — 84100 ASSAY OF PHOSPHORUS: CPT

## 2024-07-19 PROCEDURE — 94761 N-INVAS EAR/PLS OXIMETRY MLT: CPT

## 2024-07-19 PROCEDURE — 83605 ASSAY OF LACTIC ACID: CPT

## 2024-07-19 PROCEDURE — 63600175 PHARM REV CODE 636 W HCPCS

## 2024-07-19 PROCEDURE — 84484 ASSAY OF TROPONIN QUANT: CPT | Mod: 91

## 2024-07-19 PROCEDURE — 51701 INSERT BLADDER CATHETER: CPT

## 2024-07-19 PROCEDURE — 27000221 HC OXYGEN, UP TO 24 HOURS

## 2024-07-19 PROCEDURE — 84484 ASSAY OF TROPONIN QUANT: CPT

## 2024-07-19 PROCEDURE — 21400001 HC TELEMETRY ROOM

## 2024-07-19 RX ORDER — ENOXAPARIN SODIUM 100 MG/ML
40 INJECTION SUBCUTANEOUS EVERY 24 HOURS
Status: DISCONTINUED | OUTPATIENT
Start: 2024-07-19 | End: 2024-07-20

## 2024-07-19 RX ORDER — METOPROLOL TARTRATE 1 MG/ML
5 INJECTION, SOLUTION INTRAVENOUS EVERY 5 MIN PRN
Status: DISCONTINUED | OUTPATIENT
Start: 2024-07-19 | End: 2024-07-30 | Stop reason: HOSPADM

## 2024-07-19 RX ORDER — FAMOTIDINE 10 MG/ML
20 INJECTION INTRAVENOUS DAILY
Status: DISCONTINUED | OUTPATIENT
Start: 2024-07-19 | End: 2024-07-21

## 2024-07-19 RX ADMIN — ENOXAPARIN SODIUM 40 MG: 40 INJECTION SUBCUTANEOUS at 04:07

## 2024-07-19 RX ADMIN — PIPERACILLIN SODIUM AND TAZOBACTAM SODIUM 4.5 G: 4; .5 INJECTION, POWDER, LYOPHILIZED, FOR SOLUTION INTRAVENOUS at 12:07

## 2024-07-19 RX ADMIN — SODIUM CHLORIDE, POTASSIUM CHLORIDE, SODIUM LACTATE AND CALCIUM CHLORIDE: 600; 310; 30; 20 INJECTION, SOLUTION INTRAVENOUS at 10:07

## 2024-07-19 RX ADMIN — PIPERACILLIN SODIUM AND TAZOBACTAM SODIUM 4.5 G: 4; .5 INJECTION, POWDER, LYOPHILIZED, FOR SOLUTION INTRAVENOUS at 10:07

## 2024-07-19 RX ADMIN — ATORVASTATIN CALCIUM 10 MG: 10 TABLET, FILM COATED ORAL at 08:07

## 2024-07-19 RX ADMIN — SODIUM CHLORIDE, POTASSIUM CHLORIDE, SODIUM LACTATE AND CALCIUM CHLORIDE: 600; 310; 30; 20 INJECTION, SOLUTION INTRAVENOUS at 12:07

## 2024-07-19 RX ADMIN — APIXABAN 5 MG: 2.5 TABLET, FILM COATED ORAL at 08:07

## 2024-07-19 RX ADMIN — METOPROLOL SUCCINATE 12.5 MG: 25 TABLET, EXTENDED RELEASE ORAL at 08:07

## 2024-07-19 RX ADMIN — VANCOMYCIN HYDROCHLORIDE 750 MG: 750 INJECTION, POWDER, LYOPHILIZED, FOR SOLUTION INTRAVENOUS at 12:07

## 2024-07-19 RX ADMIN — AZITHROMYCIN MONOHYDRATE 500 MG: 500 INJECTION, POWDER, LYOPHILIZED, FOR SOLUTION INTRAVENOUS at 04:07

## 2024-07-19 RX ADMIN — FAMOTIDINE 20 MG: 10 INJECTION, SOLUTION INTRAVENOUS at 10:07

## 2024-07-19 RX ADMIN — ESCITALOPRAM OXALATE 10 MG: 10 TABLET ORAL at 08:07

## 2024-07-19 RX ADMIN — SODIUM CHLORIDE, POTASSIUM CHLORIDE, SODIUM LACTATE AND CALCIUM CHLORIDE: 600; 310; 30; 20 INJECTION, SOLUTION INTRAVENOUS at 09:07

## 2024-07-19 RX ADMIN — ASPIRIN 81 MG 81 MG: 81 TABLET ORAL at 08:07

## 2024-07-19 NOTE — PLAN OF CARE
Problem: Adult Inpatient Plan of Care  Goal: Plan of Care Review  Outcome: Progressing  Goal: Patient-Specific Goal (Individualized)  Outcome: Progressing  Goal: Absence of Hospital-Acquired Illness or Injury  Outcome: Progressing  Goal: Optimal Comfort and Wellbeing  Outcome: Progressing  Goal: Readiness for Transition of Care  Outcome: Progressing     Problem: Sepsis/Septic Shock  Goal: Optimal Coping  Outcome: Progressing  Goal: Absence of Bleeding  Outcome: Progressing  Goal: Blood Glucose Level Within Targeted Range  Outcome: Progressing  Goal: Absence of Infection Signs and Symptoms  Outcome: Progressing  Goal: Optimal Nutrition Intake  Outcome: Progressing     Problem: Fall Injury Risk  Goal: Absence of Fall and Fall-Related Injury  Outcome: Progressing     Problem: Skin Injury Risk Increased  Goal: Skin Health and Integrity  Outcome: Progressing     Problem: Acute Kidney Injury/Impairment  Goal: Fluid and Electrolyte Balance  Outcome: Progressing  Goal: Improved Oral Intake  Outcome: Progressing  Goal: Effective Renal Function  Outcome: Progressing     Problem: Pneumonia  Goal: Fluid Balance  Outcome: Progressing  Goal: Resolution of Infection Signs and Symptoms  Outcome: Progressing  Goal: Effective Oxygenation and Ventilation  Outcome: Progressing

## 2024-07-19 NOTE — NURSING
"Pt NPO, with oral medication due. Sarina CARSON notified and stated ,"Okay to give with sips of water".   "

## 2024-07-19 NOTE — CONSULTS
[facility]  Inpatient Consult     Reason for Consult:  Small bowel obstruction     Subjective:  HPI:  88-year-old female with a PMHx of HTN, HLD, and a fib on Eliquis presenting to the ED today with a 2 day history of nausea and vomiting.  Patient's son states that the symptoms began yesterday afternoon when the patient vomited twice.  Patient also endorsed some abdominal pain at that time.  Patient vomited again this morning prompting family to bring her to the emergency room.  General surgery was consulted after CT scan was positive for small bowel obstruction with transition point in the mid small bowel.  The patient is currently DNR.  Per chart review the patient has a surgical history of an appendectomy and cholecystectomy as well as a left heart catheterization.  On examination patient is chronically ill-appearing with a soft, nondistended, and mildly tender abdomen.  Patient was recently hospitalized for UTI with sepsis and discharged on July 1st.      PMH:  Past Medical History:   Diagnosis Date    GERD (gastroesophageal reflux disease)     Hyperlipidemia     Hypertension          PSH:  Past Surgical History:   Procedure Laterality Date    ANGIOGRAM, CORONARY, WITH LEFT HEART CATHETERIZATION N/A 10/13/2023    Procedure: Angiogram, Coronary, with Left Heart Cath;  Surgeon: Marlon Myers Jr., MD;  Location: [facility] CATH LAB;  Service: Cardiology;  Laterality: N/A;    APPENDECTOMY      CHOLECYSTECTOMY      HYSTERECTOMY      INSTANTANEOUS WAVE-FREE RATIO (IFR) N/A 10/13/2023    Procedure: Instantaneous Wave-Free Ratio (IFR);  Surgeon: Marlon Myers Jr., MD;  Location: [facility] CATH LAB;  Service: Cardiology;  Laterality: N/A;         Medications:  No current facility-administered medications on file prior to encounter.     Current Outpatient Medications on File Prior to Encounter   Medication Sig Dispense Refill    apixaban (ELIQUIS) 5 mg Tab Take 1 tablet (5 mg total) by mouth 2 (two) times daily. 60  "tablet 5    aspirin 81 MG Chew Take 1 tablet (81 mg total) by mouth once daily. 90 tablet 4    EScitalopram oxalate (LEXAPRO) 10 MG tablet Take 1 tablet (10 mg total) by mouth once daily. 90 tablet 1    famotidine (PEPCID) 20 MG tablet Take 1 tablet (20 mg total) by mouth 2 (two) times daily. 30 tablet 0    lisinopriL (PRINIVIL,ZESTRIL) 5 MG tablet Take 2 tablets (10 mg total) by mouth once daily. 90 tablet 1    melatonin (MELATIN) 5 mg Take 1 tablet (5 mg total) by mouth nightly. 90 tablet 4    metoprolol succinate (TOPROL-XL) 25 MG 24 hr tablet Take 0.5 tablets (12.5 mg total) by mouth once daily. 45 tablet 1    multivit with min-folic acid 200 mcg Chew One A Day Vitamin 90 tablet 4    pantoprazole (PROTONIX) 40 MG tablet pantoprazole 40 mg tablet,delayed release, Strength: 40 mg 90 tablet 0    pravastatin (PRAVACHOL) 20 MG tablet pravastatin 20 mg tablet  Take 1 tablet every day by oral route. Strength: 20 mg 90 tablet 4        Allergies:  Review of patient's allergies indicates:   Allergen Reactions    Sulfa (sulfonamide antibiotics)      Other reaction(s): Not available         Social Hx:  Social History     Tobacco Use   Smoking Status Never    Passive exposure: Never   Smokeless Tobacco Never      Social History     Substance and Sexual Activity   Alcohol Use Not Currently         Relevant Family Hx:  Family History   Problem Relation Name Age of Onset    Hypertension Mother      Cancer Mother      Hypertension Sister      Hypertension Sister           Objective:  Vitals:  VITAL SIGNS: 24 HR MIN & MAX LAST   Temp  Min: 98.1 °F (36.7 °C)  Max: 99.1 °F (37.3 °C)  98.1 °F (36.7 °C)   BP  Min: 106/59  Max: 117/67  117/67    Pulse  Min: 71  Max: 90  72    Resp  Min: 19  Max: 23  20    SpO2  Min: 93 %  Max: 98 %  96 %      HT: 5' 2" (157.5 cm)  WT: 59.9 kg (132 lb)  BMI: 24.1     Physical Exam:  Gen: NAD, chronically ill appearing  Neuro: awake, alert, answering questions appropriately  CV: RRR  Resp: non-labored " "breathing, satting well on 2 L NC  Abd: soft, ND, mildly tender to palpation  Ext: atraumatic  Skin: warm, well perfused     Labs:  Renal:  Recent Labs     07/18/24  0500 07/18/24  1746   BUN 28.1* 38.5*   CREATININE 1.16* 1.89*     No results for input(s): "LACTIC" in the last 72 hours.  FENGI:  Recent Labs     07/18/24  0500 07/18/24  1746    139   K 4.5 5.1    104   CO2 19* 23   CALCIUM 10.1 10.4*   ALBUMIN 3.4 3.1*   BILITOT 0.7 0.7   AST 29 28   ALKPHOS 61 57   ALT 12 8     Heme:  Recent Labs     07/18/24  0500 07/18/24  1640   HGB 11.5* 11.2*   HCT 36.7* 34.3*    254     ID:  Recent Labs     07/18/24  0500 07/18/24  1640   WBC 11.82* 22.07*     CBG:  Recent Labs     07/18/24  0500 07/18/24  1746   GLUCOSE 137* 142*      Cardiovascular:  Recent Labs   Lab 07/18/24  1746   TROPONINI 0.402*     I have reviewed all pertinent lab results within the past 24 hours.     Imaging:  CTAP 7/18:  mpression:  1. There are moderate opacities seen in both lower lobes (series 4 image 8) consistent with pneumonias.  2. There are numerous moderately dilated loops of fluid-filled small bowel with numerous air-fluid levels with transition point in the mid small bowel seen on series 2 image 54. This is compatible with a small bowel obstruction.  3. No intraperitoneal free air or ascites is seen.  4. Details and other findings as discussed above.     Assessment/Plan:  88-year-old with PMHx of HTN, HLD, and afib on Eliquis presenting with pneumonia. CT shows evidence of a small-bowel obstruction with a transition point. Pt currently AF and HDS.     - no acute surgical intervention at this time  - patient is DNR and thus will pursue non-operative interventions  - NG tube to low intermittent wall suction  - Serial abdominal exams  - NPO  - mIVF  - continue antibiotics  - rest of care per primary  - siurgery will continue to follow      Speedy Rocha MD  [facility], PGY-1  07/18/2024       Edited noted " above as necessary. Agree with documentation     Misael Farfan MD  [facility] PGY3

## 2024-07-19 NOTE — PROGRESS NOTES
"[facility] - [facility] Team  Daily Progress Note    Patient ID: Fifi Chacon, 89y/o F    Subjective:  AF, HDS  Slept well last night  Endorses mild general pain to abdomen  Desaturated to 89% while sleeping on room air   Satting well on 3L NC now  Understands questions well but cannot effectively speak  Denies nausea and vomiting  No BM or flatus  Had NG tube in but pulled it out overnight     Objective:    Vitals:  Vitals:    07/19/24 0328   BP:    Pulse: 65   Resp:    Temp:         Intake/Output:  No intake or output data in the 24 hours ending 07/19/24 0640     Physical Exam:  Gen: NAD  Neuro: awake, alert, answering questions appropriately  CV: RR  Resp: non-labored breathing, satting well on 3L NC  Abd: soft, ND, mild general tenderness to palpation   Ext: moves all 4 spontaneously and purposefully  Skin: warm, well perfused  L/D/A: 20G IV R AC, female external urinary catheter with suction     Labs:  Renal:  Recent Labs     07/18/24  0500 07/18/24  1746   BUN 28.1* 38.5*   CREATININE 1.16* 1.89*     No results for input(s): "LACTIC" in the last 72 hours.    FENGI:  Recent Labs     07/18/24  0500 07/18/24  1746    139   K 4.5 5.1    104   CO2 19* 23   CALCIUM 10.1 10.4*   ALBUMIN 3.4 3.1*   BILITOT 0.7 0.7   AST 29 28   ALKPHOS 61 57   ALT 12 8       Heme:  Recent Labs     07/18/24  0500 07/18/24 1640 07/19/24  0607   HGB 11.5* 11.2* 10.1*   HCT 36.7* 34.3* 32.1*    254 202       ID:  Recent Labs     07/18/24  0500 07/18/24 1640 07/19/24  0607   WBC 11.82* 22.07* 11.73*       CBG:  Recent Labs     07/18/24  0500 07/18/24  1746   GLUCOSE 137* 142*        Cardiovascular:  Recent Labs   Lab 07/18/24  1746 07/19/24  0008   TROPONINI 0.402* 0.412*       I have reviewed all pertinent lab results within the past 24 hours.    Imaging:  CTAP 7/18:  Impression:  1. There are moderate opacities seen in both lower lobes (series 4 image 8) consistent with pneumonias.  2. There are numerous " moderately dilated loops of fluid-filled small bowel with numerous air-fluid levels with transition point in the mid small bowel seen on series 2 image 54. This is compatible with a small bowel obstruction.  3. No intraperitoneal free air or ascites is seen.  4. Details and other findings as discussed above.    Micro/Path/Other:  18 July 2024  Blood cultures: in process  Urine cultures: in process  Respiratory culture: in process    Assessment/Plan:  88-year-old with PMHx of HTN, HLD, and afib on Eliquis presenting with pneumonia. CT shows evidence of a small-bowel obstruction with a transition point. Pt currently AF and HDS.      - no acute surgical intervention at this time  - patient is DNR and thus will pursue non-operative interventions first  - NG tube replaced this morning  - Serial abdominal exams  - NPO  - mIVF  - continue antibiotics  - rest of care per primary  - surgery will continue to follow       Jori Osullivan MS3  [facility]  07/19/2024     Patient seen and examined alongside medical student. Note reviewed and edited as appropriate. Agree with plan as written above.     Speedy Rocha MD  [facility] PGY-1

## 2024-07-19 NOTE — PT/OT/SLP EVAL
Physical Therapy Evaluation    Patient Name:  Fifi Chacon   MRN:  [id number]    Recommendations:     Therapy Intensity Recommendations at Discharge: Moderate Intensity Therapy  Discharge Equipment Recommendations: none   Equipment to be obtained for discharge: rolling walker.  Barriers to discharge: fall risk and severity of deficits    Assessment:     Fifi Chacon is a 88 y.o. female admitted with a medical diagnosis of  Small bowel obstruction.  She presents with the following impairments/functional limitations:  weakness, impaired functional mobility, impaired cardiopulmonary response to activity, impaired balance, decreased lower extremity function, decreased upper extremity function.    Rehab Prognosis: Fair.    Patient would benefit from continued skilled acute PT services to: address above listed impairments/functional limitations; receive patient/caregiver education; reduce fall risk; and maximize independency/safety with functional mobility.    Recent Surgery: * No surgery found *      Plan:     During this hospitalization, patient to be seen 5 x/week to address the identified impairments/functional limitations via gait training, therapeutic activities, therapeutic exercises, neuromuscular re-education and progress toward the established goals.    Plan of Care Expires:  08/18/24    Subjective     Communicated with patient's nurse Sivan prior to session.    Patient agreeable to participate in evaluation.     Chief Complaint: none stated  Patient/Family Comments/goals: none stated  Pain/Comfort:  Pain Rating 1: 0/10  Pain Rating Post-Intervention 1: 0/10    Patients cultural, spiritual, Yarsanism conflicts given the current situation: no    Social History  Living Environment: Patient  was in a skilled facility prior to this hospital stay.    Functional Level: Prior to admission patient required assistance with ADLs including mobility (bed-transfer-ambulation), bathing, and toileting.  Equipment Used at  Home: cane, straight, walker, rolling, rollator  Equipment owned (not currently used): rollator.  Assistance Upon Discharge: facility staff.    Objective:     Patient found supine in bed with NG tube, peripheral IV, telemetry, PureWick, oxygen  upon PT entry to room.    General Precautions: Standard, fall   Orthopedic Precautions:N/A   Braces: N/A  Respiratory Status: 2 liters/min O2 via nasal cannula  Exams:  Orientation: Patient is person, place, situation  Commands: Patient follows commands consistently  BILAT UE ROM/strength - defer to OT - see OT note for details  RLE ROM: WFL  RLE Strength: Deficits: 3-/5  LLE ROM: WFL  LLE Strength: Deficits: 3-/5    Functional Mobility:    Bed Mobility:  Rolling Right: moderate assistance and of 2 persons  Supine to Sit: moderate assistance and of 2 persons  Sit to Supine: moderate assistance and of 2 persons    Transfers:  Not attempted at this time.    Balance:  Sit  Static: GOOD: Takes MODERATE challenges from all directions  Dynamic: FAIR+: Maintains balance through MINIMAL excursions of active trunk motion    Patient left supine in bed and with HOB elevated with all lines intact, call button in reach, patient's nurse notified, and son present.    Education     Patient was instructed to utilize staff assistance for mobility/transfers.  White board updated regarding patient's safest level of mobility with staff assistance.    Goals     Multidisciplinary Problems       Physical Therapy Goals          Problem: Physical Therapy    Goal Priority Disciplines Outcome Goal Variances Interventions   Physical Therapy Goal     PT, PT/OT      Description: Goals to be met by: D/C     Patient will increase functional independence with mobility by performin. Supine to sit with Contact Guard Assistance  2. Sit to stand transfer with Contact Guard Assistance  3. Gait  x 20 feet with Minimal Assistance using Rolling Walker.                        History:     Past Medical History:    Diagnosis Date    GERD (gastroesophageal reflux disease)     Hyperlipidemia     Hypertension      Past Surgical History:   Procedure Laterality Date    ANGIOGRAM, CORONARY, WITH LEFT HEART CATHETERIZATION N/A 10/13/2023    Procedure: Angiogram, Coronary, with Left Heart Cath;  Surgeon: Marlon Myers Jr., MD;  Location: [facility] CATH LAB;  Service: Cardiology;  Laterality: N/A;    APPENDECTOMY      CHOLECYSTECTOMY      HYSTERECTOMY      INSTANTANEOUS WAVE-FREE RATIO (IFR) N/A 10/13/2023    Procedure: Instantaneous Wave-Free Ratio (IFR);  Surgeon: Marlon Myers Jr., MD;  Location: [facility] CATH LAB;  Service: Cardiology;  Laterality: N/A;     Time Tracking:     PT Received On: 07/19/24  PT Start Time: 1049     PT Stop Time: 1105  PT Total Time (min): 16 min     Billable Minutes: Evaluation 16    07/19/2024

## 2024-07-19 NOTE — H&P
[facility] History & Physical Note     Attending Physician: Dr. EMILE Graham  Resident: Kamilla    Date of Admit: 7/18/2024    Chief Complaint     Abdominal Pain, Vomiting (88 yr old in /AASI FROM NURSING HOME W REPORT OF ABD PAIN W NV AND WEAKNESS SINCE YESTERDAY.  LBM THIS AM.  PT REPORTS DYSURIA.  SEE NURSING HOME REPORTS. EKG OBTAINED. ), and Fatigue    Subjective:      History of Present Illness:  Fifi Chacon is a 88 y.o. female with history of paroxysmal afib on Eliquis, CAD s/p coronary angiogram on 10/13/23, severe aortic stenosis, hypertension, hyperlipidemia, anxiety who presented from SNF to the ED on 7/18/2024  with a primary complaint of nausea, vomiting. Patient's son at bedside provides most of the history. He states that she was in her usual state of health, working with PT at SNF, prior to symptoms starting yesterday. Reports nausea and vomiting x2 yesterday, then x3 today. Associated with abdominal pain. Denies fevers, confusion, diarrhea, urine changes, cough, dyspnea. Denies sick contacts. Patient has had dizziness for the past few months which is at baseline. Last bowel movement was this AM.   Patient is DNR.   Patient had recent hospitalization 6/26/24 - 7/1/24 after mechanical fall and found to have UTI at the time as well. She was treated with Rocephin, and transferred to SNF for continued PT. Urine culture later grew Candida tropicalis, but it is unclear whether she received treatment while in SNF. Patient normally lives at home with son, and son states she is overall independent in ADLs at baseline.     In the ED, patient presented afebrile, hypotensive down to 107/48 with wide pulse pressure. RR up to 27 on RA, improved to 20 on 2L NC. WBC of 22. BUN 38 Cr 1.89 (baseline of ~1.0), Electrolytes wnl, Lactic 2.1, Trop 0.4, EKG NSR with no ST changes. .  leuk est, 21-50 bacteria, no blood. CXR revealed diffuse bilateral interstitial infiltrates with developing  consolidation in the right lower lobe  Prominence of the right paratracheal region. Patient was given 1L LR, started on Azithromycin and Rocephin, CT AP was ordered, and Family Medicine was consulted to admit the patient for severe sepsis, PNA, and UTI.     Past Medical History:  Past Medical History:   Diagnosis Date    GERD (gastroesophageal reflux disease)     Hyperlipidemia     Hypertension        Past Surgical History:  Past Surgical History:   Procedure Laterality Date    ANGIOGRAM, CORONARY, WITH LEFT HEART CATHETERIZATION N/A 10/13/2023    Procedure: Angiogram, Coronary, with Left Heart Cath;  Surgeon: Marlon Myers Jr., MD;  Location: [facility] CATH LAB;  Service: Cardiology;  Laterality: N/A;    APPENDECTOMY      CHOLECYSTECTOMY      HYSTERECTOMY      INSTANTANEOUS WAVE-FREE RATIO (IFR) N/A 10/13/2023    Procedure: Instantaneous Wave-Free Ratio (IFR);  Surgeon: Marlon Myers Jr., MD;  Location: [facility] CATH LAB;  Service: Cardiology;  Laterality: N/A;       Family History:  Family History   Problem Relation Name Age of Onset    Hypertension Mother      Cancer Mother      Hypertension Sister      Hypertension Sister         Social History:  Social History     Tobacco Use    Smoking status: Never     Passive exposure: Never    Smokeless tobacco: Never   Substance Use Topics    Alcohol use: Not Currently    Drug use: Never       Allergies:  Review of patient's allergies indicates:   Allergen Reactions    Sulfa (sulfonamide antibiotics)      Other reaction(s): Not available       Home Medications:  Prior to Admission medications    Medication Sig Start Date End Date Taking? Authorizing Provider   apixaban (ELIQUIS) 5 mg Tab Take 1 tablet (5 mg total) by mouth 2 (two) times daily. 3/19/24 9/15/24  Cayden Forte MD   aspirin 81 MG Chew Take 1 tablet (81 mg total) by mouth once daily. 3/19/24 3/19/25  Cayden Forte MD   EScitalopram oxalate (LEXAPRO) 10 MG tablet Take 1 tablet (10 mg total) by mouth once  daily. 3/19/24   Cayden Forte MD   famotidine (PEPCID) 20 MG tablet Take 1 tablet (20 mg total) by mouth 2 (two) times daily. 3/19/24 3/19/25  Cayden Forte MD   lisinopriL (PRINIVIL,ZESTRIL) 5 MG tablet Take 2 tablets (10 mg total) by mouth once daily. 24   Alonso Foster MD   melatonin (MELATIN) 5 mg Take 1 tablet (5 mg total) by mouth nightly. 3/19/24   Cayden Forte MD   metoprolol succinate (TOPROL-XL) 25 MG 24 hr tablet Take 0.5 tablets (12.5 mg total) by mouth once daily. 3/19/24   Cayden Forte MD   multivit with min-folic acid 200 mcg Chew One A Day Vitamin 3/19/24   Cayden Forte MD   pantoprazole (PROTONIX) 40 MG tablet pantoprazole 40 mg tablet,delayed release, Strength: 40 mg 3/19/24   Cayden Forte MD   pravastatin (PRAVACHOL) 20 MG tablet pravastatin 20 mg tablet  Take 1 tablet every day by oral route. Strength: 20 mg 3/19/24   Cayden Forte MD     Review of Systems:    See HPI    Objective:   Last 24 Hour Vital Signs:  BP  Min: 106/59  Max: 111/64  Temp  Av.7 °F (37.1 °C)  Min: 98.3 °F (36.8 °C)  Max: 99.1 °F (37.3 °C)  Pulse  Av  Min: 83  Max: 90  Resp  Av  Min: 20  Max: 20  SpO2  Av.3 %  Min: 93 %  Max: 97 %  There is no height or weight on file to calculate BMI.  No intake/output data recorded.    Physical Examination:  General: in no acute distress. Appears chronically ill and deconditioned, sleepy but rousable and answers questions appropriately. Follows commands   HENT: mucous membranes dry   Neck: supple, no lymphadenopathy   Respiratory: clear to auscultation bilaterally, nonlabored respirations   Cardiovascular: regular rate and rhythm, 2/6 systolic murmur best heard over R 2nd intercostal space, trace edema in bilateral lower extremities, 2+ DP pulses bilaterally. Cap refill <2s  Gastrointestinal: soft, non-distended, bowel sounds present. Tender to palpation over RLQ without guarding or rebound.   Genitourinary: no suprapubic tenderness. In adult  diapers.   Musculoskeletal: no gross deformities observed   Integumentary: chronic venous stasis changes bilateral LE  Neuro: No focal lesions observed     Laboratory:    Most Recent Data:  CBC:   Lab Results   Component Value Date    WBC 22.07 (H) 07/18/2024    HGB 11.2 (L) 07/18/2024    HCT 34.3 (L) 07/18/2024     07/18/2024    MCV 84.3 07/18/2024    RDW 15.7 07/18/2024     WBC Differential:   Recent Labs   Lab 07/18/24  0500 07/18/24  1640   WBC 11.82* 22.07*   HGB 11.5* 11.2*   HCT 36.7* 34.3*    254   MCV 84.4 84.3     BMP:   Lab Results   Component Value Date     07/18/2024    K 5.1 07/18/2024     07/18/2024    CO2 23 07/18/2024    BUN 38.5 (H) 07/18/2024    CREATININE 1.89 (H) 07/18/2024    CALCIUM 10.4 (H) 07/18/2024    MG 2.00 07/01/2024    PHOS 3.1 07/01/2024     LFTs:   Lab Results   Component Value Date    ALBUMIN 3.1 (L) 07/18/2024    BILITOT 0.7 07/18/2024    AST 28 07/18/2024    ALKPHOS 57 07/18/2024    ALT 8 07/18/2024     Coags:   Lab Results   Component Value Date    INR 1.3 06/26/2024     FLP:   Lab Results   Component Value Date    CHOL 129 10/12/2023    HDL 33 (L) 10/12/2023    TRIG 103 10/12/2023     DM:   Lab Results   Component Value Date    HGBA1C 5.2 10/31/2022    CREATININE 1.89 (H) 07/18/2024     Thyroid:   Lab Results   Component Value Date    TSH 5.063 (H) 06/26/2024      Anemia:   Lab Results   Component Value Date    IRON 24 (L) 07/04/2024    TIBC 297 01/28/2019    FERRITIN 81.52 07/04/2024       Lab Results   Component Value Date    HBOFCUEO08 467 07/04/2024       Lab Results   Component Value Date    FOLATE >20.0 07/26/2018        Cardiac:   Lab Results   Component Value Date    TROPONINI 0.402 (H) 07/18/2024    .6 (H) 06/26/2024     Urinalysis:   Lab Results   Component Value Date    LABURIN >/= 100,000 colonies/ml Candida tropicalis (A) 07/05/2024    COLORU Yellow 07/18/2024    NITRITE Negative 07/18/2024    KETONESU Negative 10/23/2021     UROBILINOGEN Normal 07/18/2024    WBCUA 21-50 (A) 07/18/2024       Trended Lab Data:  Recent Labs   Lab 07/18/24  0500 07/18/24  1640 07/18/24  1746   WBC 11.82* 22.07*  --    HGB 11.5* 11.2*  --    HCT 36.7* 34.3*  --     254  --    MCV 84.4 84.3  --    RDW 15.6 15.7  --      --  139   K 4.5  --  5.1     --  104   CO2 19*  --  23   BUN 28.1*  --  38.5*   CREATININE 1.16*  --  1.89*   ALBUMIN 3.4  --  3.1*   BILITOT 0.7  --  0.7   AST 29  --  28   ALKPHOS 61  --  57   ALT 12  --  8       Trended Cardiac Data:  Recent Labs   Lab 07/18/24  1746   TROPONINI 0.402*       Microbiology Data:  Microbiology Results (last 7 days)       Procedure Component Value Units Date/Time    Blood culture x two cultures. Draw prior to antibiotics. [[id number]] Collected: 07/18/24 1746    Order Status: Sent Specimen: Blood from Arm, Right Updated: 07/18/24 1903    Blood culture x two cultures. Draw prior to antibiotics. [[id number]] Collected: 07/18/24 1841    Order Status: Sent Specimen: Blood from Arm, Left Updated: 07/18/24 1843    Urine culture [[id number]] Collected: 07/18/24 1729    Order Status: Sent Specimen: Urine Updated: 07/18/24 1825           Radiology:  Imaging Results              X-Ray Chest AP Portable (Final result)  Result time 07/18/24 18:49:26      Final result by Flor Hernandez MD (07/18/24 18:49:26)                   Impression:      Diffuse bilateral interstitial infiltrates with developing consolidation in the right lower lobe    Prominence of the right paratracheal region.  CT scan correlation is recommended.      Electronically signed by: Chip Hernandez  Date:    07/18/2024  Time:    18:49               Narrative:    EXAMINATION:  XR CHEST AP PORTABLE    CLINICAL HISTORY:  Sepsis;    TECHNIQUE:  Single frontal view of the chest was performed.    COMPARISON:  06/30/2024    FINDINGS:  There are diffuse bilateral interstitial infiltrates with a area of consolidation developing in the  right lower lobe.  There is also increased opacity in the right paratracheal region.  This was seen on the prior examination as well.  The heart is normal appearance.  The pulmonary vascularity is unremarkable.  Bones and joints show no acute abnormality.                                       Assessment & Plan:     Small bowel obstruction   Nausea, vomiting  - Surgery consulted, awaiting recs  - NPO now  - NG tube placed  - Zosyn as below  - anti-emetics PRN in place    Severe sepsis with suspected sources below  Right lower lobe pneumonia, possible aspiration  Acute cystitis on UA  Recent UTI growing Candida tropicalis  - Rocephin/Azithro x1 given in ED. Widen coverage to Vanc/Zosyn/Azithromycin. MRSA PCR collected.   - Given 1 L IVF bolus in ED. Will give rest of sepsis bolus.   - Lactic 2.1 --> 2.1. Will repeat in AM after more fluid resuscitation   - blood cultures x2, urine culture, resp culture (if able), COVID/Flu ordered  - it is unclear at this time whether patient received treatment for yeast while in SNF, would defer to day team for further exploration  - consider SLP evaluation in future    Acute kidney injury, likely ATN  - expect improvement with IVF  - Cr 1.89 from baseline of ~1.0    NSTEMI, likely Type II demand ischemia  - trend troponin and EKG q6h  - continue home ASA 81, statin    Questionable right paratracheal ?mass/opacity on CXR  - obtain CT chest    Severe aortic stenosis  Coronary artery disease  Hypertension  Hyperlipidemia  - last Echo 10/2023 EF 65-70%, severe aortic stenosis  - hold home Acei d/t GRACIE  - resume home statin  - consider repeat Echo in future    Paroxysmal atrial fibrillation  - continue home Eliquis  - currently NSR  - continue home metoprolol    Anxiety  - continue home Lexapro    History of falls  Chronic dizziness, at baseline  - admission 7/1/2024 for fall, currently in SNF for rehab, likely discharge back to SNF when able  - previously walked with a cane, currently  requiring rolling walker  - will need OT/PT evaluation after dealing with acute problem of SBO  - fall precautions, delirium precautions    CODE STATUS: DNR  Access: PIV  Antibiotics: Vanc/Zosyn/Azithromycin  Diet: NPO  DVT Prophylaxis: home Eliquis  GI Prophylaxis: home Pepcid  Fluids:  cc/hr      Disposition: Admit for SBO, awaiting surgery recs. Sepsis protocol. Trend troponins. NPO. Pending course.

## 2024-07-19 NOTE — PT/OT/SLP EVAL
Occupational Therapy   Evaluation    Name: Fifi Chacon  MRN: [id number]  Admitting Diagnosis: Small bowel obstruction  Recent Surgery: * No surgery found *      Recommendations:     Discharge Recommendations: Moderate Intensity Therapy  Discharge Equipment Recommendations:  none, to be determined by next level of care  Barriers to discharge:  None    Assessment:     Fifi Chacon is a 88 y.o. female with a medical diagnosis of Small bowel obstruction.  She presents with functional decline and difficulty with ADLs. Performance deficits affecting function: weakness, impaired balance, impaired endurance, impaired self care skills, impaired functional mobility, gait instability, decreased upper extremity function, decreased lower extremity function, impaired cardiopulmonary response to activity.      Rehab Prognosis: Good; patient would benefit from acute skilled OT services to address these deficits and reach maximum level of function.       Plan:     Patient to be seen 3 x/week to address the above listed problems via self-care/home management, therapeutic activities, therapeutic exercises  Plan of Care Expires:  (d/c)  Plan of Care Reviewed with: patient    Subjective     Chief Complaint: no complaints at this time.  Patient/Family Comments/goals: return to PLOF and maximize independence     Occupational Profile:  Living Environment: patient resident at skilled nursing facility prior to admission.  Previous level of function: patient with assistance for basic ADLs and transfers per son's reports patient was able to stand with minimal assistance and rolling walker prior to admission   Equipment Used at Home: cane, straight, walker, rolling, rollator  Assistance upon Discharge: family and nursing facility     Pain/Comfort:  Pain Rating 1: 0/10  Pain Addressed 1: Nurse notified  Pain Rating Post-Intervention 1: 0/10    Patients cultural, spiritual, Jain conflicts given the current situation: no    Objective:      Communicated with: nurse nelson prior to session.  Patient found supine with NG tube, peripheral IV, telemetry, PureWick, oxygen upon OT entry to room.    General Precautions: Standard, fall  Orthopedic Precautions: N/A  Braces: N/A  Respiratory Status: Nasal cannula, flow 3 L/min    Occupational Performance:    Bed Mobility:    Patient completed Rolling/Turning to Left with  moderate assistance and 2 persons  Patient completed Rolling/Turning to Right with moderate assistance and 2 persons  Patient completed Scooting/Bridging with moderate assistance and 2 persons  Patient completed Supine to Sit with moderate assistance and 2 persons  Patient completed Sit to Supine with moderate assistance and 2 persons    Functional Mobility/Transfers:  Not assessed at this time due to fatigue and decreased strength.    Activities of Daily Living:  Feeding:  dependence NG tube placed  Other ADLs not assessed at this date due to increased focus and attention to bed mobility secondary to onset of fatigue.     Cognitive/Visual Perceptual:  Cognitive/Psychosocial Skills:     -       Oriented to: Person and Place   -       Follows Commands/attention:Follows one-step commands  -       Safety awareness/insight to disability: impaired     Physical Exam:  Balance:    -       static sitting: min A, dynamic sitting: mod-max A, standing: NA  Dominant hand:    -       Right  Upper Extremity Range of Motion:     -       Right Upper Extremity: WFL except to 90 degrees of shoulder flexion, external rotation, WFL for internal rotation and elbow flexion, WFL for wrist and digits ROM  -       Left Upper Extremity: WFL except to 90 degrees of shoulder flexion, external rotation, WFL for internal rotation and elbow flexion, WFL for wrist and digits ROM  Upper Extremity Strength:    -       Right Upper Extremity: 3/5  -       Left Upper Extremity: 3/5    Strength:    -       Right Upper Extremity: WFL  -       Left Upper Extremity:  WFL    Treatment & Education:  Pt. educated on OT goals, POC, orientation to environment, use of call bell for assist with transfers OOB or for any other needs due to fall risk.    Patient left supine with all lines intact, call button in reach, nurse notified, and son present    GOALS:   Multidisciplinary Problems       Occupational Therapy Goals          Problem: Occupational Therapy    Goal Priority Disciplines Outcome Interventions   Occupational Therapy Goal     OT, PT/OT Progressing    Description: Patient will perform EOB grooming in unsupported sitting with Min A.  Patient will perform bed mobility while rolling right/left with Min A.  Patient will perform 5 sit to stand transitions from bed level with Mod A x 2 and RW for preparation of ADLs.  Patient will perform BSC transfer with RW and Mod A.                        History:     Past Medical History:   Diagnosis Date    GERD (gastroesophageal reflux disease)     Hyperlipidemia     Hypertension          Past Surgical History:   Procedure Laterality Date    ANGIOGRAM, CORONARY, WITH LEFT HEART CATHETERIZATION N/A 10/13/2023    Procedure: Angiogram, Coronary, with Left Heart Cath;  Surgeon: Marlon Myers Jr., MD;  Location: [facility] CATH LAB;  Service: Cardiology;  Laterality: N/A;    APPENDECTOMY      CHOLECYSTECTOMY      HYSTERECTOMY      INSTANTANEOUS WAVE-FREE RATIO (IFR) N/A 10/13/2023    Procedure: Instantaneous Wave-Free Ratio (IFR);  Surgeon: Marlon Myers Jr., MD;  Location: [facility] CATH LAB;  Service: Cardiology;  Laterality: N/A;       Time Tracking:   OT Date of Treatment:  7/19/ OT Start Time: 1048  OT Stop Time: 1105  OT Total Time (min): 17 min    Billable Minutes:Evaluation 17    7/19/2024

## 2024-07-19 NOTE — PROVIDER TRANSFER
TRANSITION OF CARE PROGRESS NOTE    I have received checkout from [place] [employer] [place]: Alonso Foster MD regarding this patient.     HO I assessment:  Admission diagnosis: small bowel obstruction, sepsis  Overnight management: NPO, continue IV vancomycin + Zosyn + azithromycin  Code status: DNR    Please call [phone] from 07/19/2024 4:30 PM to 7/22/24 7AM if any issues arise.    Dana Nicholson MD  [facility]-I

## 2024-07-19 NOTE — MEDICAL/APP STUDENT
"[facility] Team  Daily Progress Note    Patient ID: Fifi Chacon, 89y/o F    Subjective:  AF, HDS  Slept well last night  Endorses mild general pain to abdomen  Desaturated to 89% while sleeping on room air   Satting well on 3L NC now  Understands questions well but cannot effectively speak  Denies nausea and vomiting  No BM or flatus  Had NG tube in but pulled it out overnight     Objective:    Vitals:  Vitals:    07/19/24 0328   BP:    Pulse: 65   Resp:    Temp:         Intake/Output:  No intake or output data in the 24 hours ending 07/19/24 0556     Physical Exam:  Gen: NAD  Neuro: awake, alert, answering questions appropriately  CV: RR  Resp: non-labored breathing, satting well on 3L NC  Abd: soft, ND, mild general tenderness to palpation   Ext: moves all 4 spontaneously and purposefully  Skin: warm, well perfused  L/D/A: 20G IV R AC, female external urinary catheter with suction     Labs:  Renal:  Recent Labs     07/18/24  0500 07/18/24  1746   BUN 28.1* 38.5*   CREATININE 1.16* 1.89*     No results for input(s): "LACTIC" in the last 72 hours.    FENGI:  Recent Labs     07/18/24  0500 07/18/24  1746    139   K 4.5 5.1    104   CO2 19* 23   CALCIUM 10.1 10.4*   ALBUMIN 3.4 3.1*   BILITOT 0.7 0.7   AST 29 28   ALKPHOS 61 57   ALT 12 8       Heme:  Recent Labs     07/18/24  0500 07/18/24  1640   HGB 11.5* 11.2*   HCT 36.7* 34.3*    254       ID:  Recent Labs     07/18/24  0500 07/18/24  1640   WBC 11.82* 22.07*       CBG:  Recent Labs     07/18/24  0500 07/18/24  1746   GLUCOSE 137* 142*        Cardiovascular:  Recent Labs   Lab 07/18/24  1746 07/19/24  0008   TROPONINI 0.402* 0.412*       I have reviewed all pertinent lab results within the past 24 hours.    Imaging:  CTAP 7/18:  Impression:  1. There are moderate opacities seen in both lower lobes (series 4 image 8) consistent with pneumonias.  2. There are numerous moderately dilated loops of fluid-filled small bowel with " numerous air-fluid levels with transition point in the mid small bowel seen on series 2 image 54. This is compatible with a small bowel obstruction.  3. No intraperitoneal free air or ascites is seen.  4. Details and other findings as discussed above.    Micro/Path/Other:  18 July 2024  Blood cultures: in process  Urine cultures: in process  Respiratory culture: in process    Assessment/Plan:  88-year-old with PMHx of HTN, HLD, and afib on Eliquis presenting with pneumonia. CT shows evidence of a small-bowel obstruction with a transition point. Pt currently AF and HDS.      - no acute surgical intervention at this time  - patient is DNR and thus will pursue non-operative interventions  - NG tube to low intermittent wall suction  - Serial abdominal exams  - NPO  - mIVF  - continue antibiotics  - rest of care per primary  - surgery will continue to follow       Jori Osullivan MS3  [facility]  07/19/2024

## 2024-07-19 NOTE — PROGRESS NOTES
Inpatient Nutrition Assessment    Admit Date: 7/18/2024   Total duration of encounter: 1 day   Patient Age: 88 y.o.    Nutrition Recommendation/Prescription     Pt NPO; NGT suction; suggest use PPN /has peripheral access / until able to advance diet. Clinimix 4.25/5 @ 75 ml/rh with lipids 20% 250 ml daily to provide 1112 calories, 77 gm protein.   When appropriate--ADAT to healt healthy diet  When approprite--order ONS--boost/vanilla tid; Boost (provides 240 kcal, 10 g protein per serving)   MVI/fe  Biweekly wt  Will monitor nutrition status     Communication of Recommendations: reviewed with nurse, reviewed with patient, and reviewed with caregiver    Nutrition Assessment     Malnutrition Assessment/Nutrition-Focused Physical Exam    Malnutrition Context: acute illness or injury (07/19/24 1350)  Malnutrition Level: moderate (07/19/24 1350)  Energy Intake (Malnutrition): less than 75% for greater than or equal to 1 month (07/19/24 1350)  Weight Loss (Malnutrition): 1-2% in 1 week (07/19/24 1350)  Subcutaneous Fat (Malnutrition): mild depletion (07/19/24 1350)  Orbital Region (Subcutaneous Fat Loss): mild depletion  Upper Arm Region (Subcutaneous Fat Loss): mild depletion     Muscle Mass (Malnutrition): moderate depletion (07/19/24 1350)  Yazdanism Region (Muscle Loss): mild depletion  Clavicle Bone Region (Muscle Loss): moderate depletion  Clavicle and Acromion Bone Region (Muscle Loss): moderate depletion                          A minimum of two characteristics is recommended for diagnosis of either severe or non-severe malnutrition.    Chart Review    Reason Seen: continuous nutrition monitoring and malnutrition screening tool (MST)    Malnutrition Screening Tool Results   Have you recently lost weight without trying?: Unsure  Have you been eating poorly because of a decreased appetite?: Yes   MST Score: 3   Diagnosis:  SBO, N/V, sepsis, L lobe PNA, cystitis, UTI, GRACIE/HTN, NSTEMI, anxiety , R paratracheal mass,  aortic stenosis, HLD    Relevant Medical History: HTN, HLD, Afib     Scheduled Medications:  azithromycin, 500 mg, Q24H  enoxparin, 40 mg, Q24H (prophylaxis, 1700)  famotidine (PF), 20 mg, Daily  piperacillin-tazobactam (Zosyn) IV (PEDS and ADULTS) (extended infusion is not appropriate), 4.5 g, Q12H  [START ON 7/20/2024] vancomycin (VANCOCIN) IV (PEDS and ADULTS), 500 mg, Q24H    Continuous Infusions:  lactated ringers, Last Rate: 100 mL/hr at 07/19/24 1046    PRN Medications:  acetaminophen, 650 mg, Q8H PRN  dextrose 10%, 12.5 g, PRN  dextrose 10%, 25 g, PRN  glucagon (human recombinant), 1 mg, PRN  glucose, 16 g, PRN  glucose, 24 g, PRN  melatonin, 6 mg, Nightly PRN  metoprolol, 5 mg, Q5 Min PRN  naloxone, 0.02 mg, PRN  ondansetron, 4 mg, Q8H PRN  prochlorperazine, 5 mg, Q6H PRN  sodium chloride 0.9%, 10 mL, Q12H PRN  vancomycin - pharmacy to dose, , pharmacy to manage frequency    Calorie Containing IV Medications: no significant kcals from medications at this time    Recent Labs   Lab 07/18/24  0500 07/18/24  1640 07/18/24  1746 07/19/24  0607     --  139 131*   K 4.5  --  5.1 4.2   CALCIUM 10.1  --  10.4* 9.9   PHOS  --   --   --  3.4   MG  --   --   --  1.70   CO2 19*  --  23 24   BUN 28.1*  --  38.5* 37.6*   CREATININE 1.16*  --  1.89* 1.75*   EGFRNORACEVR 46  --  25 28   GLUCOSE 137*  --  142* 108   BILITOT 0.7  --  0.7 0.8   ALKPHOS 61  --  57 51   ALT 12  --  8 8   AST 29  --  28 26   ALBUMIN 3.4  --  3.1* 2.7*   LIPASE  --   --  18  --    WBC 11.82* 22.07*  --  11.73*   HGB 11.5* 11.2*  --  10.1*   HCT 36.7* 34.3*  --  32.1*     Nutrition Orders:  Diet NPO      Appetite/Oral Intake: NPO/NPO  Factors Affecting Nutritional Intake: abdominal pain, altered gastrointestinal function, decreased appetite, nausea, NPO, and vomiting  Social Needs Impacting Access to Food: none identified  Food/Samaritan/Cultural Preferences: none reported  Food Allergies: none reported  Last Bowel Movement:  "24  Wound(s):  none reported     Comments    () Pt NPO; NGT suction; son at bedside; reported pt has been eating well at SNF facility until Wednesday; pt then stated with N/V/abdominal pain; admitted with SBO. Pt with + wt loss approx 2% over past week. Pt with mild fat/muscle wasting; age related sarcopenia. PPN recs provided while pt NPO. Suggest use ONS when diet progressed     Anthropometrics    Height: 5' 2" (157.5 cm),    Last Weight: 61.8 kg (136 lb 3.9 oz) (24 1346), Weight Method: Standard Scale  BMI (Calculated): 24.9  BMI Classification: normal (BMI 18.5-24.9)     Ideal Body Weight (IBW), Female: 110 lb     % Ideal Body Weight, Female (lb): 120 %                    Usual Body Weight (UBW), k.5 kg  % Usual Body Weight: 97.53  % Weight Change From Usual Weight: -2.68 %  Usual Weight Provided By: patient, family/caregiver, and EMR weight history    Wt Readings from Last 5 Encounters:   24 61.8 kg (136 lb 3.9 oz)   24 63.5 kg (140 lb)   24 64.9 kg (143 lb)   01/10/24 64.3 kg (141 lb 12.8 oz)   24 63.9 kg (140 lb 12.8 oz)     Weight Change(s) Since Admission: #; bed wt today 136#  Wt Readings from Last 1 Encounters:   24 1346 61.8 kg (136 lb 3.9 oz)   24 2201 59.9 kg (132 lb)   24 59.9 kg (132 lb)   Admit Weight: 59.9 kg (132 lb) (24 214), Weight Method: Bed Scale    Estimated Needs    Weight Used For Calorie Calculations: 61.8 kg (136 lb 3.9 oz)  Energy Calorie Requirements (kcal): 1854 kcal/d; 30 lucas/kg  Energy Need Method: Kcal/kg  Weight Used For Protein Calculations: 61.8 kg (136 lb 3.9 oz)  Protein Requirements: 74 gm protein/d; 1.2 gm/kg  Fluid Requirements (mL): 1854 ml/d; 1ml/lucas        Enteral Nutrition     Patient not receiving enteral nutrition at this time.    Parenteral Nutrition     Patient not receiving parenteral nutrition support at this time.    Evaluation of Received Nutrient Intake    Calories: not meeting " estimated needs  Protein: not meeting estimated needs    Patient Education     Not applicable.    Nutrition Diagnosis     PES: Inadequate oral intake related to acute illness as evidenced by NPO . (new)     PES: Moderate acute disease or injury related malnutrition related to acute illness as evidenced by mild fat depletion, moderate muscle depletion, and 1-2% weight loss in 1 week. (new)    Nutrition Interventions     Intervention(s): modified composition of meals/snacks, modified composition of parenteral nutrition, modified rate of parenteral nutrition, multivitamin/mineral supplement therapy, and collaboration with other providers    Goal: Meet greater than 80% of nutritional needs by follow-up. (new)  Goal: Maintain weight throughout hospitalization. (new)    Nutrition Goals & Monitoring     Dietitian will monitor: food and beverage intake, parenteral nutrition intake, weight, and glucose/endocrine profile  Discharge planning: too early to determine; pending clinical course  Nutrition Risk/Follow-Up: moderate (follow-up in 3-5 days)   Please consult if re-assessment needed sooner.

## 2024-07-19 NOTE — PT/OT/SLP PROGRESS
Physical Therapy    Missed Treatment Session - cancel note    Patient Name:  Fifi Chacon   MRN:  [id number]      Patient not seen at this time secondary to Nurse/ JULIANNA hold.    Will follow-up as patient is appropriate/available/agreeable to participate and as therapists' schedule allows.

## 2024-07-19 NOTE — NURSING
16Fr NG tube was placed to right nare. X-Ray was called to confirm placement. Pt then pulled out NG tube before X-Ray arrived. Charge nurse then attempted to reinsert new NG tube, pt did not tolerate well, resistance was met. MD notified and stated to try reinsertion in the morning.

## 2024-07-19 NOTE — PROGRESS NOTES
[facility]   Progress Note      Resident Team: [facility]   Attending Physician: Rosana Graham MD  Resident: [facility] Length of Stay: 0 days    Subjective   Brief HPI:  Fifi Chacon is a 88 y.o. female with history of paroxysmal afib on Eliquis, CAD s/p coronary angiogram on 10/13/23, severe aortic stenosis, hypertension, hyperlipidemia, anxiety admitted for small bowel obstruction and sepsis.     Interval History:   Vital signs remained stable overnight. Pt pulled out her NG tube. No urine output overnight either. A straight cath was ordered this AM. Surgical team deems no surgery needed at this time. Pt admits to abdominal pain and vomited this AM after attempt to take home medications.    Review of Systems:  Review of Systems   Constitutional:  Positive for malaise/fatigue. Negative for fever.   Respiratory:  Negative for cough and shortness of breath.    Cardiovascular:  Negative for chest pain and palpitations.   Gastrointestinal:  Positive for abdominal pain, nausea and vomiting.      Objective     Vital Signs (Most Recent):  Temp: 98.3 °F (36.8 °C) (07/19/24 0744)  Pulse: 68 (07/19/24 0744)  Resp: (!) 21 (07/19/24 0744)  BP: 138/71 (07/19/24 0744)  SpO2: 97 % (07/19/24 0744) Vital Signs (24h Range):  Temp:  [97.5 °F (36.4 °C)-99.1 °F (37.3 °C)] 98.3 °F (36.8 °C)  Pulse:  [65-90] 68  Resp:  [18-23] 21  SpO2:  [89 %-98 %] 97 %  BP: (106-138)/(48-71) 138/71     Physical Examination:  Physical Exam  Vitals and nursing note reviewed.   Constitutional:       Appearance: She is ill-appearing.      Comments: Pt being cleaned up from vomit episode prior to seeing her   HENT:      Mouth/Throat:      Mouth: Mucous membranes are dry.   Cardiovascular:      Rate and Rhythm: Normal rate and regular rhythm.      Comments: 2/6 systolic murmur best heard over R 2nd intercostal space  Pulmonary:      Effort: Pulmonary effort is normal.      Breath sounds: No wheezing.    Abdominal:      General: Bowel sounds are normal.      Palpations: Abdomen is soft.      Comments: Tenderness present with palpation of BLQ and suprapubic area   Musculoskeletal:      Right lower leg: No edema.      Left lower leg: No edema.   Neurological:      Mental Status: She is alert.       Laboratory:  Most Recent Data:  Recent Lab Results  (Last 5 results in the past 24 hours)        07/19/24  0623   07/19/24  0607   07/19/24  0008   07/18/24  2316   07/18/24  2100        Influenza A, Molecular       Not Detected         Influenza B, Molecular       Not Detected         Albumin/Globulin Ratio   0.6             Albumin   2.7             ALP   51             ALT   8             Anion Gap   6.0             AST   26             Baso #   0.02             Basophil %   0.2             BILIRUBIN TOTAL   0.8             BUN   37.6             BUN/CREAT RATIO   21             Calcium   9.9             Chloride   101             CO2   24             Creatinine   1.75             eGFR   28             Eos #   0.05             Eos %   0.4             Globulin, Total   4.6             Glucose   108             Hematocrit   32.1             Hemoglobin   10.1             Extra Tube         Hold for add-ons.  Comment: Auto resulted.                   Hold for add-ons.  Comment: Auto resulted.                   Hold for add-ons.  Comment: Auto resulted.                   Hold for add-ons.  Comment: Auto resulted.          Immature Grans (Abs)   0.04             Immature Granulocytes   0.3             Lactic Acid Level 0.8               Lymph #   0.36             LYMPH %   3.1             Magnesium    1.70             MCH   26.6             MCHC   31.5             MCV   84.5             Mono #   0.22             Mono %   1.9             MPV   11.4             Neut #   11.04             Neut %   94.1             nRBC   0.0             Phosphorus Level   3.4             Platelet Count   202             Potassium   4.2              PROTEIN TOTAL   7.3             RBC   3.80             RDW   15.6             SARS-CoV2 (COVID-19) Qualitative PCR       Not Detected         Sodium   131             Troponin I   0.377   0.412           WBC   11.73                                      Microbiology Data Reviewed: yes  Pertinent Findings:  All micro still pending at this time    Other Results:  EKG:   Results for orders placed or performed during the hospital encounter of 07/18/24   EKG 12-lead    Collection Time: 07/19/24  8:09 AM   Result Value Ref Range    QRS Duration 102 ms    OHS QTC Calculation 548 ms    Narrative    Test Reason : R79.89,    Vent. Rate : 070 BPM     Atrial Rate : 070 BPM     P-R Int : 144 ms          QRS Dur : 102 ms      QT Int : 508 ms       P-R-T Axes : 062 -30 012 degrees     QTc Int : 548 ms    Normal sinus rhythm  Left axis deviation  Voltage criteria for left ventricular hypertrophy  Cannot rule out Septal infarct ,age undetermined  Marked ST abnormality, possible inferior subendocardial injury  Prolonged QT  Abnormal ECG  When compared with ECG of 18-JUL-2024 19:23,  The axis Shifted left  Minimal criteria for Septal infarct are now Present  QT has lengthened    Referred By: [email or web address]   SELF           Confirmed By:          Radiology:  Imaging Results              CT Chest Without Contrast (Final result)  Result time 07/19/24 08:17:33      Final result by Wan Abreu MD (07/19/24 08:17:33)                   Impression:    Impression:    1. There are reticular, nodular and hazy opacities in the right posterior upper lobe and both lower lobes with areas of consolidation in the lower lobes, larger in the right. This is consistent with multilobar consolidation.    2. Details and other findings as discussed above.    No significant discrepancy with overnight report      Electronically signed by: Wan Abreu  Date:    07/19/2024  Time:    08:17               Narrative:      Technique: CT Scan of the chest was  performed without intravenous contrast with direct axial as well as sagittal and, coronal, reconstruction images.    Dosage Information: Automated Exposure Control was utilized.  DLP 19 9    Comparison: Correlation is with study [id number] 20:14:34.    Clinical History: Pneumonia.    Findings:    Heart: The heart size is within normal limits. Pronounced coronary artery calcification is seen.    Aorta: No thoracic aortic aneurysmal dilatation.    Lungs: Moderate streaky linear and hazy opacity is seen predominantly in the dependent portions at the lung bases consistent with dependent changes scarring and subsegmental atelectasis. There are reticular, nodular and hazy opacities in the right posterior upper lobe and both lower lobes with areas of consolidation in the lower lobes, larger in the right. This is consistent with multilobar consolidation. Subtle emphysematous change is noted in the right upper lobe.    Pleura: No effusions or pneumothorax are identified.    There is fluid-filled dilatation of the distal esophagus which may be the result of reflux disease or related to distal esophageal stricture.    Spine: Mild multilevel spondylolytic changes are seen in the thoracic spine.                        Preliminary result by Britton Kemp Jr., MD (07/19/24 01:56:35)                   Impression:    1. There are reticular, nodular and hazy opacities in the right posterior upper lobe and both lower lobes with areas of consolidation in the lower lobes, larger in the right. This is consistent with mutifocal possibly atypical pneumonia with multilobar consolidation.  2. Details and other findings as discussed above.               Narrative:    START OF REPORT:  Technique: CT Scan of the chest was performed without intravenous contrast with direct axial as well as sagittal and, coronal, reconstruction images.    Dosage Information: Automated Exposure Control was utilized.    Comparison: Correlation is with  study [id number] 20:14:34.    Clinical History: Pneumonia.    Findings:  Soft Tissues: Unremarkable.  Lines and Tubes: None.  Neck: The visualized soft tissues of the neck appear unremarkable.  Mediastinum: The mediastinal structures are within normal limits.  Heart: The heart size is within normal limits. Pronounced coronary artery calcification is seen.  Aorta: Unremarkable appearing aorta.  Pulmonary Arteries: Unremarkable.  Lungs: Moderate streaky linear and hazy opacity is seen predominantly in the dependent portions at the lung bases consistent with dependent changes scarring and subsegmental atelectasis. There are reticular, nodular and hazy opacities in the right posterior upper lobe and both lower lobes with areas of consolidation in the lower lobes, larger in the right. This is consistent with mutifocal possibly atypical pneumonia with multilobar consolidation. Subtle emphysematous change is noted in the right upper lobe.  Pleura: No effusions or pneumothorax are identified.  Bony Structures:  Spine: Mild multilevel spondylolytic changes are seen in the thoracic spine.  Ribs: The bilateral ribs appear unremarkable.                                         CT Abdomen Pelvis  Without Contrast (Final result)  Result time 07/19/24 07:35:25      Final result by Wan Abreu MD (07/19/24 07:35:25)                   Impression:    Impression:    1. There are moderate opacities seen in both lower lobes (series 4 image 8) consistent with pneumonias.    2. There are numerous moderately dilated loops of fluid-filled small bowel with numerous air-fluid levels with transition point in the mid small bowel seen on series 2 image 54. This is compatible with a small bowel obstruction.    3. No intraperitoneal free air or ascites is seen.    4. Details and other findings as discussed above.    No significant discrepancy with overnight report.      Electronically signed by: Wan  Abreu  Date:    07/19/2024  Time:    07:35               Narrative:      Technique: CT of the abdomen and pelvis was performed with axial images as well as sagittal and coronal reconstruction images without intravenous contrast.    Comparison: October 5, 2022    Clinical History: Abdominal Pain Vomiting(88 yr old in /[facility] FROM NURSING HOME W REPORT OF ABD PAIN W NV AND WEAKNESS SINCE YESTERDAY. LBM THIS AM. PT REPORTS DYSURIA. SEE NURSING HOME REPORTS. EKG OBTAINED. ) Fatigue.    Dosage Information: Automated Exposure Control was utilized.    Findings:    Lines and Tubes: None.    Thorax:    Lungs: There are moderate opacities seen in both lower lobes (series 4 image 8) consistent with pneumonias.    Pleura: No effusions or thickening.    Heart:Pronounced coronary artery calcification is seen. There is extensive aortic and mitral valve calcifications seen.    Abdomen:    Abdominal Wall: No abdominal wall pathology is seen.    Within limitations of noncontrast technique, no acute findings liver, pancreas spleen identified.    Biliary System: No intrahepatic or extrahepatic biliary duct dilatation is seen.    Gallbladder: Surgical clips are seen in the gallbladder fossa consistent with prior cholecystectomy.    Adrenals: The adrenal glands appear unremarkable.    Kidneys: The left kidney appears unremarkable with no stones or hydronephrosis. A single stable peripelvic cyst measuring 2.8 cm is seen in the right kidney and for this no specific follow-up imaging is recommended.  The right kidney otherwise appears unremarkable with no stones or hydronephrosis identified.    Aorta: There is extensive calcification of the abdominal aorta and its branches.    Bowel:    Esophagus: There is a moderate sized hiatal hernia.    Stomach: There is hiatal herniation of the cardia and part of the body of the stomach as detailed above.    Duodenum: Unremarkable appearing duodenum.    Small Bowel: There are numerous moderately  dilated loops of fluid-filled small bowel with numerous air-fluid levels with transition point in the mid small bowel seen on series 2 image 54.    Colon: Unremarkable.    Appendix: The appendix is not identified but no inflammatory changes are seen in the right lower quadrant to suggest appendicitis.    Peritoneum: No intraperitoneal free air or ascites is seen.    Pelvis:    Bladder: The bladder appears unremarkable.    Female:    Uterus: The uterus is not identified.    Ovaries: No adnexal masses are seen.    Inguinal Findings:    Inguinal Hernia: Incidental note is made of small uncomplicated mesenteric fat containing bilateral inguinal hernias.    Bony structures:    Dorsal Spine: There is mild spondylosis of the visualized dorsal spine. There are hypodense lesions with prominent trabecular markings seen in T12-L3. These may represent hemangiomas.    Bony Pelvis: The visualized bony structures of the pelvis appear unremarkable.    Notifications: The results were discussed with the physician Dr. Sanders prior to dictation at 2024-07-18 20:46:47 CDT.                        Preliminary result by Britton Kemp Jr., MD (07/18/24 20:49:10)                   Impression:    1. There are moderate opacities seen in both lower lobes (series 4 image 8) consistent with pneumonias.  2. There are numerous moderately dilated loops of fluid-filled small bowel with numerous air-fluid levels with transition point in the mid small bowel seen on series 2 image 54. This is compatible with a small bowel obstruction.  3. No intraperitoneal free air or ascites is seen.  4. Details and other findings as discussed above.               Narrative:    START OF REPORT:  Technique: CT of the abdomen and pelvis was performed with axial images as well as sagittal and coronal reconstruction images without intravenous contrast.    Comparison: None available.    Clinical History: Abdominal Pain Vomiting(88 yr old in /[facility] FROM NURSING  HOME W REPORT OF ABD PAIN W NV AND WEAKNESS SINCE YESTERDAY. LBM THIS AM. PT REPORTS DYSURIA. SEE NURSING HOME REPORTS. EKG OBTAINED. ) Fatigue.    Dosage Information: Automated Exposure Control was utilized.    Findings:  Lines and Tubes: None.  Thorax:  Lungs: There are moderate opacities seen in both lower lobes (series 4 image 8) consistent with pneumonias.  Pleura: No effusions or thickening.  Heart: Moderate cardiomegaly is seen. Pronounced coronary artery calcification is seen. There is extensive aortic and mitral valve calcifications seen.  Abdomen:  Abdominal Wall: No abdominal wall pathology is seen.  Liver: The liver appears unremarkable.  Biliary System: No intrahepatic or extrahepatic biliary duct dilatation is seen.  Gallbladder: Surgical clips are seen in the gallbladder fossa consistent with prior cholecystectomy.  Pancreas: The pancreas appears unremarkable.  Spleen: The spleen appears unremarkable.  Adrenals: The adrenal glands appear unremarkable.  Kidneys: The left kidney appears unremarkable with no stones cysts masses or hydronephrosis. A single peripelvic cyst measuring 2.8 cm is seen in the right kidney. The right kidney otherwise appears unremarkable with no stones masses or hydronephrosis identified.  Aorta: There is extensive calcification of the abdominal aorta and its branches.  IVC: Unremarkable.  Bowel:  Esophagus: There is a moderate sized hiatal hernia.  Stomach: There is hiatal herniation of the cardia and part of the body of the stomach as detailed above.  Duodenum: Unremarkable appearing duodenum.  Small Bowel: There are numerous moderately dilated loops of fluid-filled small bowel with numerous air-fluid levels with transition point in the mid small bowel seen on series 2 image 54.  Colon: Unremarkable.  Appendix: The appendix is not identified but no inflammatory changes are seen in the right lower quadrant to suggest appendicitis.  Peritoneum: No intraperitoneal free air or  ascites is seen.    Pelvis:  Bladder: The bladder appears unremarkable.  Female:  Uterus: The uterus is not identified.  Ovaries: No adnexal masses are seen.  Inguinal Findings:  Inguinal Hernia: Incidental note is made of small uncomplicated mesenteric fat containing bilateral inguinal hernias.    Bony structures:  Dorsal Spine: There is mild spondylosis of the visualized dorsal spine. There are hypodense lesions with prominent trabecular markings seen in T12-L3. These may represent hemangiomas.  Bony Pelvis: The visualized bony structures of the pelvis appear unremarkable.    Notifications: The results were discussed with the physician Dr. Sanders prior to dictation at 2024-07-18 20:46:47 CDT.                                         X-Ray Chest AP Portable (Final result)  Result time 07/18/24 18:49:26      Final result by Flor Hernandez MD (07/18/24 18:49:26)                   Impression:      Diffuse bilateral interstitial infiltrates with developing consolidation in the right lower lobe    Prominence of the right paratracheal region.  CT scan correlation is recommended.      Electronically signed by: Chip Hernandez  Date:    07/18/2024  Time:    18:49               Narrative:    EXAMINATION:  XR CHEST AP PORTABLE    CLINICAL HISTORY:  Sepsis;    TECHNIQUE:  Single frontal view of the chest was performed.    COMPARISON:  06/30/2024    FINDINGS:  There are diffuse bilateral interstitial infiltrates with a area of consolidation developing in the right lower lobe.  There is also increased opacity in the right paratracheal region.  This was seen on the prior examination as well.  The heart is normal appearance.  The pulmonary vascularity is unremarkable.  Bones and joints show no acute abnormality.                                      Current Medications:     Infusions:   lactated ringers   Intravenous Continuous 100 mL/hr at 07/19/24 0008 New Bag at 07/19/24 0008        Scheduled:   apixaban  5 mg Oral  BID    azithromycin  500 mg Intravenous Q24H    famotidine (PF)  20 mg Intravenous Daily    piperacillin-tazobactam (Zosyn) IV (PEDS and ADULTS) (extended infusion is not appropriate)  4.5 g Intravenous Q12H    [START ON 7/20/2024] vancomycin (VANCOCIN) IV (PEDS and ADULTS)  500 mg Intravenous Q24H        PRN:    Current Facility-Administered Medications:     acetaminophen, 650 mg, Oral, Q8H PRN    dextrose 10%, 12.5 g, Intravenous, PRN    dextrose 10%, 25 g, Intravenous, PRN    glucagon (human recombinant), 1 mg, Intramuscular, PRN    glucose, 16 g, Oral, PRN    glucose, 24 g, Oral, PRN    melatonin, 6 mg, Oral, Nightly PRN    metoprolol, 5 mg, Intravenous, Q5 Min PRN    naloxone, 0.02 mg, Intravenous, PRN    ondansetron, 4 mg, Intravenous, Q8H PRN    prochlorperazine, 5 mg, Intravenous, Q6H PRN    sodium chloride 0.9%, 10 mL, Intravenous, Q12H PRN    Pharmacy to dose Vancomycin consult, , , Once **AND** vancomycin - pharmacy to dose, , Intravenous, pharmacy to manage frequency    Antibiotics and Day Number of Therapy:  Vanc, Zosyn and Azithro started 7/18    No intake or output data in the 24 hours ending 07/19/24 0955    Lines/Drains/Airways       Drain  Duration             Female External Urinary Catheter w/ Suction 07/18/24 2230 <1 day              Peripheral Intravenous Line  Duration                  Peripheral IV - Single Lumen 07/18/24 1846 20 G Anterior;Proximal;Right Forearm <1 day                  Assessment and Plan    Small bowel obstruction   Nausea, vomiting  - Surgery consulted, awaiting recs  - NPO with no medications  - Trying to replace NG tube this AM  - anti-emetics PRN in place     Severe sepsis with suspected sources below  Right lower lobe pneumonia, possible aspiration  Acute cystitis on UA  Recent UTI growing Candida tropicalis  - Placed on broad spectrum Vanc/Zosyn/Azithromycin. MRSA PCR pending.   - Lactic acid resolved and wnl this AM  - blood cultures x2, urine culture, resp culture  (if able), COVID/Flu pending  - Leukocytosis improving this AM      Acute kidney injury, likely ATN  - Kidney functions mildly improved this AM  - Continue with IVF rehydration  - Baseline of ~1.0     NSTEMI, likely Type II demand ischemia  - Trop trended down this AM; confirming likely 2/2 to demand  - Holding home ASA 81, statin due to strict NPO     Questionable right paratracheal/mass/opacity on CXR  - CT chest showed reticular, nodular and hazy opacities in the right posterior upper lobe and both lower lobes with areas of consolidation in the lower lobes, larger in the right. This is consistent with multilobar consolidation. And fluid-filled dilatation of the distal esophagus which may be the result of reflux disease or related to distal esophageal stricture      Severe aortic stenosis  Coronary artery disease  Hypertension  Hyperlipidemia  - last Echo 10/2023 EF 65-70%, severe aortic stenosis  - hold home Acei and statin  - consider repeat Echo in future if showing signs of fluid overload     Paroxysmal atrial fibrillation  - Holding home Eliquis; placed on Lovenox  - currently NSR  - Holding home metoprolol; Lopressor pushes available prn     Anxiety  - Holding home Lexapro     History of falls  Chronic dizziness, at baseline  - admission 7/1/2024 for fall, currently in SNF for rehab, likely discharge back to SNF when able  - previously walked with a cane, currently requiring rolling walker  - OT/PT consulted  - fall precautions, delirium precautions     CODE STATUS: DNR  Access: PIV  Antibiotics: Vanc/Zosyn/Azithromycin  Diet: NPO  DVT Prophylaxis: Lovenox  GI Prophylaxis: Pepcid IV  Fluids:  cc/hr      Disposition: Admit for SBO, no surgical intervention needed at this time. Trying to replace NG tube; monitor N/V closely. Continue on current Abx regimen at this time. Troponins trended down. Keep NPO, home medications changed to IV or being held if not available. Came from nursing home; may need case  management consult to ensure return upon discharge.        Alonso Foster MD  [facility]

## 2024-07-19 NOTE — PLAN OF CARE
07/19/24 0916   Discharge Assessment   Assessment Type Discharge Planning Assessment   Confirmed/corrected address, phone number and insurance Yes   Confirmed Demographics Correct on Facesheet   Source of Information family;health record   When was your last doctors appointment?   (Cayden Forte)   Reason For Admission Vomiting, Weakness, Elevated troponin, GRACIE, Chest pain, Severe sepsis   People in Home child(kathy), adult   Facility Arrived From: [facility]   Do you expect to return to your current living situation? Yes   Do you have help at home or someone to help you manage your care at home? Yes   Who are your caregiver(s) and their phone number(s)? Curtis Nuñez  Son  [phone]    2  Balbina Duran  Daughter  [phone]   Prior to hospitilization cognitive status: Alert/Oriented   Current cognitive status: Alert/Oriented   Walking or Climbing Stairs Difficulty yes   Walking or Climbing Stairs ambulation difficulty, requires equipment   Mobility Management Cane, RW, Rollator   Dressing/Bathing Difficulty yes   Dressing/Bathing bathing difficulty, assistance 1 person   Dressing/Bathing Management Staff   Home Accessibility wheelchair accessible   Home Layout Able to live on 1st floor   Equipment Currently Used at Home cane, straight;walker, rolling;rollator   Readmission within 30 days? Yes   Patient currently being followed by outpatient case management? No   Do you currently have service(s) that help you manage your care at home? No   Do you take prescription medications? Yes  ([facility])   Do you have prescription coverage? Yes   Coverage M/C Supplement   Do you have any problems affording any of your prescribed medications? No   Is the patient taking medications as prescribed? yes   Who is going to help you get home at discharge? [facility]   How do you get to doctors appointments? agency;family or friend will provide   Are you on dialysis? No   Discharge Plan A Skilled Nursing Facility    DME Needed Upon Discharge  none   Discharge Plan discussed with: Adult children   Transition of Care Barriers None   OTHER   Name(s) of People in Home Curtis Nuñez  Son  [phone]     Pt resides with Son/POACurtis; Admitted from [facility] where pt/fly would like her to return for additional therapy; Pt  with 2 children; CM to follow.

## 2024-07-19 NOTE — PLAN OF CARE
Problem: Adult Inpatient Plan of Care  Goal: Plan of Care Review  Outcome: Progressing  Flowsheets (Taken 7/19/2024 0744)  Plan of Care Reviewed With: patient  Goal: Absence of Hospital-Acquired Illness or Injury  Outcome: Progressing  Intervention: Identify and Manage Fall Risk  Flowsheets (Taken 7/19/2024 0744)  Safety Promotion/Fall Prevention:   assistive device/personal item within reach   side rails raised x 2   supervised activity  Intervention: Prevent Skin Injury  Flowsheets (Taken 7/19/2024 0744)  Body Position: 30 degrees  Intervention: Prevent Infection  Flowsheets (Taken 7/19/2024 0744)  Infection Prevention:   hand hygiene promoted   rest/sleep promoted   single patient room provided  Goal: Optimal Comfort and Wellbeing  Outcome: Progressing  Intervention: Monitor Pain and Promote Comfort  Flowsheets (Taken 7/19/2024 0744)  Pain Management Interventions:   quiet environment facilitated   relaxation techniques promoted  Intervention: Provide Person-Centered Care  Flowsheets (Taken 7/19/2024 0744)  Trust Relationship/Rapport:   care explained   reassurance provided   choices provided   thoughts/feelings acknowledged   emotional support provided   empathic listening provided   questions answered   questions encouraged     Problem: Fall Injury Risk  Goal: Absence of Fall and Fall-Related Injury  Outcome: Progressing  Intervention: Promote Injury-Free Environment  Flowsheets (Taken 7/19/2024 0744)  Safety Promotion/Fall Prevention:   assistive device/personal item within reach   side rails raised x 2   supervised activity

## 2024-07-19 NOTE — PROGRESS NOTES
Pharmacokinetic Initial Assessment: IV Vancomycin    Assessment/Plan:    Initiate intravenous vancomycin with loading dose of 750 mg once with subsequent doses when random concentrations are less than 20 mcg/mL  Desired empiric serum trough concentration is 15 to 20 mcg/mL  Draw vancomycin trough level 60 min prior to 2nd dose on 7/19 at approximately 2300  Pharmacy will continue to follow and monitor vancomycin.      Please contact pharmacy at extension [phone] with any questions regarding this assessment.     Thank you for the consult,   Inrgid Myriam  Patient to be pulse dosed initially     Patient brief summary:  Fifi Chacon is a 88 y.o. female initiated on antimicrobial therapy with IV Vancomycin for treatment of suspected lower respiratory infection    Drug Allergies:   Review of patient's allergies indicates:   Allergen Reactions    Sulfa (sulfonamide antibiotics)      Other reaction(s): Not available       Actual Body Weight:   59.9 kg    Renal Function:   Estimated Creatinine Clearance: 16.6 mL/min (A) (based on SCr of 1.89 mg/dL (H)).,     Dialysis Method (if applicable):  N/A    CBC (last 72 hours):  Recent Labs   Lab Result Units 07/18/24  0500 07/18/24  1640   WBC x10(3)/mcL 11.82* 22.07*   Hgb g/dL 11.5* 11.2*   Hct % 36.7* 34.3*   Platelet x10(3)/mcL 248 254   Mono % % 1.5  --    Eos % % 0.0  --    Basophil % % 0.1  --        Metabolic Panel (last 72 hours):  Recent Labs   Lab Result Units 07/18/24  0500 07/18/24  1729 07/18/24  1746   Sodium mmol/L 139  --  139   Potassium mmol/L 4.5  --  5.1   Chloride mmol/L 105  --  104   CO2 mmol/L 19*  --  23   Glucose mg/dL 137*  --  142*   Glucose, UA   --  Normal  --    Blood Urea Nitrogen mg/dL 28.1*  --  38.5*   Creatinine mg/dL 1.16*  --  1.89*   Albumin g/dL 3.4  --  3.1*   Bilirubin Total mg/dL 0.7  --  0.7   ALP unit/L 61  --  57   AST unit/L 29  --  28   ALT unit/L 12  --  8       Drug levels (last 3 results):  No results for input(s):  ""VANCOMYCINRA", "VANCORANDOM", "VANCOMYCINPE", "VANCOPEAK", "VANCOMYCINTR", "VANCOTROUGH" in the last 72 hours.    Microbiologic Results:  Microbiology Results (last 7 days)       Procedure Component Value Units Date/Time    Blood culture x two cultures. Draw prior to antibiotics. [[id number]] Collected: 07/18/24 1746    Order Status: Resulted Specimen: Blood from Arm, Right Updated: 07/18/24 1903    Blood culture x two cultures. Draw prior to antibiotics. [[id number]] Collected: 07/18/24 1841    Order Status: Resulted Specimen: Blood from Arm, Left Updated: 07/18/24 1843    Urine culture [[id number]] Collected: 07/18/24 1729    Order Status: Sent Specimen: Urine Updated: 07/18/24 1825            "

## 2024-07-19 NOTE — PLAN OF CARE
Problem: Occupational Therapy  Goal: Occupational Therapy Goal  Description: Patient will perform EOB grooming in unsupported sitting with Min A.  Patient will perform bed mobility while rolling right/left with Min A.  Patient will perform 5 sit to stand transitions from bed level with Mod A x 2 and RW for preparation of ADLs.  Patient will perform BSC transfer with RW and Mod A.   Outcome: Progressing

## 2024-07-20 LAB
ACINETOBACTER CALCOACETICUS-BAUMANNII COMPLEX (OHS): NOT DETECTED
ALBUMIN SERPL-MCNC: 2.3 G/DL (ref 3.4–4.8)
ALBUMIN/GLOB SERPL: 0.5 RATIO (ref 1.1–2)
ALP SERPL-CCNC: 46 UNIT/L (ref 40–150)
ALT SERPL-CCNC: 6 UNIT/L (ref 0–55)
ANION GAP SERPL CALC-SCNC: 7 MEQ/L
AST SERPL-CCNC: 19 UNIT/L (ref 5–34)
BACTERIA UR CULT: NORMAL
BACTEROIDES FRAGILIS (OHS): NOT DETECTED
BASOPHILS # BLD AUTO: 0.01 X10(3)/MCL
BASOPHILS NFR BLD AUTO: 0.1 %
BILIRUB SERPL-MCNC: 0.7 MG/DL
BUN SERPL-MCNC: 30.8 MG/DL (ref 9.8–20.1)
C AURIS DNA BLD POS QL NAA+NON-PROBE: NOT DETECTED
C GATTII+NEOFOR DNA CSF QL NAA+NON-PROBE: NOT DETECTED
CALCIUM SERPL-MCNC: 9.3 MG/DL (ref 8.4–10.2)
CANDIDA ALBICANS (OHS): NOT DETECTED
CANDIDA GLABRATA (OHS): NOT DETECTED
CANDIDA KRUSEI (OHS): NOT DETECTED
CANDIDA PARAPSILOSIS (OHS): NOT DETECTED
CANDIDA TROPICALIS (OHS): NOT DETECTED
CHLORIDE SERPL-SCNC: 102 MMOL/L (ref 98–107)
CO2 SERPL-SCNC: 27 MMOL/L (ref 23–31)
CREAT SERPL-MCNC: 1.25 MG/DL (ref 0.55–1.02)
CREAT/UREA NIT SERPL: 25
CTX-M (OHS): ABNORMAL
ENTEROBACTER CLOACAE COMPLEX (OHS): NOT DETECTED
ENTEROBACTERALES (OHS): NOT DETECTED
ENTEROCOCCUS FAECALIS (OHS): NOT DETECTED
ENTEROCOCCUS FAECIUM (OHS): NOT DETECTED
EOSINOPHIL # BLD AUTO: 0.15 X10(3)/MCL (ref 0–0.9)
EOSINOPHIL NFR BLD AUTO: 2.2 %
ERYTHROCYTE [DISTWIDTH] IN BLOOD BY AUTOMATED COUNT: 15.5 % (ref 11.5–17)
ESCHERICHIA COLI (OHS): NOT DETECTED
GFR SERPLBLD CREATININE-BSD FMLA CKD-EPI: 42 ML/MIN/1.73/M2
GLOBULIN SER-MCNC: 4.3 GM/DL (ref 2.4–3.5)
GLUCOSE SERPL-MCNC: 106 MG/DL (ref 82–115)
GP B STREP DNA CSF QL NAA+NON-PROBE: NOT DETECTED
HAEM INFLU DNA CSF QL NAA+NON-PROBE: NOT DETECTED
HCT VFR BLD AUTO: 28.9 % (ref 37–47)
HGB BLD-MCNC: 9.2 G/DL (ref 12–16)
IMM GRANULOCYTES # BLD AUTO: 0.01 X10(3)/MCL (ref 0–0.04)
IMM GRANULOCYTES NFR BLD AUTO: 0.1 %
IMP (OHS): ABNORMAL
KLEBSIELLA AEROGENES (OHS): NOT DETECTED
KLEBSIELLA OXYTOCA (OHS): NOT DETECTED
KLEBSIELLA PNEUMONIAE GROUP (OHS): NOT DETECTED
KPC (OHS): ABNORMAL
L MONOCYTOG DNA CSF QL NAA+NON-PROBE: NOT DETECTED
LYMPHOCYTES # BLD AUTO: 0.28 X10(3)/MCL (ref 0.6–4.6)
LYMPHOCYTES NFR BLD AUTO: 4.2 %
MAGNESIUM SERPL-MCNC: 1.6 MG/DL (ref 1.6–2.6)
MCH RBC QN AUTO: 27.2 PG (ref 27–31)
MCHC RBC AUTO-ENTMCNC: 31.8 G/DL (ref 33–36)
MCR-1 (OHS): ABNORMAL
MCV RBC AUTO: 85.5 FL (ref 80–94)
MECA/C (OHS): ABNORMAL
MECA/C AND MREJ (MRSA)(OHS): ABNORMAL
MONOCYTES # BLD AUTO: 0.16 X10(3)/MCL (ref 0.1–1.3)
MONOCYTES NFR BLD AUTO: 2.4 %
N MEN DNA CSF QL NAA+NON-PROBE: NOT DETECTED
NDM (OHS): ABNORMAL
NEUTROPHILS # BLD AUTO: 6.08 X10(3)/MCL (ref 2.1–9.2)
NEUTROPHILS NFR BLD AUTO: 91 %
NRBC BLD AUTO-RTO: 0 %
OXA-48-LIKE (OHS): ABNORMAL
PHOSPHATE SERPL-MCNC: 2.6 MG/DL (ref 2.3–4.7)
PLATELET # BLD AUTO: 172 X10(3)/MCL (ref 130–400)
PMV BLD AUTO: 11.6 FL (ref 7.4–10.4)
POTASSIUM SERPL-SCNC: 3.9 MMOL/L (ref 3.5–5.1)
PROT SERPL-MCNC: 6.6 GM/DL (ref 5.8–7.6)
PROTEUS SPP. (OHS): NOT DETECTED
PSEUDOMONAS AERUGINOSA (OHS): NOT DETECTED
RBC # BLD AUTO: 3.38 X10(6)/MCL (ref 4.2–5.4)
S ENT+BONG DNA STL QL NAA+NON-PROBE: NOT DETECTED
S PNEUM DNA CSF QL NAA+NON-PROBE: NOT DETECTED
SERRATIA MARCESCENS (OHS): NOT DETECTED
SODIUM SERPL-SCNC: 136 MMOL/L (ref 136–145)
STAPHYLOCOCCUS AUREUS (OHS): NOT DETECTED
STAPHYLOCOCCUS EPIDERMIDIS (OHS): NOT DETECTED
STAPHYLOCOCCUS LUGDUNENSIS (OHS): NOT DETECTED
STAPHYLOCOCCUS SPP. (OHS): DETECTED
STENOTROPHOMONAS MALTOPHILIA (OHS): NOT DETECTED
STREPTOCOCCUS PYOGENES (GROUP A)(OHS): NOT DETECTED
STREPTOCOCCUS SPP. (OHS): NOT DETECTED
VANA/B (OHS): ABNORMAL
VANCOMYCIN TROUGH SERPL-MCNC: 10.4 UG/ML (ref 15–20)
VIM (OHS): ABNORMAL
WBC # BLD AUTO: 6.69 X10(3)/MCL (ref 4.5–11.5)

## 2024-07-20 PROCEDURE — 25000003 PHARM REV CODE 250

## 2024-07-20 PROCEDURE — 83735 ASSAY OF MAGNESIUM: CPT

## 2024-07-20 PROCEDURE — 63600175 PHARM REV CODE 636 W HCPCS

## 2024-07-20 PROCEDURE — 36415 COLL VENOUS BLD VENIPUNCTURE: CPT

## 2024-07-20 PROCEDURE — 84100 ASSAY OF PHOSPHORUS: CPT

## 2024-07-20 PROCEDURE — 80202 ASSAY OF VANCOMYCIN: CPT

## 2024-07-20 PROCEDURE — 80053 COMPREHEN METABOLIC PANEL: CPT

## 2024-07-20 PROCEDURE — 21400001 HC TELEMETRY ROOM

## 2024-07-20 PROCEDURE — 85025 COMPLETE CBC W/AUTO DIFF WBC: CPT

## 2024-07-20 RX ORDER — HEPARIN SODIUM 5000 [USP'U]/ML
5000 INJECTION, SOLUTION INTRAVENOUS; SUBCUTANEOUS EVERY 12 HOURS
Status: DISCONTINUED | OUTPATIENT
Start: 2024-07-20 | End: 2024-07-22

## 2024-07-20 RX ORDER — MAGNESIUM SULFATE HEPTAHYDRATE 40 MG/ML
2 INJECTION, SOLUTION INTRAVENOUS ONCE
Status: COMPLETED | OUTPATIENT
Start: 2024-07-20 | End: 2024-07-20

## 2024-07-20 RX ADMIN — MAGNESIUM SULFATE HEPTAHYDRATE 2 G: 40 INJECTION, SOLUTION INTRAVENOUS at 03:07

## 2024-07-20 RX ADMIN — VANCOMYCIN HYDROCHLORIDE 750 MG: 750 INJECTION, POWDER, LYOPHILIZED, FOR SOLUTION INTRAVENOUS at 12:07

## 2024-07-20 RX ADMIN — MELATONIN 6 MG: at 08:07

## 2024-07-20 RX ADMIN — SODIUM CHLORIDE, POTASSIUM CHLORIDE, SODIUM LACTATE AND CALCIUM CHLORIDE: 600; 310; 30; 20 INJECTION, SOLUTION INTRAVENOUS at 05:07

## 2024-07-20 RX ADMIN — HEPARIN SODIUM 5000 UNITS: 5000 INJECTION, SOLUTION INTRAVENOUS; SUBCUTANEOUS at 09:07

## 2024-07-20 RX ADMIN — PIPERACILLIN SODIUM AND TAZOBACTAM SODIUM 4.5 G: 4; .5 INJECTION, POWDER, LYOPHILIZED, FOR SOLUTION INTRAVENOUS at 10:07

## 2024-07-20 RX ADMIN — AZITHROMYCIN MONOHYDRATE 500 MG: 500 INJECTION, POWDER, LYOPHILIZED, FOR SOLUTION INTRAVENOUS at 05:07

## 2024-07-20 RX ADMIN — HEPARIN SODIUM 5000 UNITS: 5000 INJECTION, SOLUTION INTRAVENOUS; SUBCUTANEOUS at 08:07

## 2024-07-20 RX ADMIN — PIPERACILLIN SODIUM AND TAZOBACTAM SODIUM 4.5 G: 4; .5 INJECTION, POWDER, LYOPHILIZED, FOR SOLUTION INTRAVENOUS at 12:07

## 2024-07-20 RX ADMIN — PIPERACILLIN SODIUM AND TAZOBACTAM SODIUM 4.5 G: 4; .5 INJECTION, POWDER, LYOPHILIZED, FOR SOLUTION INTRAVENOUS at 11:07

## 2024-07-20 RX ADMIN — FAMOTIDINE 20 MG: 10 INJECTION, SOLUTION INTRAVENOUS at 08:07

## 2024-07-20 NOTE — PROGRESS NOTES
"[facility] - [facility]  Daily Progress Note    Patient ID: Fifi Chacon, 87y/o F    Subjective:  AF, HDS  NGT placed yesterday, 1400cc of bilious output since placement  Reports improvement in abdominal pain and distention   Denies nausea,passing flatus or BM    Objective:    Vitals:  Vitals:    07/20/24 0750   BP: (!) 108/55   Pulse: 78   Resp:    Temp:         Intake/Output:    Intake/Output Summary (Last 24 hours) at 7/20/2024 0854  Last data filed at 7/20/2024 0623  Gross per 24 hour   Intake 3373.23 ml   Output 2300 ml   Net 1073.23 ml        Physical Exam:  Gen: NAD  Neuro: awake, alert, answering questions appropriately  CV: RR  Resp: non-labored breathing  Abd: soft, ND, mild general tenderness to palpation   Ext: moves all 4 spontaneously and purposefully  Skin: warm, well perfused      Labs:  Renal:  Recent Labs     07/18/24  0500 07/18/24  1746 07/19/24  0607 07/20/24  0339   BUN 28.1* 38.5* 37.6* 30.8*   CREATININE 1.16* 1.89* 1.75* 1.25*     No results for input(s): "LACTIC" in the last 72 hours.    FENGI:  Recent Labs     07/18/24  0500 07/18/24  1746 07/19/24  0607 07/20/24  0339    139 131* 136   K 4.5 5.1 4.2 3.9    104 101 102   CO2 19* 23 24 27   CALCIUM 10.1 10.4* 9.9 9.3   MG  --   --  1.70 1.60   PHOS  --   --  3.4 2.6   ALBUMIN 3.4 3.1* 2.7* 2.3*   BILITOT 0.7 0.7 0.8 0.7   AST 29 28 26 19   ALKPHOS 61 57 51 46   ALT 12 8 8 6       Heme:  Recent Labs     07/18/24  0500 07/18/24  1640 07/19/24  0607 07/20/24  0339   HGB 11.5* 11.2* 10.1* 9.2*   HCT 36.7* 34.3* 32.1* 28.9*    254 202 172       ID:  Recent Labs     07/18/24  0500 07/18/24  1640 07/19/24  0607 07/20/24  0339   WBC 11.82* 22.07* 11.73* 6.69       CBG:  Recent Labs     07/18/24  0500 07/18/24  1746 07/19/24  0607 07/20/24  0339   GLUCOSE 137* 142* 108 106        Cardiovascular:  Recent Labs   Lab 07/19/24  0008 07/19/24  0607 07/19/24  1154   TROPONINI 0.412* 0.377* 0.313*       I have reviewed all " pertinent lab results within the past 24 hours.    Imaging:  CTAP 7/18:  Impression:  1. There are moderate opacities seen in both lower lobes (series 4 image 8) consistent with pneumonias.  2. There are numerous moderately dilated loops of fluid-filled small bowel with numerous air-fluid levels with transition point in the mid small bowel seen on series 2 image 54. This is compatible with a small bowel obstruction.  3. No intraperitoneal free air or ascites is seen.  4. Details and other findings as discussed above.    Micro/Path/Other:  18 July 2024  Blood cultures: in process  Urine cultures: in process  Respiratory culture: in process    Assessment/Plan:  88-year-old with PMHx of HTN, HLD, and afib on Eliquis presenting with pneumonia. CT shows evidence of a small-bowel obstruction with a transition point. Pt currently AF and HDS.      - no acute surgical intervention at this time  - Will order SBFT for today  - Serial abdominal exams  - NPO  - mIVF  - continue antibiotics  - rest of care per primary  - surgery will continue to follow       Abril Bennett MD   [employer] General Surgery PGY3

## 2024-07-20 NOTE — PROGRESS NOTES
\[facility]\"" Family Medicine Progress Note     Resident Team: [facility] Family Medicine List     Subjective:      Brief HPI:  87 yo F with PMH of paroxysmal afib on Eliquis, CAD s/p coronary angiogram on 10/13/23, severe aortic stenosis, hypertension, hyperlipidemia, anxiety admitted for small bowel obstruction and sepsis.     Interval History:   VSS overnight; remained afebrile. Remain NPO with NGT in place with 500cc output recorded overnight. Surgery evaluated this AM with plans for small bowel follow through today. Rondon placed overnight d/t urinary retention with 900cc UOP recorded. Still complaining of abdominal pain.    Review of Systems:  Review of Systems   Constitutional:  Positive for malaise/fatigue. Negative for fever.   Respiratory:  Negative for cough and shortness of breath.    Cardiovascular:  Negative for chest pain and palpitations.   Gastrointestinal:  Positive for abdominal pain and nausea. Negative for vomiting.        Objective:     Last 24 Hour Vital Signs:  BP  Min: 108/55  Max: 158/68  Temp  Av.3 °F (36.8 °C)  Min: 98.2 °F (36.8 °C)  Max: 98.5 °F (36.9 °C)  Pulse  Av  Min: 67  Max: 78  Resp  Av.5  Min: 18  Max: 24  SpO2  Av.1 %  Min: 96 %  Max: 99 %  Weight  Av.8 kg (136 lb 3.9 oz)  Min: 61.8 kg (136 lb 3.9 oz)  Max: 61.8 kg (136 lb 3.9 oz)  I/O last 3 completed shifts:  In: 3373.2 [I.V.:2611.3; IV Piggyback:762]  Out: 2750 [Urine:1350; Drains:1400]    Physical Examination:  Physical Exam  Vitals reviewed.   Constitutional:       General: She is not in acute distress.  HENT:      Nose:      Comments: NGT in place     Mouth/Throat:      Mouth: Mucous membranes are dry.   Cardiovascular:      Rate and Rhythm: Normal rate and regular rhythm.      Heart sounds: Murmur heard.      Systolic murmur is present with a grade of 2/6.   Pulmonary:      Effort: Pulmonary effort is normal. No respiratory distress.      Breath sounds: No wheezing, rhonchi or rales.   Abdominal:      General:  Abdomen is flat. Bowel sounds are normal. There is no distension.      Palpations: Abdomen is soft.      Tenderness: There is abdominal tenderness (diffuse). There is no guarding or rebound.   Genitourinary:     Comments: Rondon to gravity  Musculoskeletal:      Right lower leg: Edema (1+ nonpitting) present.      Left lower leg: Edema (1+ nonpitting) present.   Skin:     General: Skin is warm.   Neurological:      Mental Status: She is alert and oriented to person, place, and time.           Laboratory:  Most Recent Data:  CBC:   Lab Results   Component Value Date    WBC 6.69 07/20/2024    HGB 9.2 (L) 07/20/2024    HCT 28.9 (L) 07/20/2024     07/20/2024    MCV 85.5 07/20/2024    RDW 15.5 07/20/2024     WBC Differential:   Recent Labs   Lab 07/18/24  0500 07/18/24  1640 07/19/24  0607 07/20/24  0339   WBC 11.82* 22.07* 11.73* 6.69   HGB 11.5* 11.2* 10.1* 9.2*   HCT 36.7* 34.3* 32.1* 28.9*    254 202 172   MCV 84.4 84.3 84.5 85.5     BMP:   Lab Results   Component Value Date     07/20/2024    K 3.9 07/20/2024     07/20/2024    CO2 27 07/20/2024    BUN 30.8 (H) 07/20/2024    CREATININE 1.25 (H) 07/20/2024    CALCIUM 9.3 07/20/2024    MG 1.60 07/20/2024    PHOS 2.6 07/20/2024     LFTs:   Lab Results   Component Value Date    ALBUMIN 2.3 (L) 07/20/2024    BILITOT 0.7 07/20/2024    AST 19 07/20/2024    ALKPHOS 46 07/20/2024    ALT 6 07/20/2024     Coags:   Lab Results   Component Value Date    INR 1.3 06/26/2024     FLP:   Lab Results   Component Value Date    CHOL 129 10/12/2023    HDL 33 (L) 10/12/2023    TRIG 103 10/12/2023     DM:   Lab Results   Component Value Date    HGBA1C 5.2 10/31/2022    CREATININE 1.25 (H) 07/20/2024     Thyroid:   Lab Results   Component Value Date    TSH 5.063 (H) 06/26/2024      Anemia:   Lab Results   Component Value Date    IRON 24 (L) 07/04/2024    TIBC 297 01/28/2019    FERRITIN 81.52 07/04/2024       Lab Results   Component Value Date    WOWERPUP17 467  07/04/2024       Lab Results   Component Value Date    FOLATE >20.0 07/26/2018        Cardiac:   Lab Results   Component Value Date    TROPONINI 0.313 (H) 07/19/2024    .6 (H) 06/26/2024         Microbiology Data:  Microbiology Results (last 7 days)       Procedure Component Value Units Date/Time    Urine culture [[id number]] Collected: 07/18/24 1729    Order Status: Completed Specimen: Urine Updated: 07/20/24 0919     Urine Culture No Significant Growth    Blood culture x two cultures. Draw prior to antibiotics. [[id number]]  (Abnormal) Collected: 07/18/24 1841    Order Status: Completed Specimen: Blood from Arm, Left Updated: 07/20/24 0803     Blood Culture Identification and Susceptibility To Follow      Gram-positive coccus probable staph     GRAM STAIN Gram Positive Cocci, probable Staphylococcus      Seen in gram stain of broth only      1 of 2 Aerobic bottles positive    BCID2 Panel [[id number]] Collected: 07/18/24 1841    Order Status: Resulted Specimen: Blood from Arm, Left Updated: 07/19/24 2103    Blood culture x two cultures. Draw prior to antibiotics. [[id number]]  (Normal) Collected: 07/18/24 1746    Order Status: Completed Specimen: Blood from Arm, Right Updated: 07/19/24 2100     Blood Culture No Growth At 24 Hours    Respiratory Culture [[id number]]     Order Status: Sent Specimen: Sputum              Other Results:    Radiology:  Imaging Results              CT Chest Without Contrast (Final result)  Result time 07/19/24 08:17:33      Final result by Wan Abreu MD (07/19/24 08:17:33)                   Impression:    Impression:    1. There are reticular, nodular and hazy opacities in the right posterior upper lobe and both lower lobes with areas of consolidation in the lower lobes, larger in the right. This is consistent with multilobar consolidation.    2. Details and other findings as discussed above.    No significant discrepancy with overnight report      Electronically signed  by: Wan Abreu  Date:    07/19/2024  Time:    08:17               Narrative:      Technique: CT Scan of the chest was performed without intravenous contrast with direct axial as well as sagittal and, coronal, reconstruction images.    Dosage Information: Automated Exposure Control was utilized.  DLP 19 9    Comparison: Correlation is with study [id number] 20:14:34.    Clinical History: Pneumonia.    Findings:    Heart: The heart size is within normal limits. Pronounced coronary artery calcification is seen.    Aorta: No thoracic aortic aneurysmal dilatation.    Lungs: Moderate streaky linear and hazy opacity is seen predominantly in the dependent portions at the lung bases consistent with dependent changes scarring and subsegmental atelectasis. There are reticular, nodular and hazy opacities in the right posterior upper lobe and both lower lobes with areas of consolidation in the lower lobes, larger in the right. This is consistent with multilobar consolidation. Subtle emphysematous change is noted in the right upper lobe.    Pleura: No effusions or pneumothorax are identified.    There is fluid-filled dilatation of the distal esophagus which may be the result of reflux disease or related to distal esophageal stricture.    Spine: Mild multilevel spondylolytic changes are seen in the thoracic spine.                        Preliminary result by Britton Kemp Jr., MD (07/19/24 01:56:35)                   Impression:    1. There are reticular, nodular and hazy opacities in the right posterior upper lobe and both lower lobes with areas of consolidation in the lower lobes, larger in the right. This is consistent with mutifocal possibly atypical pneumonia with multilobar consolidation.  2. Details and other findings as discussed above.               Narrative:    START OF REPORT:  Technique: CT Scan of the chest was performed without intravenous contrast with direct axial as well as sagittal and, coronal,  reconstruction images.    Dosage Information: Automated Exposure Control was utilized.    Comparison: Correlation is with study [id number] 20:14:34.    Clinical History: Pneumonia.    Findings:  Soft Tissues: Unremarkable.  Lines and Tubes: None.  Neck: The visualized soft tissues of the neck appear unremarkable.  Mediastinum: The mediastinal structures are within normal limits.  Heart: The heart size is within normal limits. Pronounced coronary artery calcification is seen.  Aorta: Unremarkable appearing aorta.  Pulmonary Arteries: Unremarkable.  Lungs: Moderate streaky linear and hazy opacity is seen predominantly in the dependent portions at the lung bases consistent with dependent changes scarring and subsegmental atelectasis. There are reticular, nodular and hazy opacities in the right posterior upper lobe and both lower lobes with areas of consolidation in the lower lobes, larger in the right. This is consistent with mutifocal possibly atypical pneumonia with multilobar consolidation. Subtle emphysematous change is noted in the right upper lobe.  Pleura: No effusions or pneumothorax are identified.  Bony Structures:  Spine: Mild multilevel spondylolytic changes are seen in the thoracic spine.  Ribs: The bilateral ribs appear unremarkable.                                         CT Abdomen Pelvis  Without Contrast (Final result)  Result time 07/19/24 07:35:25      Final result by Wan Abreu MD (07/19/24 07:35:25)                   Impression:    Impression:    1. There are moderate opacities seen in both lower lobes (series 4 image 8) consistent with pneumonias.    2. There are numerous moderately dilated loops of fluid-filled small bowel with numerous air-fluid levels with transition point in the mid small bowel seen on series 2 image 54. This is compatible with a small bowel obstruction.    3. No intraperitoneal free air or ascites is seen.    4. Details and other findings as discussed above.    No  significant discrepancy with overnight report.      Electronically signed by: Wan Abreu  Date:    07/19/2024  Time:    07:35               Narrative:      Technique: CT of the abdomen and pelvis was performed with axial images as well as sagittal and coronal reconstruction images without intravenous contrast.    Comparison: October 5, 2022    Clinical History: Abdominal Pain Vomiting(88 yr old in /[facility] FROM NURSING HOME W REPORT OF ABD PAIN W NV AND WEAKNESS SINCE YESTERDAY. LBM THIS AM. PT REPORTS DYSURIA. SEE NURSING HOME REPORTS. EKG OBTAINED. ) Fatigue.    Dosage Information: Automated Exposure Control was utilized.    Findings:    Lines and Tubes: None.    Thorax:    Lungs: There are moderate opacities seen in both lower lobes (series 4 image 8) consistent with pneumonias.    Pleura: No effusions or thickening.    Heart:Pronounced coronary artery calcification is seen. There is extensive aortic and mitral valve calcifications seen.    Abdomen:    Abdominal Wall: No abdominal wall pathology is seen.    Within limitations of noncontrast technique, no acute findings liver, pancreas spleen identified.    Biliary System: No intrahepatic or extrahepatic biliary duct dilatation is seen.    Gallbladder: Surgical clips are seen in the gallbladder fossa consistent with prior cholecystectomy.    Adrenals: The adrenal glands appear unremarkable.    Kidneys: The left kidney appears unremarkable with no stones or hydronephrosis. A single stable peripelvic cyst measuring 2.8 cm is seen in the right kidney and for this no specific follow-up imaging is recommended.  The right kidney otherwise appears unremarkable with no stones or hydronephrosis identified.    Aorta: There is extensive calcification of the abdominal aorta and its branches.    Bowel:    Esophagus: There is a moderate sized hiatal hernia.    Stomach: There is hiatal herniation of the cardia and part of the body of the stomach as detailed above.    Duodenum:  Unremarkable appearing duodenum.    Small Bowel: There are numerous moderately dilated loops of fluid-filled small bowel with numerous air-fluid levels with transition point in the mid small bowel seen on series 2 image 54.    Colon: Unremarkable.    Appendix: The appendix is not identified but no inflammatory changes are seen in the right lower quadrant to suggest appendicitis.    Peritoneum: No intraperitoneal free air or ascites is seen.    Pelvis:    Bladder: The bladder appears unremarkable.    Female:    Uterus: The uterus is not identified.    Ovaries: No adnexal masses are seen.    Inguinal Findings:    Inguinal Hernia: Incidental note is made of small uncomplicated mesenteric fat containing bilateral inguinal hernias.    Bony structures:    Dorsal Spine: There is mild spondylosis of the visualized dorsal spine. There are hypodense lesions with prominent trabecular markings seen in T12-L3. These may represent hemangiomas.    Bony Pelvis: The visualized bony structures of the pelvis appear unremarkable.    Notifications: The results were discussed with the physician Dr. Sanders prior to dictation at 2024-07-18 20:46:47 CDT.                        Preliminary result by Britton Kemp Jr., MD (07/18/24 20:49:10)                   Impression:    1. There are moderate opacities seen in both lower lobes (series 4 image 8) consistent with pneumonias.  2. There are numerous moderately dilated loops of fluid-filled small bowel with numerous air-fluid levels with transition point in the mid small bowel seen on series 2 image 54. This is compatible with a small bowel obstruction.  3. No intraperitoneal free air or ascites is seen.  4. Details and other findings as discussed above.               Narrative:    START OF REPORT:  Technique: CT of the abdomen and pelvis was performed with axial images as well as sagittal and coronal reconstruction images without intravenous contrast.    Comparison: None  available.    Clinical History: Abdominal Pain Vomiting(88 yr old in /[facility] FROM NURSING HOME W REPORT OF ABD PAIN W NV AND WEAKNESS SINCE YESTERDAY. LBM THIS AM. PT REPORTS DYSURIA. SEE NURSING HOME REPORTS. EKG OBTAINED. ) Fatigue.    Dosage Information: Automated Exposure Control was utilized.    Findings:  Lines and Tubes: None.  Thorax:  Lungs: There are moderate opacities seen in both lower lobes (series 4 image 8) consistent with pneumonias.  Pleura: No effusions or thickening.  Heart: Moderate cardiomegaly is seen. Pronounced coronary artery calcification is seen. There is extensive aortic and mitral valve calcifications seen.  Abdomen:  Abdominal Wall: No abdominal wall pathology is seen.  Liver: The liver appears unremarkable.  Biliary System: No intrahepatic or extrahepatic biliary duct dilatation is seen.  Gallbladder: Surgical clips are seen in the gallbladder fossa consistent with prior cholecystectomy.  Pancreas: The pancreas appears unremarkable.  Spleen: The spleen appears unremarkable.  Adrenals: The adrenal glands appear unremarkable.  Kidneys: The left kidney appears unremarkable with no stones cysts masses or hydronephrosis. A single peripelvic cyst measuring 2.8 cm is seen in the right kidney. The right kidney otherwise appears unremarkable with no stones masses or hydronephrosis identified.  Aorta: There is extensive calcification of the abdominal aorta and its branches.  IVC: Unremarkable.  Bowel:  Esophagus: There is a moderate sized hiatal hernia.  Stomach: There is hiatal herniation of the cardia and part of the body of the stomach as detailed above.  Duodenum: Unremarkable appearing duodenum.  Small Bowel: There are numerous moderately dilated loops of fluid-filled small bowel with numerous air-fluid levels with transition point in the mid small bowel seen on series 2 image 54.  Colon: Unremarkable.  Appendix: The appendix is not identified but no inflammatory changes are seen in the right  lower quadrant to suggest appendicitis.  Peritoneum: No intraperitoneal free air or ascites is seen.    Pelvis:  Bladder: The bladder appears unremarkable.  Female:  Uterus: The uterus is not identified.  Ovaries: No adnexal masses are seen.  Inguinal Findings:  Inguinal Hernia: Incidental note is made of small uncomplicated mesenteric fat containing bilateral inguinal hernias.    Bony structures:  Dorsal Spine: There is mild spondylosis of the visualized dorsal spine. There are hypodense lesions with prominent trabecular markings seen in T12-L3. These may represent hemangiomas.  Bony Pelvis: The visualized bony structures of the pelvis appear unremarkable.    Notifications: The results were discussed with the physician Dr. Sanders prior to dictation at 2024-07-18 20:46:47 CDT.                                         X-Ray Chest AP Portable (Final result)  Result time 07/18/24 18:49:26      Final result by Flor Hernandez MD (07/18/24 18:49:26)                   Impression:      Diffuse bilateral interstitial infiltrates with developing consolidation in the right lower lobe    Prominence of the right paratracheal region.  CT scan correlation is recommended.      Electronically signed by: Chip Hernandez  Date:    07/18/2024  Time:    18:49               Narrative:    EXAMINATION:  XR CHEST AP PORTABLE    CLINICAL HISTORY:  Sepsis;    TECHNIQUE:  Single frontal view of the chest was performed.    COMPARISON:  06/30/2024    FINDINGS:  There are diffuse bilateral interstitial infiltrates with a area of consolidation developing in the right lower lobe.  There is also increased opacity in the right paratracheal region.  This was seen on the prior examination as well.  The heart is normal appearance.  The pulmonary vascularity is unremarkable.  Bones and joints show no acute abnormality.                                      Current Medications:     Infusions:   lactated ringers   Intravenous Continuous 100 mL/hr  at 07/20/24 0623 Rate Verify at 07/20/24 0623        Scheduled:   azithromycin  500 mg Intravenous Q24H    diatrizoate meglumineand-diatrizoate sodium        famotidine (PF)  20 mg Intravenous Daily    heparin (porcine)  5,000 Units Subcutaneous Q12H    piperacillin-tazobactam (Zosyn) IV (PEDS and ADULTS) (extended infusion is not appropriate)  4.5 g Intravenous Q12H    vancomycin (VANCOCIN) IV (PEDS and ADULTS)  750 mg Intravenous Q24H        PRN:    Current Facility-Administered Medications:     acetaminophen, 650 mg, Oral, Q8H PRN    dextrose 10%, 12.5 g, Intravenous, PRN    dextrose 10%, 25 g, Intravenous, PRN    diatrizoate meglumineand-diatrizoate sodium, , ,     glucagon (human recombinant), 1 mg, Intramuscular, PRN    glucose, 16 g, Oral, PRN    glucose, 24 g, Oral, PRN    melatonin, 6 mg, Oral, Nightly PRN    metoprolol, 5 mg, Intravenous, Q5 Min PRN    naloxone, 0.02 mg, Intravenous, PRN    ondansetron, 4 mg, Intravenous, Q8H PRN    prochlorperazine, 5 mg, Intravenous, Q6H PRN    sodium chloride 0.9%, 10 mL, Intravenous, Q12H PRN    Pharmacy to dose Vancomycin consult, , , Once **AND** vancomycin - pharmacy to dose, , Intravenous, pharmacy to manage frequency    Antibiotics and Day Number of Therapy:  Vanc, Zosyn and Azithro started 7/18    Assessment & Plan:   Small bowel obstruction   Nausea, vomiting  - Surgery following  - NPO with no medications  - NGT in place with 500cc output overnight  - anti-emetics PRN in place     Severe sepsis with suspected sources below  Right lower lobe pneumonia, possible aspiration  Acute cystitis on UA  Recent UTI growing Candida tropicalis  - Placed on broad spectrum Vanc/Zosyn/Azithromycin. MRSA PCR neg.   - Lactic acid resolved   - blood cultures x2 collected 7/18: 1/2 bottles gram + cocci, probable staph. BCID panel pending.  - urine culture with no growth  - resp culture (if able), COVID/Flu neg  - Leukocytosis improving this AM      Acute kidney injury, likely  ATN  - Kidney functions improved this AM  - Continue with IVF rehydration  - Baseline of ~1.0     NSTEMI, likely Type II demand ischemia  - Trop trended down; likely 2/2 to demand  - Holding home ASA 81, statin due to strict NPO     Questionable right paratracheal/mass/opacity on CXR  - CT chest showed reticular, nodular and hazy opacities in the right posterior upper lobe and both lower lobes with areas of consolidation in the lower lobes, larger in the right. This is consistent with multilobar consolidation. And fluid-filled dilatation of the distal esophagus which may be the result of reflux disease or related to distal esophageal stricture      Severe aortic stenosis  Coronary artery disease  Hypertension  Hyperlipidemia  - last Echo 10/2023 EF 65-70%, severe aortic stenosis  - hold home ACEi and statin  - consider repeat Echo in future if showing signs of fluid overload     Paroxysmal atrial fibrillation  - Holding home Eliquis; switched from Lovenox to heparin this AM in case need for surgery  - currently NSR  - Holding home metoprolol; Lopressor pushes available prn     Anxiety  - Holding home Lexapro     History of falls  Chronic dizziness, at baseline  - admission 7/1/2024 for fall, currently in SNF for rehab, likely discharge back to SNF when able  - previously walked with a cane, currently requiring rolling walker  - OT/PT consulted  - fall precautions, delirium precautions     CODE STATUS: DNR  Access: PIV  Antibiotics: Vanc/Zosyn/Azithromycin  Diet: NPO  DVT Prophylaxis: Heparin  GI Prophylaxis: Pepcid IV  Fluids:  cc/hr      Disposition: Admitted for SBO, no surgical intervention needed at this time. Surgery follow with SBFT planned today. NGT in place; monitor N/V closely. Continue on current Abx regimen at this time. Troponins trended down. Keep NPO, home medications changed to IV or being held if not available. Came from nursing home; may need case management consult to ensure return upon  discharge.     Sierra Brady MD  [facility], -2

## 2024-07-20 NOTE — NURSING
Returns from radiology, will complete xray series by portable. NGT remains off suction until complete

## 2024-07-20 NOTE — NURSING
Family leaving for the night, staff previously discussed mitten placement after pulling out NGT. Bilateral mittens applied to prevent removal of NGT, door left open.

## 2024-07-20 NOTE — PLAN OF CARE
Problem: Adult Inpatient Plan of Care  Goal: Plan of Care Review  Outcome: Not Progressing  Goal: Patient-Specific Goal (Individualized)  Outcome: Not Progressing  Goal: Absence of Hospital-Acquired Illness or Injury  Outcome: Not Progressing  Goal: Optimal Comfort and Wellbeing  Outcome: Not Progressing  Goal: Readiness for Transition of Care  Outcome: Not Progressing     Problem: Sepsis/Septic Shock  Goal: Optimal Coping  Outcome: Not Progressing  Goal: Absence of Bleeding  Outcome: Not Progressing  Goal: Blood Glucose Level Within Targeted Range  Outcome: Not Progressing  Goal: Absence of Infection Signs and Symptoms  Outcome: Not Progressing  Goal: Optimal Nutrition Intake  Outcome: Not Progressing     Problem: Fall Injury Risk  Goal: Absence of Fall and Fall-Related Injury  Outcome: Not Progressing     Problem: Skin Injury Risk Increased  Goal: Skin Health and Integrity  Outcome: Not Progressing     Problem: Acute Kidney Injury/Impairment  Goal: Fluid and Electrolyte Balance  Outcome: Not Progressing  Goal: Improved Oral Intake  Outcome: Not Progressing  Goal: Effective Renal Function  Outcome: Not Progressing     Problem: Pneumonia  Goal: Fluid Balance  Outcome: Not Progressing  Goal: Resolution of Infection Signs and Symptoms  Outcome: Not Progressing  Goal: Effective Oxygenation and Ventilation  Outcome: Not Progressing     Problem: Infection  Goal: Absence of Infection Signs and Symptoms  Outcome: Not Progressing

## 2024-07-21 LAB
ABORH RETYPE: NORMAL
ALBUMIN SERPL-MCNC: 2.2 G/DL (ref 3.4–4.8)
ALBUMIN/GLOB SERPL: 0.5 RATIO (ref 1.1–2)
ALP SERPL-CCNC: 41 UNIT/L (ref 40–150)
ALT SERPL-CCNC: 7 UNIT/L (ref 0–55)
ANION GAP SERPL CALC-SCNC: 6 MEQ/L
AST SERPL-CCNC: 18 UNIT/L (ref 5–34)
BASOPHILS # BLD AUTO: 0.01 X10(3)/MCL
BASOPHILS NFR BLD AUTO: 0.3 %
BILIRUB SERPL-MCNC: 0.7 MG/DL
BUN SERPL-MCNC: 21.3 MG/DL (ref 9.8–20.1)
CALCIUM SERPL-MCNC: 9 MG/DL (ref 8.4–10.2)
CHLORIDE SERPL-SCNC: 102 MMOL/L (ref 98–107)
CO2 SERPL-SCNC: 28 MMOL/L (ref 23–31)
CREAT SERPL-MCNC: 1.07 MG/DL (ref 0.55–1.02)
CREAT/UREA NIT SERPL: 20
EOSINOPHIL # BLD AUTO: 0.12 X10(3)/MCL (ref 0–0.9)
EOSINOPHIL NFR BLD AUTO: 3.2 %
ERYTHROCYTE [DISTWIDTH] IN BLOOD BY AUTOMATED COUNT: 15.1 % (ref 11.5–17)
GFR SERPLBLD CREATININE-BSD FMLA CKD-EPI: 50 ML/MIN/1.73/M2
GLOBULIN SER-MCNC: 4.1 GM/DL (ref 2.4–3.5)
GLUCOSE SERPL-MCNC: 89 MG/DL (ref 82–115)
GROUP & RH: NORMAL
HCT VFR BLD AUTO: 25 % (ref 37–47)
HGB BLD-MCNC: 7.9 G/DL (ref 12–16)
IMM GRANULOCYTES # BLD AUTO: 0.01 X10(3)/MCL (ref 0–0.04)
IMM GRANULOCYTES NFR BLD AUTO: 0.3 %
INDIRECT COOMBS: NORMAL
LYMPHOCYTES # BLD AUTO: 0.29 X10(3)/MCL (ref 0.6–4.6)
LYMPHOCYTES NFR BLD AUTO: 7.7 %
MAGNESIUM SERPL-MCNC: 2.1 MG/DL (ref 1.6–2.6)
MCH RBC QN AUTO: 27.1 PG (ref 27–31)
MCHC RBC AUTO-ENTMCNC: 31.6 G/DL (ref 33–36)
MCV RBC AUTO: 85.6 FL (ref 80–94)
MONOCYTES # BLD AUTO: 0.18 X10(3)/MCL (ref 0.1–1.3)
MONOCYTES NFR BLD AUTO: 4.8 %
NEUTROPHILS # BLD AUTO: 3.16 X10(3)/MCL (ref 2.1–9.2)
NEUTROPHILS NFR BLD AUTO: 83.7 %
NRBC BLD AUTO-RTO: 0 %
PHOSPHATE SERPL-MCNC: 2.3 MG/DL (ref 2.3–4.7)
PLATELET # BLD AUTO: 154 X10(3)/MCL (ref 130–400)
PMV BLD AUTO: 11.4 FL (ref 7.4–10.4)
POCT GLUCOSE: 110 MG/DL (ref 70–110)
POTASSIUM SERPL-SCNC: 3.8 MMOL/L (ref 3.5–5.1)
PROT SERPL-MCNC: 6.3 GM/DL (ref 5.8–7.6)
RBC # BLD AUTO: 2.92 X10(6)/MCL (ref 4.2–5.4)
SODIUM SERPL-SCNC: 136 MMOL/L (ref 136–145)
SPECIMEN OUTDATE: NORMAL
VANCOMYCIN TROUGH SERPL-MCNC: 12.6 UG/ML (ref 15–20)
WBC # BLD AUTO: 3.77 X10(3)/MCL (ref 4.5–11.5)

## 2024-07-21 PROCEDURE — 63600175 PHARM REV CODE 636 W HCPCS

## 2024-07-21 PROCEDURE — 25000003 PHARM REV CODE 250

## 2024-07-21 PROCEDURE — 21400001 HC TELEMETRY ROOM

## 2024-07-21 PROCEDURE — 97110 THERAPEUTIC EXERCISES: CPT

## 2024-07-21 PROCEDURE — 94761 N-INVAS EAR/PLS OXIMETRY MLT: CPT

## 2024-07-21 PROCEDURE — 80053 COMPREHEN METABOLIC PANEL: CPT

## 2024-07-21 PROCEDURE — 99900035 HC TECH TIME PER 15 MIN (STAT)

## 2024-07-21 PROCEDURE — 85025 COMPLETE CBC W/AUTO DIFF WBC: CPT

## 2024-07-21 PROCEDURE — 25000003 PHARM REV CODE 250: Performed by: FAMILY MEDICINE

## 2024-07-21 PROCEDURE — 84100 ASSAY OF PHOSPHORUS: CPT

## 2024-07-21 PROCEDURE — 80202 ASSAY OF VANCOMYCIN: CPT

## 2024-07-21 PROCEDURE — 86850 RBC ANTIBODY SCREEN: CPT

## 2024-07-21 PROCEDURE — 86901 BLOOD TYPING SEROLOGIC RH(D): CPT

## 2024-07-21 PROCEDURE — 83735 ASSAY OF MAGNESIUM: CPT

## 2024-07-21 PROCEDURE — 36415 COLL VENOUS BLD VENIPUNCTURE: CPT

## 2024-07-21 RX ORDER — SODIUM CHLORIDE 9 MG/ML
INJECTION, SOLUTION INTRAVENOUS
Status: DISCONTINUED | OUTPATIENT
Start: 2024-07-21 | End: 2024-07-30 | Stop reason: HOSPADM

## 2024-07-21 RX ORDER — PANTOPRAZOLE SODIUM 40 MG/10ML
40 INJECTION, POWDER, LYOPHILIZED, FOR SOLUTION INTRAVENOUS DAILY
Status: DISCONTINUED | OUTPATIENT
Start: 2024-07-21 | End: 2024-07-30 | Stop reason: HOSPADM

## 2024-07-21 RX ORDER — DEXTROMETHORPHAN POLISTIREX 30 MG/5 ML
1 SUSPENSION, EXTENDED RELEASE 12 HR ORAL ONCE
Status: COMPLETED | OUTPATIENT
Start: 2024-07-21 | End: 2024-07-21

## 2024-07-21 RX ADMIN — VANCOMYCIN HYDROCHLORIDE 750 MG: 750 INJECTION, POWDER, LYOPHILIZED, FOR SOLUTION INTRAVENOUS at 12:07

## 2024-07-21 RX ADMIN — HEPARIN SODIUM 5000 UNITS: 5000 INJECTION, SOLUTION INTRAVENOUS; SUBCUTANEOUS at 08:07

## 2024-07-21 RX ADMIN — SODIUM CHLORIDE, POTASSIUM CHLORIDE, SODIUM LACTATE AND CALCIUM CHLORIDE: 600; 310; 30; 20 INJECTION, SOLUTION INTRAVENOUS at 05:07

## 2024-07-21 RX ADMIN — PIPERACILLIN SODIUM AND TAZOBACTAM SODIUM 4.5 G: 4; .5 INJECTION, POWDER, LYOPHILIZED, FOR SOLUTION INTRAVENOUS at 10:07

## 2024-07-21 RX ADMIN — SODIUM CHLORIDE: 9 INJECTION, SOLUTION INTRAVENOUS at 10:07

## 2024-07-21 RX ADMIN — MELATONIN 6 MG: at 09:07

## 2024-07-21 RX ADMIN — DOXYCYCLINE 100 MG: 100 INJECTION, POWDER, LYOPHILIZED, FOR SOLUTION INTRAVENOUS at 10:07

## 2024-07-21 RX ADMIN — DOXYCYCLINE 100 MG: 100 INJECTION, POWDER, LYOPHILIZED, FOR SOLUTION INTRAVENOUS at 09:07

## 2024-07-21 RX ADMIN — PANTOPRAZOLE SODIUM 40 MG: 40 INJECTION, POWDER, FOR SOLUTION INTRAVENOUS at 08:07

## 2024-07-21 RX ADMIN — SODIUM CHLORIDE, POTASSIUM CHLORIDE, SODIUM LACTATE AND CALCIUM CHLORIDE: 600; 310; 30; 20 INJECTION, SOLUTION INTRAVENOUS at 03:07

## 2024-07-21 RX ADMIN — PIPERACILLIN SODIUM AND TAZOBACTAM SODIUM 4.5 G: 4; .5 INJECTION, POWDER, LYOPHILIZED, FOR SOLUTION INTRAVENOUS at 11:07

## 2024-07-21 RX ADMIN — MINERAL OIL 1 ENEMA: 100 ENEMA RECTAL at 01:07

## 2024-07-21 NOTE — PROGRESS NOTES
"[facility] - [facility]  Daily Progress Note    Patient ID: Fifi Chacon, 87y/o F    Subjective:  AF, HDS  SBFT and KUB today with contrast in colon/rectum  NGT w/ 2L bilious output  Denies nausea,passing flatus or BM    Objective:    Vitals:  Vitals:    07/21/24 0800   BP:    Pulse: 71   Resp:    Temp:         Intake/Output:    Intake/Output Summary (Last 24 hours) at 7/21/2024 0845  Last data filed at 7/21/2024 0606  Gross per 24 hour   Intake 2105.87 ml   Output 2850 ml   Net -744.13 ml        Physical Exam:  Gen: NAD  Neuro: awake, alert, answering questions appropriately  CV: RR  Resp: non-labored breathing  Abd: soft, ND, mild general tenderness to palpation   Ext: moves all 4 spontaneously and purposefully  Skin: warm, well perfused      Labs:  Renal:  Recent Labs     07/18/24 1746 07/19/24 0607 07/20/24 0339 07/21/24 0319   BUN 38.5* 37.6* 30.8* 21.3*   CREATININE 1.89* 1.75* 1.25* 1.07*     No results for input(s): "LACTIC" in the last 72 hours.    FENGI:  Recent Labs     07/18/24 1746 07/19/24 0607 07/20/24 0339 07/21/24 0319    131* 136 136   K 5.1 4.2 3.9 3.8    101 102 102   CO2 23 24 27 28   CALCIUM 10.4* 9.9 9.3 9.0   MG  --  1.70 1.60 2.10   PHOS  --  3.4 2.6 2.3   ALBUMIN 3.1* 2.7* 2.3* 2.2*   BILITOT 0.7 0.8 0.7 0.7   AST 28 26 19 18   ALKPHOS 57 51 46 41   ALT 8 8 6 7       Heme:  Recent Labs     07/18/24 1640 07/19/24 0607 07/20/24 0339 07/21/24 0319   HGB 11.2* 10.1* 9.2* 7.9*   HCT 34.3* 32.1* 28.9* 25.0*    202 172 154       ID:  Recent Labs     07/18/24 1640 07/19/24  0607 07/20/24  0339 07/21/24  0319   WBC 22.07* 11.73* 6.69 3.77*       CBG:  Recent Labs     07/18/24  1746 07/19/24  0607 07/20/24  0339 07/21/24  0319   GLUCOSE 142* 108 106 89        Cardiovascular:  Recent Labs   Lab 07/19/24  0008 07/19/24  0607 07/19/24  1154   TROPONINI 0.412* 0.377* 0.313*       I have reviewed all pertinent lab results within the past 24 " hours.    Imaging:  Imaging Results              CT Chest Without Contrast (Final result)  Result time 07/19/24 08:17:33      Final result by Wan Abreu MD (07/19/24 08:17:33)                   Impression:    Impression:    1. There are reticular, nodular and hazy opacities in the right posterior upper lobe and both lower lobes with areas of consolidation in the lower lobes, larger in the right. This is consistent with multilobar consolidation.    2. Details and other findings as discussed above.    No significant discrepancy with overnight report      Electronically signed by: Wan Abreu  Date:    07/19/2024  Time:    08:17               Narrative:      Technique: CT Scan of the chest was performed without intravenous contrast with direct axial as well as sagittal and, coronal, reconstruction images.    Dosage Information: Automated Exposure Control was utilized.  DLP 19 9    Comparison: Correlation is with study dated2024-07-18 20:14:34.    Clinical History: Pneumonia.    Findings:    Heart: The heart size is within normal limits. Pronounced coronary artery calcification is seen.    Aorta: No thoracic aortic aneurysmal dilatation.    Lungs: Moderate streaky linear and hazy opacity is seen predominantly in the dependent portions at the lung bases consistent with dependent changes scarring and subsegmental atelectasis. There are reticular, nodular and hazy opacities in the right posterior upper lobe and both lower lobes with areas of consolidation in the lower lobes, larger in the right. This is consistent with multilobar consolidation. Subtle emphysematous change is noted in the right upper lobe.    Pleura: No effusions or pneumothorax are identified.    There is fluid-filled dilatation of the distal esophagus which may be the result of reflux disease or related to distal esophageal stricture.    Spine: Mild multilevel spondylolytic changes are seen in the thoracic spine.                        Preliminary  result by Britton Kemp Jr., MD (07/19/24 01:56:35)                   Impression:    1. There are reticular, nodular and hazy opacities in the right posterior upper lobe and both lower lobes with areas of consolidation in the lower lobes, larger in the right. This is consistent with mutifocal possibly atypical pneumonia with multilobar consolidation.  2. Details and other findings as discussed above.               Narrative:    START OF REPORT:  Technique: CT Scan of the chest was performed without intravenous contrast with direct axial as well as sagittal and, coronal, reconstruction images.    Dosage Information: Automated Exposure Control was utilized.    Comparison: Correlation is with study [id number] 20:14:34.    Clinical History: Pneumonia.    Findings:  Soft Tissues: Unremarkable.  Lines and Tubes: None.  Neck: The visualized soft tissues of the neck appear unremarkable.  Mediastinum: The mediastinal structures are within normal limits.  Heart: The heart size is within normal limits. Pronounced coronary artery calcification is seen.  Aorta: Unremarkable appearing aorta.  Pulmonary Arteries: Unremarkable.  Lungs: Moderate streaky linear and hazy opacity is seen predominantly in the dependent portions at the lung bases consistent with dependent changes scarring and subsegmental atelectasis. There are reticular, nodular and hazy opacities in the right posterior upper lobe and both lower lobes with areas of consolidation in the lower lobes, larger in the right. This is consistent with mutifocal possibly atypical pneumonia with multilobar consolidation. Subtle emphysematous change is noted in the right upper lobe.  Pleura: No effusions or pneumothorax are identified.  Bony Structures:  Spine: Mild multilevel spondylolytic changes are seen in the thoracic spine.  Ribs: The bilateral ribs appear unremarkable.                                         CT Abdomen Pelvis  Without Contrast (Final result)   Result time 07/19/24 07:35:25      Final result by Wan Abreu MD (07/19/24 07:35:25)                   Impression:    Impression:    1. There are moderate opacities seen in both lower lobes (series 4 image 8) consistent with pneumonias.    2. There are numerous moderately dilated loops of fluid-filled small bowel with numerous air-fluid levels with transition point in the mid small bowel seen on series 2 image 54. This is compatible with a small bowel obstruction.    3. No intraperitoneal free air or ascites is seen.    4. Details and other findings as discussed above.    No significant discrepancy with overnight report.      Electronically signed by: Wan Abreu  Date:    07/19/2024  Time:    07:35               Narrative:      Technique: CT of the abdomen and pelvis was performed with axial images as well as sagittal and coronal reconstruction images without intravenous contrast.    Comparison: October 5, 2022    Clinical History: Abdominal Pain Vomiting(88 yr old in /[facility] FROM NURSING HOME W REPORT OF ABD PAIN W NV AND WEAKNESS SINCE YESTERDAY. LBM THIS AM. PT REPORTS DYSURIA. SEE NURSING HOME REPORTS. EKG OBTAINED. ) Fatigue.    Dosage Information: Automated Exposure Control was utilized.    Findings:    Lines and Tubes: None.    Thorax:    Lungs: There are moderate opacities seen in both lower lobes (series 4 image 8) consistent with pneumonias.    Pleura: No effusions or thickening.    Heart:Pronounced coronary artery calcification is seen. There is extensive aortic and mitral valve calcifications seen.    Abdomen:    Abdominal Wall: No abdominal wall pathology is seen.    Within limitations of noncontrast technique, no acute findings liver, pancreas spleen identified.    Biliary System: No intrahepatic or extrahepatic biliary duct dilatation is seen.    Gallbladder: Surgical clips are seen in the gallbladder fossa consistent with prior cholecystectomy.    Adrenals: The adrenal glands appear  unremarkable.    Kidneys: The left kidney appears unremarkable with no stones or hydronephrosis. A single stable peripelvic cyst measuring 2.8 cm is seen in the right kidney and for this no specific follow-up imaging is recommended.  The right kidney otherwise appears unremarkable with no stones or hydronephrosis identified.    Aorta: There is extensive calcification of the abdominal aorta and its branches.    Bowel:    Esophagus: There is a moderate sized hiatal hernia.    Stomach: There is hiatal herniation of the cardia and part of the body of the stomach as detailed above.    Duodenum: Unremarkable appearing duodenum.    Small Bowel: There are numerous moderately dilated loops of fluid-filled small bowel with numerous air-fluid levels with transition point in the mid small bowel seen on series 2 image 54.    Colon: Unremarkable.    Appendix: The appendix is not identified but no inflammatory changes are seen in the right lower quadrant to suggest appendicitis.    Peritoneum: No intraperitoneal free air or ascites is seen.    Pelvis:    Bladder: The bladder appears unremarkable.    Female:    Uterus: The uterus is not identified.    Ovaries: No adnexal masses are seen.    Inguinal Findings:    Inguinal Hernia: Incidental note is made of small uncomplicated mesenteric fat containing bilateral inguinal hernias.    Bony structures:    Dorsal Spine: There is mild spondylosis of the visualized dorsal spine. There are hypodense lesions with prominent trabecular markings seen in T12-L3. These may represent hemangiomas.    Bony Pelvis: The visualized bony structures of the pelvis appear unremarkable.    Notifications: The results were discussed with the physician Dr. Sanders prior to dictation at 2024-07-18 20:46:47 CDT.                        Preliminary result by Britton Kemp Jr., MD (07/18/24 20:49:10)                   Impression:    1. There are moderate opacities seen in both lower lobes (series 4 image  8) consistent with pneumonias.  2. There are numerous moderately dilated loops of fluid-filled small bowel with numerous air-fluid levels with transition point in the mid small bowel seen on series 2 image 54. This is compatible with a small bowel obstruction.  3. No intraperitoneal free air or ascites is seen.  4. Details and other findings as discussed above.               Narrative:    START OF REPORT:  Technique: CT of the abdomen and pelvis was performed with axial images as well as sagittal and coronal reconstruction images without intravenous contrast.    Comparison: None available.    Clinical History: Abdominal Pain Vomiting(88 yr old in /[facility] FROM NURSING HOME W REPORT OF ABD PAIN W NV AND WEAKNESS SINCE YESTERDAY. LBM THIS AM. PT REPORTS DYSURIA. SEE NURSING HOME REPORTS. EKG OBTAINED. ) Fatigue.    Dosage Information: Automated Exposure Control was utilized.    Findings:  Lines and Tubes: None.  Thorax:  Lungs: There are moderate opacities seen in both lower lobes (series 4 image 8) consistent with pneumonias.  Pleura: No effusions or thickening.  Heart: Moderate cardiomegaly is seen. Pronounced coronary artery calcification is seen. There is extensive aortic and mitral valve calcifications seen.  Abdomen:  Abdominal Wall: No abdominal wall pathology is seen.  Liver: The liver appears unremarkable.  Biliary System: No intrahepatic or extrahepatic biliary duct dilatation is seen.  Gallbladder: Surgical clips are seen in the gallbladder fossa consistent with prior cholecystectomy.  Pancreas: The pancreas appears unremarkable.  Spleen: The spleen appears unremarkable.  Adrenals: The adrenal glands appear unremarkable.  Kidneys: The left kidney appears unremarkable with no stones cysts masses or hydronephrosis. A single peripelvic cyst measuring 2.8 cm is seen in the right kidney. The right kidney otherwise appears unremarkable with no stones masses or hydronephrosis identified.  Aorta: There is extensive  calcification of the abdominal aorta and its branches.  IVC: Unremarkable.  Bowel:  Esophagus: There is a moderate sized hiatal hernia.  Stomach: There is hiatal herniation of the cardia and part of the body of the stomach as detailed above.  Duodenum: Unremarkable appearing duodenum.  Small Bowel: There are numerous moderately dilated loops of fluid-filled small bowel with numerous air-fluid levels with transition point in the mid small bowel seen on series 2 image 54.  Colon: Unremarkable.  Appendix: The appendix is not identified but no inflammatory changes are seen in the right lower quadrant to suggest appendicitis.  Peritoneum: No intraperitoneal free air or ascites is seen.    Pelvis:  Bladder: The bladder appears unremarkable.  Female:  Uterus: The uterus is not identified.  Ovaries: No adnexal masses are seen.  Inguinal Findings:  Inguinal Hernia: Incidental note is made of small uncomplicated mesenteric fat containing bilateral inguinal hernias.    Bony structures:  Dorsal Spine: There is mild spondylosis of the visualized dorsal spine. There are hypodense lesions with prominent trabecular markings seen in T12-L3. These may represent hemangiomas.  Bony Pelvis: The visualized bony structures of the pelvis appear unremarkable.    Notifications: The results were discussed with the physician Dr. Sanders prior to dictation at 2024-07-18 20:46:47 CDT.                                         X-Ray Chest AP Portable (Final result)  Result time 07/18/24 18:49:26      Final result by Flor Hernandez MD (07/18/24 18:49:26)                   Impression:      Diffuse bilateral interstitial infiltrates with developing consolidation in the right lower lobe    Prominence of the right paratracheal region.  CT scan correlation is recommended.      Electronically signed by: Chip Hernandez  Date:    07/18/2024  Time:    18:49               Narrative:    EXAMINATION:  XR CHEST AP PORTABLE    CLINICAL  HISTORY:  Sepsis;    TECHNIQUE:  Single frontal view of the chest was performed.    COMPARISON:  06/30/2024    FINDINGS:  There are diffuse bilateral interstitial infiltrates with a area of consolidation developing in the right lower lobe.  There is also increased opacity in the right paratracheal region.  This was seen on the prior examination as well.  The heart is normal appearance.  The pulmonary vascularity is unremarkable.  Bones and joints show no acute abnormality.                                        Assessment/Plan:  88-year-old with PMHx of HTN, HLD, and afib on Eliquis presenting with pneumonia. CT shows evidence of a small-bowel obstruction with a transition point. Pt currently AF and HDS. SBFT and KUB with contrast in colon/rectum, however still no flatus or BM     - will do ANG today to assess for stool burden   - NGT to LIMWS  - Serial abdominal exams  - NPO  - mIVF  - rest of care per primary  - surgery will continue to follow       Abril Bennett MD   [facility] PGY3

## 2024-07-21 NOTE — PROGRESS NOTES
[facility] Progress Note     Resident Team: [facility] List     Subjective:      Brief HPI:  87 yo F with PMH of paroxysmal afib on Eliquis, CAD s/p coronary angiogram on 10/13/23, severe aortic stenosis, hypertension, hyperlipidemia, anxiety admitted for small bowel obstruction and sepsis.     Interval History:   VSS and NAEON. Afebrile. NPO status. NGT with 1000cc output overnight. SBFT with slow transit but contrast reaching colon. Surgery following; recommending continue NGT decompression - no emergent surgical indication at this time. No acute complaints this morning. Daughter at bedside.    Review of Systems:  Review of Systems   Constitutional:  Negative for fever.   Respiratory:  Negative for cough and shortness of breath.    Cardiovascular:  Negative for chest pain and palpitations.   Gastrointestinal:  Positive for abdominal pain and nausea. Negative for vomiting.        Objective:     Last 24 Hour Vital Signs:  BP  Min: 108/55  Max: 150/63  Temp  Av °F (36.7 °C)  Min: 97 °F (36.1 °C)  Max: 98.7 °F (37.1 °C)  Pulse  Av.7  Min: 64  Max: 78  Resp  Av.7  Min: 19  Max: 20  SpO2  Av.4 %  Min: 96 %  Max: 100 %  I/O last 3 completed shifts:  In: 5479.1 [I.V.:4281.1; IV Piggyback:1198]  Out: 4250 [Urine:1750; Drains:2500]    Physical Examination:  Physical Exam  Vitals reviewed.   Constitutional:       General: She is not in acute distress.  HENT:      Nose:      Comments: NGT in place     Mouth/Throat:      Mouth: Mucous membranes are dry.   Cardiovascular:      Rate and Rhythm: Normal rate and regular rhythm.      Heart sounds: Murmur heard.      Systolic murmur is present with a grade of 2/6.   Pulmonary:      Effort: Pulmonary effort is normal. No respiratory distress.      Breath sounds: No wheezing, rhonchi or rales.   Abdominal:      General: Abdomen is flat. Bowel sounds are normal. There is no distension.      Palpations: Abdomen is soft.      Tenderness: There is  abdominal tenderness (diffuse). There is no guarding or rebound.   Genitourinary:     Comments: Rondon to gravity  Musculoskeletal:      Right lower leg: Edema (1+ nonpitting) present.      Left lower leg: Edema (1+ nonpitting) present.   Skin:     General: Skin is warm.   Neurological:      Mental Status: She is alert and oriented to person, place, and time.           Laboratory:  Most Recent Data:  CBC:   Lab Results   Component Value Date    WBC 3.77 (L) 07/21/2024    HGB 7.9 (L) 07/21/2024    HCT 25.0 (L) 07/21/2024     07/21/2024    MCV 85.6 07/21/2024    RDW 15.1 07/21/2024     WBC Differential:   Recent Labs   Lab 07/18/24  0500 07/18/24  1640 07/19/24  0607 07/20/24  0339 07/21/24  0319   WBC 11.82* 22.07* 11.73* 6.69 3.77*   HGB 11.5* 11.2* 10.1* 9.2* 7.9*   HCT 36.7* 34.3* 32.1* 28.9* 25.0*    254 202 172 154   MCV 84.4 84.3 84.5 85.5 85.6     BMP:   Lab Results   Component Value Date     07/21/2024    K 3.8 07/21/2024     07/21/2024    CO2 28 07/21/2024    BUN 21.3 (H) 07/21/2024    CREATININE 1.07 (H) 07/21/2024    CALCIUM 9.0 07/21/2024    MG 2.10 07/21/2024    PHOS 2.3 07/21/2024     LFTs:   Lab Results   Component Value Date    ALBUMIN 2.2 (L) 07/21/2024    BILITOT 0.7 07/21/2024    AST 18 07/21/2024    ALKPHOS 41 07/21/2024    ALT 7 07/21/2024     Coags:   Lab Results   Component Value Date    INR 1.3 06/26/2024     FLP:   Lab Results   Component Value Date    CHOL 129 10/12/2023    HDL 33 (L) 10/12/2023    TRIG 103 10/12/2023     DM:   Lab Results   Component Value Date    HGBA1C 5.2 10/31/2022    CREATININE 1.07 (H) 07/21/2024     Thyroid:   Lab Results   Component Value Date    TSH 5.063 (H) 06/26/2024      Anemia:   Lab Results   Component Value Date    IRON 24 (L) 07/04/2024    TIBC 297 01/28/2019    FERRITIN 81.52 07/04/2024       Lab Results   Component Value Date    SDBILUNT80 467 07/04/2024       Lab Results   Component Value Date    FOLATE >20.0 07/26/2018         Cardiac:   Lab Results   Component Value Date    TROPONINI 0.313 (H) 07/19/2024    .6 (H) 06/26/2024         Microbiology Data:  Microbiology Results (last 7 days)       Procedure Component Value Units Date/Time    Blood culture x two cultures. Draw prior to antibiotics. [[id number]]  (Normal) Collected: 07/18/24 1746    Order Status: Completed Specimen: Blood from Arm, Right Updated: 07/20/24 2101     Blood Culture No Growth At 48 Hours    BCID2 Panel [[id number]]  (Abnormal) Collected: 07/18/24 1841    Order Status: Completed Specimen: Blood from Arm, Left Updated: 07/20/24 1034     CTX-M (ESBL ) N/A     IMP (Cabapenemase ) N/A     KPC resistance gene (Carbapenemase ) N/A     mcr-1 N/A     mecA ID N/A     Comment: Note: Antimicrobial resistance can occur via multiple mechanisms. A Not Detected result for antimicrobial resistance gene(s) does not indicate antimicrobial susceptibility. Subculturing is required for species identification and susceptibility testing of   isolates.        mecA/C and MREJ (MRSA) gene N/A     NDM (Carbapenemase ) N/A     OXA-48-like (Carbapenemase ) N/A     Jeb/B (VRE gene) N/A     VIM (Carbapenemase ) N/A     Enterococcus faecalis Not Detected     Enterococcus faecium Not Detected     Listeria monocytogenes Not Detected     Staphylococcus spp. Detected     Staphylococcus aureus Not Detected     Staphylococcus epidermidis Not Detected     Staphylococcus lugdunensis Not Detected     Streptococcus spp. Not Detected     Streptococcus agalactiae (Group B) Not Detected     Streptococcus pneumoniae Not Detected     Streptococcus pyogenes (Group A) Not Detected     Acinetobacter calcoaceticus/baumannii complex Not Detected     Bacteroides fragilis Not Detected     Enterobacterales Not Detected     Enterobacter cloacae complex Not Detected     Escherichia coli Not Detected     Klebsiella aerogenes Not Detected     Klebsiella oxytoca Not  Detected     Klebsiella pneumoniae group Not Detected     Proteus spp. Not Detected     Salmonella spp. Not Detected     Serratia marcescens Not Detected     Haemophilus influenzae Not Detected     Neisseria meningitidis Not Detected     Pseudomonas aeruginosa Not Detected     Stenotrophomonas maltophilia Not Detected     Candida albicans Not Detected     Candida auris Not Detected     Candida glabrata Not Detected     Candida krusei Not Detected     Candida parapsilosis Not Detected     Candida tropicalis Not Detected     Cryptococcus neoformans/gattii Not Detected    Narrative:      The [employer] Rem[employer] BCID2 Panel is a multiplexed nucleic acid test intended for the use with [employer] or [employer] for the simultaneous qualitative detection and identification of multiple bacterial and yeast nucleic acids and select genetic determinants associated with antimicrobial resistance.  The [employer] Rem[employer] BCID2 Panel test is performed directly on blood culture samples identified as positive by a continuous monitoring blood culture system.  Results are intended to be interpreted in conjunction with Gram stain results.    Urine culture [[id number]] Collected: 07/18/24 1729    Order Status: Completed Specimen: Urine Updated: 07/20/24 0919     Urine Culture No Significant Growth    Blood culture x two cultures. Draw prior to antibiotics. [[id number]]  (Abnormal) Collected: 07/18/24 1841    Order Status: Completed Specimen: Blood from Arm, Left Updated: 07/20/24 0803     Blood Culture Identification and Susceptibility To Follow      Gram-positive coccus probable staph     GRAM STAIN Gram Positive Cocci, probable Staphylococcus      Seen in gram stain of broth only      1 of 2 Aerobic bottles positive    Respiratory Culture [[id number]]     Order Status: Sent Specimen: Sputum              Other Results:    Radiology:  Imaging Results              CT Chest Without Contrast (Final result)  Result time 07/19/24  08:17:33      Final result by Wan Abreu MD (07/19/24 08:17:33)                   Impression:    Impression:    1. There are reticular, nodular and hazy opacities in the right posterior upper lobe and both lower lobes with areas of consolidation in the lower lobes, larger in the right. This is consistent with multilobar consolidation.    2. Details and other findings as discussed above.    No significant discrepancy with overnight report      Electronically signed by: Wan Abreu  Date:    07/19/2024  Time:    08:17               Narrative:      Technique: CT Scan of the chest was performed without intravenous contrast with direct axial as well as sagittal and, coronal, reconstruction images.    Dosage Information: Automated Exposure Control was utilized.  DLP 19 9    Comparison: Correlation is with study [id number] 20:14:34.    Clinical History: Pneumonia.    Findings:    Heart: The heart size is within normal limits. Pronounced coronary artery calcification is seen.    Aorta: No thoracic aortic aneurysmal dilatation.    Lungs: Moderate streaky linear and hazy opacity is seen predominantly in the dependent portions at the lung bases consistent with dependent changes scarring and subsegmental atelectasis. There are reticular, nodular and hazy opacities in the right posterior upper lobe and both lower lobes with areas of consolidation in the lower lobes, larger in the right. This is consistent with multilobar consolidation. Subtle emphysematous change is noted in the right upper lobe.    Pleura: No effusions or pneumothorax are identified.    There is fluid-filled dilatation of the distal esophagus which may be the result of reflux disease or related to distal esophageal stricture.    Spine: Mild multilevel spondylolytic changes are seen in the thoracic spine.                        Preliminary result by Britton Kemp Jr., MD (07/19/24 01:56:35)                   Impression:    1. There are  reticular, nodular and hazy opacities in the right posterior upper lobe and both lower lobes with areas of consolidation in the lower lobes, larger in the right. This is consistent with mutifocal possibly atypical pneumonia with multilobar consolidation.  2. Details and other findings as discussed above.               Narrative:    START OF REPORT:  Technique: CT Scan of the chest was performed without intravenous contrast with direct axial as well as sagittal and, coronal, reconstruction images.    Dosage Information: Automated Exposure Control was utilized.    Comparison: Correlation is with study [id number] 20:14:34.    Clinical History: Pneumonia.    Findings:  Soft Tissues: Unremarkable.  Lines and Tubes: None.  Neck: The visualized soft tissues of the neck appear unremarkable.  Mediastinum: The mediastinal structures are within normal limits.  Heart: The heart size is within normal limits. Pronounced coronary artery calcification is seen.  Aorta: Unremarkable appearing aorta.  Pulmonary Arteries: Unremarkable.  Lungs: Moderate streaky linear and hazy opacity is seen predominantly in the dependent portions at the lung bases consistent with dependent changes scarring and subsegmental atelectasis. There are reticular, nodular and hazy opacities in the right posterior upper lobe and both lower lobes with areas of consolidation in the lower lobes, larger in the right. This is consistent with mutifocal possibly atypical pneumonia with multilobar consolidation. Subtle emphysematous change is noted in the right upper lobe.  Pleura: No effusions or pneumothorax are identified.  Bony Structures:  Spine: Mild multilevel spondylolytic changes are seen in the thoracic spine.  Ribs: The bilateral ribs appear unremarkable.                                         CT Abdomen Pelvis  Without Contrast (Final result)  Result time 07/19/24 07:35:25      Final result by Wan Abreu MD (07/19/24 07:35:25)                    Impression:    Impression:    1. There are moderate opacities seen in both lower lobes (series 4 image 8) consistent with pneumonias.    2. There are numerous moderately dilated loops of fluid-filled small bowel with numerous air-fluid levels with transition point in the mid small bowel seen on series 2 image 54. This is compatible with a small bowel obstruction.    3. No intraperitoneal free air or ascites is seen.    4. Details and other findings as discussed above.    No significant discrepancy with overnight report.      Electronically signed by: Wan Abreu  Date:    07/19/2024  Time:    07:35               Narrative:      Technique: CT of the abdomen and pelvis was performed with axial images as well as sagittal and coronal reconstruction images without intravenous contrast.    Comparison: October 5, 2022    Clinical History: Abdominal Pain Vomiting(88 yr old in /[facility] FROM NURSING HOME W REPORT OF ABD PAIN W NV AND WEAKNESS SINCE YESTERDAY. LBM THIS AM. PT REPORTS DYSURIA. SEE NURSING HOME REPORTS. EKG OBTAINED. ) Fatigue.    Dosage Information: Automated Exposure Control was utilized.    Findings:    Lines and Tubes: None.    Thorax:    Lungs: There are moderate opacities seen in both lower lobes (series 4 image 8) consistent with pneumonias.    Pleura: No effusions or thickening.    Heart:Pronounced coronary artery calcification is seen. There is extensive aortic and mitral valve calcifications seen.    Abdomen:    Abdominal Wall: No abdominal wall pathology is seen.    Within limitations of noncontrast technique, no acute findings liver, pancreas spleen identified.    Biliary System: No intrahepatic or extrahepatic biliary duct dilatation is seen.    Gallbladder: Surgical clips are seen in the gallbladder fossa consistent with prior cholecystectomy.    Adrenals: The adrenal glands appear unremarkable.    Kidneys: The left kidney appears unremarkable with no stones or hydronephrosis. A single stable  peripelvic cyst measuring 2.8 cm is seen in the right kidney and for this no specific follow-up imaging is recommended.  The right kidney otherwise appears unremarkable with no stones or hydronephrosis identified.    Aorta: There is extensive calcification of the abdominal aorta and its branches.    Bowel:    Esophagus: There is a moderate sized hiatal hernia.    Stomach: There is hiatal herniation of the cardia and part of the body of the stomach as detailed above.    Duodenum: Unremarkable appearing duodenum.    Small Bowel: There are numerous moderately dilated loops of fluid-filled small bowel with numerous air-fluid levels with transition point in the mid small bowel seen on series 2 image 54.    Colon: Unremarkable.    Appendix: The appendix is not identified but no inflammatory changes are seen in the right lower quadrant to suggest appendicitis.    Peritoneum: No intraperitoneal free air or ascites is seen.    Pelvis:    Bladder: The bladder appears unremarkable.    Female:    Uterus: The uterus is not identified.    Ovaries: No adnexal masses are seen.    Inguinal Findings:    Inguinal Hernia: Incidental note is made of small uncomplicated mesenteric fat containing bilateral inguinal hernias.    Bony structures:    Dorsal Spine: There is mild spondylosis of the visualized dorsal spine. There are hypodense lesions with prominent trabecular markings seen in T12-L3. These may represent hemangiomas.    Bony Pelvis: The visualized bony structures of the pelvis appear unremarkable.    Notifications: The results were discussed with the physician Dr. Sanders prior to dictation at 2024-07-18 20:46:47 CDT.                        Preliminary result by Britton Kemp Jr., MD (07/18/24 20:49:10)                   Impression:    1. There are moderate opacities seen in both lower lobes (series 4 image 8) consistent with pneumonias.  2. There are numerous moderately dilated loops of fluid-filled small bowel with  numerous air-fluid levels with transition point in the mid small bowel seen on series 2 image 54. This is compatible with a small bowel obstruction.  3. No intraperitoneal free air or ascites is seen.  4. Details and other findings as discussed above.               Narrative:    START OF REPORT:  Technique: CT of the abdomen and pelvis was performed with axial images as well as sagittal and coronal reconstruction images without intravenous contrast.    Comparison: None available.    Clinical History: Abdominal Pain Vomiting(88 yr old in /[facility] FROM NURSING HOME W REPORT OF ABD PAIN W NV AND WEAKNESS SINCE YESTERDAY. LBM THIS AM. PT REPORTS DYSURIA. SEE NURSING HOME REPORTS. EKG OBTAINED. ) Fatigue.    Dosage Information: Automated Exposure Control was utilized.    Findings:  Lines and Tubes: None.  Thorax:  Lungs: There are moderate opacities seen in both lower lobes (series 4 image 8) consistent with pneumonias.  Pleura: No effusions or thickening.  Heart: Moderate cardiomegaly is seen. Pronounced coronary artery calcification is seen. There is extensive aortic and mitral valve calcifications seen.  Abdomen:  Abdominal Wall: No abdominal wall pathology is seen.  Liver: The liver appears unremarkable.  Biliary System: No intrahepatic or extrahepatic biliary duct dilatation is seen.  Gallbladder: Surgical clips are seen in the gallbladder fossa consistent with prior cholecystectomy.  Pancreas: The pancreas appears unremarkable.  Spleen: The spleen appears unremarkable.  Adrenals: The adrenal glands appear unremarkable.  Kidneys: The left kidney appears unremarkable with no stones cysts masses or hydronephrosis. A single peripelvic cyst measuring 2.8 cm is seen in the right kidney. The right kidney otherwise appears unremarkable with no stones masses or hydronephrosis identified.  Aorta: There is extensive calcification of the abdominal aorta and its branches.  IVC: Unremarkable.  Bowel:  Esophagus: There is a moderate  sized hiatal hernia.  Stomach: There is hiatal herniation of the cardia and part of the body of the stomach as detailed above.  Duodenum: Unremarkable appearing duodenum.  Small Bowel: There are numerous moderately dilated loops of fluid-filled small bowel with numerous air-fluid levels with transition point in the mid small bowel seen on series 2 image 54.  Colon: Unremarkable.  Appendix: The appendix is not identified but no inflammatory changes are seen in the right lower quadrant to suggest appendicitis.  Peritoneum: No intraperitoneal free air or ascites is seen.    Pelvis:  Bladder: The bladder appears unremarkable.  Female:  Uterus: The uterus is not identified.  Ovaries: No adnexal masses are seen.  Inguinal Findings:  Inguinal Hernia: Incidental note is made of small uncomplicated mesenteric fat containing bilateral inguinal hernias.    Bony structures:  Dorsal Spine: There is mild spondylosis of the visualized dorsal spine. There are hypodense lesions with prominent trabecular markings seen in T12-L3. These may represent hemangiomas.  Bony Pelvis: The visualized bony structures of the pelvis appear unremarkable.    Notifications: The results were discussed with the physician Dr. Sanders prior to dictation at 2024-07-18 20:46:47 CDT.                                         X-Ray Chest AP Portable (Final result)  Result time 07/18/24 18:49:26      Final result by Flor Hernandez MD (07/18/24 18:49:26)                   Impression:      Diffuse bilateral interstitial infiltrates with developing consolidation in the right lower lobe    Prominence of the right paratracheal region.  CT scan correlation is recommended.      Electronically signed by: Chip Hernandez  Date:    07/18/2024  Time:    18:49               Narrative:    EXAMINATION:  XR CHEST AP PORTABLE    CLINICAL HISTORY:  Sepsis;    TECHNIQUE:  Single frontal view of the chest was performed.    COMPARISON:  06/30/2024    FINDINGS:  There are  diffuse bilateral interstitial infiltrates with a area of consolidation developing in the right lower lobe.  There is also increased opacity in the right paratracheal region.  This was seen on the prior examination as well.  The heart is normal appearance.  The pulmonary vascularity is unremarkable.  Bones and joints show no acute abnormality.                                      Current Medications:     Infusions:   lactated ringers   Intravenous Continuous 100 mL/hr at 07/21/24 0606 Rate Verify at 07/21/24 0606        Scheduled:   azithromycin  500 mg Intravenous Q24H    famotidine (PF)  20 mg Intravenous Daily    heparin (porcine)  5,000 Units Subcutaneous Q12H    piperacillin-tazobactam (Zosyn) IV (PEDS and ADULTS) (extended infusion is not appropriate)  4.5 g Intravenous Q12H    vancomycin (VANCOCIN) IV (PEDS and ADULTS)  750 mg Intravenous Q24H        PRN:    Current Facility-Administered Medications:     acetaminophen, 650 mg, Oral, Q8H PRN    dextrose 10%, 12.5 g, Intravenous, PRN    dextrose 10%, 25 g, Intravenous, PRN    glucagon (human recombinant), 1 mg, Intramuscular, PRN    glucose, 16 g, Oral, PRN    glucose, 24 g, Oral, PRN    melatonin, 6 mg, Oral, Nightly PRN    metoprolol, 5 mg, Intravenous, Q5 Min PRN    naloxone, 0.02 mg, Intravenous, PRN    ondansetron, 4 mg, Intravenous, Q8H PRN    prochlorperazine, 5 mg, Intravenous, Q6H PRN    sodium chloride 0.9%, 10 mL, Intravenous, Q12H PRN    Pharmacy to dose Vancomycin consult, , , Once **AND** vancomycin - pharmacy to dose, , Intravenous, pharmacy to manage frequency    Antibiotics and Day Number of Therapy:  Vanc, Zosyn and Azithro started 7/18    Assessment & Plan:   Small bowel obstruction   Nausea, vomiting  - Surgery following  - NPO with no medications  - NGT in place with 1000cc output overnight  - anti-emetics PRN in place, protonix      Severe sepsis with suspected sources below  Right lower lobe pneumonia, possible aspiration  Acute cystitis  on UA  Recent UTI growing Candida tropicalis  - Started on broad spectrum Vanc/Zosyn/Azithromycin 7/18. Will switch Azithromycin to Doxycycline d/t QT prolongation on last EKG.  - MRSA PCR neg.   - Lactic acid resolved   - blood cultures x2 collected 7/18: 1/2 bottles gram + cocci, probable staph. BCID panel with Staph.  - urine culture with no growth  - resp culture (if able), COVID/Flu neg    Normocytic anemia  -H/H downtrending since admission  -this AM, 7.9/25  -will type/screen and CTM     Acute kidney injury, likely ATN  - Kidney functions improved this AM  - Continue with IVF rehydration  - Baseline of ~1.0     NSTEMI, likely Type II demand ischemia  - Trop trended down; likely 2/2 to demand  - Holding home ASA 81, statin due to strict NPO     Questionable right paratracheal/mass/opacity on CXR  - CT chest showed reticular, nodular and hazy opacities in the right posterior upper lobe and both lower lobes with areas of consolidation in the lower lobes, larger in the right. This is consistent with multilobar consolidation. And fluid-filled dilatation of the distal esophagus which may be the result of reflux disease or related to distal esophageal stricture      Severe aortic stenosis  Coronary artery disease  Hypertension  Hyperlipidemia  - last Echo 10/2023 EF 65-70%, severe aortic stenosis  - hold home ACEi and statin  - consider repeat Echo in future if showing signs of fluid overload     Paroxysmal atrial fibrillation  - Holding home Eliquis; switched from Lovenox to heparin this AM in case need for surgery  - currently NSR  - Holding home metoprolol; Lopressor pushes available prn     Anxiety  - Holding home Lexapro     History of falls  Chronic dizziness, at baseline  - admission 7/1/2024 for fall, currently in SNF for rehab, likely discharge back to SNF when able  - previously walked with a cane, currently requiring rolling walker  - OT/PT consulted  - fall precautions, delirium precautions     CODE  STATUS: DNR  Access: PIV  Antibiotics: Vanc/Zosyn/Doxy  Diet: NPO  DVT Prophylaxis: Heparin  GI Prophylaxis: Protonix IV  Fluids:  cc/hr      Disposition: Admitted for SBO, no surgical intervention recommended at this time. Surgery follow with SBFT showing slow transit. NGT in place; monitor N/V closely. Continue on current Abx regimen at this time. Troponins trended down. Keep NPO, home medications changed to IV or being held if not available. Came from nursing home; may need case management consult to ensure return upon discharge.     Sierra Brady MD  [facility], [facility]

## 2024-07-21 NOTE — PLAN OF CARE
Problem: Adult Inpatient Plan of Care  Goal: Plan of Care Review  Outcome: Progressing  Goal: Patient-Specific Goal (Individualized)  Outcome: Progressing  Goal: Absence of Hospital-Acquired Illness or Injury  Outcome: Progressing  Goal: Optimal Comfort and Wellbeing  Outcome: Progressing  Goal: Readiness for Transition of Care  Outcome: Progressing     Problem: Sepsis/Septic Shock  Goal: Optimal Coping  Outcome: Progressing  Goal: Absence of Bleeding  Outcome: Progressing  Goal: Blood Glucose Level Within Targeted Range  Outcome: Progressing  Goal: Absence of Infection Signs and Symptoms  Outcome: Progressing  Goal: Optimal Nutrition Intake  Outcome: Progressing     Problem: Fall Injury Risk  Goal: Absence of Fall and Fall-Related Injury  Outcome: Progressing     Problem: Skin Injury Risk Increased  Goal: Skin Health and Integrity  Outcome: Progressing     Problem: Acute Kidney Injury/Impairment  Goal: Fluid and Electrolyte Balance  Outcome: Progressing  Goal: Improved Oral Intake  Outcome: Progressing  Goal: Effective Renal Function  Outcome: Progressing     Problem: Pneumonia  Goal: Fluid Balance  Outcome: Progressing  Goal: Resolution of Infection Signs and Symptoms  Outcome: Progressing  Goal: Effective Oxygenation and Ventilation  Outcome: Progressing     Problem: Infection  Goal: Absence of Infection Signs and Symptoms  Outcome: Progressing     Problem: Restraint, Nonviolent  Goal: Absence of Harm or Injury  Outcome: Progressing

## 2024-07-21 NOTE — PT/OT/SLP PROGRESS
Physical Therapy Treatment    Patient Name:  Fifi Chacon   MRN:  [id number]    Recommendations     Therapy Intensity Recommendations at Discharge: Moderate Intensity Therapy  Discharge Equipment Recommendations: none   Barriers to discharge: fall risk and severity of deficits    Assessment     Fifi Chacon is a 88 y.o. female admitted with a medical diagnosis of  Small bowel obstruction.    She presents with the following impairments/functional limitations:  weakness, impaired endurance, impaired functional mobility, gait instability, pain.    Rehab Prognosis: Fair.    Patient would benefit from continued skilled acute PT services to: address above listed impairments/functional limitations; receive patient/caregiver education; reduce fall risk; and maximize independency/safety with functional mobility.    Recent Surgery: * No surgery found *      Plan     During this hospitalization, patient to be seen 5 x/week to address the identified impairments/functional limitations via gait training, therapeutic activities, therapeutic exercises, neuromuscular re-education and progress toward the established goals.    Plan of Care Expires:  08/18/24    Subjective     Communicated with patient's nurse  prior to session.    Patient agreeable to participate in treatment session.    Chief Complaint: Patient reports she feels okay today  Patient/Family Comments/goals: to improve strength  Pain/Comfort:  Pain Rating 1: 5/10  Location 1: back  Pain Rating Post-Intervention 1: 5/10  Pain Addressed 2: Nurse notified    Objective     Patient found supine in bed with canales catheter, SCD  upon PT entry to room.    General Precautions: Standard, fall   Orthopedic Precautions:N/A   Braces:    Respiratory Status: room air    Functional Mobility:        Other Mobility:  Therapeutic Exercises performed:   1 set times 20 repetitions  active range of motion  bilat lower extremity       -bed level exercises:              SLR's             hip/knee flexion            hip abduction            hip adduction            heel slides            ankle pumps    Patient left supine in bed with all lines intact, call button in reach, tray table at bedside, and patient's nurse notified.    Goals     Multidisciplinary Problems       Physical Therapy Goals          Problem: Physical Therapy    Goal Priority Disciplines Outcome Goal Variances Interventions   Physical Therapy Goal     PT, PT/OT Not Progressing     Description: Goals to be met by: D/C     Patient will increase functional independence with mobility by performin. Supine to sit with Contact Guard Assistance  2. Sit to stand transfer with Contact Guard Assistance  3. Gait  x 20 feet with Minimal Assistance using Rolling Walker.                        Time Tracking     PT Received On: 24  PT Start Time: 1035     PT Stop Time: 1048  PT Total Time (min): 13 min     Billable Minutes: Therapeutic Exercise 13 min    2024

## 2024-07-21 NOTE — MEDICAL/APP STUDENT
"     GEN SURG GOLD PROGRESS NOTE        Hospital Day: 4  ABx: 3 -  vancomycin, azithromycin, zosyn  Active Hospital Problems    Diagnosis  POA    *Small bowel obstruction [K56.609]  Yes    Malnutrition [E46]  Yes    Severe sepsis [A41.9, R65.20]  Yes    GRACIE (acute kidney injury) [N17.9]  Yes    Pneumonia [J18.9]  Yes    Elevated troponin [R79.89]  Yes    Acute cystitis without hematuria [N30.00]  Yes    Severe aortic stenosis [I35.0]  Yes      Resolved Hospital Problems   No resolved problems to display.        Interval:   AF, NAEON  Not reporting any pain,   NG tube still draining   Denies nausea,passing flatus or BM, reports eructation         OBJECTIVE     Vitals     Temp:  [97 °F (36.1 °C)-98.7 °F (37.1 °C)] 98.2 °F (36.8 °C)  Pulse:  [64-74] 71  Resp:  [19-20] 20  SpO2:  [97 %-100 %] 97 %  BP: (124-150)/(63-68) 127/67       Intake/Output    Intake/Output Summary (Last 24 hours) at 2024 0809  Last data filed at 2024 0606  Gross per 24 hour   Intake 2105.87 ml   Output 2850 ml   Net -744.13 ml     Ncc  Urine 300cc     Physical Examination:     Gen: NAD  HEENT: NG tube draining to suction   Neuro: awake, alert, answering questions appropriately  CV: RRR  Resp: non-labored breathing, SHANI  Abd: soft, ND, mild general tenderness to palpation   : Rondon in place  Ext: moves all 4 spontaneously and purposefully  Skin: warm, well perfused    Labs:    Renal:  Recent Labs     24  0607 24  0339 24   BUN 38.5* 37.6* 30.8* 21.3*   CREATININE 1.89* 1.75* 1.25* 1.07*     No results for input(s): "LDH" in the last 72 hours.    FENGI:  Recent Labs     24  0607 24  0339 24  0319    131* 136 136   K 5.1 4.2 3.9 3.8    101 102 102   CO2 23 24 27 28   CALCIUM 10.4* 9.9 9.3 9.0   MG  --  1.70 1.60 2.10   PHOS  --  3.4 2.6 2.3   ALBUMIN 3.1* 2.7* 2.3* 2.2*   BILITOT 0.7 0.8 0.7 0.7   AST 28 26 19 18   ALKPHOS 57 51 46 41   ALT 8 8 6 7 "       Heme:  Recent Labs     07/18/24  1640 07/19/24  0607 07/20/24  0339 07/21/24  0319   HGB 11.2* 10.1* 9.2* 7.9*   HCT 34.3* 32.1* 28.9* 25.0*    202 172 154       ID:  Recent Labs     07/18/24  1640 07/19/24  0607 07/20/24  0339 07/21/24  0319   WBC 22.07* 11.73* 6.69 3.77*       CBG:  Recent Labs     07/18/24  1746 07/19/24  0607 07/20/24  0339 07/21/24  0319   GLUCOSE 142* 108 106 89        Cardiovascular:  Recent Labs   Lab 07/19/24  0008 07/19/24  0607 07/19/24  1154   TROPONINI 0.412* 0.377* 0.313*       I have reviewed all pertinent lab results within the past 24 hours.     Microbiology:       Specimen: Blood from Arm, Left Updated: 07/20/24 0803      Blood Culture Identification and Susceptibility To Follow     Gram-positive coccus probable staph Abnormal     GRAM STAIN Gram Positive Cocci, probable Staphylococcus Panic      Seen in gram stain of broth only Panic      1 of 2 Aerobic bottles positive Panic         Imaging:      Procedure Component Value Units Date/Time   X-Ray Abdomen AP 1 View [[id number]] Resulted: 07/21/24 0803   Order Status: Completed Updated: 07/21/24 0805   Narrative:     EXAMINATION:  XR ABDOMEN AP 1 VIEW    CLINICAL HISTORY:  Follow up to SBFT; Unspecified intestinal obstruction, unspecified as to partial versus complete obstruction    TECHNIQUE:  AP View(s) of the abdomen was performed.    COMPARISON:  Small bowel series 07/20/2024    FINDINGS:  Contrast has reached the colon and rectum.Enteric tube terminates in the stomach.  There are persistent dilated small bowel loops measuring up to 5 cm in diameter.    No appreciable acute osseous abnormality.   Impression:       Small bowel follow-through enteric contrast has reached the colon and rectum before 24 hours without evidence of complete obstruction.            A/P:     Fifi Chacon is a 88 y.o.female admitted on 7/18/2024 with PMHx of HTN, HLD, and afib on Eliquis presenting with pneumonia. CT shows evidence of a  small-bowel obstruction with a transition point. Pt currently AF and HDS.      - f/u with SBFT  - Serial abdominal exams   - NPO    - continue antibiotics   - rest of care per primary team  - surgery will continue to follow      Toney Fang PhD  [facility] MS-3

## 2024-07-22 LAB
ALBUMIN SERPL-MCNC: 2.2 G/DL (ref 3.4–4.8)
ALBUMIN/GLOB SERPL: 0.6 RATIO (ref 1.1–2)
ALP SERPL-CCNC: 47 UNIT/L (ref 40–150)
ALT SERPL-CCNC: 6 UNIT/L (ref 0–55)
ANION GAP SERPL CALC-SCNC: 6 MEQ/L
AST SERPL-CCNC: 18 UNIT/L (ref 5–34)
BACTERIA BLD CULT: ABNORMAL
BACTERIA BLD CULT: ABNORMAL
BASOPHILS # BLD AUTO: 0.01 X10(3)/MCL
BASOPHILS NFR BLD AUTO: 0.4 %
BILIRUB SERPL-MCNC: 0.8 MG/DL
BUN SERPL-MCNC: 14.4 MG/DL (ref 9.8–20.1)
CALCIUM SERPL-MCNC: 9.1 MG/DL (ref 8.4–10.2)
CHLORIDE SERPL-SCNC: 101 MMOL/L (ref 98–107)
CO2 SERPL-SCNC: 28 MMOL/L (ref 23–31)
CREAT SERPL-MCNC: 0.95 MG/DL (ref 0.55–1.02)
CREAT/UREA NIT SERPL: 15
CRP SERPL-MCNC: 50.3 MG/L
EOSINOPHIL # BLD AUTO: 0.06 X10(3)/MCL (ref 0–0.9)
EOSINOPHIL NFR BLD AUTO: 2.2 %
ERYTHROCYTE [DISTWIDTH] IN BLOOD BY AUTOMATED COUNT: 14.6 % (ref 11.5–17)
GFR SERPLBLD CREATININE-BSD FMLA CKD-EPI: 58 ML/MIN/1.73/M2
GLOBULIN SER-MCNC: 4 GM/DL (ref 2.4–3.5)
GLUCOSE SERPL-MCNC: 89 MG/DL (ref 82–115)
GRAM STN SPEC: ABNORMAL
HCT VFR BLD AUTO: 25.8 % (ref 37–47)
HGB BLD-MCNC: 8.1 G/DL (ref 12–16)
IMM GRANULOCYTES # BLD AUTO: 0.01 X10(3)/MCL (ref 0–0.04)
IMM GRANULOCYTES NFR BLD AUTO: 0.4 %
LYMPHOCYTES # BLD AUTO: 0.23 X10(3)/MCL (ref 0.6–4.6)
LYMPHOCYTES NFR BLD AUTO: 8.6 %
MAGNESIUM SERPL-MCNC: 1.7 MG/DL (ref 1.6–2.6)
MCH RBC QN AUTO: 26.6 PG (ref 27–31)
MCHC RBC AUTO-ENTMCNC: 31.4 G/DL (ref 33–36)
MCV RBC AUTO: 84.6 FL (ref 80–94)
MONOCYTES # BLD AUTO: 0.2 X10(3)/MCL (ref 0.1–1.3)
MONOCYTES NFR BLD AUTO: 7.5 %
NEUTROPHILS # BLD AUTO: 2.16 X10(3)/MCL (ref 2.1–9.2)
NEUTROPHILS NFR BLD AUTO: 80.9 %
NRBC BLD AUTO-RTO: 0 %
PHOSPHATE SERPL-MCNC: 2.4 MG/DL (ref 2.3–4.7)
PLATELET # BLD AUTO: 141 X10(3)/MCL (ref 130–400)
PMV BLD AUTO: 10.6 FL (ref 7.4–10.4)
POTASSIUM SERPL-SCNC: 3.8 MMOL/L (ref 3.5–5.1)
PREALB SERPL-MCNC: 5 MG/DL (ref 14–37)
PROT SERPL-MCNC: 6.2 GM/DL (ref 5.8–7.6)
RBC # BLD AUTO: 3.05 X10(6)/MCL (ref 4.2–5.4)
SODIUM SERPL-SCNC: 135 MMOL/L (ref 136–145)
VANCOMYCIN TROUGH SERPL-MCNC: 13.6 UG/ML (ref 15–20)
WBC # BLD AUTO: 2.67 X10(3)/MCL (ref 4.5–11.5)

## 2024-07-22 PROCEDURE — 86140 C-REACTIVE PROTEIN: CPT | Performed by: STUDENT IN AN ORGANIZED HEALTH CARE EDUCATION/TRAINING PROGRAM

## 2024-07-22 PROCEDURE — 85025 COMPLETE CBC W/AUTO DIFF WBC: CPT

## 2024-07-22 PROCEDURE — 97110 THERAPEUTIC EXERCISES: CPT

## 2024-07-22 PROCEDURE — 83735 ASSAY OF MAGNESIUM: CPT

## 2024-07-22 PROCEDURE — 25000003 PHARM REV CODE 250: Performed by: FAMILY MEDICINE

## 2024-07-22 PROCEDURE — 27000221 HC OXYGEN, UP TO 24 HOURS

## 2024-07-22 PROCEDURE — 80053 COMPREHEN METABOLIC PANEL: CPT

## 2024-07-22 PROCEDURE — 94761 N-INVAS EAR/PLS OXIMETRY MLT: CPT

## 2024-07-22 PROCEDURE — 84134 ASSAY OF PREALBUMIN: CPT | Performed by: STUDENT IN AN ORGANIZED HEALTH CARE EDUCATION/TRAINING PROGRAM

## 2024-07-22 PROCEDURE — 63600175 PHARM REV CODE 636 W HCPCS

## 2024-07-22 PROCEDURE — 36415 COLL VENOUS BLD VENIPUNCTURE: CPT | Performed by: FAMILY MEDICINE

## 2024-07-22 PROCEDURE — 25000003 PHARM REV CODE 250

## 2024-07-22 PROCEDURE — 36415 COLL VENOUS BLD VENIPUNCTURE: CPT | Performed by: STUDENT IN AN ORGANIZED HEALTH CARE EDUCATION/TRAINING PROGRAM

## 2024-07-22 PROCEDURE — 80202 ASSAY OF VANCOMYCIN: CPT | Performed by: FAMILY MEDICINE

## 2024-07-22 PROCEDURE — 84100 ASSAY OF PHOSPHORUS: CPT

## 2024-07-22 PROCEDURE — 63600175 PHARM REV CODE 636 W HCPCS: Performed by: FAMILY MEDICINE

## 2024-07-22 PROCEDURE — 21400001 HC TELEMETRY ROOM

## 2024-07-22 RX ORDER — MAGNESIUM SULFATE HEPTAHYDRATE 40 MG/ML
2 INJECTION, SOLUTION INTRAVENOUS ONCE
Status: COMPLETED | OUTPATIENT
Start: 2024-07-22 | End: 2024-07-22

## 2024-07-22 RX ORDER — HEPARIN SODIUM 5000 [USP'U]/ML
5000 INJECTION, SOLUTION INTRAVENOUS; SUBCUTANEOUS EVERY 8 HOURS
Status: DISCONTINUED | OUTPATIENT
Start: 2024-07-22 | End: 2024-07-25

## 2024-07-22 RX ORDER — POTASSIUM CHLORIDE 7.45 MG/ML
10 INJECTION INTRAVENOUS
Status: COMPLETED | OUTPATIENT
Start: 2024-07-22 | End: 2024-07-22

## 2024-07-22 RX ORDER — MUPIROCIN 20 MG/G
OINTMENT TOPICAL 2 TIMES DAILY
Status: COMPLETED | OUTPATIENT
Start: 2024-07-22 | End: 2024-07-26

## 2024-07-22 RX ORDER — PANTOPRAZOLE SODIUM 40 MG/10ML
40 INJECTION, POWDER, LYOPHILIZED, FOR SOLUTION INTRAVENOUS DAILY
Status: CANCELLED | OUTPATIENT
Start: 2024-07-22

## 2024-07-22 RX ADMIN — POTASSIUM CHLORIDE 10 MEQ: 7.46 INJECTION, SOLUTION INTRAVENOUS at 02:07

## 2024-07-22 RX ADMIN — PIPERACILLIN SODIUM AND TAZOBACTAM SODIUM 4.5 G: 4; .5 INJECTION, POWDER, LYOPHILIZED, FOR SOLUTION INTRAVENOUS at 10:07

## 2024-07-22 RX ADMIN — VANCOMYCIN HYDROCHLORIDE 750 MG: 750 INJECTION, POWDER, LYOPHILIZED, FOR SOLUTION INTRAVENOUS at 12:07

## 2024-07-22 RX ADMIN — DOXYCYCLINE 100 MG: 100 INJECTION, POWDER, LYOPHILIZED, FOR SOLUTION INTRAVENOUS at 10:07

## 2024-07-22 RX ADMIN — MUPIROCIN: 20 OINTMENT TOPICAL at 10:07

## 2024-07-22 RX ADMIN — PIPERACILLIN SODIUM AND TAZOBACTAM SODIUM 4.5 G: 4; .5 INJECTION, POWDER, LYOPHILIZED, FOR SOLUTION INTRAVENOUS at 09:07

## 2024-07-22 RX ADMIN — PANTOPRAZOLE SODIUM 40 MG: 40 INJECTION, POWDER, FOR SOLUTION INTRAVENOUS at 09:07

## 2024-07-22 RX ADMIN — PIPERACILLIN SODIUM AND TAZOBACTAM SODIUM 4.5 G: 4; .5 INJECTION, POWDER, LYOPHILIZED, FOR SOLUTION INTRAVENOUS at 04:07

## 2024-07-22 RX ADMIN — POTASSIUM CHLORIDE 10 MEQ: 7.46 INJECTION, SOLUTION INTRAVENOUS at 01:07

## 2024-07-22 RX ADMIN — MAGNESIUM SULFATE HEPTAHYDRATE 2 G: 40 INJECTION, SOLUTION INTRAVENOUS at 06:07

## 2024-07-22 RX ADMIN — DOXYCYCLINE 100 MG: 100 INJECTION, POWDER, LYOPHILIZED, FOR SOLUTION INTRAVENOUS at 11:07

## 2024-07-22 RX ADMIN — HEPARIN SODIUM 5000 UNITS: 5000 INJECTION INTRAVENOUS; SUBCUTANEOUS at 10:07

## 2024-07-22 RX ADMIN — SODIUM PHOSPHATE, MONOBASIC, MONOHYDRATE AND SODIUM PHOSPHATE, DIBASIC, ANHYDROUS 20.01 MMOL: 142; 276 INJECTION, SOLUTION INTRAVENOUS at 09:07

## 2024-07-22 RX ADMIN — MUPIROCIN: 20 OINTMENT TOPICAL at 09:07

## 2024-07-22 RX ADMIN — HEPARIN SODIUM 5000 UNITS: 5000 INJECTION, SOLUTION INTRAVENOUS; SUBCUTANEOUS at 09:07

## 2024-07-22 NOTE — PROGRESS NOTES
"[facility] - Purple Team  Daily Progress Note    Patient ID: Fifi Chacon, 87y/o F    Subjective:  AF, HDS  NGT in place  NGT with 1275mL  bilious output in last 24 hrs  Denies nausea, vomiting, flatus, or BM    Objective:    Vitals:  Vitals:    07/22/24 0319   BP: 134/66   Pulse: 72   Resp:    Temp:         Intake/Output:    Intake/Output Summary (Last 24 hours) at 7/22/2024 0650  Last data filed at 7/22/2024 0631  Gross per 24 hour   Intake 1392.75 ml   Output 2825 ml   Net -1432.25 ml        Physical Exam:  Gen: NAD  Neuro: awake, alert, answering questions appropriately  CV: RR  Resp: non-labored breathing  Abd: soft, ND, mild tenderness to palpation of upper quadrants and epigastric regions   Ext: moves all 4 spontaneously and purposefully, mild peripheral edema bilaterally, compression garments on  Skin: warm, well perfused  L/D/A: 22G IV L forearm, 20G IV R forearm, urethral catheter, NG Tube    Labs:  Renal:  Recent Labs     07/20/24 0339 07/21/24 0319 07/22/24 0417   BUN 30.8* 21.3* 14.4   CREATININE 1.25* 1.07* 0.95     No results for input(s): "LACTIC" in the last 72 hours.    FENGI:  Recent Labs     07/20/24 0339 07/21/24 0319 07/22/24 0417    136 135*   K 3.9 3.8 3.8    102 101   CO2 27 28 28   CALCIUM 9.3 9.0 9.1   MG 1.60 2.10 1.70   PHOS 2.6 2.3 2.4   ALBUMIN 2.3* 2.2* 2.2*   BILITOT 0.7 0.7 0.8   AST 19 18 18   ALKPHOS 46 41 47   ALT 6 7 6       Heme:  Recent Labs     07/20/24 0339 07/21/24 0319 07/22/24 0417   HGB 9.2* 7.9* 8.1*   HCT 28.9* 25.0* 25.8*    154 141       ID:  Recent Labs     07/20/24 0339 07/21/24 0319 07/22/24 0417   WBC 6.69 3.77* 2.67*       CBG:  Recent Labs     07/20/24  0339 07/21/24  0319 07/22/24  0417   GLUCOSE 106 89 89        Cardiovascular:  Recent Labs   Lab 07/19/24  0008 07/19/24  0607 07/19/24  1154   TROPONINI 0.412* 0.377* 0.313*      Prealbumin: 5    I have reviewed all pertinent lab results within the past 24 " hours.    Imaging:  X RAY ABDOMEN AP 1 VIEW - 07/21/2024 AT 0457  Impression:     Small bowel follow-through enteric contrast has reached the colon and rectum before 24 hours without evidence of complete obstruction.    Micro/Path/Other:  None    Assessment/Plan:  88-year-old with PMHx of HTN, HLD, and afib on Eliquis presenting with pneumonia. CT shows evidence of a small-bowel obstruction with a transition point. Pt currently AF and HDS. SBFT and KUB with contrast in colon/rectum, however still no flatus or BM     - Recommend starting TPN today  - NGT to LIMWS  - OOB to chair TID, mobilize, PT/OT   - Serial abdominal exams  - NPO  - mIVF  - enema yesterday, still no bowel movements  - rest of care per primary  - surgery will continue to follow       Jori Osullivan, MS3  [facility]  07/22/2024     Patient seen and examined alongside medical student. Note reviewed and edited as appropriate. Agree with plan as written above.     Speedy Rocha MD  [facility] PGY-1

## 2024-07-22 NOTE — PROGRESS NOTES
Pharmacist Renal Dose Adjustment Note    Fifi Chacon is a 88 y.o. female being treated with the medication Zosyn    Patient Data:    Vital Signs (Most Recent):  Temp: 98.1 °F (36.7 °C) (07/21/24 2318)  Pulse: 72 (07/22/24 0319)  Resp: 18 (07/21/24 2318)  BP: 134/66 (07/22/24 0319)  SpO2: 99 % (07/22/24 0319) Vital Signs (72h Range):  Temp:  [97 °F (36.1 °C)-98.9 °F (37.2 °C)]   Pulse:  [64-78]   Resp:  [18-24]   BP: (108-158)/(55-76)   SpO2:  [96 %-100 %]      Recent Labs   Lab 07/20/24 0339 07/21/24 0319 07/22/24 0417   CREATININE 1.25* 1.07* 0.95     Serum creatinine: 0.95 mg/dL 07/22/24 0417  Estimated creatinine clearance: 36.1 mL/min    Medication:Zosyn 4.5 gm every 12 hours will be changed to medication:Zosyn 4.5 gm every 8 hours.     Pharmacist's Name: Jessie Ziegler  Pharmacist's Extension: [phone]

## 2024-07-22 NOTE — PROGRESS NOTES
07/22/24 1442   Missed Time Reason   OT Attempted Eval Date 07/22/24   OT Attempted Eval Time 1422   Missed Treatment Reason Patient fatigue;Other (Comment)  (Patient refused to work with therapy at this time due to fatigue. Will check back later for follow up session when schedule permits and patient agreeable and willing to participate in therapy session.)

## 2024-07-22 NOTE — PROGRESS NOTES
[facility] Wards   Progress Note      Resident Team: [facility] Family Medicine List   Attending Physician: Rosana Graham MD  Resident: [facility] Length of Stay: 3 days    Subjective   Brief HPI:  Fifi Chacon is a 88 y.o. female with history of paroxysmal afib on Eliquis, CAD s/p coronary angiogram on 10/13/23, severe aortic stenosis, hypertension, hyperlipidemia, anxiety admitted for small bowel obstruction and sepsis.     Interval History:   Vital signs remained stable overnight. No acute events occurred overnight. NG put out 1250 overnight. Son at bedside states mom looks better today than she previously has. No acute complaints at this time.    Review of Systems:  Review of Systems   Constitutional:  Negative for fever and malaise/fatigue.   Respiratory:  Negative for cough and shortness of breath.    Cardiovascular:  Negative for chest pain and palpitations.   Gastrointestinal:  Positive for abdominal pain. Negative for nausea and vomiting.      Objective     Vital Signs (Most Recent):  Temp: 97.7 °F (36.5 °C) (07/22/24 1137)  Pulse: 66 (07/22/24 1137)  Resp: 18 (07/22/24 1137)  BP: 131/60 (07/22/24 1137)  SpO2: 98 % (07/22/24 1137) Vital Signs (24h Range):  Temp:  [97 °F (36.1 °C)-98.9 °F (37.2 °C)] 97.7 °F (36.5 °C)  Pulse:  [66-84] 66  Resp:  [18-20] 18  SpO2:  [97 %-99 %] 98 %  BP: (129-158)/(57-73) 131/60     Physical Examination:  Physical Exam  Vitals and nursing note reviewed.   Constitutional:       Appearance: She is not ill-appearing.      Comments: Soft restraints in place   HENT:      Nose:      Comments: NG tube secured in place     Mouth/Throat:      Mouth: Mucous membranes are dry.   Cardiovascular:      Rate and Rhythm: Normal rate and regular rhythm.      Comments: 2/6 systolic murmur best heard over R 2nd intercostal space  Pulmonary:      Effort: Pulmonary effort is normal.      Breath sounds: No wheezing.      Comments: Crackles present in bilateral lower  lobes  Abdominal:      General: Bowel sounds are normal. There is no distension.      Palpations: Abdomen is soft.      Tenderness: There is no guarding.   Musculoskeletal:      Right lower leg: No edema.      Left lower leg: No edema.   Neurological:      Mental Status: She is alert.       Laboratory:  Most Recent Data:  Recent Lab Results         07/22/24  0417   07/21/24  2241   07/21/24  1216        Albumin/Globulin Ratio 0.6           Albumin 2.2           ALP 47           ALT 6           Anion Gap 6.0           AST 18           Baso # 0.01           Basophil % 0.4           BILIRUBIN TOTAL 0.8           BUN 14.4           BUN/CREAT RATIO 15           Calcium 9.1           Chloride 101           CO2 28           Creatinine 0.95           CRP 50.30           eGFR 58           Eos # 0.06           Eos % 2.2           Globulin, Total 4.0           Glucose 89           Hematocrit 25.8           Hemoglobin 8.1           Immature Grans (Abs) 0.01           Immature Granulocytes 0.4           Lymph # 0.23           LYMPH % 8.6           Magnesium  1.70           MCH 26.6           MCHC 31.4           MCV 84.6           Mono # 0.20           Mono % 7.5           MPV 10.6           Neut # 2.16           Neut % 80.9           nRBC 0.0           Phosphorus Level 2.4           Platelet Count 141           POCT Glucose     110       Potassium 3.8           Prealbumin 5.0           PROTEIN TOTAL 6.2           RBC 3.05           RDW 14.6           Sodium 135           Vancomycin-Trough   12.6         WBC 2.67                     Microbiology Data Reviewed: yes  Pertinent Findings:  All micro still pending at this time    Other Results:  EKG:   Results for orders placed or performed during the hospital encounter of 07/18/24   EKG 12-lead    Collection Time: 07/19/24  8:09 AM   Result Value Ref Range    QRS Duration 102 ms    [facility] QTC Calculation 548 ms    Narrative    Test Reason : R79.89,    Vent. Rate : 070 BPM     Atrial  Rate : 070 BPM     P-R Int : 144 ms          QRS Dur : 102 ms      QT Int : 508 ms       P-R-T Axes : 062 -30 012 degrees     QTc Int : 548 ms    Normal sinus rhythm  Left axis deviation  Voltage criteria for left ventricular hypertrophy  ST-t changes consistent with ischemia &/or subendocardial infarction  Prolonged QT  Abnormal ECG  When compared with ECG of 18-JUL-2024 19:23,  Minimal criteria for Septal infarct are now Present  QT has lengthened  ST changes now present  Confirmed by Jackeline Nieto MD ([id number]) on 7/19/2024 10:24:14 AM    Referred By: TRAE   SELF           Confirmed By:Jackeline Nieto MD         Radiology:  Imaging Results              CT Chest Without Contrast (Final result)  Result time 07/19/24 08:17:33      Final result by Wan Abreu MD (07/19/24 08:17:33)                   Impression:    Impression:    1. There are reticular, nodular and hazy opacities in the right posterior upper lobe and both lower lobes with areas of consolidation in the lower lobes, larger in the right. This is consistent with multilobar consolidation.    2. Details and other findings as discussed above.    No significant discrepancy with overnight report      Electronically signed by: Wan Abreu  Date:    07/19/2024  Time:    08:17               Narrative:      Technique: CT Scan of the chest was performed without intravenous contrast with direct axial as well as sagittal and, coronal, reconstruction images.    Dosage Information: Automated Exposure Control was utilized.  DLP 19 9    Comparison: Correlation is with study [id number] 20:14:34.    Clinical History: Pneumonia.    Findings:    Heart: The heart size is within normal limits. Pronounced coronary artery calcification is seen.    Aorta: No thoracic aortic aneurysmal dilatation.    Lungs: Moderate streaky linear and hazy opacity is seen predominantly in the dependent portions at the lung bases consistent with dependent changes scarring and  subsegmental atelectasis. There are reticular, nodular and hazy opacities in the right posterior upper lobe and both lower lobes with areas of consolidation in the lower lobes, larger in the right. This is consistent with multilobar consolidation. Subtle emphysematous change is noted in the right upper lobe.    Pleura: No effusions or pneumothorax are identified.    There is fluid-filled dilatation of the distal esophagus which may be the result of reflux disease or related to distal esophageal stricture.    Spine: Mild multilevel spondylolytic changes are seen in the thoracic spine.                        Preliminary result by Britton Kemp Jr., MD (07/19/24 01:56:35)                   Impression:    1. There are reticular, nodular and hazy opacities in the right posterior upper lobe and both lower lobes with areas of consolidation in the lower lobes, larger in the right. This is consistent with mutifocal possibly atypical pneumonia with multilobar consolidation.  2. Details and other findings as discussed above.               Narrative:    START OF REPORT:  Technique: CT Scan of the chest was performed without intravenous contrast with direct axial as well as sagittal and, coronal, reconstruction images.    Dosage Information: Automated Exposure Control was utilized.    Comparison: Correlation is with study [id number] 20:14:34.    Clinical History: Pneumonia.    Findings:  Soft Tissues: Unremarkable.  Lines and Tubes: None.  Neck: The visualized soft tissues of the neck appear unremarkable.  Mediastinum: The mediastinal structures are within normal limits.  Heart: The heart size is within normal limits. Pronounced coronary artery calcification is seen.  Aorta: Unremarkable appearing aorta.  Pulmonary Arteries: Unremarkable.  Lungs: Moderate streaky linear and hazy opacity is seen predominantly in the dependent portions at the lung bases consistent with dependent changes scarring and subsegmental  atelectasis. There are reticular, nodular and hazy opacities in the right posterior upper lobe and both lower lobes with areas of consolidation in the lower lobes, larger in the right. This is consistent with mutifocal possibly atypical pneumonia with multilobar consolidation. Subtle emphysematous change is noted in the right upper lobe.  Pleura: No effusions or pneumothorax are identified.  Bony Structures:  Spine: Mild multilevel spondylolytic changes are seen in the thoracic spine.  Ribs: The bilateral ribs appear unremarkable.                                         CT Abdomen Pelvis  Without Contrast (Final result)  Result time 07/19/24 07:35:25      Final result by Wan Abreu MD (07/19/24 07:35:25)                   Impression:    Impression:    1. There are moderate opacities seen in both lower lobes (series 4 image 8) consistent with pneumonias.    2. There are numerous moderately dilated loops of fluid-filled small bowel with numerous air-fluid levels with transition point in the mid small bowel seen on series 2 image 54. This is compatible with a small bowel obstruction.    3. No intraperitoneal free air or ascites is seen.    4. Details and other findings as discussed above.    No significant discrepancy with overnight report.      Electronically signed by: Wan Abreu  Date:    07/19/2024  Time:    07:35               Narrative:      Technique: CT of the abdomen and pelvis was performed with axial images as well as sagittal and coronal reconstruction images without intravenous contrast.    Comparison: October 5, 2022    Clinical History: Abdominal Pain Vomiting(88 yr old in /[facility] FROM NURSING HOME W REPORT OF ABD PAIN W NV AND WEAKNESS SINCE YESTERDAY. LBM THIS AM. PT REPORTS DYSURIA. SEE NURSING HOME REPORTS. EKG OBTAINED. ) Fatigue.    Dosage Information: Automated Exposure Control was utilized.    Findings:    Lines and Tubes: None.    Thorax:    Lungs: There are moderate opacities seen in both  lower lobes (series 4 image 8) consistent with pneumonias.    Pleura: No effusions or thickening.    Heart:Pronounced coronary artery calcification is seen. There is extensive aortic and mitral valve calcifications seen.    Abdomen:    Abdominal Wall: No abdominal wall pathology is seen.    Within limitations of noncontrast technique, no acute findings liver, pancreas spleen identified.    Biliary System: No intrahepatic or extrahepatic biliary duct dilatation is seen.    Gallbladder: Surgical clips are seen in the gallbladder fossa consistent with prior cholecystectomy.    Adrenals: The adrenal glands appear unremarkable.    Kidneys: The left kidney appears unremarkable with no stones or hydronephrosis. A single stable peripelvic cyst measuring 2.8 cm is seen in the right kidney and for this no specific follow-up imaging is recommended.  The right kidney otherwise appears unremarkable with no stones or hydronephrosis identified.    Aorta: There is extensive calcification of the abdominal aorta and its branches.    Bowel:    Esophagus: There is a moderate sized hiatal hernia.    Stomach: There is hiatal herniation of the cardia and part of the body of the stomach as detailed above.    Duodenum: Unremarkable appearing duodenum.    Small Bowel: There are numerous moderately dilated loops of fluid-filled small bowel with numerous air-fluid levels with transition point in the mid small bowel seen on series 2 image 54.    Colon: Unremarkable.    Appendix: The appendix is not identified but no inflammatory changes are seen in the right lower quadrant to suggest appendicitis.    Peritoneum: No intraperitoneal free air or ascites is seen.    Pelvis:    Bladder: The bladder appears unremarkable.    Female:    Uterus: The uterus is not identified.    Ovaries: No adnexal masses are seen.    Inguinal Findings:    Inguinal Hernia: Incidental note is made of small uncomplicated mesenteric fat containing bilateral inguinal  hernias.    Bony structures:    Dorsal Spine: There is mild spondylosis of the visualized dorsal spine. There are hypodense lesions with prominent trabecular markings seen in T12-L3. These may represent hemangiomas.    Bony Pelvis: The visualized bony structures of the pelvis appear unremarkable.    Notifications: The results were discussed with the physician Dr. Sanders prior to dictation at 2024-07-18 20:46:47 CDT.                        Preliminary result by Britton Kemp Jr., MD (07/18/24 20:49:10)                   Impression:    1. There are moderate opacities seen in both lower lobes (series 4 image 8) consistent with pneumonias.  2. There are numerous moderately dilated loops of fluid-filled small bowel with numerous air-fluid levels with transition point in the mid small bowel seen on series 2 image 54. This is compatible with a small bowel obstruction.  3. No intraperitoneal free air or ascites is seen.  4. Details and other findings as discussed above.               Narrative:    START OF REPORT:  Technique: CT of the abdomen and pelvis was performed with axial images as well as sagittal and coronal reconstruction images without intravenous contrast.    Comparison: None available.    Clinical History: Abdominal Pain Vomiting(88 yr old in /[facility] FROM NURSING HOME W REPORT OF ABD PAIN W NV AND WEAKNESS SINCE YESTERDAY. LBM THIS AM. PT REPORTS DYSURIA. SEE NURSING HOME REPORTS. EKG OBTAINED. ) Fatigue.    Dosage Information: Automated Exposure Control was utilized.    Findings:  Lines and Tubes: None.  Thorax:  Lungs: There are moderate opacities seen in both lower lobes (series 4 image 8) consistent with pneumonias.  Pleura: No effusions or thickening.  Heart: Moderate cardiomegaly is seen. Pronounced coronary artery calcification is seen. There is extensive aortic and mitral valve calcifications seen.  Abdomen:  Abdominal Wall: No abdominal wall pathology is seen.  Liver: The liver appears  unremarkable.  Biliary System: No intrahepatic or extrahepatic biliary duct dilatation is seen.  Gallbladder: Surgical clips are seen in the gallbladder fossa consistent with prior cholecystectomy.  Pancreas: The pancreas appears unremarkable.  Spleen: The spleen appears unremarkable.  Adrenals: The adrenal glands appear unremarkable.  Kidneys: The left kidney appears unremarkable with no stones cysts masses or hydronephrosis. A single peripelvic cyst measuring 2.8 cm is seen in the right kidney. The right kidney otherwise appears unremarkable with no stones masses or hydronephrosis identified.  Aorta: There is extensive calcification of the abdominal aorta and its branches.  IVC: Unremarkable.  Bowel:  Esophagus: There is a moderate sized hiatal hernia.  Stomach: There is hiatal herniation of the cardia and part of the body of the stomach as detailed above.  Duodenum: Unremarkable appearing duodenum.  Small Bowel: There are numerous moderately dilated loops of fluid-filled small bowel with numerous air-fluid levels with transition point in the mid small bowel seen on series 2 image 54.  Colon: Unremarkable.  Appendix: The appendix is not identified but no inflammatory changes are seen in the right lower quadrant to suggest appendicitis.  Peritoneum: No intraperitoneal free air or ascites is seen.    Pelvis:  Bladder: The bladder appears unremarkable.  Female:  Uterus: The uterus is not identified.  Ovaries: No adnexal masses are seen.  Inguinal Findings:  Inguinal Hernia: Incidental note is made of small uncomplicated mesenteric fat containing bilateral inguinal hernias.    Bony structures:  Dorsal Spine: There is mild spondylosis of the visualized dorsal spine. There are hypodense lesions with prominent trabecular markings seen in T12-L3. These may represent hemangiomas.  Bony Pelvis: The visualized bony structures of the pelvis appear unremarkable.    Notifications: The results were discussed with the physician  Dr. Sanders prior to dictation at 2024-07-18 20:46:47 CDT.                                         X-Ray Chest AP Portable (Final result)  Result time 07/18/24 18:49:26      Final result by Flor Hernandez MD (07/18/24 18:49:26)                   Impression:      Diffuse bilateral interstitial infiltrates with developing consolidation in the right lower lobe    Prominence of the right paratracheal region.  CT scan correlation is recommended.      Electronically signed by: Chip Hernandez  Date:    07/18/2024  Time:    18:49               Narrative:    EXAMINATION:  XR CHEST AP PORTABLE    CLINICAL HISTORY:  Sepsis;    TECHNIQUE:  Single frontal view of the chest was performed.    COMPARISON:  06/30/2024    FINDINGS:  There are diffuse bilateral interstitial infiltrates with a area of consolidation developing in the right lower lobe.  There is also increased opacity in the right paratracheal region.  This was seen on the prior examination as well.  The heart is normal appearance.  The pulmonary vascularity is unremarkable.  Bones and joints show no acute abnormality.                                      Current Medications:     Infusions:   lactated ringers   Intravenous Continuous 100 mL/hr at 07/21/24 1737 Rate Verify at 07/21/24 1737        Scheduled:   doxycycline IV (PEDS and ADULTS)  100 mg Intravenous Q12H    heparin (porcine)  5,000 Units Subcutaneous Q12H    mupirocin   Nasal BID    pantoprazole  40 mg Intravenous Daily    piperacillin-tazobactam (Zosyn) IV (PEDS and ADULTS) (extended infusion is not appropriate)  4.5 g Intravenous Q8H    sodium phosphate 20.01 mmol in sodium chloride 0.9% 250 mL IVPB  20.01 mmol Intravenous Once    vancomycin (VANCOCIN) IV (PEDS and ADULTS)  750 mg Intravenous Q24H        PRN:    Current Facility-Administered Medications:     0.9% NaCl, , Intravenous, PRN    acetaminophen, 650 mg, Oral, Q8H PRN    dextrose 10%, 12.5 g, Intravenous, PRN    dextrose 10%, 25 g,  Intravenous, PRN    glucagon (human recombinant), 1 mg, Intramuscular, PRN    glucose, 16 g, Oral, PRN    glucose, 24 g, Oral, PRN    melatonin, 6 mg, Oral, Nightly PRN    metoprolol, 5 mg, Intravenous, Q5 Min PRN    naloxone, 0.02 mg, Intravenous, PRN    ondansetron, 4 mg, Intravenous, Q8H PRN    prochlorperazine, 5 mg, Intravenous, Q6H PRN    sodium chloride 0.9%, 10 mL, Intravenous, Q12H PRN    Pharmacy to dose Vancomycin consult, , , Once **AND** vancomycin - pharmacy to dose, , Intravenous, pharmacy to manage frequency    Antibiotics and Day Number of Therapy:  Vanc, Zosyn and Azithro started 7/18      Intake/Output Summary (Last 24 hours) at 7/22/2024 1156  Last data filed at 7/22/2024 0631  Gross per 24 hour   Intake 1392.75 ml   Output 2825 ml   Net -1432.25 ml       Lines/Drains/Airways       Drain  Duration                  Urethral Catheter 07/19/24 2134 2 days         NG/OG Tube 07/21/24 1940 16 Fr. Right nostril <1 day              Peripheral Intravenous Line  Duration                  Peripheral IV - Single Lumen 07/18/24 1846 20 G Anterior;Proximal;Right Forearm 3 days         Peripheral IV - Single Lumen 07/19/24 1200 22 G Anterior;Left Forearm 2 days                  Assessment and Plan    Small bowel obstruction   Nausea, vomiting  - Surgery following  - NPO with no medications  - NGT in place with 1000cc output overnight  - anti-emetics PRN in place, protonix   - Given hyponatremia will hold off on starting Clinimix at this time.   - Will place nutrition consult for tomorrow     Severe sepsis with suspected sources below  Right lower lobe pneumonia, possible aspiration  Acute cystitis on UA  Recent UTI growing Candida tropicalis  - Started on broad spectrum Vanc/Zosyn/Doxycycline d/t QT prolongation on last EKG.  - MRSA PCR neg.   - Lactic acid resolved   - blood cultures x2 collected 7/18: 1/2 bottles gram + cocci, probable staph. BCID panel with Staph.  - urine culture with no growth  - resp  culture (if able), COVID/Flu neg  - IS ordered today      Normocytic anemia - stable  -H/H downtrending since admission  -Stable this AM  -will type/screen and CTM     Acute kidney injury, likely ATN - resolved  - Kidney functions back at baseline  - Continue with IVF rehydration  - Baseline of ~1.0     NSTEMI, likely Type II demand ischemia - resolved  - Trop trended down; likely 2/2 to demand  - Holding home ASA 81, statin due to strict NPO     Questionable right paratracheal/mass/opacity on CXR  - CT chest showed reticular, nodular and hazy opacities in the right posterior upper lobe and both lower lobes with areas of consolidation in the lower lobes, larger in the right. This is consistent with multilobar consolidation. And fluid-filled dilatation of the distal esophagus which may be the result of reflux disease or related to distal esophageal stricture      Severe aortic stenosis  Coronary artery disease  Hypertension  Hyperlipidemia  - last Echo 10/2023 EF 65-70%, severe aortic stenosis  - hold home ACEi and statin  - consider repeat Echo in future if showing signs of fluid overload     Paroxysmal atrial fibrillation  - Holding home Eliquis; switched from Lovenox to heparin this AM in case need for surgery  - currently NSR  - Holding home metoprolol; Lopressor pushes available prn     Anxiety  - Holding home Lexapro     History of falls  Chronic dizziness, at baseline  - admission 7/1/2024 for fall, currently in SNF for rehab, likely discharge back to SNF when able  - previously walked with a cane, currently requiring rolling walker  - OT/PT consulted  - fall precautions, delirium precautions     CODE STATUS: DNR  Access: PIV  Antibiotics: Vanc/Zosyn/Azithromycin  Diet: NPO  DVT Prophylaxis: Lovenox  GI Prophylaxis: Pepcid IV  Fluids:  cc/hr      Disposition: Admit for SBO, no surgical intervention needed at this time. NG tube in place; monitor output. Continue on current Abx regimen at this time. Keep  NPO, home medications changed to IV or being held if not available. Came from nursing home; may need case management consult to ensure return upon discharge.        Alonso Foster MD  [id number]

## 2024-07-22 NOTE — MEDICAL/APP STUDENT
"[facility] - [facility]  Daily Progress Note    Patient ID: Fifi Chacon, 87y/o F    Subjective:  AF, HDS  Pulled NGT out, has since been replaced   NGT with 750ml bilious output   Denies nausea, vomiting, flatus, or BM    Objective:    Vitals:  Vitals:    07/22/24 0319   BP: 134/66   Pulse: 72   Resp:    Temp:         Intake/Output:    Intake/Output Summary (Last 24 hours) at 7/22/2024 0546  Last data filed at 7/22/2024 0539  Gross per 24 hour   Intake 3498.62 ml   Output 2275 ml   Net 1223.62 ml        Physical Exam:  Gen: NAD  Neuro: awake, alert, answering questions appropriately  CV: RR  Resp: non-labored breathing  Abd: soft, ND, mild tenderness to palpation of upper quadrants and epigastric regions   Ext: moves all 4 spontaneously and purposefully, mild peripheral edema bilaterally, compression garments on  Skin: warm, well perfused  L/D/A: 22G IV L forearm, 20G IV R forearm, urethral catheter, NG Tube    Labs:  Renal:  Recent Labs     07/19/24  0607 07/20/24  0339 07/21/24 0319 07/22/24 0417   BUN 37.6* 30.8* 21.3* 14.4   CREATININE 1.75* 1.25* 1.07* 0.95     No results for input(s): "LACTIC" in the last 72 hours.    FENGI:  Recent Labs     07/19/24  0607 07/20/24  0339 07/21/24 0319 07/22/24 0417   * 136 136 135*   K 4.2 3.9 3.8 3.8    102 102 101   CO2 24 27 28 28   CALCIUM 9.9 9.3 9.0 9.1   MG 1.70 1.60 2.10 1.70   PHOS 3.4 2.6 2.3 2.4   ALBUMIN 2.7* 2.3* 2.2* 2.2*   BILITOT 0.8 0.7 0.7 0.8   AST 26 19 18 18   ALKPHOS 51 46 41 47   ALT 8 6 7 6       Heme:  Recent Labs     07/19/24  0607 07/20/24  0339 07/21/24 0319 07/22/24 0417   HGB 10.1* 9.2* 7.9* 8.1*   HCT 32.1* 28.9* 25.0* 25.8*    172 154 141       ID:  Recent Labs     07/19/24  0607 07/20/24  0339 07/21/24 0319 07/22/24  0417   WBC 11.73* 6.69 3.77* 2.67*       CBG:  Recent Labs     07/19/24  0607 07/20/24  0339 07/21/24 0319 07/22/24  0417   GLUCOSE 108 106 89 89        Cardiovascular:  Recent Labs   Lab " 07/19/24  0008 07/19/24  0607 07/19/24  1154   TROPONINI 0.412* 0.377* 0.313*       I have reviewed all pertinent lab results within the past 24 hours.    Imaging:  X RAY ABDOMEN AP 1 VIEW - 07/21/2024 AT 0457  Impression:     Small bowel follow-through enteric contrast has reached the colon and rectum before 24 hours without evidence of complete obstruction.    X RAY ABDOMEN AP 1 VIEW - 07/21/2024 AT 2119    Impression:   No interval changes from earlier film.     Imaging Results                  CT Chest Without Contrast (Final result)  Result time 07/19/24 08:17:33            Final result by Wan Abreu MD (07/19/24 08:17:33)                           Impression:     Impression:     1. There are reticular, nodular and hazy opacities in the right posterior upper lobe and both lower lobes with areas of consolidation in the lower lobes, larger in the right. This is consistent with multilobar consolidation.     2. Details and other findings as discussed above.     No significant discrepancy with overnight report        Electronically signed by:Wan Abreu  Date:                                        07/19/2024  Time:                                                08:17                     Narrative:        Technique: CT Scan of the chest was performed without intravenous contrast with direct axial as well as sagittal and, coronal, reconstruction images.     Dosage Information: Automated Exposure Control was utilized.  DLP 19 9     Comparison: Correlation is with study dated2024-07-18 20:14:34.     Clinical History: Pneumonia.     Findings:     Heart: The heart size is within normal limits. Pronounced coronary artery calcification is seen.     Aorta: No thoracic aortic aneurysmal dilatation.     Lungs: Moderate streaky linear and hazy opacity is seen predominantly in the dependent portions at the lung bases consistent with dependent changes scarring and subsegmental atelectasis. There are reticular, nodular and  hazy opacities in the right posterior upper lobe and both lower lobes with areas of consolidation in the lower lobes, larger in the right. This is consistent with multilobar consolidation. Subtle emphysematous change is noted in the right upper lobe.     Pleura: No effusions or pneumothorax are identified.     There is fluid-filled dilatation of the distal esophagus which may be the result of reflux disease or related to distal esophageal stricture.     Spine: Mild multilevel spondylolytic changes are seen in the thoracic spine.                                 Preliminary result by Britton Kemp Jr., MD (07/19/24 01:56:35)                           Impression:     1. There are reticular, nodular and hazy opacities in the right posterior upper lobe and both lower lobes with areas of consolidation in the lower lobes, larger in the right. This is consistent with mutifocal possibly atypical pneumonia with multilobar consolidation.  2. Details and other findings as discussed above.                     Narrative:     START OF REPORT:  Technique: CT Scan of the chest was performed without intravenous contrast with direct axial as well as sagittal and, coronal, reconstruction images.     Dosage Information: Automated Exposure Control was utilized.     Comparison: Correlation is with study [id number] 20:14:34.     Clinical History: Pneumonia.     Findings:  Soft Tissues: Unremarkable.  Lines and Tubes: None.  Neck: The visualized soft tissues of the neck appear unremarkable.  Mediastinum: The mediastinal structures are within normal limits.  Heart: The heart size is within normal limits. Pronounced coronary artery calcification is seen.  Aorta: Unremarkable appearing aorta.  Pulmonary Arteries: Unremarkable.  Lungs: Moderate streaky linear and hazy opacity is seen predominantly in the dependent portions at the lung bases consistent with dependent changes scarring and subsegmental atelectasis. There are  reticular, nodular and hazy opacities in the right posterior upper lobe and both lower lobes with areas of consolidation in the lower lobes, larger in the right. This is consistent with mutifocal possibly atypical pneumonia with multilobar consolidation. Subtle emphysematous change is noted in the right upper lobe.  Pleura: No effusions or pneumothorax are identified.  Bony Structures:  Spine: Mild multilevel spondylolytic changes are seen in the thoracic spine.  Ribs: The bilateral ribs appear unremarkable.                                                   CT Abdomen Pelvis  Without Contrast (Final result)  Result time 07/19/24 07:35:25            Final result by Wan Abreu MD (07/19/24 07:35:25)                           Impression:     Impression:     1. There are moderate opacities seen in both lower lobes (series 4 image 8) consistent with pneumonias.     2. There are numerous moderately dilated loops of fluid-filled small bowel with numerous air-fluid levels with transition point in the mid small bowel seen on series 2 image 54. This is compatible with a small bowel obstruction.     3. No intraperitoneal free air or ascites is seen.     4. Details and other findings as discussed above.     No significant discrepancy with overnight report.        Electronically signed by:Wan Abreu  Date:                                        07/19/2024  Time:                                                07:35                     Narrative:        Technique: CT of the abdomen and pelvis was performed with axial images as well as sagittal and coronal reconstruction images without intravenous contrast.     Comparison: October 5, 2022     Clinical History: Abdominal Pain Vomiting(88 yr old in /[facility] FROM NURSING HOME W REPORT OF ABD PAIN W NV AND WEAKNESS SINCE YESTERDAY. LBM THIS AM. PT REPORTS DYSURIA. SEE NURSING HOME REPORTS. EKG OBTAINED. ) Fatigue.     Dosage Information: Automated Exposure Control was utilized.      Findings:     Lines and Tubes: None.     Thorax:     Lungs: There are moderate opacities seen in both lower lobes (series 4 image 8) consistent with pneumonias.     Pleura: No effusions or thickening.     Heart:Pronounced coronary artery calcification is seen. There is extensive aortic and mitral valve calcifications seen.     Abdomen:     Abdominal Wall: No abdominal wall pathology is seen.     Within limitations of noncontrast technique, no acute findings liver, pancreas spleen identified.     Biliary System: No intrahepatic or extrahepatic biliary duct dilatation is seen.     Gallbladder: Surgical clips are seen in the gallbladder fossa consistent with prior cholecystectomy.     Adrenals: The adrenal glands appear unremarkable.     Kidneys: The left kidney appears unremarkable with no stones or hydronephrosis. A single stable peripelvic cyst measuring 2.8 cm is seen in the right kidney and for this no specific follow-up imaging is recommended.  The right kidney otherwise appears unremarkable with no stones or hydronephrosis identified.     Aorta: There is extensive calcification of the abdominal aorta and its branches.     Bowel:     Esophagus: There is a moderate sized hiatal hernia.     Stomach: There is hiatal herniation of the cardia and part of the body of the stomach as detailed above.     Duodenum: Unremarkable appearing duodenum.     Small Bowel: There are numerous moderately dilated loops of fluid-filled small bowel with numerous air-fluid levels with transition point in the mid small bowel seen on series 2 image 54.     Colon: Unremarkable.     Appendix: The appendix is not identified but no inflammatory changes are seen in the right lower quadrant to suggest appendicitis.     Peritoneum: No intraperitoneal free air or ascites is seen.     Pelvis:     Bladder: The bladder appears unremarkable.     Female:     Uterus: The uterus is not identified.     Ovaries: No adnexal masses are seen.     Inguinal  Findings:     Inguinal Hernia: Incidental note is made of small uncomplicated mesenteric fat containing bilateral inguinal hernias.     Bony structures:     Dorsal Spine: There is mild spondylosis of the visualized dorsal spine. There are hypodense lesions with prominent trabecular markings seen in T12-L3. These may represent hemangiomas.     Bony Pelvis: The visualized bony structures of the pelvis appear unremarkable.     Notifications: The results were discussed with the physician Dr. Sanders prior to dictation at 2024-07-18 20:46:47 CDT.                                 Preliminary result by Britton Kemp Jr., MD (07/18/24 20:49:10)                           Impression:     1. There are moderate opacities seen in both lower lobes (series 4 image 8) consistent with pneumonias.  2. There are numerous moderately dilated loops of fluid-filled small bowel with numerous air-fluid levels with transition point in the mid small bowel seen on series 2 image 54. This is compatible with a small bowel obstruction.  3. No intraperitoneal free air or ascites is seen.  4. Details and other findings as discussed above.                     Narrative:     START OF REPORT:  Technique: CT of the abdomen and pelvis was performed with axial images as well as sagittal and coronal reconstruction images without intravenous contrast.     Comparison: None available.     Clinical History: Abdominal Pain Vomiting(88 yr old in /[facility] FROM NURSING HOME W REPORT OF ABD PAIN W NV AND WEAKNESS SINCE YESTERDAY. LBM THIS AM. PT REPORTS DYSURIA. SEE NURSING HOME REPORTS. EKG OBTAINED. ) Fatigue.     Dosage Information: Automated Exposure Control was utilized.     Findings:  Lines and Tubes: None.  Thorax:  Lungs: There are moderate opacities seen in both lower lobes (series 4 image 8) consistent with pneumonias.  Pleura: No effusions or thickening.  Heart: Moderate cardiomegaly is seen. Pronounced coronary artery calcification is seen.  There is extensive aortic and mitral valve calcifications seen.  Abdomen:  Abdominal Wall: No abdominal wall pathology is seen.  Liver: The liver appears unremarkable.  Biliary System: No intrahepatic or extrahepatic biliary duct dilatation is seen.  Gallbladder: Surgical clips are seen in the gallbladder fossa consistent with prior cholecystectomy.  Pancreas: The pancreas appears unremarkable.  Spleen: The spleen appears unremarkable.  Adrenals: The adrenal glands appear unremarkable.  Kidneys: The left kidney appears unremarkable with no stones cysts masses or hydronephrosis. A single peripelvic cyst measuring 2.8 cm is seen in the right kidney. The right kidney otherwise appears unremarkable with no stones masses or hydronephrosis identified.  Aorta: There is extensive calcification of the abdominal aorta and its branches.  IVC: Unremarkable.  Bowel:  Esophagus: There is a moderate sized hiatal hernia.  Stomach: There is hiatal herniation of the cardia and part of the body of the stomach as detailed above.  Duodenum: Unremarkable appearing duodenum.  Small Bowel: There are numerous moderately dilated loops of fluid-filled small bowel with numerous air-fluid levels with transition point in the mid small bowel seen on series 2 image 54.  Colon: Unremarkable.  Appendix: The appendix is not identified but no inflammatory changes are seen in the right lower quadrant to suggest appendicitis.  Peritoneum: No intraperitoneal free air or ascites is seen.     Pelvis:  Bladder: The bladder appears unremarkable.  Female:  Uterus: The uterus is not identified.  Ovaries: No adnexal masses are seen.  Inguinal Findings:  Inguinal Hernia: Incidental note is made of small uncomplicated mesenteric fat containing bilateral inguinal hernias.     Bony structures:  Dorsal Spine: There is mild spondylosis of the visualized dorsal spine. There are hypodense lesions with prominent trabecular markings seen in T12-L3. These may represent  hemangiomas.  Bony Pelvis: The visualized bony structures of the pelvis appear unremarkable.     Notifications: The results were discussed with the physician Dr. Sanders prior to dictation at 2024-07-18 20:46:47 CDT.                                                   X-Ray Chest AP Portable (Final result)  Result time 07/18/24 18:49:26            Final result by Flor Hernandez MD (07/18/24 18:49:26)                           Impression:        Diffuse bilateral interstitial infiltrates with developing consolidation in the right lower lobe     Prominence of the right paratracheal region.  CT scan correlation is recommended.        Electronically signed by:Chip Hernandez  Date:                                        07/18/2024  Time:                                                18:49                     Narrative:     EXAMINATION:  XR CHEST AP PORTABLE     CLINICAL HISTORY:  Sepsis;     TECHNIQUE:  Single frontal view of the chest was performed.     COMPARISON:  06/30/2024     FINDINGS:  There are diffuse bilateral interstitial infiltrates with a area of consolidation developing in the right lower lobe.  There is also increased opacity in the right paratracheal region.  This was seen on the prior examination as well.  The heart is normal appearance.  The pulmonary vascularity is unremarkable.  Bones and joints show no acute abnormality.                       Micro/Path/Other:  None    Assessment/Plan:  88-year-old with PMHx of HTN, HLD, and afib on Eliquis presenting with pneumonia. CT shows evidence of a small-bowel obstruction with a transition point. Pt currently AF and HDS. SBFT and KUB with contrast in colon/rectum, however still no flatus or BM     - Discuss nutrition options  - NGT to LIMWS  - Serial abdominal exams  - NPO  - mIVF  - rest of care per primary  - surgery will continue to follow       Jori Osullivan MS3  [facility]  07/22/2024

## 2024-07-22 NOTE — PT/OT/SLP PROGRESS
Physical Therapy Treatment    Patient Name:  Fifi Chacon   MRN:  [id number]    Recommendations     Therapy Intensity Recommendations at Discharge: Moderate Intensity Therapy  Discharge Equipment Recommendations: none   Barriers to discharge: fall risk and level of skilled assistance required    Assessment     Fifi Chacon is a 88 y.o. female admitted with a medical diagnosis of  Small bowel obstruction.    She presents with the following impairments/functional limitations:  weakness, impaired functional mobility, impaired endurance, gait instability, decreased lower extremity function.    Rehab Prognosis: TBD pending progress.    Patient would benefit from continued skilled acute PT services to: address above listed impairments/functional limitations; receive patient/caregiver education; reduce fall risk; and maximize independency/safety with functional mobility.    Recent Surgery: * No surgery found *      Plan     During this hospitalization, patient to be seen 5 x/week to address the identified impairments/functional limitations via gait training, therapeutic activities, therapeutic exercises, neuromuscular re-education and progress toward the established goals.    Plan of Care Expires:  08/18/24    Subjective     Communicated with patient's nurse Rhona prior to session.    Patient agreeable to participate in treatment session.    Chief Complaint: none stated  Patient/Family Comments/goals: none stated  Pain/Comfort:  Pain Rating 1: other (see comments) (unable to verbalize this morning.)  Pain Rating Post-Intervention 1: 0/10    Objective     Patient found supine in bed with canales catheter, SCD, telemetry, peripheral IV, pulse ox (continuous)  upon PT entry to room.    General Precautions: Standard, fall   Orthopedic Precautions:N/A   Braces: N/A    Functional Mobility:    Bed Mobility:  Rolling Left: maximal assistance  Bed Positioning: max A    Transfers:  Not attempted at this time.     Other  Mobility:  Therapeutic Exercises performed:        -bed level exercises:              SLR's            hip/knee flexion            short arc quads            ankle pumps    Patient left  in Left sidelying with wedge  with all lines intact, call button in reach, patient's nurse notified, and son present.    Goals     Multidisciplinary Problems       Physical Therapy Goals          Problem: Physical Therapy    Goal Priority Disciplines Outcome Goal Variances Interventions   Physical Therapy Goal     PT, PT/OT Not Progressing     Description: REVIEWED 24  Goals to be met by: D/C     Patient will increase functional independence with mobility by performin. Supine to sit with Contact Guard Assistance  2. Sit to stand transfer with Contact Guard Assistance  3. Gait  x 20 feet with Minimal Assistance using Rolling Walker.                        Time Tracking     PT Received On: 24  PT Start Time: 919     PT Stop Time: 931  PT Total Time (min): 12 min     Billable Minutes: Therapeutic Exercise 2024

## 2024-07-23 LAB
ALBUMIN SERPL-MCNC: 2.3 G/DL (ref 3.4–4.8)
ALBUMIN/GLOB SERPL: 0.5 RATIO (ref 1.1–2)
ALP SERPL-CCNC: 48 UNIT/L (ref 40–150)
ALT SERPL-CCNC: 6 UNIT/L (ref 0–55)
ANION GAP SERPL CALC-SCNC: 7 MEQ/L
AST SERPL-CCNC: 21 UNIT/L (ref 5–34)
BACTERIA BLD CULT: NORMAL
BASOPHILS # BLD AUTO: 0.01 X10(3)/MCL
BASOPHILS NFR BLD AUTO: 0.3 %
BILIRUB SERPL-MCNC: 0.9 MG/DL
BUN SERPL-MCNC: 10.3 MG/DL (ref 9.8–20.1)
CALCIUM SERPL-MCNC: 9 MG/DL (ref 8.4–10.2)
CHLORIDE SERPL-SCNC: 100 MMOL/L (ref 98–107)
CO2 SERPL-SCNC: 27 MMOL/L (ref 23–31)
CREAT SERPL-MCNC: 0.9 MG/DL (ref 0.55–1.02)
CREAT/UREA NIT SERPL: 11
EOSINOPHIL # BLD AUTO: 0.11 X10(3)/MCL (ref 0–0.9)
EOSINOPHIL NFR BLD AUTO: 3.4 %
ERYTHROCYTE [DISTWIDTH] IN BLOOD BY AUTOMATED COUNT: 14.6 % (ref 11.5–17)
GFR SERPLBLD CREATININE-BSD FMLA CKD-EPI: >60 ML/MIN/1.73/M2
GLOBULIN SER-MCNC: 4.3 GM/DL (ref 2.4–3.5)
GLUCOSE SERPL-MCNC: 96 MG/DL (ref 82–115)
HCT VFR BLD AUTO: 26.3 % (ref 37–47)
HGB BLD-MCNC: 8.5 G/DL (ref 12–16)
IMM GRANULOCYTES # BLD AUTO: 0.02 X10(3)/MCL (ref 0–0.04)
IMM GRANULOCYTES NFR BLD AUTO: 0.6 %
LYMPHOCYTES # BLD AUTO: 0.24 X10(3)/MCL (ref 0.6–4.6)
LYMPHOCYTES NFR BLD AUTO: 7.4 %
MAGNESIUM SERPL-MCNC: 1.9 MG/DL (ref 1.6–2.6)
MCH RBC QN AUTO: 26.8 PG (ref 27–31)
MCHC RBC AUTO-ENTMCNC: 32.3 G/DL (ref 33–36)
MCV RBC AUTO: 83 FL (ref 80–94)
MONOCYTES # BLD AUTO: 0.23 X10(3)/MCL (ref 0.1–1.3)
MONOCYTES NFR BLD AUTO: 7.1 %
NEUTROPHILS # BLD AUTO: 2.65 X10(3)/MCL (ref 2.1–9.2)
NEUTROPHILS NFR BLD AUTO: 81.2 %
NRBC BLD AUTO-RTO: 0 %
PHOSPHATE SERPL-MCNC: 2.4 MG/DL (ref 2.3–4.7)
PLATELET # BLD AUTO: 161 X10(3)/MCL (ref 130–400)
PMV BLD AUTO: 11.2 FL (ref 7.4–10.4)
POTASSIUM SERPL-SCNC: 3.8 MMOL/L (ref 3.5–5.1)
PROT SERPL-MCNC: 6.6 GM/DL (ref 5.8–7.6)
RBC # BLD AUTO: 3.17 X10(6)/MCL (ref 4.2–5.4)
SODIUM SERPL-SCNC: 134 MMOL/L (ref 136–145)
WBC # BLD AUTO: 3.26 X10(3)/MCL (ref 4.5–11.5)

## 2024-07-23 PROCEDURE — 97530 THERAPEUTIC ACTIVITIES: CPT

## 2024-07-23 PROCEDURE — 63600175 PHARM REV CODE 636 W HCPCS

## 2024-07-23 PROCEDURE — 80053 COMPREHEN METABOLIC PANEL: CPT

## 2024-07-23 PROCEDURE — 84100 ASSAY OF PHOSPHORUS: CPT

## 2024-07-23 PROCEDURE — 25000003 PHARM REV CODE 250

## 2024-07-23 PROCEDURE — 94761 N-INVAS EAR/PLS OXIMETRY MLT: CPT

## 2024-07-23 PROCEDURE — 97535 SELF CARE MNGMENT TRAINING: CPT

## 2024-07-23 PROCEDURE — 85025 COMPLETE CBC W/AUTO DIFF WBC: CPT

## 2024-07-23 PROCEDURE — 83735 ASSAY OF MAGNESIUM: CPT

## 2024-07-23 PROCEDURE — 36415 COLL VENOUS BLD VENIPUNCTURE: CPT

## 2024-07-23 PROCEDURE — 27000221 HC OXYGEN, UP TO 24 HOURS

## 2024-07-23 PROCEDURE — B4185 PARENTERAL SOL 10 GM LIPIDS: HCPCS

## 2024-07-23 PROCEDURE — 21400001 HC TELEMETRY ROOM

## 2024-07-23 PROCEDURE — 80202 ASSAY OF VANCOMYCIN: CPT

## 2024-07-23 PROCEDURE — 25000003 PHARM REV CODE 250: Performed by: FAMILY MEDICINE

## 2024-07-23 PROCEDURE — 63600175 PHARM REV CODE 636 W HCPCS: Performed by: FAMILY MEDICINE

## 2024-07-23 RX ORDER — KETOROLAC TROMETHAMINE 30 MG/ML
15 INJECTION, SOLUTION INTRAMUSCULAR; INTRAVENOUS ONCE
Status: COMPLETED | OUTPATIENT
Start: 2024-07-23 | End: 2024-07-23

## 2024-07-23 RX ADMIN — KETOROLAC TROMETHAMINE 15 MG: 30 INJECTION, SOLUTION INTRAMUSCULAR; INTRAVENOUS at 04:07

## 2024-07-23 RX ADMIN — VANCOMYCIN HYDROCHLORIDE 1000 MG: 1 INJECTION, POWDER, LYOPHILIZED, FOR SOLUTION INTRAVENOUS at 12:07

## 2024-07-23 RX ADMIN — MUPIROCIN: 20 OINTMENT TOPICAL at 10:07

## 2024-07-23 RX ADMIN — HEPARIN SODIUM 5000 UNITS: 5000 INJECTION INTRAVENOUS; SUBCUTANEOUS at 04:07

## 2024-07-23 RX ADMIN — HEPARIN SODIUM 5000 UNITS: 5000 INJECTION INTRAVENOUS; SUBCUTANEOUS at 09:07

## 2024-07-23 RX ADMIN — MUPIROCIN: 20 OINTMENT TOPICAL at 08:07

## 2024-07-23 RX ADMIN — PIPERACILLIN SODIUM AND TAZOBACTAM SODIUM 4.5 G: 4; .5 INJECTION, POWDER, LYOPHILIZED, FOR SOLUTION INTRAVENOUS at 06:07

## 2024-07-23 RX ADMIN — DOXYCYCLINE 100 MG: 100 INJECTION, POWDER, LYOPHILIZED, FOR SOLUTION INTRAVENOUS at 10:07

## 2024-07-23 RX ADMIN — HEPARIN SODIUM 5000 UNITS: 5000 INJECTION INTRAVENOUS; SUBCUTANEOUS at 06:07

## 2024-07-23 RX ADMIN — PANTOPRAZOLE SODIUM 40 MG: 40 INJECTION, POWDER, FOR SOLUTION INTRAVENOUS at 09:07

## 2024-07-23 RX ADMIN — I.V. FAT EMULSION 250 ML: 20 EMULSION INTRAVENOUS at 10:07

## 2024-07-23 RX ADMIN — SODIUM CHLORIDE, POTASSIUM CHLORIDE, SODIUM LACTATE AND CALCIUM CHLORIDE: 600; 310; 30; 20 INJECTION, SOLUTION INTRAVENOUS at 03:07

## 2024-07-23 RX ADMIN — PIPERACILLIN SODIUM AND TAZOBACTAM SODIUM 4.5 G: 4; .5 INJECTION, POWDER, LYOPHILIZED, FOR SOLUTION INTRAVENOUS at 04:07

## 2024-07-23 RX ADMIN — LEUCINE, PHENYLALANINE, LYSINE, METHIONINE, ISOLEUCINE, VALINE, HISTIDINE, THREONINE, TRYPTOPHAN, ALANINE, GLYCINE, ARGININE, PROLINE, SERINE, TYROSINE, SODIUM ACETATE, DIBASIC POTASSIUM PHOSPHATE, MAGNESIUM CHLORIDE, SODIUM CHLORIDE, CALCIUM CHLORIDE, DEXTROSE
311; 238; 247; 170; 255; 247; 204; 179; 77; 880; 438; 489; 289; 213; 17; 297; 261; 51; 77; 33; 5 INJECTION INTRAVENOUS at 06:07

## 2024-07-23 NOTE — PROGRESS NOTES
"[facility] - [facility] Team  Daily Progress Note    Patient ID: Fifi Chacon, 87y/o F    Subjective:  AF, HDS  NGT in place  NGT with 500ml billious output in last 24 hours   Denies nausea, vomiting, or BM  Passing flatus per nursing and family     Objective:    Vitals:  Vitals:    07/23/24 0400   BP:    Pulse: 73   Resp:    Temp:         Intake/Output:    Intake/Output Summary (Last 24 hours) at 7/23/2024 0627  Last data filed at 7/23/2024 0340  Gross per 24 hour   Intake 2849.4 ml   Output 2950 ml   Net -100.6 ml        Physical Exam:  Gen: NAD  Neuro: awake, alert, answering questions appropriately  CV: RR  Resp: non-labored breathing, satting well on 2L NC  Abd: soft, ND, denies tenderness   Ext: moves all 4 spontaneously and purposefully  Skin: warm, well perfused  L/D/A: 22G IV L forearm, 20G IV R forearm, urethral catheter, NG Tube     Labs:  Renal:  Recent Labs     07/21/24 0319 07/22/24 0417 07/23/24 0351   BUN 21.3* 14.4 10.3   CREATININE 1.07* 0.95 0.90     No results for input(s): "LACTIC" in the last 72 hours.    FENGI:  Recent Labs     07/21/24 0319 07/22/24 0417 07/23/24 0351    135* 134*   K 3.8 3.8 3.8    101 100   CO2 28 28 27   CALCIUM 9.0 9.1 9.0   MG 2.10 1.70 1.90   PHOS 2.3 2.4 2.4   ALBUMIN 2.2* 2.2* 2.3*   BILITOT 0.7 0.8 0.9   AST 18 18 21   ALKPHOS 41 47 48   ALT 7 6 6       Heme:  Recent Labs     07/21/24 0319 07/22/24 0417 07/23/24  0351   HGB 7.9* 8.1* 8.5*   HCT 25.0* 25.8* 26.3*    141 161       ID:  Recent Labs     07/21/24 0319 07/22/24 0417 07/23/24  0351   WBC 3.77* 2.67* 3.26*       CBG:  Recent Labs     07/21/24 0319 07/22/24  0417 07/23/24  0351   GLUCOSE 89 89 96        Cardiovascular:  Recent Labs   Lab 07/19/24  0008 07/19/24  0607 07/19/24  1154   TROPONINI 0.412* 0.377* 0.313*       I have reviewed all pertinent lab results within the past 24 hours.    Imaging:  X RAY ABDOMEN AP 1 VIEW - 07/21/2024 AT 0457  Impression:     Small bowel " follow-through enteric contrast has reached the colon and rectum before 24 hours without evidence of complete obstruction.    Micro/Path/Other:  Microbiology Results (last 7 days)       Procedure Component Value Units Date/Time    Blood culture x two cultures. Draw prior to antibiotics. [[id number]]  (Normal) Collected: 07/18/24 1746    Order Status: Completed Specimen: Blood from Arm, Right Updated: 07/22/24 2100     Blood Culture No Growth At 96 Hours    Blood culture x two cultures. Draw prior to antibiotics. [[id number]]  (Abnormal)  (Susceptibility) Collected: 07/18/24 1841    Order Status: Completed Specimen: Blood from Arm, Left Updated: 07/22/24 0641     Blood Culture Staphylococcus capitis      Staphylococcus warneri     GRAM STAIN Gram Positive Cocci, probable Staphylococcus      Seen in gram stain of broth only      1 of 2 Aerobic bottles positive    BCID2 Panel [[id number]]  (Abnormal) Collected: 07/18/24 1841    Order Status: Completed Specimen: Blood from Arm, Left Updated: 07/20/24 1034     CTX-M (ESBL ) N/A     IMP (Cabapenemase ) N/A     KPC resistance gene (Carbapenemase ) N/A     mcr-1 N/A     mecA ID N/A     Comment: Note: Antimicrobial resistance can occur via multiple mechanisms. A Not Detected result for antimicrobial resistance gene(s) does not indicate antimicrobial susceptibility. Subculturing is required for species identification and susceptibility testing of   isolates.        mecA/C and MREJ (MRSA) gene N/A     NDM (Carbapenemase ) N/A     OXA-48-like (Carbapenemase ) N/A     Jeb/B (VRE gene) N/A     VIM (Carbapenemase ) N/A     Enterococcus faecalis Not Detected     Enterococcus faecium Not Detected     Listeria monocytogenes Not Detected     Staphylococcus spp. Detected     Staphylococcus aureus Not Detected     Staphylococcus epidermidis Not Detected     Staphylococcus lugdunensis Not Detected     Streptococcus spp. Not Detected      Streptococcus agalactiae (Group B) Not Detected     Streptococcus pneumoniae Not Detected     Streptococcus pyogenes (Group A) Not Detected     Acinetobacter calcoaceticus/baumannii complex Not Detected     Bacteroides fragilis Not Detected     Enterobacterales Not Detected     Enterobacter cloacae complex Not Detected     Escherichia coli Not Detected     Klebsiella aerogenes Not Detected     Klebsiella oxytoca Not Detected     Klebsiella pneumoniae group Not Detected     Proteus spp. Not Detected     Salmonella spp. Not Detected     Serratia marcescens Not Detected     Haemophilus influenzae Not Detected     Neisseria meningitidis Not Detected     Pseudomonas aeruginosa Not Detected     Stenotrophomonas maltophilia Not Detected     Candida albicans Not Detected     Candida auris Not Detected     Candida glabrata Not Detected     Candida krusei Not Detected     Candida parapsilosis Not Detected     Candida tropicalis Not Detected     Cryptococcus neoformans/gattii Not Detected    Narrative:      The [employer] BCID2 Panel is a multiplexed nucleic acid test intended for the use with fflap® [employer].0 or [employer] for the simultaneous qualitative detection and identification of multiple bacterial and yeast nucleic acids and select genetic determinants associated with antimicrobial resistance.  The [employer] BCID2 Panel test is performed directly on blood culture samples identified as positive by a continuous monitoring blood culture system.  Results are intended to be interpreted in conjunction with Gram stain results.    Urine culture [[id number]] Collected: 07/18/24 1729    Order Status: Completed Specimen: Urine Updated: 07/20/24 0919     Urine Culture No Significant Growth    Respiratory Culture [[id number]]     Order Status: Sent Specimen: Sputum              Assessment/Plan:  88-year-old with PMHx of HTN, HLD, and afib on Eliquis presenting with pneumonia. CT shows evidence of a  small-bowel obstruction with a transition point. Pt currently AF and HDS. SBFT and KUB with contrast in colon/rectum, however still no flatus or BM.     - OOB to chair TID, mobilize, PT/OT   - Recommend starting PPN  - NGT to LIMWS  - Serial abdominal exams  - NPO  - mIVF  - enema Sunday, still no bowel movements  - rest of care per primary  - surgery will continue to follow       Jori Osullivan, MS3  [facility]  07/23/2024     Patient seen and examined alongside medical student. Note reviewed and edited as appropriate. Agree with plan as written above.     Speedy Rocha MD  [facility] PGY-1

## 2024-07-23 NOTE — PT/OT/SLP PROGRESS
Occupational Therapy   Treatment    Name: Fifi Chacon  MRN: [id number]  Admitting Diagnosis:  Small bowel obstruction       Recommendations:     Discharge Recommendations: Moderate Intensity Therapy  Discharge Equipment Recommendations:  none  Barriers to discharge:  None    Assessment:     Fifi Chacon is a 88 y.o. female with a medical diagnosis of Small bowel obstruction.  She presents with   Functional decline and impaired occupational performance and independence. Performance deficits affecting function are weakness, impaired endurance, impaired self care skills, gait instability, impaired functional mobility, impaired balance, decreased upper extremity function, decreased lower extremity function, decreased safety awareness, pain, impaired cardiopulmonary response to activity.     Rehab Prognosis:  Good; patient would benefit from acute skilled OT services to address these deficits and reach maximum level of function.       Plan:     Patient to be seen 3 x/week to address the above listed problems via self-care/home management, therapeutic activities, therapeutic exercises  Plan of Care Expires:  (d/c)  Plan of Care Reviewed with: patient, family    Subjective     Chief Complaint: general fatigue  Patient/Family Comments/goals: get stronger and maximize independence   Pain/Comfort:  Pain Rating 1: 0/10  Pain Addressed 1: Reposition, Distraction, Cessation of Activity, Nurse notified  Pain Rating Post-Intervention 1: 0/10    Objective:     Communicated with: nurse prior to session.  Patient found supine with telemetry, pulse ox (continuous), peripheral IV, oxygen, canales catheter, pressure relief boots, NG tube upon OT entry to room.    General Precautions: Standard, fall    Orthopedic Precautions:N/A  Braces: N/A  Respiratory Status: Nasal cannula, flow 3 L/min     Occupational Performance:     Bed Mobility:    Patient completed Rolling/Turning to Left with  moderate assistance and 1 persons  Patient  completed Rolling/Turning to Right with moderate assistance and 1 persons  Patient completed Scooting/Bridging with minimum assistance  Patient completed Supine to Sit with moderate assistance  Patient completed Sit to Supine with moderate assistance     Functional Mobility/Transfers:  Patient completed Sit <> Stand Transfer with moderate assistance and of 2 persons  with  rolling walker   Functional Mobility: please defer to PT note for specific mobility.    Activities of Daily Living:  Grooming: minimum assistance and seated EOB with verbal cueing for orientation to comb and assistance for combing back of hair . Min A for set-up and completion of washing face with wash cloth while seated EOB to prevent pulling of NG tube and Oxygen.      Treatment & Education:  Pt. educated on OT goals, POC, orientation to environment, use of call bell for assist with transfers OOB or for any other needs due to fall risk.    Patient left with bed in chair position with all lines intact, call button in reach, bed alarm on, restraints reapplied at end of session, nurse notified, and daughter present    GOALS:   Multidisciplinary Problems       Occupational Therapy Goals          Problem: Occupational Therapy    Goal Priority Disciplines Outcome Interventions   Occupational Therapy Goal     OT, PT/OT Progressing    Description: Patient will perform EOB grooming in unsupported sitting with Min A. Goal Met 7/23/2024  Patient will perform bed mobility while rolling right/left with Min A.  Patient will perform 5 sit to stand transitions from bed level with Mod A x 2 and RW for preparation of ADLs.  Patient will perform BSC transfer with RW and Mod A.                        Time Tracking:     OT Date of Treatment: 07/23/24  OT Start Time: 1050  OT Stop Time: 1117  OT Total Time (min): 27 min    Billable Minutes:Self Care/Home Management 13  Therapeutic Activity 14    OT/SANIYA: OT          7/23/2024

## 2024-07-23 NOTE — CONSULTS
Inpatient Nutrition Assessment    Admit Date: 7/18/2024   Total duration of encounter: 5 days   Patient Age: 88 y.o.    Nutrition Recommendation/Prescription     Pt remains NPOx 5 days; NGT suction; consulted for PPN rec; suggest begin PPN /has peripheral access / until able to advance diet. Clinimix 4.25/5 @ 75 ml/rh with lipids 20% 250 ml daily to provide 1112 calories, 77 gm protein.   When appropriate--ADAT to healt healthy diet  When approprite--order ONS--boost/vanilla tid; Boost (provides 240 kcal, 10 g protein per serving)   MVI/fe  Biweekly wt  Will monitor nutrition status     Communication of Recommendations: reviewed with nurse, reviewed with patient, and reviewed with caregiver    Nutrition Assessment     Malnutrition Assessment/Nutrition-Focused Physical Exam    Malnutrition Context: acute illness or injury (07/19/24 1350)  Malnutrition Level: moderate (07/19/24 1350)  Energy Intake (Malnutrition): less than 75% for greater than or equal to 1 month (07/19/24 1350)  Weight Loss (Malnutrition): 1-2% in 1 week (07/19/24 1350)  Subcutaneous Fat (Malnutrition): mild depletion (07/19/24 1350)  Orbital Region (Subcutaneous Fat Loss): mild depletion  Upper Arm Region (Subcutaneous Fat Loss): mild depletion     Muscle Mass (Malnutrition): moderate depletion (07/19/24 1350)  Interior Region (Muscle Loss): mild depletion  Clavicle Bone Region (Muscle Loss): moderate depletion  Clavicle and Acromion Bone Region (Muscle Loss): moderate depletion         A minimum of two characteristics is recommended for diagnosis of either severe or non-severe malnutrition.    Chart Review    Reason Seen: continuous nutrition monitoring, malnutrition screening tool (MST), physician consult for PPN, and follow-up    Malnutrition Screening Tool Results   Have you recently lost weight without trying?: Unsure  Have you been eating poorly because of a decreased appetite?: Yes   MST Score: 3   Diagnosis:  SBO, N/V, sepsis, L lobe PNA,  cystitis, UTI, GRACIE/HTN, NSTEMI, anxiety , R paratracheal mass, aortic stenosis, HLD    Relevant Medical History: HTN, HLD, Afib     Scheduled Medications:  doxycycline IV (PEDS and ADULTS), 100 mg, Q12H  heparin (porcine), 5,000 Units, Q8H  mupirocin, , BID  pantoprazole, 40 mg, Daily  piperacillin-tazobactam (Zosyn) IV (PEDS and ADULTS) (extended infusion is not appropriate), 4.5 g, Q8H  vancomycin (VANCOCIN) IV (PEDS and ADULTS), 1,000 mg, Q24H    Continuous Infusions:  lactated ringers, Last Rate: 100 mL/hr at 07/23/24 0322    PRN Medications:  0.9% NaCl, , PRN  acetaminophen, 650 mg, Q8H PRN  dextrose 10%, 12.5 g, PRN  dextrose 10%, 25 g, PRN  glucagon (human recombinant), 1 mg, PRN  glucose, 16 g, PRN  glucose, 24 g, PRN  melatonin, 6 mg, Nightly PRN  metoprolol, 5 mg, Q5 Min PRN  naloxone, 0.02 mg, PRN  ondansetron, 4 mg, Q8H PRN  prochlorperazine, 5 mg, Q6H PRN  sodium chloride 0.9%, 10 mL, Q12H PRN  vancomycin - pharmacy to dose, , pharmacy to manage frequency    Calorie Containing IV Medications: no significant kcals from medications at this time    Recent Labs   Lab 07/18/24  0500 07/18/24  1640 07/18/24  1746 07/19/24  0607 07/20/24  0339 07/21/24  0319 07/22/24  0417 07/23/24  0351     --  139 131* 136 136 135* 134*   K 4.5  --  5.1 4.2 3.9 3.8 3.8 3.8   CALCIUM 10.1  --  10.4* 9.9 9.3 9.0 9.1 9.0   PHOS  --   --   --  3.4 2.6 2.3 2.4 2.4   MG  --   --   --  1.70 1.60 2.10 1.70 1.90   CO2 19*  --  23 24 27 28 28 27   BUN 28.1*  --  38.5* 37.6* 30.8* 21.3* 14.4 10.3   CREATININE 1.16*  --  1.89* 1.75* 1.25* 1.07* 0.95 0.90   EGFRNORACEVR 46  --  25 28 42 50 58 >60   GLUCOSE 137*  --  142* 108 106 89 89 96   BILITOT 0.7  --  0.7 0.8 0.7 0.7 0.8 0.9   ALKPHOS 61  --  57 51 46 41 47 48   ALT 12  --  8 8 6 7 6 6   AST 29  --  28 26 19 18 18 21   ALBUMIN 3.4  --  3.1* 2.7* 2.3* 2.2* 2.2* 2.3*   PREALB  --   --   --   --   --   --  5.0*  --    CRP  --   --   --   --   --   --  50.30*  --    LIPASE  --    "--  18  --   --   --   --   --    WBC 11.82* 22.07*  --  11.73* 6.69 3.77* 2.67* 3.26*   HGB 11.5* 11.2*  --  10.1* 9.2* 7.9* 8.1* 8.5*   HCT 36.7* 34.3*  --  32.1* 28.9* 25.0* 25.8* 26.3*     Nutrition Orders:  Diet NPO      Appetite/Oral Intake: NPO/NPO  Factors Affecting Nutritional Intake: abdominal pain, altered gastrointestinal function, decreased appetite, nausea, NPO, and vomiting  Social Needs Impacting Access to Food: none identified  Food/Quaker/Cultural Preferences: none reported  Food Allergies: none reported  Last Bowel Movement: 24  Wound(s):  none reported     Comments  () consulted for PPN recs; pt remains NPO x 5 days; /NGT suction; no BM; pt sleeping during rounds; bedwt 67.1kg--increased from 61.8kg; will track wt. PPN recs provided to help maintain nutrition status. Labs acknowledged--Bun/Cr (WNL); pt remains on IVF . Discussed rec with nurse caring for pt; will forward to MD.     () Pt NPO; NGT suction; son at bedside; reported pt has been eating well at [facility] facility until Wednesday; pt then stated with N/V/abdominal pain; admitted with SBO. Pt with + wt loss approx 2% over past week. Pt with mild fat/muscle wasting; age related sarcopenia. PPN recs provided while pt NPO. Suggest use ONS when diet progressed     Anthropometrics    Height: 5' 2" (157.5 cm),    Last Weight: 61.8 kg (136 lb 3.9 oz) (24 1346), Weight Method: Standard Scale  BMI (Calculated): 24.9  BMI Classification: normal (BMI 18.5-24.9)     Ideal Body Weight (IBW), Female: 110 lb     % Ideal Body Weight, Female (lb): 120 %                    Usual Body Weight (UBW), k.5 kg  % Usual Body Weight: 97.53  % Weight Change From Usual Weight: -2.68 %  Usual Weight Provided By: patient, family/caregiver, and EMR weight history    Wt Readings from Last 5 Encounters:   24 61.8 kg (136 lb 3.9 oz)   24 63.5 kg (140 lb)   24 64.9 kg (143 lb)   01/10/24 64.3 kg (141 lb 12.8 oz)   24 63.9 kg " (140 lb 12.8 oz)     Weight Change(s) Since Admission: #; bed wt today 136#  Wt Readings from Last 1 Encounters:   07/19/24 1346 61.8 kg (136 lb 3.9 oz)   07/18/24 2201 59.9 kg (132 lb)   07/18/24 2145 59.9 kg (132 lb)   Admit Weight: 59.9 kg (132 lb) (07/18/24 2145), Weight Method: Bed Scale    Estimated Needs    Weight Used For Calorie Calculations: 61.8 kg (136 lb 3.9 oz)  Energy Calorie Requirements (kcal): 1854 kcal/d; 30 lucas/kg  Energy Need Method: Kcal/kg  Weight Used For Protein Calculations: 61.8 kg (136 lb 3.9 oz)  Protein Requirements: 74 gm protein/d; 1.2 gm/kg  Fluid Requirements (mL): 1854 ml/d; 1ml/lucas        Enteral Nutrition     Patient not receiving enteral nutrition at this time.    Parenteral Nutrition     Patient not receiving parenteral nutrition support at this time.    Evaluation of Received Nutrient Intake    Calories: not meeting estimated needs  Protein: not meeting estimated needs    Patient Education     Not applicable.    Nutrition Diagnosis     PES: Inadequate oral intake related to acute illness as evidenced by NPO . (active)     PES: Moderate acute disease or injury related malnutrition related to acute illness as evidenced by mild fat depletion, moderate muscle depletion, and 1-2% weight loss in 1 week. (active)    Nutrition Interventions     Intervention(s): modified composition of meals/snacks, modified composition of parenteral nutrition, modified rate of parenteral nutrition, multivitamin/mineral supplement therapy, and collaboration with other providers    Goal: Meet greater than 80% of nutritional needs by follow-up. (goal not met)  Goal: Maintain weight throughout hospitalization. (goal progressing)    Nutrition Goals & Monitoring     Dietitian will monitor: food and beverage intake, parenteral nutrition intake, weight, and glucose/endocrine profile  Discharge planning: too early to determine; pending clinical course  Nutrition Risk/Follow-Up: high (follow-up in 1-4  days)   Please consult if re-assessment needed sooner.

## 2024-07-23 NOTE — PROVIDER TRANSFER
Transition of Care Progress Note      Dr. Kohli, Ephraim and I have received checkout from Dr. Foster regarding this patient.     HO1 assessment:  Admission diagnosis:   SBO    Overnight management:   NG tube in place, monitor output. Keep NPO, home medications changed to IV or being held if not available.  cc/hr.     Code status:   DNR    Please call [phone] from 7PM to 7AM if any issues arise.    Joan Calhoun MD  [facility]

## 2024-07-23 NOTE — PROGRESS NOTES
[facility]   Progress Note      Resident Team: [facility] List   Attending Physician: Julieta Barnett MD  Resident: [facility] Length of Stay: 4 days    Subjective   Brief HPI:  Fifi Chacon is a 88 y.o. female with history of paroxysmal afib on Eliquis, CAD s/p coronary angiogram on 10/13/23, severe aortic stenosis, hypertension, hyperlipidemia, anxiety admitted for small bowel obstruction and sepsis.     Interval History:   Vital signs remained stable overnight. No acute events occurred overnight. NG put out 500 overnight. Pt more alert this AM. Has been passing gas and planning on NG tube clamp trials today. No acute complaints at this time. Nutrition consulted for possible initiation of PPN.     Review of Systems:  Review of Systems   Constitutional:  Negative for fever and malaise/fatigue.   Respiratory:  Negative for cough and shortness of breath.    Cardiovascular:  Negative for chest pain and palpitations.   Gastrointestinal:  Positive for abdominal pain. Negative for nausea and vomiting.      Objective     Vital Signs (Most Recent):  Temp: 98.6 °F (37 °C) (07/23/24 0729)  Pulse: 77 (07/23/24 0729)  Resp: 18 (07/23/24 0729)  BP: (!) 161/74 (07/23/24 0729)  SpO2: 96 % (07/23/24 0729) Vital Signs (24h Range):  Temp:  [97.7 °F (36.5 °C)-99.4 °F (37.4 °C)] 98.6 °F (37 °C)  Pulse:  [66-77] 77  Resp:  [18-22] 18  SpO2:  [96 %-98 %] 96 %  BP: (131-171)/(57-74) 161/74     Physical Examination:  Physical Exam  Vitals and nursing note reviewed.   Constitutional:       Appearance: She is not ill-appearing.      Comments: Soft restraints in place   HENT:      Nose:      Comments: NG tube secured in place     Mouth/Throat:      Mouth: Mucous membranes are dry.   Cardiovascular:      Rate and Rhythm: Normal rate and regular rhythm.      Comments: 2/6 systolic murmur best heard over R 2nd intercostal space  Pulmonary:      Effort: Pulmonary effort is normal.      Breath sounds: No wheezing.    Abdominal:      General: Bowel sounds are normal. There is no distension.      Palpations: Abdomen is soft.      Tenderness: There is no guarding.   Musculoskeletal:      Right lower leg: No edema.      Left lower leg: No edema.   Neurological:      Mental Status: She is alert.       Laboratory:  Most Recent Data:  Recent Lab Results         07/23/24  0351   07/22/24  2255        Albumin/Globulin Ratio 0.5         Albumin 2.3         ALP 48         ALT 6         Anion Gap 7.0         AST 21         Baso # 0.01         Basophil % 0.3         BILIRUBIN TOTAL 0.9         BUN 10.3         BUN/CREAT RATIO 11         Calcium 9.0         Chloride 100         CO2 27         Creatinine 0.90         eGFR >60         Eos # 0.11         Eos % 3.4         Globulin, Total 4.3         Glucose 96         Hematocrit 26.3         Hemoglobin 8.5         Immature Grans (Abs) 0.02         Immature Granulocytes 0.6         Lymph # 0.24         LYMPH % 7.4         Magnesium  1.90         MCH 26.8         MCHC 32.3         MCV 83.0         Mono # 0.23         Mono % 7.1         MPV 11.2         Neut # 2.65         Neut % 81.2         nRBC 0.0         Phosphorus Level 2.4         Platelet Count 161         Potassium 3.8         PROTEIN TOTAL 6.6         RBC 3.17         RDW 14.6         Sodium 134         Vancomycin-Trough   13.6       WBC 3.26                   Microbiology Data Reviewed: yes  Pertinent Findings:  Blood culture with 1/4 bottles + Staph capitis and Staph warneri  Urine culture with no growth    Radiology:  Imaging Results              CT Chest Without Contrast (Final result)  Result time 07/19/24 08:17:33      Final result by Wan Abreu MD (07/19/24 08:17:33)                   Impression:    Impression:    1. There are reticular, nodular and hazy opacities in the right posterior upper lobe and both lower lobes with areas of consolidation in the lower lobes, larger in the right. This is consistent with multilobar  consolidation.    2. Details and other findings as discussed above.    No significant discrepancy with overnight report      Electronically signed by: Wan Abreu  Date:    07/19/2024  Time:    08:17               Narrative:      Technique: CT Scan of the chest was performed without intravenous contrast with direct axial as well as sagittal and, coronal, reconstruction images.    Dosage Information: Automated Exposure Control was utilized.  DLP 19 9    Comparison: Correlation is with study [id number] 20:14:34.    Clinical History: Pneumonia.    Findings:    Heart: The heart size is within normal limits. Pronounced coronary artery calcification is seen.    Aorta: No thoracic aortic aneurysmal dilatation.    Lungs: Moderate streaky linear and hazy opacity is seen predominantly in the dependent portions at the lung bases consistent with dependent changes scarring and subsegmental atelectasis. There are reticular, nodular and hazy opacities in the right posterior upper lobe and both lower lobes with areas of consolidation in the lower lobes, larger in the right. This is consistent with multilobar consolidation. Subtle emphysematous change is noted in the right upper lobe.    Pleura: No effusions or pneumothorax are identified.    There is fluid-filled dilatation of the distal esophagus which may be the result of reflux disease or related to distal esophageal stricture.    Spine: Mild multilevel spondylolytic changes are seen in the thoracic spine.                        Preliminary result by Britton Kemp Jr., MD (07/19/24 01:56:35)                   Impression:    1. There are reticular, nodular and hazy opacities in the right posterior upper lobe and both lower lobes with areas of consolidation in the lower lobes, larger in the right. This is consistent with mutifocal possibly atypical pneumonia with multilobar consolidation.  2. Details and other findings as discussed above.               Narrative:     START OF REPORT:  Technique: CT Scan of the chest was performed without intravenous contrast with direct axial as well as sagittal and, coronal, reconstruction images.    Dosage Information: Automated Exposure Control was utilized.    Comparison: Correlation is with study [id number] 20:14:34.    Clinical History: Pneumonia.    Findings:  Soft Tissues: Unremarkable.  Lines and Tubes: None.  Neck: The visualized soft tissues of the neck appear unremarkable.  Mediastinum: The mediastinal structures are within normal limits.  Heart: The heart size is within normal limits. Pronounced coronary artery calcification is seen.  Aorta: Unremarkable appearing aorta.  Pulmonary Arteries: Unremarkable.  Lungs: Moderate streaky linear and hazy opacity is seen predominantly in the dependent portions at the lung bases consistent with dependent changes scarring and subsegmental atelectasis. There are reticular, nodular and hazy opacities in the right posterior upper lobe and both lower lobes with areas of consolidation in the lower lobes, larger in the right. This is consistent with mutifocal possibly atypical pneumonia with multilobar consolidation. Subtle emphysematous change is noted in the right upper lobe.  Pleura: No effusions or pneumothorax are identified.  Bony Structures:  Spine: Mild multilevel spondylolytic changes are seen in the thoracic spine.  Ribs: The bilateral ribs appear unremarkable.                                         CT Abdomen Pelvis  Without Contrast (Final result)  Result time 07/19/24 07:35:25      Final result by Wan Abreu MD (07/19/24 07:35:25)                   Impression:    Impression:    1. There are moderate opacities seen in both lower lobes (series 4 image 8) consistent with pneumonias.    2. There are numerous moderately dilated loops of fluid-filled small bowel with numerous air-fluid levels with transition point in the mid small bowel seen on series 2 image 54. This is compatible  with a small bowel obstruction.    3. No intraperitoneal free air or ascites is seen.    4. Details and other findings as discussed above.    No significant discrepancy with overnight report.      Electronically signed by: Wan Abreu  Date:    07/19/2024  Time:    07:35               Narrative:      Technique: CT of the abdomen and pelvis was performed with axial images as well as sagittal and coronal reconstruction images without intravenous contrast.    Comparison: October 5, 2022    Clinical History: Abdominal Pain Vomiting(88 yr old in /[facility] FROM NURSING HOME W REPORT OF ABD PAIN W NV AND WEAKNESS SINCE YESTERDAY. LBM THIS AM. PT REPORTS DYSURIA. SEE NURSING HOME REPORTS. EKG OBTAINED. ) Fatigue.    Dosage Information: Automated Exposure Control was utilized.    Findings:    Lines and Tubes: None.    Thorax:    Lungs: There are moderate opacities seen in both lower lobes (series 4 image 8) consistent with pneumonias.    Pleura: No effusions or thickening.    Heart:Pronounced coronary artery calcification is seen. There is extensive aortic and mitral valve calcifications seen.    Abdomen:    Abdominal Wall: No abdominal wall pathology is seen.    Within limitations of noncontrast technique, no acute findings liver, pancreas spleen identified.    Biliary System: No intrahepatic or extrahepatic biliary duct dilatation is seen.    Gallbladder: Surgical clips are seen in the gallbladder fossa consistent with prior cholecystectomy.    Adrenals: The adrenal glands appear unremarkable.    Kidneys: The left kidney appears unremarkable with no stones or hydronephrosis. A single stable peripelvic cyst measuring 2.8 cm is seen in the right kidney and for this no specific follow-up imaging is recommended.  The right kidney otherwise appears unremarkable with no stones or hydronephrosis identified.    Aorta: There is extensive calcification of the abdominal aorta and its branches.    Bowel:    Esophagus: There is a moderate  sized hiatal hernia.    Stomach: There is hiatal herniation of the cardia and part of the body of the stomach as detailed above.    Duodenum: Unremarkable appearing duodenum.    Small Bowel: There are numerous moderately dilated loops of fluid-filled small bowel with numerous air-fluid levels with transition point in the mid small bowel seen on series 2 image 54.    Colon: Unremarkable.    Appendix: The appendix is not identified but no inflammatory changes are seen in the right lower quadrant to suggest appendicitis.    Peritoneum: No intraperitoneal free air or ascites is seen.    Pelvis:    Bladder: The bladder appears unremarkable.    Female:    Uterus: The uterus is not identified.    Ovaries: No adnexal masses are seen.    Inguinal Findings:    Inguinal Hernia: Incidental note is made of small uncomplicated mesenteric fat containing bilateral inguinal hernias.    Bony structures:    Dorsal Spine: There is mild spondylosis of the visualized dorsal spine. There are hypodense lesions with prominent trabecular markings seen in T12-L3. These may represent hemangiomas.    Bony Pelvis: The visualized bony structures of the pelvis appear unremarkable.    Notifications: The results were discussed with the physician Dr. Sanders prior to dictation at 2024-07-18 20:46:47 CDT.                        Preliminary result by Britton Kemp Jr., MD (07/18/24 20:49:10)                   Impression:    1. There are moderate opacities seen in both lower lobes (series 4 image 8) consistent with pneumonias.  2. There are numerous moderately dilated loops of fluid-filled small bowel with numerous air-fluid levels with transition point in the mid small bowel seen on series 2 image 54. This is compatible with a small bowel obstruction.  3. No intraperitoneal free air or ascites is seen.  4. Details and other findings as discussed above.               Narrative:    START OF REPORT:  Technique: CT of the abdomen and pelvis was  performed with axial images as well as sagittal and coronal reconstruction images without intravenous contrast.    Comparison: None available.    Clinical History: Abdominal Pain Vomiting(88 yr old in /[facility] FROM NURSING HOME W REPORT OF ABD PAIN W NV AND WEAKNESS SINCE YESTERDAY. LBM THIS AM. PT REPORTS DYSURIA. SEE NURSING HOME REPORTS. EKG OBTAINED. ) Fatigue.    Dosage Information: Automated Exposure Control was utilized.    Findings:  Lines and Tubes: None.  Thorax:  Lungs: There are moderate opacities seen in both lower lobes (series 4 image 8) consistent with pneumonias.  Pleura: No effusions or thickening.  Heart: Moderate cardiomegaly is seen. Pronounced coronary artery calcification is seen. There is extensive aortic and mitral valve calcifications seen.  Abdomen:  Abdominal Wall: No abdominal wall pathology is seen.  Liver: The liver appears unremarkable.  Biliary System: No intrahepatic or extrahepatic biliary duct dilatation is seen.  Gallbladder: Surgical clips are seen in the gallbladder fossa consistent with prior cholecystectomy.  Pancreas: The pancreas appears unremarkable.  Spleen: The spleen appears unremarkable.  Adrenals: The adrenal glands appear unremarkable.  Kidneys: The left kidney appears unremarkable with no stones cysts masses or hydronephrosis. A single peripelvic cyst measuring 2.8 cm is seen in the right kidney. The right kidney otherwise appears unremarkable with no stones masses or hydronephrosis identified.  Aorta: There is extensive calcification of the abdominal aorta and its branches.  IVC: Unremarkable.  Bowel:  Esophagus: There is a moderate sized hiatal hernia.  Stomach: There is hiatal herniation of the cardia and part of the body of the stomach as detailed above.  Duodenum: Unremarkable appearing duodenum.  Small Bowel: There are numerous moderately dilated loops of fluid-filled small bowel with numerous air-fluid levels with transition point in the mid small bowel seen on  series 2 image 54.  Colon: Unremarkable.  Appendix: The appendix is not identified but no inflammatory changes are seen in the right lower quadrant to suggest appendicitis.  Peritoneum: No intraperitoneal free air or ascites is seen.    Pelvis:  Bladder: The bladder appears unremarkable.  Female:  Uterus: The uterus is not identified.  Ovaries: No adnexal masses are seen.  Inguinal Findings:  Inguinal Hernia: Incidental note is made of small uncomplicated mesenteric fat containing bilateral inguinal hernias.    Bony structures:  Dorsal Spine: There is mild spondylosis of the visualized dorsal spine. There are hypodense lesions with prominent trabecular markings seen in T12-L3. These may represent hemangiomas.  Bony Pelvis: The visualized bony structures of the pelvis appear unremarkable.    Notifications: The results were discussed with the physician Dr. Sanders prior to dictation at 2024-07-18 20:46:47 CDT.                                         X-Ray Chest AP Portable (Final result)  Result time 07/18/24 18:49:26      Final result by Flor Hernandez MD (07/18/24 18:49:26)                   Impression:      Diffuse bilateral interstitial infiltrates with developing consolidation in the right lower lobe    Prominence of the right paratracheal region.  CT scan correlation is recommended.      Electronically signed by: Chip Hernandez  Date:    07/18/2024  Time:    18:49               Narrative:    EXAMINATION:  XR CHEST AP PORTABLE    CLINICAL HISTORY:  Sepsis;    TECHNIQUE:  Single frontal view of the chest was performed.    COMPARISON:  06/30/2024    FINDINGS:  There are diffuse bilateral interstitial infiltrates with a area of consolidation developing in the right lower lobe.  There is also increased opacity in the right paratracheal region.  This was seen on the prior examination as well.  The heart is normal appearance.  The pulmonary vascularity is unremarkable.  Bones and joints show no acute  abnormality.                                      Current Medications:     Infusions:   lactated ringers   Intravenous Continuous 100 mL/hr at 07/23/24 0322 New Bag at 07/23/24 0322        Scheduled:   doxycycline IV (PEDS and ADULTS)  100 mg Intravenous Q12H    heparin (porcine)  5,000 Units Subcutaneous Q8H    mupirocin   Nasal BID    pantoprazole  40 mg Intravenous Daily    piperacillin-tazobactam (Zosyn) IV (PEDS and ADULTS) (extended infusion is not appropriate)  4.5 g Intravenous Q8H    vancomycin (VANCOCIN) IV (PEDS and ADULTS)  1,000 mg Intravenous Q24H        PRN:    Current Facility-Administered Medications:     0.9% NaCl, , Intravenous, PRN    acetaminophen, 650 mg, Oral, Q8H PRN    dextrose 10%, 12.5 g, Intravenous, PRN    dextrose 10%, 25 g, Intravenous, PRN    glucagon (human recombinant), 1 mg, Intramuscular, PRN    glucose, 16 g, Oral, PRN    glucose, 24 g, Oral, PRN    melatonin, 6 mg, Oral, Nightly PRN    metoprolol, 5 mg, Intravenous, Q5 Min PRN    naloxone, 0.02 mg, Intravenous, PRN    ondansetron, 4 mg, Intravenous, Q8H PRN    prochlorperazine, 5 mg, Intravenous, Q6H PRN    sodium chloride 0.9%, 10 mL, Intravenous, Q12H PRN    Pharmacy to dose Vancomycin consult, , , Once **AND** vancomycin - pharmacy to dose, , Intravenous, pharmacy to manage frequency    Antibiotics and Day Number of Therapy:  Vanc, Zosyn and Azithro started 7/18  Stop Vanc 7/23      Intake/Output Summary (Last 24 hours) at 7/23/2024 1040  Last data filed at 7/23/2024 0627  Gross per 24 hour   Intake 2849.4 ml   Output 1900 ml   Net 949.4 ml       Lines/Drains/Airways       Drain  Duration                  Urethral Catheter 07/19/24 2134 3 days         NG/OG Tube 07/21/24 1940 16 Fr. Right nostril 1 day              Peripheral Intravenous Line  Duration                  Peripheral IV - Single Lumen 07/18/24 1846 20 G Anterior;Proximal;Right Forearm 4 days         Peripheral IV - Single Lumen 07/19/24 1200 22 G Anterior;Left  Forearm 3 days                  Assessment and Plan    Small bowel obstruction   Nausea, vomiting  - Surgery following  - NPO with no medications  - NGT in place with 500cc output overnight  - NGT clamp trials today per surgery team recs  - anti-emetics PRN in place, protonix   - Given hyponatremia will hold off on starting Clinimix at this time.   - Pending nutrition consult      Severe sepsis with suspected sources below  Right lower lobe pneumonia, possible aspiration  Acute cystitis on UA  Recent UTI growing Candida tropicalis  - Started on broad spectrum Vanc/Zosyn/Doxycycline d/t QT prolongation on last EKG. Will stop Vanc at this time as no need for MRSA coverage.   - MRSA PCR neg.   - Lactic acid resolved   - blood cultures x2 collected 7/18: 1/2 bottles gram + cocci, probable staph. BCID panel with Staph.  - urine culture with no growth  - COVID/Flu neg  - IS ordered today      Normocytic anemia - stable  -Stable this AM  -Continue to monitor with AM cbc     Acute kidney injury, likely ATN - resolved  - Kidney functions back at baseline  - Continue with IVF rehydration while NPO  - Baseline of ~1.0     NSTEMI, likely Type II demand ischemia - resolved  - Trop trended down; likely 2/2 to demand  - Holding home ASA 81, statin due to strict NPO     Questionable right paratracheal/mass/opacity on CXR  - CT chest showed reticular, nodular and hazy opacities in the right posterior upper lobe and both lower lobes with areas of consolidation in the lower lobes, larger in the right. This is consistent with multilobar consolidation. And fluid-filled dilatation of the distal esophagus which may be the result of reflux disease or related to distal esophageal stricture      Severe aortic stenosis  Coronary artery disease  Hypertension  Hyperlipidemia  - last Echo 10/2023 EF 65-70%, severe aortic stenosis  - hold home ACEi and statin  - consider repeat Echo in future if showing signs of fluid overload     Paroxysmal  atrial fibrillation  - Holding home Eliquis; switched from Lovenox to heparin this AM in case need for surgery  - currently NSR  - Holding home metoprolol; Lopressor pushes available prn     Anxiety  - Holding home Lexapro     History of falls  Chronic dizziness, at baseline  - admission 7/1/2024 for fall, currently in SNF for rehab, likely discharge back to SNF when able  - previously walked with a cane, currently requiring rolling walker  - OT/PT consulted  - fall precautions, delirium precautions     CODE STATUS: DNR  Access: PIV  Antibiotics: Zosyn/Doxy  Diet: NPO  DVT Prophylaxis: Heparin 5000 q8h  GI Prophylaxis: Pepcid IV  Fluids:  cc/hr      Disposition: Admit for SBO, no surgical intervention needed at this time. NG tube in place; monitor output. Continue on Zosyn/Doxy at this time. Keep NPO, home medications changed to IV or being held if not available. Nutrition consulted for PPN recs. Came from nursing home; may need case management consult to ensure return upon discharge.        Alonso Foster MD  [facility]

## 2024-07-23 NOTE — MEDICAL/APP STUDENT
"[facility] - Purple Team  Daily Progress Note    Patient ID: Fifi Chacon, 89y/o F    Subjective:  AF, HDS  NGT in place  NGT with 800ml billious output in last 24 hours   Denies nausea, vomiting, or BM  Passing flatus per nursing and family     Objective:    Vitals:  Vitals:    07/23/24 0400   BP:    Pulse: 73   Resp:    Temp:         Intake/Output:    Intake/Output Summary (Last 24 hours) at 7/23/2024 0534  Last data filed at 7/23/2024 0340  Gross per 24 hour   Intake 2849.4 ml   Output 4150 ml   Net -1300.6 ml        Physical Exam:  Gen: NAD  Neuro: awake, alert, answering questions appropriately  CV: RR  Resp: non-labored breathing, satting well on 2L NC  Abd: soft, ND, denies tenderness   Ext: moves all 4 spontaneously and purposefully  Skin: warm, well perfused  L/D/A: 22G IV L forearm, 20G IV R forearm, urethral catheter, NG Tube     Labs:  Renal:  Recent Labs     07/21/24 0319 07/22/24 0417 07/23/24 0351   BUN 21.3* 14.4 10.3   CREATININE 1.07* 0.95 0.90     No results for input(s): "LACTIC" in the last 72 hours.    FENGI:  Recent Labs     07/21/24 0319 07/22/24 0417 07/23/24 0351    135* 134*   K 3.8 3.8 3.8    101 100   CO2 28 28 27   CALCIUM 9.0 9.1 9.0   MG 2.10 1.70 1.90   PHOS 2.3 2.4 2.4   ALBUMIN 2.2* 2.2* 2.3*   BILITOT 0.7 0.8 0.9   AST 18 18 21   ALKPHOS 41 47 48   ALT 7 6 6       Heme:  Recent Labs     07/21/24 0319 07/22/24 0417 07/23/24  0351   HGB 7.9* 8.1* 8.5*   HCT 25.0* 25.8* 26.3*    141 161       ID:  Recent Labs     07/21/24 0319 07/22/24 0417 07/23/24  0351   WBC 3.77* 2.67* 3.26*       CBG:  Recent Labs     07/21/24 0319 07/22/24  0417 07/23/24  0351   GLUCOSE 89 89 96        Cardiovascular:  Recent Labs   Lab 07/19/24  0008 07/19/24  0607 07/19/24  1154   TROPONINI 0.412* 0.377* 0.313*       I have reviewed all pertinent lab results within the past 24 hours.    Imaging:  X RAY ABDOMEN AP 1 VIEW - 07/21/2024 AT 0457  Impression:     Small bowel " follow-through enteric contrast has reached the colon and rectum before 24 hours without evidence of complete obstruction.    Micro/Path/Other:  None    Assessment/Plan:  88-year-old with PMHx of HTN, HLD, and afib on Eliquis presenting with pneumonia. CT shows evidence of a small-bowel obstruction with a transition point. Pt currently AF and HDS. SBFT and KUB with contrast in colon/rectum, however still no flatus or BM.     - Recommend starting TPN   - NGT to LIMWS  - OOB to chair TID, mobilize, PT/OT   - Serial abdominal exams  - NPO  - mIVF  - enema Sunday, still no bowel movements  - rest of care per primary  - surgery will continue to follow       MCKENZIE Suarez  [facility]  07/23/2024

## 2024-07-23 NOTE — PT/OT/SLP PROGRESS
Physical Therapy Treatment    Patient Name:  Fifi Chacon   MRN:  [id number]    Recommendations     Therapy Intensity Recommendations at Discharge: Moderate Intensity Therapy  Discharge Equipment Recommendations:  (TBD - currently - hospital bed)   Barriers to discharge: fall risk, severity of deficits, level of skilled assistance required, decreased endurance, impaired cognitive status, decreased safety awareness, and insight into deficits    Assessment     Fifi Chacon is a 88 y.o. female admitted with a medical diagnosis of Small bowel obstruction.  1. GRACIE (acute kidney injury)    2. Weakness    3. Vomiting    4. Severe sepsis    5. Chest pain    6. Elevated troponin    7. Small bowel obstruction    8. Acute urinary retention       Patient Active Problem List   Diagnosis    Vaginal prolapse    Pessary maintenance    Hematuria    Hypertension    Atrophy of vagina    Gastroesophageal reflux disease    History of iron deficiency    Hydroureter    Rheumatoid arthritis    Vitamin D deficiency    Anxiety    Depressive disorder    Renal cyst    CAD (coronary artery disease)    Paroxysmal A-fib    Severe aortic stenosis    Acute cystitis without hematuria    Small bowel obstruction    Severe sepsis    GRACIE (acute kidney injury)    Pneumonia    Elevated troponin    Malnutrition      She presents with the following impairments/functional limitations:  weakness, gait instability, impaired balance, impaired endurance, impaired self care skills, impaired functional mobility, impaired cognition, decreased safety awareness, decreased lower extremity function, decreased upper extremity function, impaired coordination, impaired fine motor.    Rehab Prognosis: Fair.    Patient would benefit from continued skilled acute PT services to: address above listed impairments/functional limitations; receive patient/caregiver education; reduce fall risk; and maximize independency/safety with functional mobility.    -continued: cardiac  chair positioning in bed, sitting edge of bed, standing edge of bed, as tolerated/appropriate, with assistance and supervision, AND continued attempts at steps (fwd/bwd/side) at edge of bed    Recent Surgery: * No surgery found *      Plan     During this hospitalization, patient to be seen 5 x/week to address the identified impairments/functional limitations via gait training, therapeutic activities, therapeutic exercises, neuromuscular re-education, wheelchair management/training and progress toward the established goals.    Plan of Care Expires:  08/18/24    Subjective     Communicated with patient's nurse Ezio prior to session.    Patient agreeable to participate in treatment session.    Chief Complaint: none verbalized  Patient/Family Comments/goals: none verbalized  Pain/Comfort:  Pain Rating 1: 0/10  Pain Addressed 1: Nurse notified  Pain Rating Post-Intervention 1: 0/10    Objective     Patient found supine in bed, with HOB elevated, and with bed rails up bilateral HOB, left FOB, and right FOB with telemetry, pulse ox (continuous), peripheral IV, oxygen, canales catheter, pressure relief boots, NG tube, bed alarm, bilateral hand mittens, upon PT entry to room.    General Precautions: Standard, fall   Orthopedic Precautions:N/A   Braces: N/A  Respiratory Status: O2 via nasal cannula  SAT O2 Check: n/a    Functional Mobility: (bilateral hand mittens removed, generally slowed/guarded mvmts requiring multiple cues/coaxing to participate)    Bed Mobility:  Rolling Left: moderate assistance  Rolling Right: moderate assistance  Scooting: minimal-moderate assistance  Supine to Sit: moderate assistance  Sit to Supine: moderate assistance  with cues for proper technique, UE management, LE management, and maintenance of precautions  with HOB elevated, hand rail, and firm mattress    Transfers:  Sit to Stand: moderate assistance and of 2 persons with hand-held assist and rolling walker  with cues for hand placement,  foot placement, and posture  with firm mattress and elevated bed surface    Gait:  Does not occur - patient able to move Lt foot to midline but is unable to move Rt foot  During standing activities - patients bed was un-braked and moved toward FOB to facilitate improved positioning in bed at tx end    Other Mobility:  Therapeutic Activities performed:        -sitting balance activities:    *bilateral  socks adjusted  *patient combed hair w/ therapists' assistance  *patient washed face            scooting: (multiple cues/coaxing)                 -forward                 -backward            repositioning at edge of bed       -standing balance activities:              weight shifting:                   -laterally (left)                 -laterally (right)    Balance:  Sit  Patient demonstrated static balance on level surface with minimum assistance with minimal verbal cues.  dynamic balance - not assessed.  Stand  Patient demonstrated static balance on level surface  using hand-held assist and rolling walker with moderate assistance and of 2 persons with moderate verbal cues.    Patient left with HOB elevated, with bed rails up bilateral HOB, left FOB, and right FOB, and in chair position in bed with telemetry, pulse ox (continuous), peripheral IV, oxygen, canales catheter, pressure relief boots, NG tube, with bilateral UE's elevated on pillows, with bilateral hand mittens on standby (per patients nurse) with all lines intact, call button in reach, tray table at bedside, patient's nurse notified, bed alarm on and patients daughter into room at tx end..    Goals     Multidisciplinary Problems       Physical Therapy Goals       Problem: Physical Therapy    Goal Priority Disciplines Outcome Goal Variances Interventions   Physical Therapy Goal     PT, PT/OT Progressing     Description: REVIEWED 24  Goals to be met by: DISCHARGE  Patient will increase functional independence with mobility by performin. Supine  to sit with Contact Guard Assistance - ONGOING  2. Sit to stand transfer with Contact Guard Assistance - ONGOING  3. Gait  x 20 feet with Minimal Assistance using Rolling Walker - ONGOING           Time Tracking     PT Received On: 07/23/24  PT Start Time: 1049     PT Stop Time: 1117  PT Total Time (min): 28 min     Billable Minutes: Therapeutic Activity 28  *co-treatment 2/2 assistance of 2 required to maintain safety and perform transitional and static activities (verbal/tactile cues required throughout therapy session)  Non-Billable Minutes: n/a  07/23/2024

## 2024-07-23 NOTE — PLAN OF CARE
Problem: Occupational Therapy  Goal: Occupational Therapy Goal  Description: Patient will perform EOB grooming in unsupported sitting with Min A. Goal Met 7/23/2024  Patient will perform bed mobility while rolling right/left with Min A.  Patient will perform 5 sit to stand transitions from bed level with Mod A x 2 and RW for preparation of ADLs.  Patient will perform BSC transfer with RW and Mod A.   Outcome: Progressing

## 2024-07-24 LAB
ALBUMIN SERPL-MCNC: 2.1 G/DL (ref 3.4–4.8)
ALBUMIN/GLOB SERPL: 0.5 RATIO (ref 1.1–2)
ALP SERPL-CCNC: 41 UNIT/L (ref 40–150)
ALT SERPL-CCNC: 7 UNIT/L (ref 0–55)
ANION GAP SERPL CALC-SCNC: 6 MEQ/L
AST SERPL-CCNC: 19 UNIT/L (ref 5–34)
BASOPHILS # BLD AUTO: 0.01 X10(3)/MCL
BASOPHILS NFR BLD AUTO: 0.3 %
BILIRUB SERPL-MCNC: 0.6 MG/DL
BUN SERPL-MCNC: 11.9 MG/DL (ref 9.8–20.1)
CALCIUM SERPL-MCNC: 8.9 MG/DL (ref 8.4–10.2)
CHLORIDE SERPL-SCNC: 104 MMOL/L (ref 98–107)
CO2 SERPL-SCNC: 28 MMOL/L (ref 23–31)
CREAT SERPL-MCNC: 0.92 MG/DL (ref 0.55–1.02)
CREAT/UREA NIT SERPL: 13
EOSINOPHIL # BLD AUTO: 0.11 X10(3)/MCL (ref 0–0.9)
EOSINOPHIL NFR BLD AUTO: 3.2 %
ERYTHROCYTE [DISTWIDTH] IN BLOOD BY AUTOMATED COUNT: 14.6 % (ref 11.5–17)
GFR SERPLBLD CREATININE-BSD FMLA CKD-EPI: 60 ML/MIN/1.73/M2
GLOBULIN SER-MCNC: 4.3 GM/DL (ref 2.4–3.5)
GLUCOSE SERPL-MCNC: 130 MG/DL (ref 82–115)
HCT VFR BLD AUTO: 26.9 % (ref 37–47)
HGB BLD-MCNC: 8.8 G/DL (ref 12–16)
IMM GRANULOCYTES # BLD AUTO: 0.03 X10(3)/MCL (ref 0–0.04)
IMM GRANULOCYTES NFR BLD AUTO: 0.9 %
LYMPHOCYTES # BLD AUTO: 0.26 X10(3)/MCL (ref 0.6–4.6)
LYMPHOCYTES NFR BLD AUTO: 7.5 %
MAGNESIUM SERPL-MCNC: 1.7 MG/DL (ref 1.6–2.6)
MCH RBC QN AUTO: 27.5 PG (ref 27–31)
MCHC RBC AUTO-ENTMCNC: 32.7 G/DL (ref 33–36)
MCV RBC AUTO: 84.1 FL (ref 80–94)
MONOCYTES # BLD AUTO: 0.18 X10(3)/MCL (ref 0.1–1.3)
MONOCYTES NFR BLD AUTO: 5.2 %
NEUTROPHILS # BLD AUTO: 2.89 X10(3)/MCL (ref 2.1–9.2)
NEUTROPHILS NFR BLD AUTO: 82.9 %
NRBC BLD AUTO-RTO: 0 %
PHOSPHATE SERPL-MCNC: 2.5 MG/DL (ref 2.3–4.7)
PLATELET # BLD AUTO: 157 X10(3)/MCL (ref 130–400)
PMV BLD AUTO: 11.1 FL (ref 7.4–10.4)
POTASSIUM SERPL-SCNC: 3.7 MMOL/L (ref 3.5–5.1)
PROT SERPL-MCNC: 6.4 GM/DL (ref 5.8–7.6)
RBC # BLD AUTO: 3.2 X10(6)/MCL (ref 4.2–5.4)
SODIUM SERPL-SCNC: 138 MMOL/L (ref 136–145)
VANCOMYCIN TROUGH SERPL-MCNC: 15.5 UG/ML (ref 15–20)
WBC # BLD AUTO: 3.48 X10(3)/MCL (ref 4.5–11.5)

## 2024-07-24 PROCEDURE — 97530 THERAPEUTIC ACTIVITIES: CPT

## 2024-07-24 PROCEDURE — 25000003 PHARM REV CODE 250

## 2024-07-24 PROCEDURE — 84100 ASSAY OF PHOSPHORUS: CPT

## 2024-07-24 PROCEDURE — 21400001 HC TELEMETRY ROOM

## 2024-07-24 PROCEDURE — 63600175 PHARM REV CODE 636 W HCPCS

## 2024-07-24 PROCEDURE — 85025 COMPLETE CBC W/AUTO DIFF WBC: CPT

## 2024-07-24 PROCEDURE — 25000003 PHARM REV CODE 250: Performed by: FAMILY MEDICINE

## 2024-07-24 PROCEDURE — B4185 PARENTERAL SOL 10 GM LIPIDS: HCPCS

## 2024-07-24 PROCEDURE — 36415 COLL VENOUS BLD VENIPUNCTURE: CPT

## 2024-07-24 PROCEDURE — 63600175 PHARM REV CODE 636 W HCPCS: Performed by: FAMILY MEDICINE

## 2024-07-24 PROCEDURE — 83735 ASSAY OF MAGNESIUM: CPT

## 2024-07-24 PROCEDURE — 94761 N-INVAS EAR/PLS OXIMETRY MLT: CPT

## 2024-07-24 PROCEDURE — 80053 COMPREHEN METABOLIC PANEL: CPT

## 2024-07-24 PROCEDURE — 27000221 HC OXYGEN, UP TO 24 HOURS

## 2024-07-24 RX ORDER — ESCITALOPRAM OXALATE 10 MG/1
10 TABLET ORAL DAILY
Status: DISCONTINUED | OUTPATIENT
Start: 2024-07-24 | End: 2024-07-26

## 2024-07-24 RX ORDER — HYDRALAZINE HYDROCHLORIDE 20 MG/ML
10 INJECTION INTRAMUSCULAR; INTRAVENOUS EVERY 6 HOURS PRN
Status: DISCONTINUED | OUTPATIENT
Start: 2024-07-24 | End: 2024-07-30 | Stop reason: HOSPADM

## 2024-07-24 RX ADMIN — VANCOMYCIN HYDROCHLORIDE 1000 MG: 1 INJECTION, POWDER, LYOPHILIZED, FOR SOLUTION INTRAVENOUS at 12:07

## 2024-07-24 RX ADMIN — SODIUM CHLORIDE, POTASSIUM CHLORIDE, SODIUM LACTATE AND CALCIUM CHLORIDE: 600; 310; 30; 20 INJECTION, SOLUTION INTRAVENOUS at 05:07

## 2024-07-24 RX ADMIN — PIPERACILLIN SODIUM AND TAZOBACTAM SODIUM 4.5 G: 4; .5 INJECTION, POWDER, LYOPHILIZED, FOR SOLUTION INTRAVENOUS at 02:07

## 2024-07-24 RX ADMIN — HEPARIN SODIUM 5000 UNITS: 5000 INJECTION INTRAVENOUS; SUBCUTANEOUS at 05:07

## 2024-07-24 RX ADMIN — PIPERACILLIN SODIUM AND TAZOBACTAM SODIUM 4.5 G: 4; .5 INJECTION, POWDER, LYOPHILIZED, FOR SOLUTION INTRAVENOUS at 11:07

## 2024-07-24 RX ADMIN — SODIUM CHLORIDE, POTASSIUM CHLORIDE, SODIUM LACTATE AND CALCIUM CHLORIDE: 600; 310; 30; 20 INJECTION, SOLUTION INTRAVENOUS at 06:07

## 2024-07-24 RX ADMIN — LEUCINE, PHENYLALANINE, LYSINE, METHIONINE, ISOLEUCINE, VALINE, HISTIDINE, THREONINE, TRYPTOPHAN, ALANINE, GLYCINE, ARGININE, PROLINE, SERINE, TYROSINE, SODIUM ACETATE, DIBASIC POTASSIUM PHOSPHATE, MAGNESIUM CHLORIDE, SODIUM CHLORIDE, CALCIUM CHLORIDE, DEXTROSE
311; 238; 247; 170; 255; 247; 204; 179; 77; 880; 438; 489; 289; 213; 17; 297; 261; 51; 77; 33; 5 INJECTION INTRAVENOUS at 08:07

## 2024-07-24 RX ADMIN — MUPIROCIN: 20 OINTMENT TOPICAL at 10:07

## 2024-07-24 RX ADMIN — MUPIROCIN: 20 OINTMENT TOPICAL at 08:07

## 2024-07-24 RX ADMIN — HEPARIN SODIUM 5000 UNITS: 5000 INJECTION INTRAVENOUS; SUBCUTANEOUS at 02:07

## 2024-07-24 RX ADMIN — HEPARIN SODIUM 5000 UNITS: 5000 INJECTION INTRAVENOUS; SUBCUTANEOUS at 09:07

## 2024-07-24 RX ADMIN — METOPROLOL SUCCINATE 12.5 MG: 25 TABLET, EXTENDED RELEASE ORAL at 02:07

## 2024-07-24 RX ADMIN — PANTOPRAZOLE SODIUM 40 MG: 40 INJECTION, POWDER, FOR SOLUTION INTRAVENOUS at 08:07

## 2024-07-24 RX ADMIN — I.V. FAT EMULSION 250 ML: 20 EMULSION INTRAVENOUS at 09:07

## 2024-07-24 RX ADMIN — DOXYCYCLINE 100 MG: 100 INJECTION, POWDER, LYOPHILIZED, FOR SOLUTION INTRAVENOUS at 09:07

## 2024-07-24 RX ADMIN — PIPERACILLIN SODIUM AND TAZOBACTAM SODIUM 4.5 G: 4; .5 INJECTION, POWDER, LYOPHILIZED, FOR SOLUTION INTRAVENOUS at 12:07

## 2024-07-24 RX ADMIN — ESCITALOPRAM OXALATE 10 MG: 10 TABLET ORAL at 02:07

## 2024-07-24 RX ADMIN — DOXYCYCLINE 100 MG: 100 INJECTION, POWDER, LYOPHILIZED, FOR SOLUTION INTRAVENOUS at 10:07

## 2024-07-24 RX ADMIN — PIPERACILLIN SODIUM AND TAZOBACTAM SODIUM 4.5 G: 4; .5 INJECTION, POWDER, LYOPHILIZED, FOR SOLUTION INTRAVENOUS at 06:07

## 2024-07-24 NOTE — PT/OT/SLP PROGRESS
Physical Therapy Treatment    Patient Name:  Fifi Chacon   MRN:  [id number]    Recommendations     Therapy Intensity Recommendations at Discharge: Moderate Intensity Therapy  Discharge Equipment Recommendations:  (TBD - currently - hospital bed)   Barriers to discharge: none, fall risk, severity of deficits, level of skilled assistance required, decreased endurance, impaired cognitive status, decreased safety awareness, and insight into deficits    Assessment     Fifi Chacon is a 88 y.o. female admitted with a medical diagnosis of Small bowel obstruction.  1. GRACIE (acute kidney injury)    2. Weakness    3. Vomiting    4. Severe sepsis    5. Chest pain    6. Elevated troponin    7. Small bowel obstruction    8. Acute urinary retention       Patient Active Problem List   Diagnosis    Vaginal prolapse    Pessary maintenance    Hematuria    Hypertension    Atrophy of vagina    Gastroesophageal reflux disease    History of iron deficiency    Hydroureter    Rheumatoid arthritis    Vitamin D deficiency    Anxiety    Depressive disorder    Renal cyst    CAD (coronary artery disease)    Paroxysmal A-fib    Severe aortic stenosis    Acute cystitis without hematuria    Small bowel obstruction    Severe sepsis    GRACIE (acute kidney injury)    Pneumonia    Elevated troponin    Malnutrition      She presents with the following impairments/functional limitations:  weakness, gait instability, impaired endurance, impaired balance, impaired self care skills, impaired functional mobility, impaired cognition, decreased safety awareness, decreased upper extremity function, decreased lower extremity function, decreased ROM, impaired fine motor, impaired coordination.    Rehab Prognosis: Good.    Patient would benefit from continued skilled acute PT services to: address above listed impairments/functional limitations; receive patient/caregiver education; reduce fall risk; and maximize independency/safety with functional  mobility.    -continued: cardiac chair positioning in bed, sitting edge of bed, standing edge of bed, side steps at edge of bed, as tolerated/appropriate, with assistance and supervision    Recent Surgery: * No surgery found *      Plan     During this hospitalization, patient to be seen 5 x/week to address the identified impairments/functional limitations via gait training, therapeutic activities, therapeutic exercises, neuromuscular re-education and progress toward the established goals.    Plan of Care Expires:  08/18/24    Subjective     Communicated with patient's nurse Presley prior to session.    Patient agreeable to participate in treatment session.    Chief Complaint: none  Patient/Family Comments/goals: return to highest pre-admit PLOF  Pain/Comfort:  Pain Rating 1: 0/10  Pain Addressed 1: Nurse notified  Pain Rating Post-Intervention 1: 0/10    Objective     Patient found supine in bed, with HOB elevated, and with bed rails up bilateral HOB, left FOB, and right FOB with telemetry, pulse ox (continuous), peripheral IV, oxygen, canales catheter, pressure relief boots, NG tube  upon PT entry to room.    General Precautions: Standard, fall   Orthopedic Precautions:N/A   Braces: N/A  Respiratory Status: 3 liters/min O2 via nasal cannula  SAT O2 Check: n/a    Functional Mobility:    Bed Mobility:  Rolling Left: minimum assistance  Rolling Right: minimum assistance  Scooting: contact guard assistance  Supine to Sit: moderate assistance  Sit to Supine: moderate assistance  with cues for proper technique, UE management, and LE management  with HOB elevated, hand rail, and firm mattress    Transfers:  Sit to Stand: moderate assistance and of 2 persons with hand-held assist and rolling walker  with cues for hand placement, foot placement, and posture  with firm mattress and elevated bed surface    Gait:  Patient side stepped Rt (toward HOB) x1ft  Sitting rest (patient sat back onto bed surface without warning  Patient  side stepped Rt x1ft  With hand-held assist and rolling walker and minimum assistance and of 2 persons.  Patient demonstrates :       unsteady gait       decreased bilateral step length (Rt decreased more so vs Lt)       decreased bilateral foot/floor clearance       inconsistent bilateral foot placement       shortened step on bilateral       flexed posture       multiple cues/coaxing    Other Mobility:  Therapeutic Activities performed:        -sitting balance activities:    *bilateral  socks adjusted  *OT Nils combed patients hair            scooting: (multiple cues/coaxing)                 -forward                 -backward            repositioning at edge of bed    Other Mobility:  Therapeutic Exercises performed:   1 set times 10 repetitions  active-assist to passive range of motion  bilat lower extremity       -bed level exercises:              hip/knee flexion            hip abduction            hip adduction            heel slides            ankle pumps    Balance:  Sit  Patient demonstrated static balance on level surface with minimum assistance with minimal verbal cues.  dynamic balance - not assessed.  Stand  Patient demonstrated static balance on level surface  using hand-held assist and rolling walker with moderate assistance and of 2 persons with moderate verbal cues.     Patient left with HOB elevated, with bed rails up bilateral HOB, left FOB, and right FOB, and in chair position in bed with telemetry, pulse ox (continuous), peripheral IV, oxygen, canales catheter, pressure relief boots, NG tube, with bilateral UE's elevated on pillows, with bilateral hand mittens on standby (per patients nurse) with all lines intact, call button in reach, tray table at bedside, patient's nurse notified, bed alarm on and patients daughter into room at tx end..    Education     Patient educated on the importance of early mobility to prevent functional decline during hospital stay.  Patient educated on and assisted  with functional mobility as noted above.  Patient educated on PT Plan of Care and role of PT in acute care.  Patient was instructed to utilize staff assistance for mobility/transfers.  White board updated regarding patient's safest level of mobility with staff assistance.    Goals     Multidisciplinary Problems       Physical Therapy Goals       Problem: Physical Therapy    Goal Priority Disciplines Outcome Goal Variances Interventions   Physical Therapy Goal     PT, PT/OT Progressing     Description: REVIEWED 24  Goals to be met by: DISCHARGE  Patient will increase functional independence with mobility by performin. Supine to sit with Contact Guard Assistance - ONGOING  2. Sit to stand transfer with Contact Guard Assistance - ONGOING  3. Gait  x 20 feet with Minimal Assistance using Rolling Walker - ONGOING  4. Bilateral LE general ROM ex's w/ active-assist x10 reps each - all joints/planes           Time Tracking     PT Received On: 24  PT Start Time: 1118     PT Stop Time: 1146  PT Total Time (min): 28 min     Billable Minutes: Therapeutic Activity 23 and Therapeutic Exercise 5  *co-treatment 2/2 assistance of 2 required to maintain safety and perform transitional and static activities (verbal/tactile cues and coaxing required throughout therapy session)   Non-Billable Minutes: n/a  2024

## 2024-07-24 NOTE — PT/OT/SLP PROGRESS
Occupational Therapy   Treatment    Name: Fifi Chacon  MRN: [id number]  Admitting Diagnosis:  Small bowel obstruction       Recommendations:     Discharge Recommendations: Moderate Intensity Therapy  Discharge Equipment Recommendations:  none  Barriers to discharge:  None    Assessment:     Fifi Chacon is a 88 y.o. female with a medical diagnosis of Small bowel obstruction.  She presents with functional decline and impaired occupational performance. Performance deficits affecting function are impaired endurance, weakness, impaired self care skills, impaired functional mobility, gait instability, impaired balance, decreased coordination, decreased safety awareness, decreased lower extremity function, decreased upper extremity function, impaired cardiopulmonary response to activity.     Rehab Prognosis:  Good; patient would benefit from acute skilled OT services to address these deficits and reach maximum level of function.       Plan:     Patient to be seen 3 x/week to address the above listed problems via self-care/home management, therapeutic activities, therapeutic exercises  Plan of Care Expires:  (d/c)  Plan of Care Reviewed with: patient, family    Subjective     Chief Complaint: no complaints at this time  Patient/Family Comments/goals: maximize independence   Pain/Comfort:  Pain Rating 1: 0/10  Location 1: back  Pain Addressed 1: Distraction, Cessation of Activity, Reposition, Nurse notified  Pain Rating Post-Intervention 1: 0/10    Objective:     Communicated with: nurse prior to session. Patient found supine with telemetry, pulse ox (continuous), peripheral IV, oxygen, canales catheter, pressure relief boots, NG tube upon OT entry to room.    General Precautions: Standard, fall    Orthopedic Precautions:N/A  Braces: N/A  Respiratory Status: Nasal cannula, flow 3 L/min     Occupational Performance:     Bed Mobility:    Patient completed Rolling/Turning to Left with  minimum assistance  Patient completed  Rolling/Turning to Right with minimum assistance  Patient completed Scooting/Bridging with stand by assistance  Patient completed Supine to Sit with moderate assistance  Patient completed Sit to Supine with moderate assistance     Functional Mobility/Transfers:  Patient completed Sit <> Stand Transfer with moderate assistance and of 2 persons  with  rolling walker   Functional Mobility: patient able to side step towards HOB and with max cueing and difficulty with lifting legs    Treatment & Education:  Pt. educated on OT goals, POC, orientation to environment, use of call bell for assist with transfers OOB or for any other needs due to fall risk.    Patient left up in chair with all lines intact, call button in reach, and nurse notified    GOALS:   Multidisciplinary Problems       Occupational Therapy Goals          Problem: Occupational Therapy    Goal Priority Disciplines Outcome Interventions   Occupational Therapy Goal     OT, PT/OT Progressing    Description: Patient will perform EOB grooming in unsupported sitting with Min A. Goal Met 7/23/2024  Patient will perform bed mobility while rolling right/left with Min A.  Patient will perform 5 sit to stand transitions from bed level with Mod A x 2 and RW for preparation of ADLs.  Patient will perform BSC transfer with RW and Mod A.                        Time Tracking:     OT Date of Treatment: 07/24/24  OT Start Time: 1123  OT Stop Time: 1146  OT Total Time (min): 23 min    Billable Minutes:Therapeutic Activity 23    OT/SANIYA: OT        7/24/2024

## 2024-07-24 NOTE — CARE UPDATE
TRANSITION OF CARE PROGRESS NOTE    I have received checkout from Dr. Alonso Foster regarding this patient.     HO-I assessment:  Admission diagnosis: Vomiting [R11.10]  Weakness [R53.1]  Elevated troponin [R79.89]  GRACIE (acute kidney injury) [N17.9]  Chest pain [R07.9]  Severe sepsis [A41.9, R65.20]   Overnight management: Patient admitted for small bowel obstruction. Currently NPO. Starting PPN. Surgery will clamp NGT tomorrow.   Code status: DNR    Please call Dr. Ceasar Ye at [phone] from 07/23/2024 4PM to 07/24/2024 7AM if any issues arise.    Sadiq Romero MD  [facility], HO-I

## 2024-07-24 NOTE — NURSING
Pt canales catheter was placed 07/19/2024. Pt brief and chris pad covered in urine. Possible leak. Deflated balloon 10mL water out then re inflated with same 10 mL. Urine is still being collected in collection bag. Emptied pt catheter with 300mL. Earlier with another 300mL. Dr. Kohli notified.

## 2024-07-24 NOTE — MEDICAL/APP STUDENT
"[facility] - Purple Team  Daily Progress Note    Patient ID: Fifi Chacon, 89y/o F    Subjective:  NAEON  AF, HDS  NGT 300ml output over last 24 hours   Conducted bed mobility with PT/OT yesterday without pain or issue   Denies nausea, vomiting, or BM  Passing flatus per nursing and family     Objective:    Vitals:  Vitals:    07/24/24 0335   BP: (!) 149/69   Pulse: 71   Resp:    Temp:         Intake/Output:    Intake/Output Summary (Last 24 hours) at 7/24/2024 0547  Last data filed at 7/24/2024 0519  Gross per 24 hour   Intake 939.4 ml   Output 2100 ml   Net -1160.6 ml        Physical Exam:  Gen: NAD  Neuro: awake, alert, answering questions appropriately  CV: RR  Resp: non-labored breathing, satting well on 2L NC  Abd: soft, ND, NT  Ext: moves all 4 spontaneously and purposefully  Skin: warm, well perfused  L/D/A: 20G IV R forearm, 20G IV R upper arm, NGT, urethral catheter     Labs:  Renal:  Recent Labs     07/22/24 0417 07/23/24 0351 07/24/24  0326   BUN 14.4 10.3 11.9   CREATININE 0.95 0.90 0.92     No results for input(s): "LACTIC" in the last 72 hours.    FENGI:  Recent Labs     07/22/24 0417 07/23/24 0351 07/24/24  0326   * 134* 138   K 3.8 3.8 3.7    100 104   CO2 28 27 28   CALCIUM 9.1 9.0 8.9   MG 1.70 1.90 1.70   PHOS 2.4 2.4 2.5   ALBUMIN 2.2* 2.3* 2.1*   BILITOT 0.8 0.9 0.6   AST 18 21 19   ALKPHOS 47 48 41   ALT 6 6 7       Heme:  Recent Labs     07/22/24 0417 07/23/24  0351 07/24/24  0326   HGB 8.1* 8.5* 8.8*   HCT 25.8* 26.3* 26.9*    161 157       ID:  Recent Labs     07/22/24 0417 07/23/24  0351 07/24/24  0326   WBC 2.67* 3.26* 3.48*       CBG:  Recent Labs     07/22/24  0417 07/23/24  0351 07/24/24  0326   GLUCOSE 89 96 130*        Cardiovascular:  Recent Labs   Lab 07/19/24  0008 07/19/24  0607 07/19/24  1154   TROPONINI 0.412* 0.377* 0.313*       I have reviewed all pertinent lab results within the past 24 hours.    Imaging:  X RAY ABDOMEN AP 1 VIEW - " 07/21/2024 AT 0457  Impression:     Small bowel follow-through enteric contrast has reached the colon and rectum before 24 hours without evidence of complete obstruction.    Micro/Path/Other:  Microbiology Results (last 7 days)       Procedure Component Value Units Date/Time    Blood culture x two cultures. Draw prior to antibiotics. [[id number]]  (Normal) Collected: 07/18/24 1746    Order Status: Completed Specimen: Blood from Arm, Right Updated: 07/23/24 2100     Blood Culture No Growth at 5 days    Blood culture x two cultures. Draw prior to antibiotics. [[id number]]  (Abnormal)  (Susceptibility) Collected: 07/18/24 1841    Order Status: Completed Specimen: Blood from Arm, Left Updated: 07/22/24 0641     Blood Culture Staphylococcus capitis      Staphylococcus warneri     GRAM STAIN Gram Positive Cocci, probable Staphylococcus      Seen in gram stain of broth only      1 of 2 Aerobic bottles positive    BCID2 Panel [[id number]]  (Abnormal) Collected: 07/18/24 1841    Order Status: Completed Specimen: Blood from Arm, Left Updated: 07/20/24 1034     CTX-M (ESBL ) N/A     IMP (Cabapenemase ) N/A     KPC resistance gene (Carbapenemase ) N/A     mcr-1 N/A     mecA ID N/A     Comment: Note: Antimicrobial resistance can occur via multiple mechanisms. A Not Detected result for antimicrobial resistance gene(s) does not indicate antimicrobial susceptibility. Subculturing is required for species identification and susceptibility testing of   isolates.        mecA/C and MREJ (MRSA) gene N/A     NDM (Carbapenemase ) N/A     OXA-48-like (Carbapenemase ) N/A     Jeb/B (VRE gene) N/A     VIM (Carbapenemase ) N/A     Enterococcus faecalis Not Detected     Enterococcus faecium Not Detected     Listeria monocytogenes Not Detected     Staphylococcus spp. Detected     Staphylococcus aureus Not Detected     Staphylococcus epidermidis Not Detected     Staphylococcus lugdunensis Not  Detected     Streptococcus spp. Not Detected     Streptococcus agalactiae (Group B) Not Detected     Streptococcus pneumoniae Not Detected     Streptococcus pyogenes (Group A) Not Detected     Acinetobacter calcoaceticus/baumannii complex Not Detected     Bacteroides fragilis Not Detected     Enterobacterales Not Detected     Enterobacter cloacae complex Not Detected     Escherichia coli Not Detected     Klebsiella aerogenes Not Detected     Klebsiella oxytoca Not Detected     Klebsiella pneumoniae group Not Detected     Proteus spp. Not Detected     Salmonella spp. Not Detected     Serratia marcescens Not Detected     Haemophilus influenzae Not Detected     Neisseria meningitidis Not Detected     Pseudomonas aeruginosa Not Detected     Stenotrophomonas maltophilia Not Detected     Candida albicans Not Detected     Candida auris Not Detected     Candida glabrata Not Detected     Candida krusei Not Detected     Candida parapsilosis Not Detected     Candida tropicalis Not Detected     Cryptococcus neoformans/gattii Not Detected    Narrative:      The [employer] BCID2 Panel is a multiplexed nucleic acid test intended for the use with [employer]® 2.0 or [employer]® [employer] Systems for the simultaneous qualitative detection and identification of multiple bacterial and yeast nucleic acids and select genetic determinants associated with antimicrobial resistance.  The [employer] BCID2 Panel test is performed directly on blood culture samples identified as positive by a continuous monitoring blood culture system.  Results are intended to be interpreted in conjunction with Gram stain results.    Urine culture [[id number]] Collected: 07/18/24 1729    Order Status: Completed Specimen: Urine Updated: 07/20/24 0919     Urine Culture No Significant Growth    Respiratory Culture [[id number]]     Order Status: Sent Specimen: Sputum              Assessment/Plan:  88-year-old with PMHx of HTN, HLD, and afib on Eliquis  presenting with pneumonia. CT shows evidence of a small-bowel obstruction with a transition point. Pt currently AF and HDS. SBFT and KUB with contrast in colon/rectum, however still no flatus or BM.     - OOB to chair TID, mobilize, PT/OT   - Recommend starting PPN  - NGT to LIMWS  - Serial abdominal exams  - NPO  - mIVF  - enema Sunday, still no bowel movements  - rest of care per primary  - surgery will continue to follow     Jori Osullivan MS3  [facility]  07/24/2024

## 2024-07-24 NOTE — PROGRESS NOTES
"[facility] - [facility]  Daily Progress Note    Patient ID: Fifi Chacon, 87y/o F    Subjective:  NAEON  AF, HDS  NGT 300ml output over last 24 hours   Conducted bed mobility with PT/OT yesterday without pain or issue   Denies nausea, vomiting, or BM  Passing flatus per nursing and family     Objective:    Vitals:  Vitals:    07/24/24 0335   BP: (!) 149/69   Pulse: 71   Resp:    Temp:         Intake/Output:    Intake/Output Summary (Last 24 hours) at 7/24/2024 0629  Last data filed at 7/24/2024 0617  Gross per 24 hour   Intake 939.4 ml   Output 2300 ml   Net -1360.6 ml        Physical Exam:  Gen: NAD  Neuro: awake, alert, answering questions appropriately  CV: RR  Resp: non-labored breathing, satting well on 2L NC  Abd: soft, ND, NT  Ext: moves all 4 spontaneously and purposefully  Skin: warm, well perfused  L/D/A: 20G IV R forearm, 20G IV R upper arm, NGT, urethral catheter     Labs:  Renal:  Recent Labs     07/22/24 0417 07/23/24 0351 07/24/24  0326   BUN 14.4 10.3 11.9   CREATININE 0.95 0.90 0.92     No results for input(s): "LACTIC" in the last 72 hours.    FENGI:  Recent Labs     07/22/24 0417 07/23/24 0351 07/24/24  0326   * 134* 138   K 3.8 3.8 3.7    100 104   CO2 28 27 28   CALCIUM 9.1 9.0 8.9   MG 1.70 1.90 1.70   PHOS 2.4 2.4 2.5   ALBUMIN 2.2* 2.3* 2.1*   BILITOT 0.8 0.9 0.6   AST 18 21 19   ALKPHOS 47 48 41   ALT 6 6 7       Heme:  Recent Labs     07/22/24 0417 07/23/24  0351 07/24/24  0326   HGB 8.1* 8.5* 8.8*   HCT 25.8* 26.3* 26.9*    161 157       ID:  Recent Labs     07/22/24 0417 07/23/24  0351 07/24/24  0326   WBC 2.67* 3.26* 3.48*       CBG:  Recent Labs     07/22/24  0417 07/23/24  0351 07/24/24  0326   GLUCOSE 89 96 130*        Cardiovascular:  Recent Labs   Lab 07/19/24  0008 07/19/24  0607 07/19/24  1154   TROPONINI 0.412* 0.377* 0.313*       I have reviewed all pertinent lab results within the past 24 hours.    Imaging:  X RAY ABDOMEN AP 1 VIEW - " 07/21/2024 AT 0457  Impression:     Small bowel follow-through enteric contrast has reached the colon and rectum before 24 hours without evidence of complete obstruction.    Micro/Path/Other:  Microbiology Results (last 7 days)       Procedure Component Value Units Date/Time    Blood culture x two cultures. Draw prior to antibiotics. [[id number]]  (Normal) Collected: 07/18/24 1746    Order Status: Completed Specimen: Blood from Arm, Right Updated: 07/23/24 2100     Blood Culture No Growth at 5 days    Blood culture x two cultures. Draw prior to antibiotics. [[id number]]  (Abnormal)  (Susceptibility) Collected: 07/18/24 1841    Order Status: Completed Specimen: Blood from Arm, Left Updated: 07/22/24 0641     Blood Culture Staphylococcus capitis      Staphylococcus warneri     GRAM STAIN Gram Positive Cocci, probable Staphylococcus      Seen in gram stain of broth only      1 of 2 Aerobic bottles positive    BCID2 Panel [[id number]]  (Abnormal) Collected: 07/18/24 1841    Order Status: Completed Specimen: Blood from Arm, Left Updated: 07/20/24 1034     CTX-M (ESBL ) N/A     IMP (Cabapenemase ) N/A     KPC resistance gene (Carbapenemase ) N/A     mcr-1 N/A     mecA ID N/A     Comment: Note: Antimicrobial resistance can occur via multiple mechanisms. A Not Detected result for antimicrobial resistance gene(s) does not indicate antimicrobial susceptibility. Subculturing is required for species identification and susceptibility testing of   isolates.        mecA/C and MREJ (MRSA) gene N/A     NDM (Carbapenemase ) N/A     OXA-48-like (Carbapenemase ) N/A     Jeb/B (VRE gene) N/A     VIM (Carbapenemase ) N/A     Enterococcus faecalis Not Detected     Enterococcus faecium Not Detected     Listeria monocytogenes Not Detected     Staphylococcus spp. Detected     Staphylococcus aureus Not Detected     Staphylococcus epidermidis Not Detected     Staphylococcus lugdunensis Not  Detected     Streptococcus spp. Not Detected     Streptococcus agalactiae (Group B) Not Detected     Streptococcus pneumoniae Not Detected     Streptococcus pyogenes (Group A) Not Detected     Acinetobacter calcoaceticus/baumannii complex Not Detected     Bacteroides fragilis Not Detected     Enterobacterales Not Detected     Enterobacter cloacae complex Not Detected     Escherichia coli Not Detected     Klebsiella aerogenes Not Detected     Klebsiella oxytoca Not Detected     Klebsiella pneumoniae group Not Detected     Proteus spp. Not Detected     Salmonella spp. Not Detected     Serratia marcescens Not Detected     Haemophilus influenzae Not Detected     Neisseria meningitidis Not Detected     Pseudomonas aeruginosa Not Detected     Stenotrophomonas maltophilia Not Detected     Candida albicans Not Detected     Candida auris Not Detected     Candida glabrata Not Detected     Candida krusei Not Detected     Candida parapsilosis Not Detected     Candida tropicalis Not Detected     Cryptococcus neoformans/gattii Not Detected    Narrative:      The [employer] BCID2 Panel is a multiplexed nucleic acid test intended for the use with [employer]® 2.0 or [employer]® [employer] Systems for the simultaneous qualitative detection and identification of multiple bacterial and yeast nucleic acids and select genetic determinants associated with antimicrobial resistance.  The [employer] BCID2 Panel test is performed directly on blood culture samples identified as positive by a continuous monitoring blood culture system.  Results are intended to be interpreted in conjunction with Gram stain results.    Urine culture [[id number]] Collected: 07/18/24 1729    Order Status: Completed Specimen: Urine Updated: 07/20/24 0919     Urine Culture No Significant Growth    Respiratory Culture [[id number]]     Order Status: Sent Specimen: Sputum              Assessment/Plan:  88-year-old with PMHx of HTN, HLD, and afib on Eliquis  presenting with pneumonia. CT shows evidence of a small-bowel obstruction with a transition point. Pt currently AF and HDS. SBFT and KUB with contrast in colon/rectum, however still no flatus or BM.     - OOB to chair TID, mobilize, PT/OT   - PPN started yesterday  - NGT clamp trail today  - Serial abdominal exams  - NPO  - mIVF  - no bowel movements, passing flatus per family and nursing  - rest of care per primary  - surgery will continue to follow     Jori Osullivan MS3  [facility]  07/24/2024     Patient seen and examined alongside medical student. Note reviewed and edited as appropriate. Agree with plan as written above.     Speedy Rocha MD  [facility] PGY-1

## 2024-07-24 NOTE — PROGRESS NOTES
7/24/24  Pt remains NPO; NGT suction; SBO; acknowledge initiation of recommended PPN Clinimix 4.25/5 @ 75 ml/hr with lipids 20% 250 ml daily to provide 1112 calories, 77 gm protein, 90 gm CHO. (7/24) Labs--P 2.5 K 3.7 Mg 1.7 Gluc 130   Estimated needs : 1854 kcal/d; 74 gm protein/d.   Suggest continue current PPN while awaiting resolution of SBO. Will continue to monitor nutrition status/ diet progression.

## 2024-07-24 NOTE — PROGRESS NOTES
[facility]   Progress Note      Resident Team: [facility] List   Attending Physician: Julieta Barnett MD  Resident: [facility] Length of Stay: 5 days    Subjective   Brief HPI:  Fifi Chacon is a 88 y.o. female with history of paroxysmal afib on Eliquis, CAD s/p coronary angiogram on 10/13/23, severe aortic stenosis, hypertension, hyperlipidemia, anxiety admitted for small bowel obstruction and sepsis.     Interval History:   Vital signs remained stable overnight, BP slowly trending up. No acute events occurred overnight. NG put out 300 overnight. Pt's alertness continues to remain improved. Has been passing gas and surgery planning on NG tube removal today. No acute complaints at this time. Electrolytes stable on ppn.     Review of Systems:  Review of Systems   Constitutional:  Negative for fever and malaise/fatigue.   Respiratory:  Negative for cough and shortness of breath.    Cardiovascular:  Negative for chest pain and palpitations.   Gastrointestinal:  Positive for abdominal pain. Negative for nausea and vomiting.      Objective     Vital Signs (Most Recent):  Temp: 98.2 °F (36.8 °C) (07/24/24 0727)  Pulse: 81 (07/24/24 0727)  Resp: 17 (07/24/24 0727)  BP: (!) 166/84 (07/24/24 0727)  SpO2: 98 % (07/24/24 0727) Vital Signs (24h Range):  Temp:  [97.6 °F (36.4 °C)-98.2 °F (36.8 °C)] 98.2 °F (36.8 °C)  Pulse:  [69-81] 81  Resp:  [17-20] 17  SpO2:  [98 %-99 %] 98 %  BP: (147-166)/(63-84) 166/84     Physical Examination:  Physical Exam  Vitals and nursing note reviewed.   Constitutional:       Appearance: She is not ill-appearing.      Comments: Soft restraints in place   HENT:      Mouth/Throat:      Mouth: Mucous membranes are dry.   Cardiovascular:      Rate and Rhythm: Normal rate and regular rhythm.      Comments: 2/6 systolic murmur best heard over R 2nd intercostal space  Pulmonary:      Effort: Pulmonary effort is normal.      Breath sounds: No wheezing.   Abdominal:       General: Bowel sounds are normal. There is no distension.      Palpations: Abdomen is soft.      Tenderness: There is no guarding.   Musculoskeletal:      Right lower leg: No edema.      Left lower leg: No edema.   Neurological:      Mental Status: She is alert.       Laboratory:  Most Recent Data:  Recent Lab Results         07/24/24  0326   07/23/24  2304        Albumin/Globulin Ratio 0.5         Albumin 2.1         ALP 41         ALT 7         Anion Gap 6.0         AST 19         Baso # 0.01         Basophil % 0.3         BILIRUBIN TOTAL 0.6         BUN 11.9         BUN/CREAT RATIO 13         Calcium 8.9         Chloride 104         CO2 28         Creatinine 0.92         eGFR 60         Eos # 0.11         Eos % 3.2         Globulin, Total 4.3         Glucose 130         Hematocrit 26.9         Hemoglobin 8.8         Immature Grans (Abs) 0.03         Immature Granulocytes 0.9         Lymph # 0.26         LYMPH % 7.5         Magnesium  1.70         MCH 27.5         MCHC 32.7         MCV 84.1         Mono # 0.18         Mono % 5.2         MPV 11.1         Neut # 2.89         Neut % 82.9         nRBC 0.0         Phosphorus Level 2.5         Platelet Count 157         Potassium 3.7         PROTEIN TOTAL 6.4         RBC 3.20         RDW 14.6         Sodium 138         Vancomycin-Trough   15.5       WBC 3.48                   Microbiology Data Reviewed: yes  Pertinent Findings:  Blood culture with 1/4 bottles + Staph capitis and Staph warneri  Urine culture with no growth    Radiology:  Imaging Results              CT Chest Without Contrast (Final result)  Result time 07/19/24 08:17:33      Final result by Wan Abreu MD (07/19/24 08:17:33)                   Impression:    Impression:    1. There are reticular, nodular and hazy opacities in the right posterior upper lobe and both lower lobes with areas of consolidation in the lower lobes, larger in the right. This is consistent with multilobar consolidation.    2.  Details and other findings as discussed above.    No significant discrepancy with overnight report      Electronically signed by: Wan Abreu  Date:    07/19/2024  Time:    08:17               Narrative:      Technique: CT Scan of the chest was performed without intravenous contrast with direct axial as well as sagittal and, coronal, reconstruction images.    Dosage Information: Automated Exposure Control was utilized.  DLP 19 9    Comparison: Correlation is with study [id number] 20:14:34.    Clinical History: Pneumonia.    Findings:    Heart: The heart size is within normal limits. Pronounced coronary artery calcification is seen.    Aorta: No thoracic aortic aneurysmal dilatation.    Lungs: Moderate streaky linear and hazy opacity is seen predominantly in the dependent portions at the lung bases consistent with dependent changes scarring and subsegmental atelectasis. There are reticular, nodular and hazy opacities in the right posterior upper lobe and both lower lobes with areas of consolidation in the lower lobes, larger in the right. This is consistent with multilobar consolidation. Subtle emphysematous change is noted in the right upper lobe.    Pleura: No effusions or pneumothorax are identified.    There is fluid-filled dilatation of the distal esophagus which may be the result of reflux disease or related to distal esophageal stricture.    Spine: Mild multilevel spondylolytic changes are seen in the thoracic spine.                        Preliminary result by Britton Kemp Jr., MD (07/19/24 01:56:35)                   Impression:    1. There are reticular, nodular and hazy opacities in the right posterior upper lobe and both lower lobes with areas of consolidation in the lower lobes, larger in the right. This is consistent with mutifocal possibly atypical pneumonia with multilobar consolidation.  2. Details and other findings as discussed above.               Narrative:    START OF  REPORT:  Technique: CT Scan of the chest was performed without intravenous contrast with direct axial as well as sagittal and, coronal, reconstruction images.    Dosage Information: Automated Exposure Control was utilized.    Comparison: Correlation is with study [id number] 20:14:34.    Clinical History: Pneumonia.    Findings:  Soft Tissues: Unremarkable.  Lines and Tubes: None.  Neck: The visualized soft tissues of the neck appear unremarkable.  Mediastinum: The mediastinal structures are within normal limits.  Heart: The heart size is within normal limits. Pronounced coronary artery calcification is seen.  Aorta: Unremarkable appearing aorta.  Pulmonary Arteries: Unremarkable.  Lungs: Moderate streaky linear and hazy opacity is seen predominantly in the dependent portions at the lung bases consistent with dependent changes scarring and subsegmental atelectasis. There are reticular, nodular and hazy opacities in the right posterior upper lobe and both lower lobes with areas of consolidation in the lower lobes, larger in the right. This is consistent with mutifocal possibly atypical pneumonia with multilobar consolidation. Subtle emphysematous change is noted in the right upper lobe.  Pleura: No effusions or pneumothorax are identified.  Bony Structures:  Spine: Mild multilevel spondylolytic changes are seen in the thoracic spine.  Ribs: The bilateral ribs appear unremarkable.                                         CT Abdomen Pelvis  Without Contrast (Final result)  Result time 07/19/24 07:35:25      Final result by Wan Abreu MD (07/19/24 07:35:25)                   Impression:    Impression:    1. There are moderate opacities seen in both lower lobes (series 4 image 8) consistent with pneumonias.    2. There are numerous moderately dilated loops of fluid-filled small bowel with numerous air-fluid levels with transition point in the mid small bowel seen on series 2 image 54. This is compatible with a  small bowel obstruction.    3. No intraperitoneal free air or ascites is seen.    4. Details and other findings as discussed above.    No significant discrepancy with overnight report.      Electronically signed by: Wan Abreu  Date:    07/19/2024  Time:    07:35               Narrative:      Technique: CT of the abdomen and pelvis was performed with axial images as well as sagittal and coronal reconstruction images without intravenous contrast.    Comparison: October 5, 2022    Clinical History: Abdominal Pain Vomiting(88 yr old in /[facility] FROM NURSING HOME W REPORT OF ABD PAIN W NV AND WEAKNESS SINCE YESTERDAY. LBM THIS AM. PT REPORTS DYSURIA. SEE NURSING HOME REPORTS. EKG OBTAINED. ) Fatigue.    Dosage Information: Automated Exposure Control was utilized.    Findings:    Lines and Tubes: None.    Thorax:    Lungs: There are moderate opacities seen in both lower lobes (series 4 image 8) consistent with pneumonias.    Pleura: No effusions or thickening.    Heart:Pronounced coronary artery calcification is seen. There is extensive aortic and mitral valve calcifications seen.    Abdomen:    Abdominal Wall: No abdominal wall pathology is seen.    Within limitations of noncontrast technique, no acute findings liver, pancreas spleen identified.    Biliary System: No intrahepatic or extrahepatic biliary duct dilatation is seen.    Gallbladder: Surgical clips are seen in the gallbladder fossa consistent with prior cholecystectomy.    Adrenals: The adrenal glands appear unremarkable.    Kidneys: The left kidney appears unremarkable with no stones or hydronephrosis. A single stable peripelvic cyst measuring 2.8 cm is seen in the right kidney and for this no specific follow-up imaging is recommended.  The right kidney otherwise appears unremarkable with no stones or hydronephrosis identified.    Aorta: There is extensive calcification of the abdominal aorta and its branches.    Bowel:    Esophagus: There is a moderate sized  hiatal hernia.    Stomach: There is hiatal herniation of the cardia and part of the body of the stomach as detailed above.    Duodenum: Unremarkable appearing duodenum.    Small Bowel: There are numerous moderately dilated loops of fluid-filled small bowel with numerous air-fluid levels with transition point in the mid small bowel seen on series 2 image 54.    Colon: Unremarkable.    Appendix: The appendix is not identified but no inflammatory changes are seen in the right lower quadrant to suggest appendicitis.    Peritoneum: No intraperitoneal free air or ascites is seen.    Pelvis:    Bladder: The bladder appears unremarkable.    Female:    Uterus: The uterus is not identified.    Ovaries: No adnexal masses are seen.    Inguinal Findings:    Inguinal Hernia: Incidental note is made of small uncomplicated mesenteric fat containing bilateral inguinal hernias.    Bony structures:    Dorsal Spine: There is mild spondylosis of the visualized dorsal spine. There are hypodense lesions with prominent trabecular markings seen in T12-L3. These may represent hemangiomas.    Bony Pelvis: The visualized bony structures of the pelvis appear unremarkable.    Notifications: The results were discussed with the physician Dr. Sanders prior to dictation at 2024-07-18 20:46:47 CDT.                        Preliminary result by Britton Kemp Jr., MD (07/18/24 20:49:10)                   Impression:    1. There are moderate opacities seen in both lower lobes (series 4 image 8) consistent with pneumonias.  2. There are numerous moderately dilated loops of fluid-filled small bowel with numerous air-fluid levels with transition point in the mid small bowel seen on series 2 image 54. This is compatible with a small bowel obstruction.  3. No intraperitoneal free air or ascites is seen.  4. Details and other findings as discussed above.               Narrative:    START OF REPORT:  Technique: CT of the abdomen and pelvis was  performed with axial images as well as sagittal and coronal reconstruction images without intravenous contrast.    Comparison: None available.    Clinical History: Abdominal Pain Vomiting(88 yr old in /[facility] FROM NURSING HOME W REPORT OF ABD PAIN W NV AND WEAKNESS SINCE YESTERDAY. LBM THIS AM. PT REPORTS DYSURIA. SEE NURSING HOME REPORTS. EKG OBTAINED. ) Fatigue.    Dosage Information: Automated Exposure Control was utilized.    Findings:  Lines and Tubes: None.  Thorax:  Lungs: There are moderate opacities seen in both lower lobes (series 4 image 8) consistent with pneumonias.  Pleura: No effusions or thickening.  Heart: Moderate cardiomegaly is seen. Pronounced coronary artery calcification is seen. There is extensive aortic and mitral valve calcifications seen.  Abdomen:  Abdominal Wall: No abdominal wall pathology is seen.  Liver: The liver appears unremarkable.  Biliary System: No intrahepatic or extrahepatic biliary duct dilatation is seen.  Gallbladder: Surgical clips are seen in the gallbladder fossa consistent with prior cholecystectomy.  Pancreas: The pancreas appears unremarkable.  Spleen: The spleen appears unremarkable.  Adrenals: The adrenal glands appear unremarkable.  Kidneys: The left kidney appears unremarkable with no stones cysts masses or hydronephrosis. A single peripelvic cyst measuring 2.8 cm is seen in the right kidney. The right kidney otherwise appears unremarkable with no stones masses or hydronephrosis identified.  Aorta: There is extensive calcification of the abdominal aorta and its branches.  IVC: Unremarkable.  Bowel:  Esophagus: There is a moderate sized hiatal hernia.  Stomach: There is hiatal herniation of the cardia and part of the body of the stomach as detailed above.  Duodenum: Unremarkable appearing duodenum.  Small Bowel: There are numerous moderately dilated loops of fluid-filled small bowel with numerous air-fluid levels with transition point in the mid small bowel seen on  series 2 image 54.  Colon: Unremarkable.  Appendix: The appendix is not identified but no inflammatory changes are seen in the right lower quadrant to suggest appendicitis.  Peritoneum: No intraperitoneal free air or ascites is seen.    Pelvis:  Bladder: The bladder appears unremarkable.  Female:  Uterus: The uterus is not identified.  Ovaries: No adnexal masses are seen.  Inguinal Findings:  Inguinal Hernia: Incidental note is made of small uncomplicated mesenteric fat containing bilateral inguinal hernias.    Bony structures:  Dorsal Spine: There is mild spondylosis of the visualized dorsal spine. There are hypodense lesions with prominent trabecular markings seen in T12-L3. These may represent hemangiomas.  Bony Pelvis: The visualized bony structures of the pelvis appear unremarkable.    Notifications: The results were discussed with the physician Dr. Sanders prior to dictation at 2024-07-18 20:46:47 CDT.                                         X-Ray Chest AP Portable (Final result)  Result time 07/18/24 18:49:26      Final result by Flor Hernandez MD (07/18/24 18:49:26)                   Impression:      Diffuse bilateral interstitial infiltrates with developing consolidation in the right lower lobe    Prominence of the right paratracheal region.  CT scan correlation is recommended.      Electronically signed by: Chip Hernandez  Date:    07/18/2024  Time:    18:49               Narrative:    EXAMINATION:  XR CHEST AP PORTABLE    CLINICAL HISTORY:  Sepsis;    TECHNIQUE:  Single frontal view of the chest was performed.    COMPARISON:  06/30/2024    FINDINGS:  There are diffuse bilateral interstitial infiltrates with a area of consolidation developing in the right lower lobe.  There is also increased opacity in the right paratracheal region.  This was seen on the prior examination as well.  The heart is normal appearance.  The pulmonary vascularity is unremarkable.  Bones and joints show no acute  abnormality.                                      Current Medications:     Infusions:   Amino acid 4.25% - dextrose 5% (CLINIMIX-E) solution (1L provides 42.5 gm AA, 50 gm CHO (170 kcal/L dextrose), Na 35, K 30, Mg 5, Ca 4.5, Acetate 70, Cl 39, Phos 15)   Intravenous Continuous 75 mL/hr at 07/23/24 1818 New Bag at 07/23/24 1818    lactated ringers   Intravenous Continuous 100 mL/hr at 07/24/24 0515 New Bag at 07/24/24 0515        Scheduled:   doxycycline IV (PEDS and ADULTS)  100 mg Intravenous Q12H    heparin (porcine)  5,000 Units Subcutaneous Q8H    mupirocin   Nasal BID    pantoprazole  40 mg Intravenous Daily    piperacillin-tazobactam (Zosyn) IV (PEDS and ADULTS) (extended infusion is not appropriate)  4.5 g Intravenous Q8H    vancomycin (VANCOCIN) IV (PEDS and ADULTS)  1,000 mg Intravenous Q24H        PRN:    Current Facility-Administered Medications:     0.9% NaCl, , Intravenous, PRN    acetaminophen, 650 mg, Oral, Q8H PRN    dextrose 10%, 12.5 g, Intravenous, PRN    dextrose 10%, 25 g, Intravenous, PRN    glucagon (human recombinant), 1 mg, Intramuscular, PRN    glucose, 16 g, Oral, PRN    glucose, 24 g, Oral, PRN    melatonin, 6 mg, Oral, Nightly PRN    metoprolol, 5 mg, Intravenous, Q5 Min PRN    naloxone, 0.02 mg, Intravenous, PRN    ondansetron, 4 mg, Intravenous, Q8H PRN    prochlorperazine, 5 mg, Intravenous, Q6H PRN    sodium chloride 0.9%, 10 mL, Intravenous, Q12H PRN    Pharmacy to dose Vancomycin consult, , , Once **AND** vancomycin - pharmacy to dose, , Intravenous, pharmacy to manage frequency    Antibiotics and Day Number of Therapy:  Vanc, Zosyn and Azithro started 7/18  Stop Vanc 7/23      Intake/Output Summary (Last 24 hours) at 7/24/2024 1206  Last data filed at 7/24/2024 0617  Gross per 24 hour   Intake 939.4 ml   Output 2300 ml   Net -1360.6 ml       Lines/Drains/Airways       Drain  Duration                  Urethral Catheter 07/19/24 2134 4 days         NG/OG Tube 07/21/24 1940 16 Fr.  Right nostril 2 days              Peripheral Intravenous Line  Duration                  Peripheral IV - Single Lumen 07/18/24 1846 20 G Anterior;Proximal;Right Forearm 5 days         Peripheral IV - Single Lumen 07/23/24 1200 20 G Anterior;Right Upper Arm 1 day                  Assessment and Plan    Small bowel obstruction   Nausea, vomiting  - Surgery following  - NGT with 300cc output overnight  - NGT removed today per surgery team recs  - anti-emetics PRN in place, protonix   - Given hyponatremia will hold off on starting Clinimix at this time.   - Nutrition consulted and appreciate their recs   -Start clear liquid diet     Severe sepsis with suspected sources below  Right lower lobe pneumonia, possible aspiration  Acute cystitis on UA  Recent UTI growing Candida tropicalis  - Continue Zosyn/Doxy regimen for concern for aspiration pneumonia  - MRSA PCR neg.   - Lactic acid resolved   - blood cultures x2 collected 7/18: 1/2 bottles gram + cocci, probable staph. BCID panel with Staph.  - urine culture with no growth  - COVID/Flu neg  - IS ordered today      Normocytic anemia - stable  -Stable this AM  -Continue to monitor with AM cbc     Questionable right paratracheal/mass/opacity on CXR  - CT chest showed reticular, nodular and hazy opacities in the right posterior upper lobe and both lower lobes with areas of consolidation in the lower lobes, larger in the right. This is consistent with multilobar consolidation. And fluid-filled dilatation of the distal esophagus which may be the result of reflux disease or related to distal esophageal stricture      Severe aortic stenosis  Coronary artery disease  Hypertension  Hyperlipidemia  - last Echo 10/2023 EF 65-70%, severe aortic stenosis  - Resumed home Metoprolol. Will monitor and can resume home lisinopril tomorrow if BP remains elevated.  - hold home ACEi and statin  - consider repeat Echo in future if showing signs of fluid overload     Paroxysmal atrial  fibrillation  - Holding home Eliquis; switched from Lovenox to heparin this AM in case need for surgery  - currently NSR  - Initiate home metoprolol; Lopressor pushes available prn     Anxiety  - Holding home Lexapro; consider restarting if pt tolerates PO intake     History of falls  Chronic dizziness, at baseline  - admission 7/1/2024 for fall, currently in SNF for rehab, likely discharge back to SNF when able  - previously walked with a cane, currently requiring rolling walker  - OT/PT consulted  - fall precautions, delirium precautions     CODE STATUS: DNR  Access: PIV  Antibiotics: Zosyn/Doxy  Diet: clear liquid diet  DVT Prophylaxis: Heparin 5000 q8h  GI Prophylaxis: Pepcid IV  Fluids:  cc/hr      Disposition: Admit for SBO, no surgical intervention needed at this time. Continue on Zosyn/Doxy at this time. NG tube removed today; start clear liquid diet. Will resume home metoprolol given elevated BP; can add lisinopril tomorrow if needed. Nutrition consulted for PPN recs. Came from nursing home; may need case management consult to ensure return upon discharge.        Alonso Foster MD  [id number]

## 2024-07-25 PROBLEM — R07.9 CHEST PAIN: Status: ACTIVE | Noted: 2024-07-25

## 2024-07-25 PROBLEM — E86.9 VOLUME DEPLETION, GASTROINTESTINAL LOSS: Status: ACTIVE | Noted: 2024-07-25

## 2024-07-25 PROBLEM — R33.8 ACUTE URINARY RETENTION: Status: ACTIVE | Noted: 2024-07-25

## 2024-07-25 PROBLEM — R11.2 NAUSEA AND VOMITING: Status: ACTIVE | Noted: 2024-07-25

## 2024-07-25 LAB
ALBUMIN SERPL-MCNC: 2 G/DL (ref 3.4–4.8)
ALBUMIN/GLOB SERPL: 0.5 RATIO (ref 1.1–2)
ALP SERPL-CCNC: 41 UNIT/L (ref 40–150)
ALT SERPL-CCNC: 7 UNIT/L (ref 0–55)
ANION GAP SERPL CALC-SCNC: 4 MEQ/L
AST SERPL-CCNC: 20 UNIT/L (ref 5–34)
BASOPHILS # BLD AUTO: 0.01 X10(3)/MCL
BASOPHILS NFR BLD AUTO: 0.3 %
BILIRUB SERPL-MCNC: 0.5 MG/DL
BUN SERPL-MCNC: 17.6 MG/DL (ref 9.8–20.1)
CALCIUM SERPL-MCNC: 9 MG/DL (ref 8.4–10.2)
CHLORIDE SERPL-SCNC: 106 MMOL/L (ref 98–107)
CO2 SERPL-SCNC: 27 MMOL/L (ref 23–31)
CREAT SERPL-MCNC: 0.88 MG/DL (ref 0.55–1.02)
CREAT/UREA NIT SERPL: 20
EOSINOPHIL # BLD AUTO: 0.25 X10(3)/MCL (ref 0–0.9)
EOSINOPHIL NFR BLD AUTO: 8.2 %
ERYTHROCYTE [DISTWIDTH] IN BLOOD BY AUTOMATED COUNT: 14.7 % (ref 11.5–17)
GFR SERPLBLD CREATININE-BSD FMLA CKD-EPI: >60 ML/MIN/1.73/M2
GLOBULIN SER-MCNC: 4.2 GM/DL (ref 2.4–3.5)
GLUCOSE SERPL-MCNC: 114 MG/DL (ref 82–115)
HCT VFR BLD AUTO: 25 % (ref 37–47)
HGB BLD-MCNC: 8.1 G/DL (ref 12–16)
IMM GRANULOCYTES # BLD AUTO: 0.06 X10(3)/MCL (ref 0–0.04)
IMM GRANULOCYTES NFR BLD AUTO: 2 %
LYMPHOCYTES # BLD AUTO: 0.45 X10(3)/MCL (ref 0.6–4.6)
LYMPHOCYTES NFR BLD AUTO: 14.7 %
MAGNESIUM SERPL-MCNC: 1.6 MG/DL (ref 1.6–2.6)
MCH RBC QN AUTO: 26.9 PG (ref 27–31)
MCHC RBC AUTO-ENTMCNC: 32.4 G/DL (ref 33–36)
MCV RBC AUTO: 83.1 FL (ref 80–94)
MONOCYTES # BLD AUTO: 0.18 X10(3)/MCL (ref 0.1–1.3)
MONOCYTES NFR BLD AUTO: 5.9 %
NEUTROPHILS # BLD AUTO: 2.11 X10(3)/MCL (ref 2.1–9.2)
NEUTROPHILS NFR BLD AUTO: 68.9 %
NRBC BLD AUTO-RTO: 0 %
PHOSPHATE SERPL-MCNC: 2.6 MG/DL (ref 2.3–4.7)
PLATELET # BLD AUTO: 168 X10(3)/MCL (ref 130–400)
PMV BLD AUTO: 11.1 FL (ref 7.4–10.4)
POTASSIUM SERPL-SCNC: 3.9 MMOL/L (ref 3.5–5.1)
PROT SERPL-MCNC: 6.2 GM/DL (ref 5.8–7.6)
RBC # BLD AUTO: 3.01 X10(6)/MCL (ref 4.2–5.4)
SODIUM SERPL-SCNC: 137 MMOL/L (ref 136–145)
WBC # BLD AUTO: 3.06 X10(3)/MCL (ref 4.5–11.5)

## 2024-07-25 PROCEDURE — 84100 ASSAY OF PHOSPHORUS: CPT

## 2024-07-25 PROCEDURE — 83735 ASSAY OF MAGNESIUM: CPT

## 2024-07-25 PROCEDURE — 25000003 PHARM REV CODE 250

## 2024-07-25 PROCEDURE — 94761 N-INVAS EAR/PLS OXIMETRY MLT: CPT

## 2024-07-25 PROCEDURE — 27000221 HC OXYGEN, UP TO 24 HOURS

## 2024-07-25 PROCEDURE — 63600175 PHARM REV CODE 636 W HCPCS: Performed by: FAMILY MEDICINE

## 2024-07-25 PROCEDURE — 85025 COMPLETE CBC W/AUTO DIFF WBC: CPT

## 2024-07-25 PROCEDURE — 97110 THERAPEUTIC EXERCISES: CPT

## 2024-07-25 PROCEDURE — 25000003 PHARM REV CODE 250: Performed by: FAMILY MEDICINE

## 2024-07-25 PROCEDURE — 80053 COMPREHEN METABOLIC PANEL: CPT

## 2024-07-25 PROCEDURE — 63600175 PHARM REV CODE 636 W HCPCS

## 2024-07-25 PROCEDURE — 36415 COLL VENOUS BLD VENIPUNCTURE: CPT

## 2024-07-25 PROCEDURE — 21400001 HC TELEMETRY ROOM

## 2024-07-25 RX ORDER — POLYETHYLENE GLYCOL 3350 17 G/17G
17 POWDER, FOR SOLUTION ORAL DAILY
Status: DISCONTINUED | OUTPATIENT
Start: 2024-07-25 | End: 2024-07-30 | Stop reason: HOSPADM

## 2024-07-25 RX ORDER — LISINOPRIL 10 MG/1
10 TABLET ORAL DAILY
Status: DISCONTINUED | OUTPATIENT
Start: 2024-07-25 | End: 2024-07-30 | Stop reason: HOSPADM

## 2024-07-25 RX ORDER — ATORVASTATIN CALCIUM 10 MG/1
10 TABLET, FILM COATED ORAL DAILY
Status: DISCONTINUED | OUTPATIENT
Start: 2024-07-25 | End: 2024-07-29

## 2024-07-25 RX ADMIN — HEPARIN SODIUM 5000 UNITS: 5000 INJECTION INTRAVENOUS; SUBCUTANEOUS at 05:07

## 2024-07-25 RX ADMIN — LEUCINE, PHENYLALANINE, LYSINE, METHIONINE, ISOLEUCINE, VALINE, HISTIDINE, THREONINE, TRYPTOPHAN, ALANINE, GLYCINE, ARGININE, PROLINE, SERINE, TYROSINE, SODIUM ACETATE, DIBASIC POTASSIUM PHOSPHATE, MAGNESIUM CHLORIDE, SODIUM CHLORIDE, CALCIUM CHLORIDE, DEXTROSE
311; 238; 247; 170; 255; 247; 204; 179; 77; 880; 438; 489; 289; 213; 17; 297; 261; 51; 77; 33; 5 INJECTION INTRAVENOUS at 09:07

## 2024-07-25 RX ADMIN — DOXYCYCLINE 100 MG: 100 INJECTION, POWDER, LYOPHILIZED, FOR SOLUTION INTRAVENOUS at 09:07

## 2024-07-25 RX ADMIN — APIXABAN 5 MG: 2.5 TABLET, FILM COATED ORAL at 08:07

## 2024-07-25 RX ADMIN — PIPERACILLIN SODIUM AND TAZOBACTAM SODIUM 4.5 G: 4; .5 INJECTION, POWDER, LYOPHILIZED, FOR SOLUTION INTRAVENOUS at 10:07

## 2024-07-25 RX ADMIN — MUPIROCIN: 20 OINTMENT TOPICAL at 09:07

## 2024-07-25 RX ADMIN — ESCITALOPRAM OXALATE 10 MG: 10 TABLET ORAL at 08:07

## 2024-07-25 RX ADMIN — METOPROLOL SUCCINATE 12.5 MG: 25 TABLET, EXTENDED RELEASE ORAL at 08:07

## 2024-07-25 RX ADMIN — PANTOPRAZOLE SODIUM 40 MG: 40 INJECTION, POWDER, FOR SOLUTION INTRAVENOUS at 08:07

## 2024-07-25 RX ADMIN — ATORVASTATIN CALCIUM 10 MG: 10 TABLET, FILM COATED ORAL at 08:07

## 2024-07-25 RX ADMIN — LISINOPRIL 10 MG: 10 TABLET ORAL at 02:07

## 2024-07-25 RX ADMIN — MELATONIN 6 MG: at 09:07

## 2024-07-25 RX ADMIN — PIPERACILLIN SODIUM AND TAZOBACTAM SODIUM 4.5 G: 4; .5 INJECTION, POWDER, LYOPHILIZED, FOR SOLUTION INTRAVENOUS at 02:07

## 2024-07-25 RX ADMIN — APIXABAN 5 MG: 2.5 TABLET, FILM COATED ORAL at 09:07

## 2024-07-25 RX ADMIN — MUPIROCIN: 20 OINTMENT TOPICAL at 08:07

## 2024-07-25 RX ADMIN — POLYETHYLENE GLYCOL 3350 17 G: 17 POWDER, FOR SOLUTION ORAL at 12:07

## 2024-07-25 RX ADMIN — PIPERACILLIN SODIUM AND TAZOBACTAM SODIUM 4.5 G: 4; .5 INJECTION, POWDER, LYOPHILIZED, FOR SOLUTION INTRAVENOUS at 06:07

## 2024-07-25 NOTE — PHYSICIAN QUERY
Please clarify the nutritional diagnosis associated with the clinical findings (include all that apply):

## 2024-07-25 NOTE — PT/OT/SLP PROGRESS
Physical Therapy Treatment    Patient Name:  Fifi Chacon   MRN:  [id number]    Recommendations     Therapy Intensity Recommendations at Discharge: Moderate Intensity Therapy  Discharge Equipment Recommendations:  (TBD - pending progress - currently - hospital bed)   Barriers to discharge: none, fall risk, severity of deficits, level of skilled assistance required, decreased endurance, impaired cognitive status, decreased safety awareness, and insight into deficits     Assessment     Fifi Chacon is a 88 y.o. female admitted with a medical diagnosis of Small bowel obstruction.  1. GRACIE (acute kidney injury)    2. Weakness    3. Vomiting    4. Severe sepsis    5. Chest pain    6. Elevated troponin    7. Small bowel obstruction    8. Acute urinary retention    9. Nausea and vomiting, unspecified vomiting type    10. Volume depletion, gastrointestinal loss    11. Chest pain, unspecified type       Patient Active Problem List   Diagnosis    Vaginal prolapse    Pessary maintenance    Hematuria    Hypertension    Atrophy of vagina    Gastroesophageal reflux disease    History of iron deficiency    Hydroureter    Rheumatoid arthritis    Vitamin D deficiency    Anxiety    Depressive disorder    Renal cyst    CAD (coronary artery disease)    Paroxysmal A-fib    Severe aortic stenosis    Acute cystitis without hematuria    Small bowel obstruction    Severe sepsis    GRACIE (acute kidney injury)    Pneumonia    Elevated troponin    Malnutrition    Nausea and vomiting    Volume depletion, gastrointestinal loss    Acute urinary retention    Chest pain      She presents with the following impairments/functional limitations:  weakness, gait instability, impaired balance, impaired endurance, impaired self care skills, impaired cognition, decreased safety awareness, impaired fine motor, decreased lower extremity function, decreased upper extremity function, decreased ROM, impaired coordination, edema.    Rehab Prognosis:  Good.    Patient would benefit from continued skilled acute PT services to: address above listed impairments/functional limitations; receive patient/caregiver education; reduce fall risk; and maximize independency/safety with functional mobility.    -continued: cardiac chair positioning in bed, sitting edge of bed, standing edge of bed, side steps at edge of bed, as tolerated/appropriate, with assistance and supervision     Recent Surgery: * No surgery found *      Plan     During this hospitalization, patient to be seen 5 x/week to address the identified impairments/functional limitations via gait training, therapeutic exercises, therapeutic activities, neuromuscular re-education and progress toward the established goals.    Plan of Care Expires:  08/18/24    Subjective     Communicated with patient's nurse Sivan prior to session.    Patient agreeable to participate in treatment session.    Chief Complaint: none  Patient/Family Comments/goals: none verbalized  Pain/Comfort:  Pain Rating 1: 0/10  Pain Addressed 1: Nurse notified  Pain Rating Post-Intervention 1: 0/10    Objective     Patient found supine in bed, with HOB elevated, and with bed rails up bilateral HOB, left FOB, and right FOB with telemetry, pulse ox (continuous), peripheral IV, PureWick, oxygen, pressure relief boots w/ bilateral LE's elevated, w/ patients son in room, upon PT entry to room.    General Precautions: Standard, fall   Orthopedic Precautions:N/A   Braces: N/A  Respiratory Status: 2 liters/min O2 via nasal cannula  SAT O2 Check: n/a    Functional Mobility:    Bed Mobility:  Bilateral UE/LE elevated on pillows and positioned - minimal assistance    Transfers:  Not assessed    Gait:  Not assessed    Other Mobility:  Therapeutic Exercises performed:   1 set times 10 repetitions  PROM-AAROM  bilat lower extremity       -bed level exercises:              hip abduction            hip adduction            hip internal rotation            hip  external rotation            short arc quads            ankle pumps  1 set times 15 repetitions  AAROM-PROM  bilat upper extremity       -bed level exercises:               chest press            shoulder internal rotation            shoulder external rotation            elbow flexion            elbow extension            forearm supination            forearm pronation            wrist flexion            wrist extension            make-a-fist            finger spread    Balance:  Sit  static balance-not assessed  dynamic balance-not assessed  Stand  static balance-not assessed    Patient left supine in bed, with HOB elevated, and with bed rails up bilateral HOB, left FOB, and right FOB with telemetry, pulse ox (continuous), peripheral IV, PureWick, oxygen, pressure relief boots, w/ bilateral UE/LE's elevated on pillows, with all lines intact, call button in reach, tray table at bedside, patient's nurse notified, patients son present, and bed alarm on.    Education     Patient educated on and assisted with functional mobility as noted above.  Patient educated on PT Plan of Care and role of PT in acute care.  Patient was instructed to utilize staff assistance for mobility/transfers.  White board updated regarding patient's safest level of mobility with staff assistance.    Goals     Multidisciplinary Problems       Physical Therapy Goals       Problem: Physical Therapy    Goal Priority Disciplines Outcome Goal Variances Interventions   Physical Therapy Goal     PT, PT/OT Progressing     Description: REVIEWED 2024  Goals to be met by: DISCHARGE  Patient will increase functional independence with mobility by performin. Supine to sit with Contact Guard Assistance - ONGOING  2. Sit to stand transfer with Contact Guard Assistance - ONGOING  3. Gait  x 20 feet with Minimal Assistance using Rolling Walker - ONGOING  4. Bilateral LE general ROM ex's w/ active-assist x10 reps each - all joints/planes - ONGOING            Time Tracking     PT Received On: 07/25/24  PT Start Time: 1551     PT Stop Time: 1624  PT Total Time (min): 33 min     Billable Minutes: Therapeutic Activity 3 (UE/LE positioning)  and Therapeutic Exercise 30  Non-Billable Minutes: n/a  07/25/2024

## 2024-07-25 NOTE — PHYSICIAN QUERY
Please further specify/clarify fluid depletion in the query:  The patient's volume depletion was present on admission and due to small bowel obstruction with nausea and vomiting

## 2024-07-25 NOTE — PROGRESS NOTES
"[facility] - [facility]  Daily Progress Note    Patient ID: Fifi Chacon, 89y/o F    Subjective:  NAEON  AF, hypertensive overnight  NGT removed yesterday  Conducted bed mobility with PT/OT yesterday without pain or issue   Denies nausea, vomiting, or BM  Passing flatus per nursing and family   Tolerating clear liquids  PPN started yesterday     Objective:    Vitals:  Vitals:    07/25/24 0311   BP: (!) 157/63   Pulse: 74   Resp: 18   Temp: 98.1 °F (36.7 °C)        Intake/Output:    Intake/Output Summary (Last 24 hours) at 7/25/2024 0724  Last data filed at 7/25/2024 0618  Gross per 24 hour   Intake 4154.14 ml   Output 2550 ml   Net 1604.14 ml        Physical Exam:  Gen: NAD  Neuro: awake, alert, answering questions appropriately  CV: RRR  Resp: non-labored breathing, satting well on 2L NC  Abd: soft, ND, NT  Ext: moves all 4 spontaneously and purposefully  Skin: warm, well perfused  L/D/A: 20G IV RUE, 20G IV R forearm, urethral catheter     Labs:  Renal:  Recent Labs     07/23/24 0351 07/24/24 0326 07/25/24 0313   BUN 10.3 11.9 17.6   CREATININE 0.90 0.92 0.88     No results for input(s): "LACTIC" in the last 72 hours.    FENGI:  Recent Labs     07/23/24  0351 07/24/24 0326 07/25/24 0313   * 138 137   K 3.8 3.7 3.9    104 106   CO2 27 28 27   CALCIUM 9.0 8.9 9.0   MG 1.90 1.70 1.60   PHOS 2.4 2.5 2.6   ALBUMIN 2.3* 2.1* 2.0*   BILITOT 0.9 0.6 0.5   AST 21 19 20   ALKPHOS 48 41 41   ALT 6 7 7       Heme:  Recent Labs     07/23/24  0351 07/24/24  0326 07/25/24  0313   HGB 8.5* 8.8* 8.1*   HCT 26.3* 26.9* 25.0*    157 168       ID:  Recent Labs     07/23/24  0351 07/24/24  0326 07/25/24  0313   WBC 3.26* 3.48* 3.06*       CBG:  Recent Labs     07/23/24  0351 07/24/24  0326 07/25/24  0313   GLUCOSE 96 130* 114        Cardiovascular:  Recent Labs   Lab 07/19/24  0008 07/19/24  0607 07/19/24  1154   TROPONINI 0.412* 0.377* 0.313*       I have reviewed all pertinent lab results within the " past 24 hours.    Imaging:  X RAY ABDOMEN AP 1 VIEW - 07/21/2024 AT 0457  Impression:     Small bowel follow-through enteric contrast has reached the colon and rectum before 24 hours without evidence of complete obstruction.    Micro/Path/Other:      Assessment/Plan:  88-year-old with PMHx of HTN, HLD, and afib on Eliquis presenting with pneumonia. CT shows evidence of a small-bowel obstruction with a transition point. Pt currently AF and HDS. SBFT and KUB with contrast in colon/rectum, however still no flatus or BM.      - OOB to chair TID, mobilize, PT/OT   - PPN started   - Serial abdominal exams  - ok from surgery perspective to advance diet as tolerated. Pt aspiration risk so can consider swallow study  - Recommend bowel regimen of mirlax to help with bowel function   - rest of care per primary      Jori Osullivan, MS3  [facility]  07/25/2024     RESIDENT ATTESTATION    I have seen and evaluated the patient with the student doctor. Note edited as needed. Agree with documentation above.     Misael Farfan MD  [facility] PGY-3  7:26 AM

## 2024-07-25 NOTE — PHYSICIAN QUERY
Please clarify the nutritional diagnosis associated with the clinical findings (include all that apply):      Moderate Protein Calorie Malnutrition  Abnormal Weight Loss  Nutrition progress notes 7/19:  Moderate acute disease or injury related malnutrition related to acute illness as evidenced by mild fat depletion, moderate muscle depletion, and 1-2% weight loss in 1 week. (new)      Malnutrition Context: acute illness or injury  Malnutrition Level: moderate      Energy Intake (Malnutrition): less than 75% for greater than or equal to 1 month      Weight Loss (Malnutrition): 1-2% in 1 week

## 2024-07-25 NOTE — PROGRESS NOTES
[facility]   Progress Note      Resident Team: [facility]   Attending Physician: Julieta Barnett MD  Resident: [facility] [place] Length of Stay: 6 days    Subjective   Brief HPI:  Fifi Chacon is a 88 y.o. female with history of paroxysmal afib on Eliquis, CAD s/p coronary angiogram on 10/13/23, severe aortic stenosis, hypertension, hyperlipidemia, anxiety admitted for small bowel obstruction and sepsis.     Interval History:   Vital signs remained stable, BP was elevated overnight despite addition of Metoprolol. No other acute events occurred overnight. NG tube was removed yesterday. Pt tolerating PO medications and clear liquids without N/V. She has no acute complaints at this time. Electrolytes stable on ppn.     Review of Systems:  Review of Systems   Constitutional:  Negative for fever and malaise/fatigue.   Respiratory:  Negative for cough and shortness of breath.    Cardiovascular:  Negative for chest pain and palpitations.   Gastrointestinal:  Positive for abdominal pain. Negative for nausea and vomiting.      Objective     Vital Signs (Most Recent):  Temp: 99.4 °F (37.4 °C) (07/25/24 0822)  Pulse: 79 (07/25/24 0822)  Resp: 20 (07/25/24 0822)  BP: (!) 152/70 (07/25/24 0822)  SpO2: 97 % (07/25/24 0859) Vital Signs (24h Range):  Temp:  [97.7 °F (36.5 °C)-99.4 °F (37.4 °C)] 99.4 °F (37.4 °C)  Pulse:  [71-90] 79  Resp:  [18-20] 20  SpO2:  [95 %-100 %] 97 %  BP: (137-165)/(63-83) 152/70     Physical Examination:  Physical Exam  Vitals and nursing note reviewed.   Constitutional:       Appearance: She is not ill-appearing.      Comments: Pt is alert and responsive this AM   HENT:      Mouth/Throat:      Mouth: Mucous membranes are dry.   Cardiovascular:      Rate and Rhythm: Normal rate and regular rhythm.      Comments: 2/6 systolic murmur best heard over R 2nd intercostal space  Pulmonary:      Effort: Pulmonary effort is normal.      Breath sounds: No wheezing.      Comments:  Course breath sounds bilaterally  Abdominal:      General: Bowel sounds are normal. There is no distension.      Palpations: Abdomen is soft.      Tenderness: There is no abdominal tenderness. There is no guarding.   Musculoskeletal:      Right lower leg: No edema.      Left lower leg: No edema.       Laboratory:  Most Recent Data:  Recent Lab Results         07/25/24  0313        Albumin/Globulin Ratio 0.5       Albumin 2.0       ALP 41       ALT 7       Anion Gap 4.0       AST 20       Baso # 0.01       Basophil % 0.3       BILIRUBIN TOTAL 0.5       BUN 17.6       BUN/CREAT RATIO 20       Calcium 9.0       Chloride 106       CO2 27       Creatinine 0.88       eGFR >60       Eos # 0.25       Eos % 8.2       Globulin, Total 4.2       Glucose 114       Hematocrit 25.0       Hemoglobin 8.1       Immature Grans (Abs) 0.06       Immature Granulocytes 2.0       Lymph # 0.45       LYMPH % 14.7       Magnesium  1.60       MCH 26.9       MCHC 32.4       MCV 83.1       Mono # 0.18       Mono % 5.9       MPV 11.1       Neut # 2.11       Neut % 68.9       nRBC 0.0       Phosphorus Level 2.6       Platelet Count 168       Potassium 3.9       PROTEIN TOTAL 6.2       RBC 3.01       RDW 14.7       Sodium 137       WBC 3.06                 Microbiology Data Reviewed: yes  Pertinent Findings:  Blood culture with 1/4 bottles + Staph capitis and Staph warneri  Urine culture with no growth    Current Medications:     Infusions:   Amino acid 4.25% - dextrose 5% (CLINIMIX-E) solution (1L provides 42.5 gm AA, 50 gm CHO (170 kcal/L dextrose), Na 35, K 30, Mg 5, Ca 4.5, Acetate 70, Cl 39, Phos 15)   Intravenous Continuous 75 mL/hr at 07/25/24 0600 Rate Verify at 07/25/24 0600        Scheduled:   apixaban  5 mg Oral BID    atorvastatin  10 mg Oral Daily    doxycycline IV (PEDS and ADULTS)  100 mg Intravenous Q12H    EScitalopram oxalate  10 mg Oral Daily    lisinopriL  10 mg Oral Daily    metoprolol succinate  12.5 mg Oral Daily    mupirocin    Nasal BID    pantoprazole  40 mg Intravenous Daily    piperacillin-tazobactam (Zosyn) IV (PEDS and ADULTS) (extended infusion is not appropriate)  4.5 g Intravenous Q8H        PRN:    Current Facility-Administered Medications:     0.9% NaCl, , Intravenous, PRN    acetaminophen, 650 mg, Oral, Q8H PRN    dextrose 10%, 12.5 g, Intravenous, PRN    dextrose 10%, 25 g, Intravenous, PRN    glucagon (human recombinant), 1 mg, Intramuscular, PRN    glucose, 16 g, Oral, PRN    glucose, 24 g, Oral, PRN    hydrALAZINE, 10 mg, Intravenous, Q6H PRN    melatonin, 6 mg, Oral, Nightly PRN    metoprolol, 5 mg, Intravenous, Q5 Min PRN    naloxone, 0.02 mg, Intravenous, PRN    ondansetron, 4 mg, Intravenous, Q8H PRN    prochlorperazine, 5 mg, Intravenous, Q6H PRN    sodium chloride 0.9%, 10 mL, Intravenous, Q12H PRN    Antibiotics and Day Number of Therapy:  Vanc, Zosyn and Azithro started 7/18  Stop Vanc 7/23      Intake/Output Summary (Last 24 hours) at 7/25/2024 1008  Last data filed at 7/25/2024 0618  Gross per 24 hour   Intake 4154.14 ml   Output 2550 ml   Net 1604.14 ml       Lines/Drains/Airways       Drain  Duration                  Urethral Catheter 07/19/24 2134 5 days              Peripheral Intravenous Line  Duration                  Peripheral IV - Single Lumen 07/23/24 1200 20 G Anterior;Right Upper Arm 1 day         Peripheral IV - Single Lumen 07/24/24 1839 20 G Anterior;Right Forearm <1 day                  Assessment and Plan    Small bowel obstruction - improved  Nausea, vomiting  - Surgery following  - NGT with 300cc output overnight  - NGT removed today per surgery team recs  - anti-emetics PRN in place, protonix   - Start clear liquid diet and advance as tolerated without N/V  - Initiate Miralax per surgical team recs     Severe sepsis 2/2 Right lower lobe pneumonia, possible aspiration  - Continue Zosyn/Doxy regimen for concern for aspiration pneumonia - D7  - MRSA PCR neg.   - Lactic acid resolved   - blood  cultures x2 collected 7/18: 1/2 bottles gram + cocci, probable staph. BCID panel with Staph.  - COVID/Flu neg  - IS ordered     Moderate Malnutrition  - Moderate malnutrition related to acute small bowel obstruction seen by mild fat depletion, moderate muscle depletion and 1-2% weight loss  - Present for 1 week   - Nutrition consulted for ppn  - Continue Clinimix  - Start clear liquid diet and advance as tolerated without N/V     Normocytic anemia - stable  - Stable this AM  - Continue to monitor with AM cbc     Questionable right paratracheal/mass/opacity on CXR  - CT chest showed reticular, nodular and hazy opacities in the right posterior upper lobe and both lower lobes with areas of consolidation in the lower lobes, larger in the right. This is consistent with multilobar consolidation. And fluid-filled dilatation of the distal esophagus which may be the result of reflux disease or related to distal esophageal stricture      Severe aortic stenosis  Coronary artery disease  Hypertension  Hyperlipidemia  - last Echo 10/2023 EF 65-70%, severe aortic stenosis  - Resumed home Metoprolol.   - Resume home ACEi and statin given elevated BP   - consider repeat Echo in future if showing signs of fluid overload     Paroxysmal atrial fibrillation  - Resume home Eliquis now tolerating PO medications  - currently NSR  - Continue home metoprolol; Lopressor pushes available prn  - Telemetry ordered     Anxiety  - Resume home Lexapro     History of falls  Chronic dizziness, at baseline  - admission 7/1/2024 for fall, currently in SNF for rehab, likely discharge back to SNF when able  - previously walked with a cane, currently requiring rolling walker  - OT/PT consulted  - fall precautions, delirium precautions  - Rondon catheter removed today; urine trial/purewick    Volume Depletion  -Secondary to acute small bowel obstruction  -Volume repleted in ED with IVF     CODE STATUS: DNR  Access: PIV  Antibiotics: Zosyn/Doxy  Diet: clear  liquid diet; advance as tolerated  DVT Prophylaxis: Eliquis  GI Prophylaxis: Pepcid IV  Fluids:  cc/hr      Disposition: Admit for SBO, no surgical intervention needed at this time. Continue on Zosyn/Doxy at this time for PNA. NG tube removed; start clear liquid diet and advance as tolerated. Resuming home medications today. Nutrition consulted for PPN recs. Case management consulted for discharge planning.         Alonso Foster MD  [facility]

## 2024-07-25 NOTE — MEDICAL/APP STUDENT
"[facility] - Purple Team  Daily Progress Note    Patient ID: Fifi Chacon, 87y/o F    Subjective:  NAEON  AF, HDS  Conducted bed mobility with PT/OT yesterday without pain or issue   Denies nausea, vomiting, or BM  Passing flatus per nursing and family   Tolerating clear liquids  TPN begun     Objective:    Vitals:  Vitals:    07/25/24 0311   BP: (!) 157/63   Pulse: 74   Resp: 18   Temp: 98.1 °F (36.7 °C)        Intake/Output:    Intake/Output Summary (Last 24 hours) at 7/25/2024 0619  Last data filed at 7/25/2024 0618  Gross per 24 hour   Intake 3092.41 ml   Output 2550 ml   Net 542.41 ml        Physical Exam:  Gen: NAD  Neuro: awake, alert, answering questions appropriately  CV: RRR  Resp: non-labored breathing, satting well on 2L NC  Abd: soft, ND, NT  Ext: moves all 4 spontaneously and purposefully  Skin: warm, well perfused  L/D/A: 20G IV RUE, 20G IV R forearm, urethral catheter     Labs:  Renal:  Recent Labs     07/23/24 0351 07/24/24 0326 07/25/24 0313   BUN 10.3 11.9 17.6   CREATININE 0.90 0.92 0.88     No results for input(s): "LACTIC" in the last 72 hours.    FENGI:  Recent Labs     07/23/24 0351 07/24/24 0326 07/25/24 0313   * 138 137   K 3.8 3.7 3.9    104 106   CO2 27 28 27   CALCIUM 9.0 8.9 9.0   MG 1.90 1.70 1.60   PHOS 2.4 2.5 2.6   ALBUMIN 2.3* 2.1* 2.0*   BILITOT 0.9 0.6 0.5   AST 21 19 20   ALKPHOS 48 41 41   ALT 6 7 7       Heme:  Recent Labs     07/23/24 0351 07/24/24 0326 07/25/24 0313   HGB 8.5* 8.8* 8.1*   HCT 26.3* 26.9* 25.0*    157 168       ID:  Recent Labs     07/23/24  0351 07/24/24  0326 07/25/24  0313   WBC 3.26* 3.48* 3.06*       CBG:  Recent Labs     07/23/24  0351 07/24/24  0326 07/25/24  0313   GLUCOSE 96 130* 114        Cardiovascular:  Recent Labs   Lab 07/19/24  0008 07/19/24  0607 07/19/24  1154   TROPONINI 0.412* 0.377* 0.313*       I have reviewed all pertinent lab results within the past 24 hours.    Imaging:  X RAY ABDOMEN AP 1 VIEW - " 07/21/2024 AT 0457  Impression:     Small bowel follow-through enteric contrast has reached the colon and rectum before 24 hours without evidence of complete obstruction.    Micro/Path/Other:  Microbiology Results (last 7 days)       Procedure Component Value Units Date/Time    Blood culture x two cultures. Draw prior to antibiotics. [[id number]]  (Normal) Collected: 07/18/24 1746    Order Status: Completed Specimen: Blood from Arm, Right Updated: 07/23/24 2100     Blood Culture No Growth at 5 days    Blood culture x two cultures. Draw prior to antibiotics. [[id number]]  (Abnormal)  (Susceptibility) Collected: 07/18/24 1841    Order Status: Completed Specimen: Blood from Arm, Left Updated: 07/22/24 0641     Blood Culture Staphylococcus capitis      Staphylococcus warneri     GRAM STAIN Gram Positive Cocci, probable Staphylococcus      Seen in gram stain of broth only      1 of 2 Aerobic bottles positive    BCID2 Panel [[id number]]  (Abnormal) Collected: 07/18/24 1841    Order Status: Completed Specimen: Blood from Arm, Left Updated: 07/20/24 1034     CTX-M (ESBL ) N/A     IMP (Cabapenemase ) N/A     KPC resistance gene (Carbapenemase ) N/A     mcr-1 N/A     mecA ID N/A     Comment: Note: Antimicrobial resistance can occur via multiple mechanisms. A Not Detected result for antimicrobial resistance gene(s) does not indicate antimicrobial susceptibility. Subculturing is required for species identification and susceptibility testing of   isolates.        mecA/C and MREJ (MRSA) gene N/A     NDM (Carbapenemase ) N/A     OXA-48-like (Carbapenemase ) N/A     Jeb/B (VRE gene) N/A     VIM (Carbapenemase ) N/A     Enterococcus faecalis Not Detected     Enterococcus faecium Not Detected     Listeria monocytogenes Not Detected     Staphylococcus spp. Detected     Staphylococcus aureus Not Detected     Staphylococcus epidermidis Not Detected     Staphylococcus lugdunensis Not  Detected     Streptococcus spp. Not Detected     Streptococcus agalactiae (Group B) Not Detected     Streptococcus pneumoniae Not Detected     Streptococcus pyogenes (Group A) Not Detected     Acinetobacter calcoaceticus/baumannii complex Not Detected     Bacteroides fragilis Not Detected     Enterobacterales Not Detected     Enterobacter cloacae complex Not Detected     Escherichia coli Not Detected     Klebsiella aerogenes Not Detected     Klebsiella oxytoca Not Detected     Klebsiella pneumoniae group Not Detected     Proteus spp. Not Detected     Salmonella spp. Not Detected     Serratia marcescens Not Detected     Haemophilus influenzae Not Detected     Neisseria meningitidis Not Detected     Pseudomonas aeruginosa Not Detected     Stenotrophomonas maltophilia Not Detected     Candida albicans Not Detected     Candida auris Not Detected     Candida glabrata Not Detected     Candida krusei Not Detected     Candida parapsilosis Not Detected     Candida tropicalis Not Detected     Cryptococcus neoformans/gattii Not Detected    Narrative:      The [employer] BCID2 Panel is a multiplexed nucleic acid test intended for the use with Applied StemCell® 2.0 or Applied StemCell® [place] [place] [employer] for the simultaneous qualitative detection and identification of multiple bacterial and yeast nucleic acids and select genetic determinants associated with antimicrobial resistance.  The [employer] BCID2 Panel test is performed directly on blood culture samples identified as positive by a continuous monitoring blood culture system.  Results are intended to be interpreted in conjunction with Gram stain results.    Urine culture [[id number]] Collected: 07/18/24 1729    Order Status: Completed Specimen: Urine Updated: 07/20/24 0919     Urine Culture No Significant Growth    Respiratory Culture [[id number]]     Order Status: Sent Specimen: Sputum              Assessment/Plan:  88-year-old with PMHx of HTN, HLD, and afib on Eliquis  presenting with pneumonia. CT shows evidence of a small-bowel obstruction with a transition point. Pt currently AF and HDS. SBFT and KUB with contrast in colon/rectum, however still no flatus or BM.      - OOB to chair TID, mobilize, PT/OT   - PPN started   - Serial abdominal exams  - NPO  - mIVF  - no bowel movements, passing flatus per family and nursing  - rest of care per primary  - surgery will continue to follow       Jori Osullivan, MS3  [facility]  07/25/2024

## 2024-07-25 NOTE — PLAN OF CARE
Problem: Adult Inpatient Plan of Care  Goal: Plan of Care Review  Outcome: Progressing  Flowsheets (Taken 7/25/2024 0741)  Plan of Care Reviewed With: patient  Goal: Absence of Hospital-Acquired Illness or Injury  Outcome: Progressing  Intervention: Identify and Manage Fall Risk  Flowsheets (Taken 7/25/2024 0741)  Safety Promotion/Fall Prevention:   assistive device/personal item within reach   side rails raised x 2   supervised activity  Intervention: Prevent Skin Injury  Flowsheets (Taken 7/25/2024 0741)  Body Position: 30 degrees  Intervention: Prevent Infection  Flowsheets (Taken 7/25/2024 0741)  Infection Prevention:   hand hygiene promoted   single patient room provided   rest/sleep promoted  Goal: Optimal Comfort and Wellbeing  Outcome: Progressing  Intervention: Monitor Pain and Promote Comfort  Flowsheets (Taken 7/25/2024 0741)  Pain Management Interventions:   quiet environment facilitated   relaxation techniques promoted  Intervention: Provide Person-Centered Care  Flowsheets (Taken 7/25/2024 0741)  Trust Relationship/Rapport:   care explained   reassurance provided   thoughts/feelings acknowledged   emotional support provided   empathic listening provided   questions answered   questions encouraged   choices provided     Problem: Fall Injury Risk  Goal: Absence of Fall and Fall-Related Injury  Outcome: Progressing  Intervention: Promote Injury-Free Environment  Flowsheets (Taken 7/25/2024 0741)  Safety Promotion/Fall Prevention:   assistive device/personal item within reach   side rails raised x 2   supervised activity

## 2024-07-25 NOTE — NURSING
0000-Patient blood pressure 165/68. MD Dr Ye Notified. MD states to not give PRN Hydralazine at this time and will continue to monitor the blood pressure.

## 2024-07-25 NOTE — RESIDENT HANDOFF
TRANSITION OF CARE PROGRESS NOTE    I have received checkout from Dr. Alonso Foster regarding this patient.     HO-I assessment:  Admission diagnosis: Vomiting [R11.10]  Weakness [R53.1]  Elevated troponin [R79.89]  GRACIE (acute kidney injury) [N17.9]  Chest pain [R07.9]  Severe sepsis [A41.9, R65.20]   Overnight management: continue peripheral parenteral nutrition.   Code status: DNR    Please call Dr. Ceasar Ye at [phone] from 07/24/2024 7PM to 07/25/24 7AM if any issues arise.    Harjinder Teresa MD  [facility], HO-I

## 2024-07-25 NOTE — PT/OT/SLP PROGRESS
Physical Therapy    Missed Treatment Session - cancel note    Patient Name:  Fifi Chacon   MRN:  [id number]      -patient not seen at this time secondary to nursing care ongoing  -CNA in room bathing patient  -will follow-up as patient is appropriate/available/agreeable to participate and as therapists' schedule allows.

## 2024-07-26 LAB
ALBUMIN SERPL-MCNC: 2.1 G/DL (ref 3.4–4.8)
ALBUMIN/GLOB SERPL: 0.5 RATIO (ref 1.1–2)
ALP SERPL-CCNC: 51 UNIT/L (ref 40–150)
ALT SERPL-CCNC: 10 UNIT/L (ref 0–55)
ANION GAP SERPL CALC-SCNC: 5 MEQ/L
ANION GAP SERPL CALC-SCNC: 6 MEQ/L
AST SERPL-CCNC: 29 UNIT/L (ref 5–34)
BASOPHILS # BLD AUTO: 0.01 X10(3)/MCL
BASOPHILS NFR BLD AUTO: 0.3 %
BILIRUB SERPL-MCNC: 0.6 MG/DL
BUN SERPL-MCNC: 20.9 MG/DL (ref 9.8–20.1)
BUN SERPL-MCNC: 22.2 MG/DL (ref 9.8–20.1)
CALCIUM SERPL-MCNC: 8.7 MG/DL (ref 8.4–10.2)
CALCIUM SERPL-MCNC: 9.3 MG/DL (ref 8.4–10.2)
CHLORIDE SERPL-SCNC: 103 MMOL/L (ref 98–107)
CHLORIDE SERPL-SCNC: 104 MMOL/L (ref 98–107)
CO2 SERPL-SCNC: 22 MMOL/L (ref 23–31)
CO2 SERPL-SCNC: 24 MMOL/L (ref 23–31)
CREAT SERPL-MCNC: 0.92 MG/DL (ref 0.55–1.02)
CREAT SERPL-MCNC: 1.03 MG/DL (ref 0.55–1.02)
CREAT/UREA NIT SERPL: 20
CREAT/UREA NIT SERPL: 24
EOSINOPHIL # BLD AUTO: 0.1 X10(3)/MCL (ref 0–0.9)
EOSINOPHIL NFR BLD AUTO: 2.8 %
ERYTHROCYTE [DISTWIDTH] IN BLOOD BY AUTOMATED COUNT: 14.9 % (ref 11.5–17)
GFR SERPLBLD CREATININE-BSD FMLA CKD-EPI: 53 ML/MIN/1.73/M2
GFR SERPLBLD CREATININE-BSD FMLA CKD-EPI: 60 ML/MIN/1.73/M2
GLOBULIN SER-MCNC: 4.4 GM/DL (ref 2.4–3.5)
GLUCOSE SERPL-MCNC: 114 MG/DL (ref 82–115)
GLUCOSE SERPL-MCNC: 127 MG/DL (ref 82–115)
HCT VFR BLD AUTO: 27 % (ref 37–47)
HGB BLD-MCNC: 8.7 G/DL (ref 12–16)
HOLD SPECIMEN: NORMAL
IMM GRANULOCYTES # BLD AUTO: 0.09 X10(3)/MCL (ref 0–0.04)
IMM GRANULOCYTES NFR BLD AUTO: 2.6 %
LYMPHOCYTES # BLD AUTO: 0.5 X10(3)/MCL (ref 0.6–4.6)
LYMPHOCYTES NFR BLD AUTO: 14.2 %
MAGNESIUM SERPL-MCNC: 1.7 MG/DL (ref 1.6–2.6)
MCH RBC QN AUTO: 26.8 PG (ref 27–31)
MCHC RBC AUTO-ENTMCNC: 32.2 G/DL (ref 33–36)
MCV RBC AUTO: 83.1 FL (ref 80–94)
MONOCYTES # BLD AUTO: 0.21 X10(3)/MCL (ref 0.1–1.3)
MONOCYTES NFR BLD AUTO: 6 %
NEUTROPHILS # BLD AUTO: 2.61 X10(3)/MCL (ref 2.1–9.2)
NEUTROPHILS NFR BLD AUTO: 74.1 %
NRBC BLD AUTO-RTO: 0 %
PHOSPHATE SERPL-MCNC: 3.1 MG/DL (ref 2.3–4.7)
PLATELET # BLD AUTO: 170 X10(3)/MCL (ref 130–400)
PMV BLD AUTO: 10.9 FL (ref 7.4–10.4)
POCT GLUCOSE: 106 MG/DL (ref 70–110)
POTASSIUM SERPL-SCNC: 4.1 MMOL/L (ref 3.5–5.1)
POTASSIUM SERPL-SCNC: 4.4 MMOL/L (ref 3.5–5.1)
PROT SERPL-MCNC: 6.5 GM/DL (ref 5.8–7.6)
RBC # BLD AUTO: 3.25 X10(6)/MCL (ref 4.2–5.4)
SODIUM SERPL-SCNC: 131 MMOL/L (ref 136–145)
SODIUM SERPL-SCNC: 133 MMOL/L (ref 136–145)
WBC # BLD AUTO: 3.52 X10(3)/MCL (ref 4.5–11.5)

## 2024-07-26 PROCEDURE — 25000003 PHARM REV CODE 250

## 2024-07-26 PROCEDURE — 80053 COMPREHEN METABOLIC PANEL: CPT

## 2024-07-26 PROCEDURE — 97168 OT RE-EVAL EST PLAN CARE: CPT

## 2024-07-26 PROCEDURE — 27000221 HC OXYGEN, UP TO 24 HOURS

## 2024-07-26 PROCEDURE — 63600175 PHARM REV CODE 636 W HCPCS

## 2024-07-26 PROCEDURE — 97164 PT RE-EVAL EST PLAN CARE: CPT

## 2024-07-26 PROCEDURE — 94761 N-INVAS EAR/PLS OXIMETRY MLT: CPT

## 2024-07-26 PROCEDURE — 63600175 PHARM REV CODE 636 W HCPCS: Performed by: FAMILY MEDICINE

## 2024-07-26 PROCEDURE — 36415 COLL VENOUS BLD VENIPUNCTURE: CPT | Performed by: FAMILY MEDICINE

## 2024-07-26 PROCEDURE — 97530 THERAPEUTIC ACTIVITIES: CPT

## 2024-07-26 PROCEDURE — 85025 COMPLETE CBC W/AUTO DIFF WBC: CPT

## 2024-07-26 PROCEDURE — 36415 COLL VENOUS BLD VENIPUNCTURE: CPT

## 2024-07-26 PROCEDURE — 99900035 HC TECH TIME PER 15 MIN (STAT)

## 2024-07-26 PROCEDURE — 84100 ASSAY OF PHOSPHORUS: CPT

## 2024-07-26 PROCEDURE — 25000003 PHARM REV CODE 250: Performed by: FAMILY MEDICINE

## 2024-07-26 PROCEDURE — 83735 ASSAY OF MAGNESIUM: CPT

## 2024-07-26 PROCEDURE — 21400001 HC TELEMETRY ROOM

## 2024-07-26 RX ORDER — DEXTROMETHORPHAN POLISTIREX 30 MG/5 ML
1 SUSPENSION, EXTENDED RELEASE 12 HR ORAL ONCE
Status: COMPLETED | OUTPATIENT
Start: 2024-07-26 | End: 2024-07-26

## 2024-07-26 RX ORDER — HEPARIN SODIUM 5000 [USP'U]/ML
5000 INJECTION, SOLUTION INTRAVENOUS; SUBCUTANEOUS EVERY 8 HOURS
Status: DISCONTINUED | OUTPATIENT
Start: 2024-07-26 | End: 2024-07-30 | Stop reason: HOSPADM

## 2024-07-26 RX ORDER — SODIUM CHLORIDE 9 MG/ML
INJECTION, SOLUTION INTRAVENOUS CONTINUOUS
Status: DISCONTINUED | OUTPATIENT
Start: 2024-07-26 | End: 2024-07-27

## 2024-07-26 RX ADMIN — MUPIROCIN: 20 OINTMENT TOPICAL at 09:07

## 2024-07-26 RX ADMIN — LISINOPRIL 10 MG: 10 TABLET ORAL at 09:07

## 2024-07-26 RX ADMIN — ATORVASTATIN CALCIUM 10 MG: 10 TABLET, FILM COATED ORAL at 09:07

## 2024-07-26 RX ADMIN — PANTOPRAZOLE SODIUM 40 MG: 40 INJECTION, POWDER, FOR SOLUTION INTRAVENOUS at 09:07

## 2024-07-26 RX ADMIN — SODIUM CHLORIDE: 9 INJECTION, SOLUTION INTRAVENOUS at 09:07

## 2024-07-26 RX ADMIN — MINERAL OIL 1 ENEMA: 100 ENEMA RECTAL at 09:07

## 2024-07-26 RX ADMIN — PIPERACILLIN SODIUM AND TAZOBACTAM SODIUM 4.5 G: 4; .5 INJECTION, POWDER, LYOPHILIZED, FOR SOLUTION INTRAVENOUS at 06:07

## 2024-07-26 RX ADMIN — APIXABAN 5 MG: 2.5 TABLET, FILM COATED ORAL at 09:07

## 2024-07-26 RX ADMIN — DOXYCYCLINE 100 MG: 100 INJECTION, POWDER, LYOPHILIZED, FOR SOLUTION INTRAVENOUS at 09:07

## 2024-07-26 RX ADMIN — METOPROLOL SUCCINATE 12.5 MG: 25 TABLET, EXTENDED RELEASE ORAL at 09:07

## 2024-07-26 RX ADMIN — PIPERACILLIN SODIUM AND TAZOBACTAM SODIUM 4.5 G: 4; .5 INJECTION, POWDER, LYOPHILIZED, FOR SOLUTION INTRAVENOUS at 10:07

## 2024-07-26 RX ADMIN — ONDANSETRON 4 MG: 2 INJECTION INTRAMUSCULAR; INTRAVENOUS at 02:07

## 2024-07-26 RX ADMIN — POLYETHYLENE GLYCOL 3350 17 G: 17 POWDER, FOR SOLUTION ORAL at 09:07

## 2024-07-26 RX ADMIN — HEPARIN SODIUM 5000 UNITS: 5000 INJECTION, SOLUTION INTRAVENOUS; SUBCUTANEOUS at 09:07

## 2024-07-26 RX ADMIN — PIPERACILLIN SODIUM AND TAZOBACTAM SODIUM 4.5 G: 4; .5 INJECTION, POWDER, LYOPHILIZED, FOR SOLUTION INTRAVENOUS at 03:07

## 2024-07-26 RX ADMIN — ESCITALOPRAM OXALATE 10 MG: 10 TABLET ORAL at 09:07

## 2024-07-26 NOTE — PT/OT/SLP RE-EVAL
Physical Therapy Re-Evaluation    Patient Name:  Fifi Chacon   MRN:  [id number]    Recommendations     Therapy Intensity Recommendations at Discharge: Moderate Intensity Therapy  Discharge Equipment Recommendations:  (TBD - currently - hospital bed)   Equipment to be obtained for discharge: TBD - currently - hospital bed  Barriers to discharge: fall risk, severity of deficits, level of skilled assistance required, impaired cognitive status, decreased safety awareness, and insight into deficits    Assessment     Fifi Chacon is a 88 y.o. female admitted with a medical diagnosis of Small bowel obstruction.  1. GRACIE (acute kidney injury)    2. Weakness    3. Vomiting    4. Severe sepsis    5. Chest pain    6. Elevated troponin    7. Small bowel obstruction    8. Acute urinary retention    9. Nausea and vomiting, unspecified vomiting type    10. Volume depletion, gastrointestinal loss    11. Chest pain, unspecified type       Patient Active Problem List   Diagnosis    Vaginal prolapse    Pessary maintenance    Hematuria    Hypertension    Atrophy of vagina    Gastroesophageal reflux disease    History of iron deficiency    Hydroureter    Rheumatoid arthritis    Vitamin D deficiency    Anxiety    Depressive disorder    Renal cyst    CAD (coronary artery disease)    Paroxysmal A-fib    Severe aortic stenosis    Acute cystitis without hematuria    Small bowel obstruction    Severe sepsis    GRACIE (acute kidney injury)    Pneumonia    Elevated troponin    Malnutrition    Nausea and vomiting    Volume depletion, gastrointestinal loss    Acute urinary retention    Chest pain      She presents with the following impairments/functional limitations:  weakness, gait instability, impaired balance, impaired endurance, impaired self care skills, impaired cognition, decreased coordination, impaired functional mobility, decreased safety awareness, impaired fine motor, impaired coordination, decreased lower extremity function,  decreased upper extremity function, decreased ROM.    Rehab Prognosis: Good.    Patient would benefit from continued skilled acute PT services to: address above listed impairments/functional limitations; receive patient/caregiver education; reduce fall risk; and maximize independency/safety with functional mobility.    -continued: cardiac chair positioning in bed, sitting edge of bed, standing edge of bed, as tolerated/appropriate, with assistance and supervision, progress side steps at edge of bed      Recent Surgery: * No surgery found *      Plan     During this hospitalization, patient to be seen 5 x/week to address the identified impairments/functional limitations via gait training, therapeutic activities, therapeutic exercises, neuromuscular re-education and progress toward the established goals.    Plan of Care Expires:  08/18/24    Plan of Care reviewed with: patient    Subjective     Communicated with patient's nurse Sivan prior to session.    Patient agreeable to participate in re-evaluation.    Chief Complaint: none  Patient/Family Comments/goals: maximize functional outcomes  Pain/Comfort:  Pain Rating 1: 0/10  Pain Addressed 1: Nurse notified  Pain Rating Post-Intervention 1: 0/10    Patients cultural, spiritual, Christian conflicts given the current situation: no    Hand dominance: right    Objective     OT Nils in room for re-evaluation  Patients son left room to sit in lobby during therapy session  PM&R TECH Wei to room during beginning of therapy session    Patient found supine in bed, with HOB elevated, and with bed rails up bilateral HOB, left FOB, and right FOB with telemetry, pulse ox (continuous), peripheral IV, oxygen, PureWick, pressure relief boots (BILAT LE's elevated on pillows), patients son in room, upon PT entry to room.    General Precautions: Standard, fall, aspiration   Orthopedic Precautions:N/A   Braces: N/A  Respiratory Status: 3 liters/min O2 via nasal cannula  SAT O2 Check:  n/a    Vitals   At Rest (pre-session)  BP  157/74   HR  70   O2 Sat %  96     Exams:  Orientation: Patient is person  Commands: Patient follows commands inconsistently  Fine Motor Coordination:     -     Impaired: Rapid alternating ankle DF/PF slowed/limited w/ multiple cues (verbal/tactile) required to complete  Sensation:    -     Intact: light/touch bilat lower extremity  BILAT UE ROM/strength - defer to OT - see OT note for details  RLE ROM: Deficits: all joints/planes  RLE Strength:  grossly 2-/5 throughout  LLE ROM: Deficits: all joints/planes  LLE Strength:  grossly 2-/5 throughout    Functional Mobility:    Bed Mobility:  Rolling Left: total assistance  Rolling Right: total assistance  Scooting: total assistance  with cues for proper technique  with firm mattress    Repositioning to HOB and center of bed w/ draw sheet total assistance x2 w/ LE assist x1  with cues for proper technique  with firm mattress, trendelenburg positioning, and w/ aspiration precautions observed    *multiple attempts to have patient assist in transitioning to sitting edge of bed, requiring total assist to attempt to position LE's to edge of bed    *multiple commands not followed by patient    *safety concerns w/ sitting activities at this time    Transfers:  Not appropriate to assess at this time    Gait:  Not appropriate to assess at this time    Other Mobility:  not assessed    Balance:  Sit  static balance - Not appropriate to assess at this time  dynamic balance - Not appropriate to assess at this time  Stand  static balance - Not appropriate to assess at this time    Additional Treatment Session  n/a    Patient left semi-cardiac chair positioning in bed with HOB elevated, with bed rails up bilateral HOB, left FOB, and right FOB, with telemetry, pulse ox (continuous), peripheral IV, oxygen, PureWick, pressure relief boots (BILAT LE's elevated on pillows) with all lines intact, call button in reach, tray table at bedside,  patient's nurse notified, patients son entering room at tx end, and bed alarm on.    Education     Patient educated on and assisted with functional mobility as noted above.  Patient educated on PT Plan of Care and role of PT in acute care.  Patient was instructed to utilize staff assistance for mobility/transfers.  White board updated regarding patient's safest level of mobility with staff assistance    Goals     Multidisciplinary Problems       Physical Therapy Goals       Problem: Physical Therapy    Goal Priority Disciplines Outcome Goal Variances Interventions   Physical Therapy Goal     PT, PT/OT Progressing     Description: REVIEWED/CONTINUED 2024  Goals to be met by: DISCHARGE  Patient will increase functional independence with mobility by performin. Supine to sit with Contact Guard Assistance - ONGOING  2. Sit to stand transfer with Contact Guard Assistance - ONGOING  3. Gait  x 20 feet with Minimal Assistance using Rolling Walker - ONGOING  4. Bilateral LE general ROM ex's w/ active-assist x10 reps each - all joints/planes - ONGOING           History     Past Medical History:   Diagnosis Date    GERD (gastroesophageal reflux disease)     Hyperlipidemia     Hypertension      Past Surgical History:   Procedure Laterality Date    ANGIOGRAM, CORONARY, WITH LEFT HEART CATHETERIZATION N/A 10/13/2023    Procedure: Angiogram, Coronary, with Left Heart Cath;  Surgeon: Marlon Myers Jr., MD;  Location: [facility] CATH LAB;  Service: Cardiology;  Laterality: N/A;    APPENDECTOMY      CHOLECYSTECTOMY      HYSTERECTOMY      INSTANTANEOUS WAVE-FREE RATIO (IFR) N/A 10/13/2023    Procedure: Instantaneous Wave-Free Ratio (IFR);  Surgeon: Marlon Myers Jr., MD;  Location: [facility] CATH LAB;  Service: Cardiology;  Laterality: N/A;     Time Tracking     PT Received On: 24  PT Start Time: 829     PT Stop Time: 848  PT Total Time (min): 19 min     Billable Minutes: Re-eval 18  Non-Billable Minutes:  n/a  07/26/2024

## 2024-07-26 NOTE — PLAN OF CARE
Problem: Adult Inpatient Plan of Care  Goal: Absence of Hospital-Acquired Illness or Injury  Outcome: Progressing  Intervention: Identify and Manage Fall Risk  Flowsheets (Taken 7/26/2024 0754)  Safety Promotion/Fall Prevention:   assistive device/personal item within reach   side rails raised x 2   supervised activity  Intervention: Prevent Skin Injury  Flowsheets (Taken 7/26/2024 0754)  Body Position: position changed independently  Intervention: Prevent Infection  Flowsheets (Taken 7/26/2024 0754)  Infection Prevention:   hand hygiene promoted   rest/sleep promoted   single patient room provided  Goal: Optimal Comfort and Wellbeing  Outcome: Progressing  Intervention: Monitor Pain and Promote Comfort  Flowsheets (Taken 7/26/2024 0754)  Pain Management Interventions:   quiet environment facilitated   relaxation techniques promoted  Intervention: Provide Person-Centered Care  Flowsheets (Taken 7/26/2024 0754)  Trust Relationship/Rapport:   care explained   reassurance provided   choices provided   thoughts/feelings acknowledged   emotional support provided   empathic listening provided   questions answered   questions encouraged     Problem: Fall Injury Risk  Goal: Absence of Fall and Fall-Related Injury  Outcome: Progressing  Intervention: Promote Injury-Free Environment  Flowsheets (Taken 7/26/2024 0754)  Safety Promotion/Fall Prevention:   assistive device/personal item within reach   side rails raised x 2   supervised activity

## 2024-07-26 NOTE — PT/OT/SLP RE-EVAL
Occupational Therapy   Re-evaluation    Name: Fifi Chacon  MRN: [id number]  Admitting Diagnosis:  Small bowel obstruction  Recent Surgery: * No surgery found *      Recommendations:     Discharge Recommendations: Moderate Intensity Therapy  Discharge Equipment Recommendations: other (see comments) (to be determined by progress)  Barriers to discharge:  None    Assessment:     Fifi Chacon is a 88 y.o. female with a medical diagnosis of Small bowel obstruction.  She presents with functional deficits and impaired occupational performance.  Performance deficits affecting function are weakness, impaired endurance, impaired sensation, impaired self care skills, impaired functional mobility, decreased upper extremity function, decreased lower extremity function, decreased safety awareness, impaired cognition, gait instability, impaired balance, pain, impaired cardiopulmonary response to activity.      Rehab Prognosis:  good; patient would benefit from acute skilled OT services to address these deficits and reach maximum level of function.       Plan:     Patient to be seen 3 x/week to address the above listed problems via self-care/home management, therapeutic activities, therapeutic exercises  Plan of Care Expires:  (d/c)  Plan of Care Reviewed with: patient    Subjective     Chief Complaint: no complaints this date  Patient/Family stated goals: maximize independence and functional strength  Communicated with: nurse and son and PT prior to session.  Pain/Comfort:  Pain Rating 1: 0/10  Pain Addressed 1: Nurse notified, Cessation of Activity  Pain Rating Post-Intervention 1: 0/10    Objective:     Communicated with: nurse prior to session.  Patient found supine with: telemetry, pulse ox (continuous), peripheral IV, oxygen, canales catheter, pressure relief boots, NG tube upon OT entry to room.    General Precautions: Standard, fall  Orthopedic Precautions: N/A  Braces: N/A  Respiratory Status: Nasal cannula, flow 3  L/min    Occupational Performance:    Bed Mobility:    Patient completed Rolling/Turning to Left with  total assistance  Patient completed Rolling/Turning to Right with total assistance  Patient completed Scooting/Bridging with total assistance    Cognitive/Visual Perceptual:  Cognitive/Psychosocial Skills:     -       Oriented to: Person   -       Follows Commands/attention:Inattentive  -       Safety awareness/insight to disability: impaired     Physical Exam:  Balance:    -       total assist for sitting and standing balance   Upper Extremity Range of Motion:  -       Right Upper Extremity: Deficits: decreased ability to follow commands this date and difficulty with AROM following max visual and verbal cueing   -       Left Upper Extremity: Deficits: decreased ability to follow commands this date and difficulty with AROM following max visual and verbal cueing  Upper Extremity Strength:    -       Right Upper Extremity: Deficits: decreased muscle strength throughout BUE  -       Left Upper Extremity: decreased muscle strength throughout BUE   Strength:    -       Right Upper Extremity: decreased muscle strength throughout BUE  -       Left Upper Extremity: decreased muscle strength throughout BUE    Treatment & Education:  Pt. educated on OT goals, POC, orientation to environment, use of call bell for assist with transfers OOB or for any other needs due to fall risk.    Patient left supine with all lines intact, call button in reach, bed alarm on, nurse notified, and son present    GOALS:   Multidisciplinary Problems       Occupational Therapy Goals          Problem: Occupational Therapy    Goal Priority Disciplines Outcome Interventions   Occupational Therapy Goal     OT, PT/OT Progressing    Description: Patient will perform EOB grooming in unsupported sitting with Min A. Goal Met 7/23/2024  Patient will perform bed mobility while rolling right/left with Min A.  Patient will perform 5 sit to stand  transitions from bed level with Mod A x 2 and RW for preparation of ADLs.  Patient will perform BSC transfer with RW and Mod A.                        History:     Past Medical History:   Diagnosis Date    GERD (gastroesophageal reflux disease)     Hyperlipidemia     Hypertension          Past Surgical History:   Procedure Laterality Date    ANGIOGRAM, CORONARY, WITH LEFT HEART CATHETERIZATION N/A 10/13/2023    Procedure: Angiogram, Coronary, with Left Heart Cath;  Surgeon: Marlon Myers Jr., MD;  Location: [facility] CATH LAB;  Service: Cardiology;  Laterality: N/A;    APPENDECTOMY      CHOLECYSTECTOMY      HYSTERECTOMY      INSTANTANEOUS WAVE-FREE RATIO (IFR) N/A 10/13/2023    Procedure: Instantaneous Wave-Free Ratio (IFR);  Surgeon: Marlon Myers Jr., MD;  Location: [facility] CATH LAB;  Service: Cardiology;  Laterality: N/A;       Time Tracking:     OT Date of Treatment: 07/24/24  OT Start Time: 0829  OT Stop Time: 0848  OT Total Time (min): 19 min    Billable Minutes:Re-eval 19 7/26/2024

## 2024-07-26 NOTE — PROGRESS NOTES
[facility] Family Medicine Wards   Progress Note      Resident Team: [facility] Family Medicine List   Attending Physician: Julieta Barnett MD  Resident: [facility] Length of Stay: 7 days    Subjective   Brief HPI:  Fifi Chacon is a 88 y.o. female with history of paroxysmal afib on Eliquis, CAD s/p coronary angiogram on 10/13/23, severe aortic stenosis, hypertension, hyperlipidemia, anxiety admitted for small bowel obstruction and sepsis.     Interval History:   Vital signs remained stable, BP was elevated yesterday; home lisinopril was resumed in the evening. No other acute events occurred overnight. Pt tolerating PO medications and clear liquids without N/V. She has no acute complaints at this time. Na has dropped this am still on ppn. Working with PT/OT.    Review of Systems:  Review of Systems   Constitutional:  Negative for fever and malaise/fatigue.   Respiratory:  Negative for cough and shortness of breath.    Cardiovascular:  Negative for chest pain and palpitations.   Gastrointestinal:  Negative for abdominal pain, nausea and vomiting.      Objective     Vital Signs (Most Recent):  Temp: 98.5 °F (36.9 °C) (07/26/24 0334)  Pulse: 69 (07/26/24 0334)  Resp: 18 (07/25/24 2321)  BP: (!) 132/55 (07/26/24 0334)  SpO2: 96 % (07/26/24 0600) Vital Signs (24h Range):  Temp:  [97.5 °F (36.4 °C)-98.5 °F (36.9 °C)] 98.5 °F (36.9 °C)  Pulse:  [67-74] 69  Resp:  [18] 18  SpO2:  [96 %-100 %] 96 %  BP: (132-159)/(55-69) 132/55     Physical Examination:  Physical Exam  Vitals and nursing note reviewed.   Constitutional:       Appearance: She is not ill-appearing.      Comments: Pt is alert and responsive this AM   HENT:      Mouth/Throat:      Mouth: Mucous membranes are dry.   Cardiovascular:      Rate and Rhythm: Normal rate and regular rhythm.      Comments: 2/6 systolic murmur best heard over R 2nd intercostal space  Pulmonary:      Effort: Pulmonary effort is normal.      Breath sounds: No wheezing.      Comments: Stable  course breath sounds bilaterally  Abdominal:      General: Bowel sounds are normal. There is no distension.      Palpations: Abdomen is soft.      Tenderness: There is no abdominal tenderness. There is no guarding.   Musculoskeletal:      Right lower leg: No edema.      Left lower leg: No edema.   Skin:     Comments: Small 2cm x 1cm blood blister on lateral R ankle. See pic below         Laboratory:  Most Recent Data:  Recent Lab Results         07/26/24  0321        Albumin/Globulin Ratio 0.5       Albumin 2.1       ALP 51       ALT 10       Anion Gap 6.0       AST 29       Baso # 0.01       Basophil % 0.3       BILIRUBIN TOTAL 0.6       BUN 22.2       BUN/CREAT RATIO 24       Calcium 8.7       Chloride 103       CO2 24       Creatinine 0.92       eGFR 60       Eos # 0.10       Eos % 2.8       Globulin, Total 4.4       Glucose 127       Hematocrit 27.0       Hemoglobin 8.7       Immature Grans (Abs) 0.09       Immature Granulocytes 2.6       Lymph # 0.50       LYMPH % 14.2       Magnesium  1.70       MCH 26.8       MCHC 32.2       MCV 83.1       Mono # 0.21       Mono % 6.0       MPV 10.9       Neut # 2.61       Neut % 74.1       nRBC 0.0       Phosphorus Level 3.1       Platelet Count 170       Potassium 4.1       PROTEIN TOTAL 6.5       RBC 3.25       RDW 14.9       Sodium 133       WBC 3.52                 Microbiology Data Reviewed: yes  Pertinent Findings:  Blood culture with 1/4 bottles + Staph capitis and Staph warneri  Urine culture with no growth    Current Medications:     Infusions:   Amino acid 4.25% - dextrose 5% (CLINIMIX-E) solution (1L provides 42.5 gm AA, 50 gm CHO (170 kcal/L dextrose), Na 35, K 30, Mg 5, Ca 4.5, Acetate 70, Cl 39, Phos 15)   Intravenous Continuous 75 mL/hr at 07/25/24 2112 New Bag at 07/25/24 2112        Scheduled:   apixaban  5 mg Oral BID    atorvastatin  10 mg Oral Daily    doxycycline IV (PEDS and ADULTS)  100 mg Intravenous Q12H    EScitalopram oxalate  10 mg Oral Daily     lisinopriL  10 mg Oral Daily    metoprolol succinate  12.5 mg Oral Daily    mupirocin   Nasal BID    pantoprazole  40 mg Intravenous Daily    piperacillin-tazobactam (Zosyn) IV (PEDS and ADULTS) (extended infusion is not appropriate)  4.5 g Intravenous Q8H    polyethylene glycol  17 g Oral Daily        PRN:    Current Facility-Administered Medications:     0.9% NaCl, , Intravenous, PRN    acetaminophen, 650 mg, Oral, Q8H PRN    dextrose 10%, 12.5 g, Intravenous, PRN    dextrose 10%, 25 g, Intravenous, PRN    glucagon (human recombinant), 1 mg, Intramuscular, PRN    glucose, 16 g, Oral, PRN    glucose, 24 g, Oral, PRN    hydrALAZINE, 10 mg, Intravenous, Q6H PRN    melatonin, 6 mg, Oral, Nightly PRN    metoprolol, 5 mg, Intravenous, Q5 Min PRN    naloxone, 0.02 mg, Intravenous, PRN    ondansetron, 4 mg, Intravenous, Q8H PRN    prochlorperazine, 5 mg, Intravenous, Q6H PRN    sodium chloride 0.9%, 10 mL, Intravenous, Q12H PRN    Antibiotics and Day Number of Therapy:  Vanc, Zosyn and Azithro started 7/18 - end 7/28  Stop Vanc 7/23      Intake/Output Summary (Last 24 hours) at 7/26/2024 1009  Last data filed at 7/26/2024 0533  Gross per 24 hour   Intake --   Output 1000 ml   Net -1000 ml       Lines/Drains/Airways       Peripheral Intravenous Line  Duration                  Peripheral IV - Single Lumen 07/23/24 1200 20 G Anterior;Right Upper Arm 2 days         Peripheral IV - Single Lumen 07/25/24 1300 20 G Anterior;Left Forearm <1 day                  Assessment and Plan    Small bowel obstruction - improved  Nausea, vomiting  - Surgery following  - anti-emetics PRN in place, protonix   - Start clear liquid diet and advance as tolerated without N/V  - Continue Miralax per surgical team recs; enema ordered for today for BM     Severe sepsis 2/2 Right lower lobe pneumonia, possible aspiration  - Continue Zosyn/Doxy regimen for concern for aspiration pneumonia - D8  - MRSA PCR neg   - Lactic acid resolved   - blood  cultures x2 collected 7/18: 1/2 bottles gram + cocci, probable staph. BCID panel with Staph.  - COVID/Flu neg  - IS ordered     Moderate Malnutrition  - Moderate malnutrition related to acute small bowel obstruction seen by mild fat depletion, moderate muscle depletion and 1-2% weight loss  - Present for 1 week   - Nutrition consulted for ppn  - Continue clear liquid diet and advance as tolerated without N/V  - Hyponatremic this AM; will hold Clinimix for now and have nutrition re-evaluate recs to prevent worsening drop     Normocytic anemia - stable  - Stable this AM  - Continue to monitor with AM cbc     Questionable right paratracheal/mass/opacity on CXR  - CT chest showed reticular, nodular and hazy opacities in the right posterior upper lobe and both lower lobes with areas of consolidation in the lower lobes, larger in the right. This is consistent with multilobar consolidation. And fluid-filled dilatation of the distal esophagus which may be the result of reflux disease or related to distal esophageal stricture      Severe aortic stenosis  Coronary artery disease  Hypertension  Hyperlipidemia  - last Echo 10/2023 EF 65-70%, severe aortic stenosis  - Resumed home Metoprolol.   - Resume home ACEi and statin given elevated BP   - consider repeat Echo in future if showing signs of fluid overload     Paroxysmal atrial fibrillation  - Continue home Eliquis now tolerating PO medications  - Currently NSR  - Continue home metoprolol; Lopressor pushes available prn  - Telemetry ordered     Anxiety  - Resume home Lexapro     History of falls  Chronic dizziness, at baseline  - admission 7/1/2024 for fall, currently in SNF for rehab, likely discharge back to SNF when able  - previously walked with a cane, currently requiring rolling walker  - OT/PT consulted  - fall precautions, delirium precautions  - Rondon catheter removed today; urine trial/purewick    Volume Depletion   -Secondary to acute small bowel  obstruction  -Volume repleted in ED with IVF     CODE STATUS: DNR  Access: PIV  Antibiotics: Zosyn/Doxy D8/10  Diet: clear liquid diet; advance as tolerated  DVT Prophylaxis: Eliquis  GI Prophylaxis: Pepcid IV  Fluids:  cc/hr      Disposition: Admit for SBO, no surgical intervention needed at this time. Continue on Zosyn/Doxy at this time for PNA. Continue clear liquid diet and advance as tolerated. Continue home medications today. Nutrition consulted for PPN recs. Case management consulted for discharge planning.         Alonso Foster MD  [facility]

## 2024-07-26 NOTE — PROVIDER TRANSFER
TRANSITION OF CARE PROGRESS NOTE    I have received checkout from [facility] [employer] of 2025: Alonso Foster MD regarding this patient.     HO I assessment:  Admission diagnosis: SBO  Overnight management: Continue Zosyn and doxycycline. On clear liquid diet and PPN  Code status: DNR    Please call [phone] from 07/25/2024 7PM to 7/26/2024 7AM if any issues arise.    Dana Nicholson MD  [facility]

## 2024-07-26 NOTE — PROGRESS NOTES
Inpatient Nutrition Assessment    Admit Date: 7/18/2024   Total duration of encounter: 8 days   Patient Age: 88 y.o.    Nutrition Recommendation/Prescription     Pt diet progressed to Clear liquids; eating small amounts; suggest continue PPN Clinimix 4.25/5 @ 75 ml/hr and reorder  lipids 20% 250 ml daily to provide 1112 calories, 77 gm protein.   When appropriate--ADAT to healt healthy diet  While on clear liquid diet; will order boost breeze tid; Boost Breeze (provides 250 kcal, 9 g protein per serving)   When approprite--order ONS--boost/vanilla tid; Boost (provides 240 kcal, 10 g protein per serving)   MVI/fe  Biweekly wt  Will monitor nutrition status     Communication of Recommendations: reviewed with nurse, reviewed with patient, and reviewed with caregiver    Nutrition Assessment     Malnutrition Assessment/Nutrition-Focused Physical Exam    Malnutrition Context: acute illness or injury (07/19/24 1350)  Malnutrition Level: moderate (07/19/24 1350)  Energy Intake (Malnutrition): less than 75% for greater than or equal to 1 month (07/19/24 1350)  Weight Loss (Malnutrition): 1-2% in 1 week (07/19/24 1350)  Subcutaneous Fat (Malnutrition): mild depletion (07/19/24 1350)  Orbital Region (Subcutaneous Fat Loss): mild depletion  Upper Arm Region (Subcutaneous Fat Loss): mild depletion     Muscle Mass (Malnutrition): moderate depletion (07/19/24 1350)  Shinto Region (Muscle Loss): mild depletion  Clavicle Bone Region (Muscle Loss): moderate depletion  Clavicle and Acromion Bone Region (Muscle Loss): moderate depletion         A minimum of two characteristics is recommended for diagnosis of either severe or non-severe malnutrition.    Chart Review    Reason Seen: continuous nutrition monitoring, malnutrition screening tool (MST), physician consult for PPN, and follow-up    Malnutrition Screening Tool Results   Have you recently lost weight without trying?: Unsure  Have you been eating poorly because of a decreased  appetite?: Yes   MST Score: 3   Diagnosis:  SBO, N/V, sepsis, L lobe PNA, cystitis, UTI, GRACIE/HTN, NSTEMI, anxiety , R paratracheal mass, aortic stenosis, HLD    Relevant Medical History: HTN, HLD, Afib     Scheduled Medications:  apixaban, 5 mg, BID  atorvastatin, 10 mg, Daily  doxycycline IV (PEDS and ADULTS), 100 mg, Q12H  EScitalopram oxalate, 10 mg, Daily  lisinopriL, 10 mg, Daily  metoprolol succinate, 12.5 mg, Daily  mupirocin, , BID  pantoprazole, 40 mg, Daily  piperacillin-tazobactam (Zosyn) IV (PEDS and ADULTS) (extended infusion is not appropriate), 4.5 g, Q8H  polyethylene glycol, 17 g, Daily    Continuous Infusions:  Amino acid 4.25% - dextrose 5% (CLINIMIX-E) solution (1L provides 42.5 gm AA, 50 gm CHO (170 kcal/L dextrose), Na 35, K 30, Mg 5, Ca 4.5, Acetate 70, Cl 39, Phos 15), Last Rate: 75 mL/hr at 07/25/24 2112    PRN Medications:  0.9% NaCl, , PRN  acetaminophen, 650 mg, Q8H PRN  dextrose 10%, 12.5 g, PRN  dextrose 10%, 25 g, PRN  glucagon (human recombinant), 1 mg, PRN  glucose, 16 g, PRN  glucose, 24 g, PRN  hydrALAZINE, 10 mg, Q6H PRN  melatonin, 6 mg, Nightly PRN  metoprolol, 5 mg, Q5 Min PRN  naloxone, 0.02 mg, PRN  ondansetron, 4 mg, Q8H PRN  prochlorperazine, 5 mg, Q6H PRN  sodium chloride 0.9%, 10 mL, Q12H PRN    Calorie Containing IV Medications: no significant kcals from medications at this time    Recent Labs   Lab 07/20/24  0339 07/21/24  0319 07/22/24  0417 07/23/24  0351 07/24/24  0326 07/25/24  0313 07/26/24  0321    136 135* 134* 138 137 133*   K 3.9 3.8 3.8 3.8 3.7 3.9 4.1   CALCIUM 9.3 9.0 9.1 9.0 8.9 9.0 8.7   PHOS 2.6 2.3 2.4 2.4 2.5 2.6 3.1   MG 1.60 2.10 1.70 1.90 1.70 1.60 1.70   CO2 27 28 28 27 28 27 24   BUN 30.8* 21.3* 14.4 10.3 11.9 17.6 22.2*   CREATININE 1.25* 1.07* 0.95 0.90 0.92 0.88 0.92   EGFRNORACEVR 42 50 58 >60 60 >60 60   GLUCOSE 106 89 89 96 130* 114 127*   BILITOT 0.7 0.7 0.8 0.9 0.6 0.5 0.6   ALKPHOS 46 41 47 48 41 41 51   ALT 6 7 6 6 7 7 10   AST 19  "18 18 21 19 20 29   ALBUMIN 2.3* 2.2* 2.2* 2.3* 2.1* 2.0* 2.1*   PREALB  --   --  5.0*  --   --   --   --    CRP  --   --  50.30*  --   --   --   --    WBC 6.69 3.77* 2.67* 3.26* 3.48* 3.06* 3.52*   HGB 9.2* 7.9* 8.1* 8.5* 8.8* 8.1* 8.7*   HCT 28.9* 25.0* 25.8* 26.3* 26.9* 25.0* 27.0*     Nutrition Orders:  Diet Clear Liquid  Amino acid 4.25% - dextrose 5% (CLINIMIX-E) solution (1L provides 42.5 gm AA, 50 gm CHO (170 kcal/L dextrose), Na 35, K 30, Mg 5, Ca 4.5, Acetate 70, Cl 39, Phos 15)      Appetite/Oral Intake: poor/25-50% of meals  Factors Affecting Nutritional Intake: abdominal pain, altered gastrointestinal function, clear liquid diet, constipation, decreased appetite, and nausea  Social Needs Impacting Access to Food: none identified  Food/Religion/Cultural Preferences: none reported  Food Allergies: none reported  Last Bowel Movement: 07/18/24  Wound(s):  none reported     Comments  (7/26) Pt diet progressed to clear liquids; eating jello/popsicle/italian ice; will send boost breeze for added nutrition; pt on PPN @ 75ml/hr; suggest resume lipids to provide additional calories. Bedwt on rounds 69.1kg; ? Accuracy/will track. Last BM 7/18; pt received enema today; and on miralax. Labs acknowledged.     (7/23) consulted for PPN recs; pt remains NPO x 5 days; /NGT suction; no BM; pt sleeping during rounds; bedwt 67.1kg--increased from 61.8kg; will track wt. PPN recs provided to help maintain nutrition status. Labs acknowledged--Bun/Cr (WNL); pt remains on IVF . Discussed rec with nurse caring for pt; will forward to MD.     (7/19) Pt NPO; NGT suction; son at bedside; reported pt has been eating well at [facility] facility until Wednesday; pt then stated with N/V/abdominal pain; admitted with SBO. Pt with + wt loss approx 2% over past week. Pt with mild fat/muscle wasting; age related sarcopenia. PPN recs provided while pt NPO. Suggest use ONS when diet progressed     Anthropometrics    Height: 5' 2" (157.5 cm),  "   Last Weight: 69.1 kg (152 lb 5.4 oz) (? accuracy of bedscale) (24 1237), Weight Method: Standard Scale  BMI (Calculated): 27.9  BMI Classification: normal (BMI 18.5-24.9)     Ideal Body Weight (IBW), Female: 110 lb     % Ideal Body Weight, Female (lb): 120 %                    Usual Body Weight (UBW), k.5 kg  % Usual Body Weight: 97.53  % Weight Change From Usual Weight: -2.68 %  Usual Weight Provided By: patient, family/caregiver, and EMR weight history    Wt Readings from Last 5 Encounters:   24 69.1 kg (152 lb 5.4 oz)   24 63.5 kg (140 lb)   24 64.9 kg (143 lb)   01/10/24 64.3 kg (141 lb 12.8 oz)   24 63.9 kg (140 lb 12.8 oz)     Weight Change(s) Since Admission: #; bed wt today 136#  Wt Readings from Last 1 Encounters:   24 1237 69.1 kg (152 lb 5.4 oz)   24 1346 61.8 kg (136 lb 3.9 oz)   24 2201 59.9 kg (132 lb)   24 2145 59.9 kg (132 lb)   Admit Weight: 59.9 kg (132 lb) (24 2145), Weight Method: Bed Scale    Estimated Needs    Weight Used For Calorie Calculations: 61.8 kg (136 lb 3.9 oz)  Energy Calorie Requirements (kcal): 1854 kcal/d; 30 lucas/kg  Energy Need Method: Kcal/kg  Weight Used For Protein Calculations: 61.8 kg (136 lb 3.9 oz)  Protein Requirements: 74 gm protein/d; 1.2 gm/kg  Fluid Requirements (mL): 1854 ml/d; 1ml/lucas        Enteral Nutrition     Patient not receiving enteral nutrition at this time.    Parenteral Nutrition     Standard Formula: Clinimix E 4.25/5  Custom Formula: not applicable  Additives: none  Rate/Volume: 75ml/hr  Lipids: none  Total Nutrition Provided by Parenteral Nutrition:  Calories Provided  612 kcal/d, 33% needs   Protein Provided  77 g/d, 100% needs   Dextrose Provided  90 g/d, GIR 1.0 mg CHO/kg/min   Fluid Provided  1800 ml/d, 97% needs       Evaluation of Received Nutrient Intake    Calories: not meeting estimated needs  Protein: meeting estimated needs    Patient Education     Not  applicable.    Nutrition Diagnosis     PES: Inadequate oral intake related to acute illness as evidenced by NPO . (active)     PES: Moderate acute disease or injury related malnutrition related to acute illness as evidenced by mild fat depletion, moderate muscle depletion, and 1-2% weight loss in 1 week. (active)    Nutrition Interventions     Intervention(s): modified composition of meals/snacks, modified composition of parenteral nutrition, modified rate of parenteral nutrition, multivitamin/mineral supplement therapy, and collaboration with other providers    Goal: Meet greater than 80% of nutritional needs by follow-up. (goal progressing)  Goal: Maintain weight throughout hospitalization. (goal progressing)    Nutrition Goals & Monitoring     Dietitian will monitor: food and beverage intake, parenteral nutrition intake, weight, and glucose/endocrine profile  Discharge planning: too early to determine; pending clinical course  Nutrition Risk/Follow-Up: high (follow-up in 1-4 days)   Please consult if re-assessment needed sooner.

## 2024-07-26 NOTE — MEDICAL/APP STUDENT
"[facility] - Purple Team  Daily Progress Note    Patient ID: Fifi Chacon, 89y/o F    Subjective:  NAEON  AF, HDS  Endorses lower abdominal discomfort and nausea this morning  Denies vomiting and BM  Passing flatus  Conducted mobility with PT/OT without pain or complications  Tolerated jello, popsicles, and clear liquid yesterday  PPN started Wednesday     Objective:    Vitals:  Vitals:    07/26/24 0334   BP: (!) 132/55   Pulse: 69   Resp:    Temp: 98.5 °F (36.9 °C)        Intake/Output:    Intake/Output Summary (Last 24 hours) at 7/26/2024 0550  Last data filed at 7/26/2024 0533  Gross per 24 hour   Intake 1061.73 ml   Output 1350 ml   Net -288.27 ml        Physical Exam:  Gen: NAD  Neuro: awake, alert, answering questions appropriately  CV: RR  Resp: non-labored breathing, satting well on 2L NC  Abd: soft, ND, discomfort to lower abdomen on palpation  Ext: moves all 4 spontaneously and purposefully  Skin: warm, well perfused  L/D/A: 20G IV L forearm, 20G IV RUE    Labs:  Renal:  Recent Labs     07/24/24 0326 07/25/24 0313 07/26/24 0321   BUN 11.9 17.6 22.2*   CREATININE 0.92 0.88 0.92     No results for input(s): "LACTIC" in the last 72 hours.    FENGI:  Recent Labs     07/24/24 0326 07/25/24 0313 07/26/24 0321    137 133*   K 3.7 3.9 4.1    106 103   CO2 28 27 24   CALCIUM 8.9 9.0 8.7   MG 1.70 1.60 1.70   PHOS 2.5 2.6 3.1   ALBUMIN 2.1* 2.0* 2.1*   BILITOT 0.6 0.5 0.6   AST 19 20 29   ALKPHOS 41 41 51   ALT 7 7 10       Heme:  Recent Labs     07/24/24 0326 07/25/24 0313 07/26/24 0321   HGB 8.8* 8.1* 8.7*   HCT 26.9* 25.0* 27.0*    168 170       ID:  Recent Labs     07/24/24 0326 07/25/24 0313 07/26/24  0321   WBC 3.48* 3.06* 3.52*       CBG:  Recent Labs     07/24/24  0326 07/25/24  0313 07/26/24  0321   GLUCOSE 130* 114 127*        Cardiovascular:  Recent Labs   Lab 07/19/24  0607 07/19/24  1154   TROPONINI 0.377* 0.313*       I have reviewed all pertinent lab results " within the past 24 hours.    Imaging:  X RAY ABDOMEN AP 1 VIEW - 07/21/2024 AT 0457  Impression:     Small bowel follow-through enteric contrast has reached the colon and rectum before 24 hours without evidence of complete obstruction.    Micro/Path/Other:  None     Assessment/Plan:  88-year-old with PMHx of HTN, HLD, and afib on Eliquis presenting with pneumonia. CT shows evidence of a small-bowel obstruction with a transition point. Pt currently AF and HDS. SBFT and KUB with contrast in colon/rectum, however still no flatus or BM.      - OOB to chair TID, mobilize, PT/OT   - Continue PPN  - Serial abdominal exams  - ok from surgery perspective to advance diet as tolerated. Pt aspiration risk so can consider swallow study  - Recommend bowel regimen of Miralax to help with bowel function   - rest of care per primary      MCKENZIE Suarez  [facility]  07/26/2024

## 2024-07-26 NOTE — PROVIDER TRANSFER
Transition of Care Progress Note      Dr. Bucio, Dr. Foster, and I have received checkout from Dr. Alonso Foster regarding this patient.     HO1 assessment:  Admission diagnosis:   Shortness of breath    Overnight management:   Continue Zosyn and Doxyclycine currently Day 8(need 10 day treatment) for PNA. Patient vomited, will have NG tube placed and remain NPO at this time, advance as tolerated.     Code status:   DNR    Please call [phone] from 07/26/2024 4PM to 07/29/2024 7AM if any issues arise.    La Gomez MD  [facility]

## 2024-07-26 NOTE — PROGRESS NOTES
"[facility] - Purple Team  Daily Progress Note    Patient ID: Fifi Chacon, 89y/o F    Subjective:  NAEON  AF, HDS  Mild discomfort in lower abdomen   Denies vomiting and BM  Passing flatus  Conducted mobility with PT/OT without pain or complications  Tolerated jello, popsicles, and clear liquid yesterday  PPN started Wednesday     Objective:    Vitals:  Vitals:    07/26/24 0334   BP: (!) 132/55   Pulse: 69   Resp:    Temp: 98.5 °F (36.9 °C)        Intake/Output:    Intake/Output Summary (Last 24 hours) at 7/26/2024 0642  Last data filed at 7/26/2024 0533  Gross per 24 hour   Intake --   Output 1000 ml   Net -1000 ml        Physical Exam:  Gen: NAD  Neuro: awake, alert, answering questions appropriately  CV: RR  Resp: non-labored breathing, satting well on 2L NC  Abd: soft, ND, mild discomfort to lower abdomen on palpation, no peritonitis or gaurding  Ext: moves all 4 spontaneously and purposefully  Skin: warm, well perfused  L/D/A: 20G IV L forearm, 20G IV RUE    Labs:  Renal:  Recent Labs     07/24/24 0326 07/25/24 0313 07/26/24 0321   BUN 11.9 17.6 22.2*   CREATININE 0.92 0.88 0.92     No results for input(s): "LACTIC" in the last 72 hours.    FENGI:  Recent Labs     07/24/24 0326 07/25/24 0313 07/26/24 0321    137 133*   K 3.7 3.9 4.1    106 103   CO2 28 27 24   CALCIUM 8.9 9.0 8.7   MG 1.70 1.60 1.70   PHOS 2.5 2.6 3.1   ALBUMIN 2.1* 2.0* 2.1*   BILITOT 0.6 0.5 0.6   AST 19 20 29   ALKPHOS 41 41 51   ALT 7 7 10       Heme:  Recent Labs     07/24/24 0326 07/25/24 0313 07/26/24 0321   HGB 8.8* 8.1* 8.7*   HCT 26.9* 25.0* 27.0*    168 170       ID:  Recent Labs     07/24/24 0326 07/25/24 0313 07/26/24  0321   WBC 3.48* 3.06* 3.52*       CBG:  Recent Labs     07/24/24  0326 07/25/24  0313 07/26/24  0321   GLUCOSE 130* 114 127*        Cardiovascular:  Recent Labs   Lab 07/19/24  1154   TROPONINI 0.313*       I have reviewed all pertinent lab results within the past 24 " hours.    Imaging:  X RAY ABDOMEN AP 1 VIEW - 07/21/2024 AT 0457  Impression:     Small bowel follow-through enteric contrast has reached the colon and rectum before 24 hours without evidence of complete obstruction.    Micro/Path/Other:  None     Assessment/Plan:  88-year-old with PMHx of HTN, HLD, and afib on Eliquis presenting with pneumonia. CT shows evidence of a small-bowel obstruction with a transition point. Pt currently AF and HDS. SBFT and KUB with contrast in colon/rectum, however still no flatus or BM.      - OOB to chair TID, mobilize, PT/OT   - PPN per primary   - Serial abdominal exams  - ok from surgery perspective to advance diet as tolerated. Pt aspiration risk so can consider swallow study to determine goal diet  - Recommend bowel regimen of Miralax to help with bowel function. Will give enema today   - rest of care per primary      Jori Osullivan, MS3  [facility]  07/26/2024       RESIDENT ATTESTATION    I have seen and evaluated the patient with the student doctor. Note edited as needed. Agree with documentation above.     Misael Farfan MD  [facility] PGY-3  6:43 AM

## 2024-07-26 NOTE — PLAN OF CARE
Problem: Adult Inpatient Plan of Care  Goal: Plan of Care Review  Outcome: Progressing  Goal: Patient-Specific Goal (Individualized)  Outcome: Progressing  Goal: Absence of Hospital-Acquired Illness or Injury  Outcome: Progressing  Goal: Optimal Comfort and Wellbeing  Outcome: Progressing  Goal: Readiness for Transition of Care  Outcome: Progressing     Problem: Sepsis/Septic Shock  Goal: Optimal Coping  Outcome: Progressing  Goal: Absence of Bleeding  Outcome: Progressing  Goal: Blood Glucose Level Within Targeted Range  Outcome: Progressing  Goal: Absence of Infection Signs and Symptoms  Outcome: Progressing  Goal: Optimal Nutrition Intake  Outcome: Progressing     Problem: Fall Injury Risk  Goal: Absence of Fall and Fall-Related Injury  Outcome: Progressing     Problem: Pneumonia  Goal: Fluid Balance  Outcome: Progressing  Goal: Resolution of Infection Signs and Symptoms  Outcome: Progressing  Goal: Effective Oxygenation and Ventilation  Outcome: Progressing     Problem: Infection  Goal: Absence of Infection Signs and Symptoms  Outcome: Progressing

## 2024-07-26 NOTE — CARE UPDATE
Received call from nursing staff that pt had vomited. Went to assess pt; she was being cleaned. Abdomen did not appear tender, bowel sounds present. Asked nursing staff to inform surgical team. Asked for NG tube to be replaced and pt to remain Npo.     Alonso Foster MD  [facility]

## 2024-07-27 LAB
ALBUMIN SERPL-MCNC: 2.1 G/DL (ref 3.4–4.8)
ALBUMIN/GLOB SERPL: 0.5 RATIO (ref 1.1–2)
ALP SERPL-CCNC: 61 UNIT/L (ref 40–150)
ALT SERPL-CCNC: 19 UNIT/L (ref 0–55)
ANION GAP SERPL CALC-SCNC: 6 MEQ/L
AST SERPL-CCNC: 45 UNIT/L (ref 5–34)
BASOPHILS # BLD AUTO: 0.01 X10(3)/MCL
BASOPHILS NFR BLD AUTO: 0.2 %
BILIRUB SERPL-MCNC: 0.6 MG/DL
BUN SERPL-MCNC: 21.6 MG/DL (ref 9.8–20.1)
CALCIUM SERPL-MCNC: 9.1 MG/DL (ref 8.4–10.2)
CHLORIDE SERPL-SCNC: 103 MMOL/L (ref 98–107)
CO2 SERPL-SCNC: 25 MMOL/L (ref 23–31)
CREAT SERPL-MCNC: 1.11 MG/DL (ref 0.55–1.02)
CREAT/UREA NIT SERPL: 19
EOSINOPHIL # BLD AUTO: 0.07 X10(3)/MCL (ref 0–0.9)
EOSINOPHIL NFR BLD AUTO: 1.7 %
ERYTHROCYTE [DISTWIDTH] IN BLOOD BY AUTOMATED COUNT: 15.3 % (ref 11.5–17)
GFR SERPLBLD CREATININE-BSD FMLA CKD-EPI: 48 ML/MIN/1.73/M2
GLOBULIN SER-MCNC: 4.3 GM/DL (ref 2.4–3.5)
GLUCOSE SERPL-MCNC: 90 MG/DL (ref 82–115)
HCT VFR BLD AUTO: 25.8 % (ref 37–47)
HGB BLD-MCNC: 8.2 G/DL (ref 12–16)
IMM GRANULOCYTES # BLD AUTO: 0.1 X10(3)/MCL (ref 0–0.04)
IMM GRANULOCYTES NFR BLD AUTO: 2.4 %
LYMPHOCYTES # BLD AUTO: 0.49 X10(3)/MCL (ref 0.6–4.6)
LYMPHOCYTES NFR BLD AUTO: 11.6 %
MAGNESIUM SERPL-MCNC: 1.8 MG/DL (ref 1.6–2.6)
MCH RBC QN AUTO: 26.7 PG (ref 27–31)
MCHC RBC AUTO-ENTMCNC: 31.8 G/DL (ref 33–36)
MCV RBC AUTO: 84 FL (ref 80–94)
MONOCYTES # BLD AUTO: 0.23 X10(3)/MCL (ref 0.1–1.3)
MONOCYTES NFR BLD AUTO: 5.5 %
NEUTROPHILS # BLD AUTO: 3.31 X10(3)/MCL (ref 2.1–9.2)
NEUTROPHILS NFR BLD AUTO: 78.6 %
NRBC BLD AUTO-RTO: 0 %
PHOSPHATE SERPL-MCNC: 3.3 MG/DL (ref 2.3–4.7)
PLATELET # BLD AUTO: 175 X10(3)/MCL (ref 130–400)
PMV BLD AUTO: 11 FL (ref 7.4–10.4)
POCT GLUCOSE: 101 MG/DL (ref 70–110)
POCT GLUCOSE: 112 MG/DL (ref 70–110)
POCT GLUCOSE: 120 MG/DL (ref 70–110)
POCT GLUCOSE: 124 MG/DL (ref 70–110)
POTASSIUM SERPL-SCNC: 4.2 MMOL/L (ref 3.5–5.1)
PROT SERPL-MCNC: 6.4 GM/DL (ref 5.8–7.6)
RBC # BLD AUTO: 3.07 X10(6)/MCL (ref 4.2–5.4)
SODIUM SERPL-SCNC: 134 MMOL/L (ref 136–145)
WBC # BLD AUTO: 4.21 X10(3)/MCL (ref 4.5–11.5)

## 2024-07-27 PROCEDURE — 80053 COMPREHEN METABOLIC PANEL: CPT

## 2024-07-27 PROCEDURE — 36415 COLL VENOUS BLD VENIPUNCTURE: CPT

## 2024-07-27 PROCEDURE — 99900035 HC TECH TIME PER 15 MIN (STAT)

## 2024-07-27 PROCEDURE — 63600175 PHARM REV CODE 636 W HCPCS

## 2024-07-27 PROCEDURE — 85025 COMPLETE CBC W/AUTO DIFF WBC: CPT

## 2024-07-27 PROCEDURE — 25000003 PHARM REV CODE 250

## 2024-07-27 PROCEDURE — 63600175 PHARM REV CODE 636 W HCPCS: Performed by: FAMILY MEDICINE

## 2024-07-27 PROCEDURE — 25000003 PHARM REV CODE 250: Performed by: FAMILY MEDICINE

## 2024-07-27 PROCEDURE — 94761 N-INVAS EAR/PLS OXIMETRY MLT: CPT

## 2024-07-27 PROCEDURE — 21400001 HC TELEMETRY ROOM

## 2024-07-27 PROCEDURE — 83735 ASSAY OF MAGNESIUM: CPT

## 2024-07-27 PROCEDURE — 27000221 HC OXYGEN, UP TO 24 HOURS

## 2024-07-27 PROCEDURE — 84100 ASSAY OF PHOSPHORUS: CPT

## 2024-07-27 RX ORDER — MAGNESIUM SULFATE 1 G/100ML
1 INJECTION INTRAVENOUS ONCE
Status: COMPLETED | OUTPATIENT
Start: 2024-07-27 | End: 2024-07-27

## 2024-07-27 RX ADMIN — DOXYCYCLINE 100 MG: 100 INJECTION, POWDER, LYOPHILIZED, FOR SOLUTION INTRAVENOUS at 11:07

## 2024-07-27 RX ADMIN — PIPERACILLIN SODIUM AND TAZOBACTAM SODIUM 4.5 G: 4; .5 INJECTION, POWDER, LYOPHILIZED, FOR SOLUTION INTRAVENOUS at 02:07

## 2024-07-27 RX ADMIN — LISINOPRIL 10 MG: 10 TABLET ORAL at 09:07

## 2024-07-27 RX ADMIN — SODIUM CHLORIDE: 9 INJECTION, SOLUTION INTRAVENOUS at 08:07

## 2024-07-27 RX ADMIN — MAGNESIUM SULFATE IN DEXTROSE 1 G: 10 INJECTION, SOLUTION INTRAVENOUS at 09:07

## 2024-07-27 RX ADMIN — POLYETHYLENE GLYCOL 3350 17 G: 17 POWDER, FOR SOLUTION ORAL at 09:07

## 2024-07-27 RX ADMIN — SODIUM CHLORIDE: 9 INJECTION, SOLUTION INTRAVENOUS at 07:07

## 2024-07-27 RX ADMIN — ATORVASTATIN CALCIUM 10 MG: 10 TABLET, FILM COATED ORAL at 09:07

## 2024-07-27 RX ADMIN — PIPERACILLIN SODIUM AND TAZOBACTAM SODIUM 4.5 G: 4; .5 INJECTION, POWDER, LYOPHILIZED, FOR SOLUTION INTRAVENOUS at 06:07

## 2024-07-27 RX ADMIN — METOPROLOL SUCCINATE 12.5 MG: 25 TABLET, EXTENDED RELEASE ORAL at 09:07

## 2024-07-27 RX ADMIN — DOXYCYCLINE 100 MG: 100 INJECTION, POWDER, LYOPHILIZED, FOR SOLUTION INTRAVENOUS at 09:07

## 2024-07-27 RX ADMIN — HEPARIN SODIUM 5000 UNITS: 5000 INJECTION, SOLUTION INTRAVENOUS; SUBCUTANEOUS at 02:07

## 2024-07-27 RX ADMIN — PANTOPRAZOLE SODIUM 40 MG: 40 INJECTION, POWDER, FOR SOLUTION INTRAVENOUS at 09:07

## 2024-07-27 RX ADMIN — PIPERACILLIN SODIUM AND TAZOBACTAM SODIUM 4.5 G: 4; .5 INJECTION, POWDER, LYOPHILIZED, FOR SOLUTION INTRAVENOUS at 11:07

## 2024-07-27 RX ADMIN — HEPARIN SODIUM 5000 UNITS: 5000 INJECTION, SOLUTION INTRAVENOUS; SUBCUTANEOUS at 05:07

## 2024-07-27 RX ADMIN — HEPARIN SODIUM 5000 UNITS: 5000 INJECTION, SOLUTION INTRAVENOUS; SUBCUTANEOUS at 09:07

## 2024-07-27 RX ADMIN — LEUCINE, PHENYLALANINE, LYSINE, METHIONINE, ISOLEUCINE, VALINE, HISTIDINE, THREONINE, TRYPTOPHAN, ALANINE, GLYCINE, ARGININE, PROLINE, SERINE, TYROSINE, SODIUM ACETATE, DIBASIC POTASSIUM PHOSPHATE, MAGNESIUM CHLORIDE, SODIUM CHLORIDE, CALCIUM CHLORIDE, DEXTROSE
311; 238; 247; 170; 255; 247; 204; 179; 77; 880; 438; 489; 289; 213; 17; 297; 261; 51; 77; 33; 5 INJECTION INTRAVENOUS at 10:07

## 2024-07-27 NOTE — NURSING
Notified MD that pt sodium level has improved to 134. MD stated to let pt receive full liter of NS continuous before restarting CLINIXIX-E.  250mL left in bag.

## 2024-07-27 NOTE — PT/OT/SLP PROGRESS
Physical Therapy    Missed Treatment Session - patient refusal note    Patient Name:  Fifi Chacon   MRN:  [id number]      Patient not seen at this time secondary to Other (Comment) (increased fatigue and family stating they want her to be able to rest today.).    Will follow-up as patient is appropriate/available/agreeable to participate and as therapists' schedule allows.

## 2024-07-27 NOTE — PROGRESS NOTES
[facility]ards   Progress Note      Resident Team: [facility] Family Medicine List   Attending Physician: Julieta Barnett MD  Resident: Fortunato Foster  Intern: [facility] Length of Stay: 8 days    Subjective   Brief HPI:  Fifi Chacon is a 88 y.o. female with history of paroxysmal afib on Eliquis, CAD s/p coronary angiogram on 10/13/23, severe aortic stenosis, hypertension, hyperlipidemia, anxiety admitted for small bowel obstruction and sepsis.     Interval History:   Vital signs are stable, patient had one episode of vomiting and abdominal pain yesterday night and NG tube was continued yesterday and currently NPO. Patient is with family at bedside, reports no discomfort this morning. No BM overnight but has been passing gas. Improved sodium from 131 to 134 this morning, will restart Clinimix at this time, will continue 90ml/hr of NS during the day. NG tube in place with ~20ml of bile collected. Denies fever, nausea, vomiting, CP, SOB, or abdominal pain at this time    General surgery spoke with family and patient this AM with options of Ex-lap or palliative care. Patient and family decided on hospice at this time with [facility] and vocalized understanding of decision. Family understood the risk of ex-lap along with recover and colostomy post surgery and would prefer to proceed with hospice at this time    Review of Systems:  Review of Systems   Constitutional:  Negative for fever.   Respiratory:  Negative for cough, shortness of breath and wheezing.    Cardiovascular:  Negative for chest pain.   Gastrointestinal:  Positive for constipation. Negative for abdominal pain, nausea and vomiting.        Objective     Vital Signs (Most Recent):  Temp: 100.3 °F (37.9 °C) (07/27/24 1305)  Pulse: 68 (07/27/24 1145)  Resp: (!) 22 (07/27/24 1145)  BP: 105/65 (07/27/24 1145)  SpO2: 96 % (07/27/24 1145) Vital Signs (24h Range):  Temp:  [92.5 °F (33.6 °C)-100.3 °F (37.9 °C)] 100.3 °F (37.9 °C)  Pulse:  [68-78]  68  Resp:  [18-23] 22  SpO2:  [96 %-100 %] 96 %  BP: (103-144)/(56-76) 105/65     Physical Examination:  Physical Exam  Constitutional:       General: She is not in acute distress.  HENT:      Head: Normocephalic and atraumatic.   Cardiovascular:      Rate and Rhythm: Normal rate and regular rhythm.      Pulses: Normal pulses.      Heart sounds: No murmur heard.     Comments: 2/6 systolic murmur on 2nd R intercostal space  Pulmonary:      Effort: Pulmonary effort is normal.      Breath sounds: Normal breath sounds. No wheezing or rales.   Abdominal:      General: Abdomen is flat. Bowel sounds are normal. There is no distension.      Palpations: Abdomen is soft.      Tenderness: There is no abdominal tenderness.   Musculoskeletal:         General: No swelling or tenderness.      Right lower leg: No edema.      Left lower leg: No edema.   Skin:     General: Skin is warm and dry.      Comments: 2cm x 1 cm blood blister present on right lateral malleolus    Neurological:      Mental Status: She is alert.   Psychiatric:         Mood and Affect: Mood normal.         Behavior: Behavior normal.         Laboratory:  Most Recent Data:  CBC:   Recent Labs   Lab 07/26/24  0321 07/27/24  0310   WBC 3.52* 4.21*   HGB 8.7* 8.2*   HCT 27.0* 25.8*    175     CMP:   Recent Labs   Lab 07/27/24  0310   CALCIUM 9.1   ALBUMIN 2.1*   *   K 4.2   CO2 25      BUN 21.6*   CREATININE 1.11*   ALKPHOS 61   ALT 19   AST 45*   BILITOT 0.6     All pertinent lab results from the last 24 hours have been reviewed.      Microbiology Data Reviewed: yes  Pertinent Findings:  Blood culture with 1/4 bottles + Staph capitis and Staph warneri  Urine culture with no growth      Current Medications:     Infusions:   0.9% NaCl   Intravenous Continuous 90 mL/hr at 07/27/24 0859 New Bag at 07/27/24 0859    Amino acid 4.25% - dextrose 5% (CLINIMIX-E) solution (1L provides 42.5 gm AA, 50 gm CHO (170 kcal/L dextrose), Na 35, K 30, Mg 5, Ca 4.5,  Acetate 70, Cl 39, Phos 15)   Intravenous Continuous 75 mL/hr at 07/27/24 1009 New Bag at 07/27/24 1009        Scheduled:   atorvastatin  10 mg Oral Daily    doxycycline IV (PEDS and ADULTS)  100 mg Intravenous Q12H    heparin (porcine)  5,000 Units Subcutaneous Q8H    lisinopriL  10 mg Oral Daily    metoprolol succinate  12.5 mg Oral Daily    pantoprazole  40 mg Intravenous Daily    piperacillin-tazobactam (Zosyn) IV (PEDS and ADULTS) (extended infusion is not appropriate)  4.5 g Intravenous Q8H    polyethylene glycol  17 g Oral Daily        PRN:    Current Facility-Administered Medications:     0.9% NaCl, , Intravenous, PRN    acetaminophen, 650 mg, Oral, Q8H PRN    dextrose 10%, 12.5 g, Intravenous, PRN    dextrose 10%, 25 g, Intravenous, PRN    glucagon (human recombinant), 1 mg, Intramuscular, PRN    glucose, 16 g, Oral, PRN    glucose, 24 g, Oral, PRN    hydrALAZINE, 10 mg, Intravenous, Q6H PRN    melatonin, 6 mg, Oral, Nightly PRN    metoprolol, 5 mg, Intravenous, Q5 Min PRN    naloxone, 0.02 mg, Intravenous, PRN    ondansetron, 4 mg, Intravenous, Q8H PRN    prochlorperazine, 5 mg, Intravenous, Q6H PRN    sodium chloride 0.9%, 10 mL, Intravenous, Q12H PRN    Antibiotics and Day Number of Therapy:  Vanc, Zosyn and doxycyline started 7/18 - end 7/28  Stop Vanc 7/23      Intake/Output Summary (Last 24 hours) at 7/27/2024 1326  Last data filed at 7/27/2024 0556  Gross per 24 hour   Intake --   Output 1950 ml   Net -1950 ml       Lines/Drains/Airways       Drain  Duration                  NG/OG Tube 07/26/24 1415 16 Fr. Right nostril <1 day              Peripheral Intravenous Line  Duration                  Peripheral IV - Single Lumen 07/23/24 1200 20 G Anterior;Right Upper Arm 4 days         Peripheral IV - Single Lumen 07/25/24 1300 20 G Anterior;Left Forearm 2 days                    Assessment and Plan    Small bowel obstruction   Nausea, vomiting  - Surgery following, counseled patient for ex-lap vs  palliative/hospice care, patient/family prefer hospice care at this time  - anti-emetics PRN in place, protonix   - NPO, NG tube in place  - No BM with enema yesterday  - Consulted Simeon with [facility] today, she will meet with family and patient      Severe sepsis 2/2 Right lower lobe pneumonia, possible aspiration  - Continue Zosyn/Doxy regimen for concern for aspiration pneumonia - D9  - MRSA PCR neg   - Lactic acid resolved   - blood cultures x2 collected 7/18: 1/2 bottles gram + cocci, probable staph. BCID panel with Staph.  - COVID/Flu neg  - IS ordered      Moderate Malnutrition  - Moderate malnutrition related to acute small bowel obstruction seen by mild fat depletion, moderate muscle depletion and 1-2% weight loss  - Present for 1 week   - Nutrition consulted for ppn  - NPO at this time with NG tube  - Improved hyponatremia from 131 to 134 today 7/27/2024, will restart Clinnimix 75ml today and 90ml/hr of NS during daytime to prevent hyponatremia     Normocytic anemia - stable  - Stable this AM  - Continue to monitor with AM cbc     Questionable right paratracheal/mass/opacity on CXR  - CT chest showed reticular, nodular and hazy opacities in the right posterior upper lobe and both lower lobes with areas of consolidation in the lower lobes, larger in the right. This is consistent with multilobar consolidation. And fluid-filled dilatation of the distal esophagus which may be the result of reflux disease or related to distal esophageal stricture      Severe aortic stenosis  Coronary artery disease  Hypertension  Hyperlipidemia  - last Echo 10/2023 EF 65-70%, severe aortic stenosis  - Resumed home Metoprolol.   - Resume home ACEi and statin given elevated BP   - consider repeat Echo in future if showing signs of fluid overload     Paroxysmal atrial fibrillation  - Continue home Eliquis now tolerating PO medications  - Currently NSR  - Continue home metoprolol; Lopressor pushes available prn  - Telemetry  ordered     Anxiety  - Resume home Lexapro     History of falls  Chronic dizziness, at baseline  - admission 7/1/2024 for fall, currently in SNF for rehab, likely discharge back to SNF when able  - previously walked with a cane, currently requiring rolling walker  - OT/PT consulted  - fall precautions, delirium precautions  - urine trial/purewick     Volume Depletion   -Secondary to acute small bowel obstruction  -Volume repleted in ED with IVF        CODE STATUS: DNR  Access: PIV  Antibiotics: Zosyn/Doxy Day 9/10  Diet: NPO  DVT Prophylaxis: Eliquis  GI Prophylaxis: Pepcid IV  Fluids: 90ml/hr NS only during daytime, clinnimix 75ml/hr      Disposition: Admit for SBO, no surgical intervention needed at this time. Continue on Zosyn/Doxy at this time for PNA. NPO at this time with NG tube. Continue home medications today. Nutrition consulted for PPN recs. Case management consulted for discharge planning. Patient and family want hospice at this time rather than surgery, notified Simeon at [facility] and she will come see patient/family for further discussion      La Gomez MD  \[facility]\"" Family Medicine [facility]

## 2024-07-27 NOTE — MEDICAL/APP STUDENT
"[facility] - Purple Team  Daily Progress Note    Patient ID: Fifi Chacon, 89y/o F    Subjective:  AF, HDS  Mild discomfort to lower abdomen  Vomited yesterday  NGT placed yesterday   Reports nausea at this time  Denies passing flatus and BM  Conducted bed mobility with PT/OT  NPO  PPN since Wednesday   Episode of hyponatremia to 131 during administration of Clinixix-E, resolved with 1L NS    Objective:    Vitals:  Vitals:    07/27/24 0534   BP: 125/62   Pulse: 72   Resp:    Temp:         Intake/Output:    Intake/Output Summary (Last 24 hours) at 7/27/2024 0724  Last data filed at 7/27/2024 0556  Gross per 24 hour   Intake --   Output 1950 ml   Net -1950 ml        Physical Exam:  Gen: NAD  Neuro: awake, alert, answering questions appropriately  CV: RRR  Resp: non-labored breathing, satting well on 2L NC  Abd: soft, ND, mild discomfort to lower abdomen on palpation,  no peritonitis or guarding   Ext: moves all 4 spontaneously and purposefully  Skin: warm, well perfused  L/D/A: 20G IV L forearm, 20G IV RUE     Labs:  Renal:  Recent Labs     07/25/24 0313 07/26/24 0321 07/26/24  1846 07/27/24  0310   BUN 17.6 22.2* 20.9* 21.6*   CREATININE 0.88 0.92 1.03* 1.11*     No results for input(s): "LACTIC" in the last 72 hours.    FENGI:  Recent Labs     07/25/24 0313 07/26/24 0321 07/26/24  1846 07/27/24  0310    133* 131* 134*   K 3.9 4.1 4.4 4.2    103 104 103   CO2 27 24 22* 25   CALCIUM 9.0 8.7 9.3 9.1   MG 1.60 1.70  --  1.80   PHOS 2.6 3.1  --  3.3   ALBUMIN 2.0* 2.1*  --  2.1*   BILITOT 0.5 0.6  --  0.6   AST 20 29  --  45*   ALKPHOS 41 51  --  61   ALT 7 10  --  19       Heme:  Recent Labs     07/25/24 0313 07/26/24  0321 07/27/24 0310   HGB 8.1* 8.7* 8.2*   HCT 25.0* 27.0* 25.8*    170 175       ID:  Recent Labs     07/25/24 0313 07/26/24  0321 07/27/24  0310   WBC 3.06* 3.52* 4.21*       CBG:  Recent Labs     07/25/24  0313 07/26/24  0321 07/26/24  1846 07/27/24  0310   GLUCOSE 114 " "127* 114 90        Cardiovascular:  No results for input(s): "TROPONINI", "CKTOTAL", "CKMB", "BNP" in the last 168 hours.    I have reviewed all pertinent lab results within the past 24 hours.    Imaging:  X-Ray Abdomen AP 1 View  Order: [id number]  Status: Final result       Visible to patient: Yes (not seen)       Next appt: None    0 Result Notes  Details    Reading Physician Reading Date Result Priority   Nohemy Hilton MD  [phone] 7/26/2024 Routine     Narrative & Impression  EXAMINATION:  XR ABDOMEN AP 1 VIEW     CLINICAL HISTORY:  Abdominal distention;     TECHNIQUE:  AP view of the abdomen.     COMPARISON:  X-ray dated 07/21/2024     FINDINGS:  There is persistent gaseous distension of multiple small bowel loops.  Contrast is seen within the hepatic flexure.     Impression:     Persistent dilated small bowel loops.     X RAY ABDOMEN AP 1 VIEW - 07/21/2024 AT 0457  Impression:     Small bowel follow-through enteric contrast has reached the colon and rectum before 24 hours without evidence of complete obstruction.    Micro/Path/Other:  None     Assessment/Plan:  88-year-old with PMHx of HTN, HLD, and afib on Eliquis presenting with pneumonia. CT shows evidence of a small-bowel obstruction with a transition point. Pt currently AF and HDS. SBFT and KUB with contrast in colon/rectum, however still no flatus or BM.      - OOB to chair TID, mobilize, PT/OT   - PPN per primary   - Serial abdominal exams  - Keep NGT in place  - Recommend bowel regimen of Miralax to help with bowel function. Will give enema   - rest of care per primary      MCKENZIE Suarez  [facility]  07/27/2024   "

## 2024-07-27 NOTE — PROGRESS NOTES
"[facility] - Purple Team  Daily Progress Note    Patient ID: Fifi Chacon, 87y/o F    Subjective:  AF, HDS  Interval decrease in abdominal discomfort   Vomited yesterday  NGT placed yesterday   Reports nausea at this time  Denies passing flatus and BM  NPO  PPN since Wednesday   Episode of hyponatremia to 131 during administration of Clinixix-E, resolved with 1L NS    Objective:    Vitals:  Vitals:    07/27/24 0800   BP: (!) 116/56   Pulse: 73   Resp: (!) 23   Temp: 98.1 °F (36.7 °C)        Intake/Output:    Intake/Output Summary (Last 24 hours) at 7/27/2024 0928  Last data filed at 7/27/2024 0556  Gross per 24 hour   Intake --   Output 1950 ml   Net -1950 ml        Physical Exam:  Gen: NAD, chronically ill-appearing  Neuro: awake, alert, answering questions appropriately  CV: RRR  Resp: non-labored breathing, satting well on 2L NC  Abd: soft, interval decrease in distention, mild discomfort to lower abdomen on palpation,  no peritonitis or guarding   Ext: moves all 4 spontaneously and purposefully  Skin: warm, well perfused  L/D/A: 20G IV L forearm, 20G IV RUE     Labs:  Renal:  Recent Labs     07/25/24 0313 07/26/24  0321 07/26/24  1846 07/27/24  0310   BUN 17.6 22.2* 20.9* 21.6*   CREATININE 0.88 0.92 1.03* 1.11*     No results for input(s): "LACTIC" in the last 72 hours.    FENGI:  Recent Labs     07/25/24  0313 07/26/24  0321 07/26/24  1846 07/27/24  0310    133* 131* 134*   K 3.9 4.1 4.4 4.2    103 104 103   CO2 27 24 22* 25   CALCIUM 9.0 8.7 9.3 9.1   MG 1.60 1.70  --  1.80   PHOS 2.6 3.1  --  3.3   ALBUMIN 2.0* 2.1*  --  2.1*   BILITOT 0.5 0.6  --  0.6   AST 20 29  --  45*   ALKPHOS 41 51  --  61   ALT 7 10  --  19       Heme:  Recent Labs     07/25/24 0313 07/26/24  0321 07/27/24 0310   HGB 8.1* 8.7* 8.2*   HCT 25.0* 27.0* 25.8*    170 175       ID:  Recent Labs     07/25/24 0313 07/26/24 0321 07/27/24 0310   WBC 3.06* 3.52* 4.21*       CBG:  Recent Labs     07/25/24 0313 " "07/26/24  0321 07/26/24  1846 07/27/24  0310   GLUCOSE 114 127* 114 90        Cardiovascular:  No results for input(s): "TROPONINI", "CKTOTAL", "CKMB", "BNP" in the last 168 hours.    I have reviewed all pertinent lab results within the past 24 hours.    Imaging:  X-Ray Abdomen AP 1 View  Order: [id number]  Status: Final result       Visible to patient: Yes (not seen)       Next appt: None    0 Result Notes  Details    Reading Physician Reading Date Result Priority   Nohemy Hilton MD  [phone] 7/26/2024 Routine     Narrative & Impression  EXAMINATION:  XR ABDOMEN AP 1 VIEW     CLINICAL HISTORY:  Abdominal distention;     TECHNIQUE:  AP view of the abdomen.     COMPARISON:  X-ray dated 07/21/2024     FINDINGS:  There is persistent gaseous distension of multiple small bowel loops.  Contrast is seen within the hepatic flexure.     Impression:     Persistent dilated small bowel loops.     X RAY ABDOMEN AP 1 VIEW - 07/21/2024 AT 0457  Impression:     Small bowel follow-through enteric contrast has reached the colon and rectum before 24 hours without evidence of complete obstruction.    Micro/Path/Other:  None     Assessment/Plan:  88-year-old with PMHx of HTN, HLD, and afib on Eliquis presenting with pneumonia. CT shows evidence of a small-bowel obstruction with a transition point. Pt currently AF and HDS. SBFT and KUB with contrast in colon/rectum, however still no flatus or BM.  Nose previously tolerating clears but had an emesis episodes of increased abdominal distention on 07/26 required replacement of NG tube.     - OOB to chair TID, mobilize, PT/OT   - PPN per primary   - Serial abdominal exams  - Please obtain a CRP and pre-albumin with next set of labs  - Keep NGT in place  - having active discussions with family regarding pursuing surgical intervention versus hospice care.  Extensive conversation with patient and patient's family yesterday regarding risks and benefits of surgery especially given her " past medical history and overall poor nutritional status.  Patient is at high risk for complications with surgery.  Patient and family verbalized understanding and will give definitive answer soon.  - rest of care per primary      Jori Osullivan MS3  [facility]  07/27/2024     RESIDENT ATTESTATION    I have seen and evaluated the patient with the student doctor. Note edited as needed. Agree with documentation above.     Misael Farfan MD  [facility] General Surgery PGY-3  9:34 AM

## 2024-07-27 NOTE — PLAN OF CARE
Problem: Adult Inpatient Plan of Care  Goal: Plan of Care Review  Outcome: Progressing     Problem: Adult Inpatient Plan of Care  Goal: Patient-Specific Goal (Individualized)  Outcome: Progressing     Problem: Adult Inpatient Plan of Care  Goal: Absence of Hospital-Acquired Illness or Injury  Outcome: Progressing     Problem: Adult Inpatient Plan of Care  Goal: Optimal Comfort and Wellbeing  Outcome: Progressing     Problem: Adult Inpatient Plan of Care  Goal: Readiness for Transition of Care  Outcome: Progressing     Problem: Sepsis/Septic Shock  Goal: Optimal Coping  Outcome: Progressing     Problem: Sepsis/Septic Shock  Goal: Absence of Bleeding  Outcome: Progressing     Problem: Sepsis/Septic Shock  Goal: Blood Glucose Level Within Targeted Range  Outcome: Progressing

## 2024-07-27 NOTE — NURSING
"Patients' Clinimix- E continuous infusion paused due to decreasing sodium level. New order for NS continuous at 90mL/ hr MD Barnett notified and stated, "May worsen hyponatremia. Just saline until recheck". Charge OLE Hurt notified.   "

## 2024-07-27 NOTE — MEDICAL/APP STUDENT
"[facility] General Surgery - *** Team  Daily Progress Note    Patient ID: ***    Subjective:  ***    Objective:    Vitals:  Vitals:    07/27/24 0534   BP: 125/62   Pulse: 72   Resp:    Temp:         Intake/Output:  ***    Physical Exam:  Gen: NAD  Neuro: awake, alert, answering questions appropriately  CV: RRR  Resp: non-labored breathing, SHANI  Abd: soft, ND, NT  : ***  Ext: moves all 4 spontaneously and purposefully  Skin: warm, well perfused  L/D/A: ***    Labs:  Renal:  Recent Labs     07/25/24 0313 07/26/24 0321 07/26/24 1846 07/27/24 0310   BUN 17.6 22.2* 20.9* 21.6*   CREATININE 0.88 0.92 1.03* 1.11*     No results for input(s): "LACTIC" in the last 72 hours.    FENGI:  Recent Labs     07/25/24 0313 07/26/24 0321 07/26/24 1846 07/27/24 0310    133* 131* 134*   K 3.9 4.1 4.4 4.2    103 104 103   CO2 27 24 22* 25   CALCIUM 9.0 8.7 9.3 9.1   MG 1.60 1.70  --  1.80   PHOS 2.6 3.1  --  3.3   ALBUMIN 2.0* 2.1*  --  2.1*   BILITOT 0.5 0.6  --  0.6   AST 20 29  --  45*   ALKPHOS 41 51  --  61   ALT 7 10  --  19       Heme:  Recent Labs     07/25/24 0313 07/26/24 0321 07/27/24 0310   HGB 8.1* 8.7* 8.2*   HCT 25.0* 27.0* 25.8*    170 175       ID:  Recent Labs     07/25/24 0313 07/26/24 0321 07/27/24 0310   WBC 3.06* 3.52* 4.21*       CBG:  Recent Labs     07/25/24 0313 07/26/24 0321 07/26/24 1846 07/27/24 0310   GLUCOSE 114 127* 114 90        Cardiovascular:  No results for input(s): "TROPONINI", "CKTOTAL", "CKMB", "BNP" in the last 168 hours.    I have reviewed all pertinent lab results within the past 24 hours.    Imaging:  ***    Micro/Path/Other:  ***    Assessment/Plan:  ***     -   -   -   -     Jori Osullivan, MS3  [facility]  07/27/2024   "

## 2024-07-28 LAB
ABS NEUT CALC (OHS): 3.6 X10(3)/MCL (ref 2.1–9.2)
ALBUMIN SERPL-MCNC: 2.1 G/DL (ref 3.4–4.8)
ALBUMIN/GLOB SERPL: 0.5 RATIO (ref 1.1–2)
ALP SERPL-CCNC: 70 UNIT/L (ref 40–150)
ALT SERPL-CCNC: 19 UNIT/L (ref 0–55)
ANION GAP SERPL CALC-SCNC: 5 MEQ/L
ANION GAP SERPL CALC-SCNC: 6 MEQ/L
ANISOCYTOSIS BLD QL SMEAR: ABNORMAL
AST SERPL-CCNC: 39 UNIT/L (ref 5–34)
BILIRUB SERPL-MCNC: 0.6 MG/DL
BUN SERPL-MCNC: 23.8 MG/DL (ref 9.8–20.1)
BUN SERPL-MCNC: 24.3 MG/DL (ref 9.8–20.1)
CALCIUM SERPL-MCNC: 8.9 MG/DL (ref 8.4–10.2)
CALCIUM SERPL-MCNC: 9 MG/DL (ref 8.4–10.2)
CHLORIDE SERPL-SCNC: 102 MMOL/L (ref 98–107)
CHLORIDE SERPL-SCNC: 103 MMOL/L (ref 98–107)
CO2 SERPL-SCNC: 23 MMOL/L (ref 23–31)
CO2 SERPL-SCNC: 23 MMOL/L (ref 23–31)
CREAT SERPL-MCNC: 0.98 MG/DL (ref 0.55–1.02)
CREAT SERPL-MCNC: 1.03 MG/DL (ref 0.55–1.02)
CREAT/UREA NIT SERPL: 23
CREAT/UREA NIT SERPL: 25
EOSINOPHIL NFR BLD MANUAL: 0.04 X10(3)/MCL (ref 0–0.9)
EOSINOPHIL NFR BLD MANUAL: 1 % (ref 0–8)
ERYTHROCYTE [DISTWIDTH] IN BLOOD BY AUTOMATED COUNT: 15.1 % (ref 11.5–17)
GFR SERPLBLD CREATININE-BSD FMLA CKD-EPI: 53 ML/MIN/1.73/M2
GFR SERPLBLD CREATININE-BSD FMLA CKD-EPI: 56 ML/MIN/1.73/M2
GLOBULIN SER-MCNC: 4.5 GM/DL (ref 2.4–3.5)
GLUCOSE SERPL-MCNC: 101 MG/DL (ref 82–115)
GLUCOSE SERPL-MCNC: 109 MG/DL (ref 82–115)
HCT VFR BLD AUTO: 27.8 % (ref 37–47)
HGB BLD-MCNC: 8.8 G/DL (ref 12–16)
LYMPHOCYTES NFR BLD MANUAL: 0.33 X10(3)/MCL
LYMPHOCYTES NFR BLD MANUAL: 8 % (ref 13–40)
MAGNESIUM SERPL-MCNC: 2 MG/DL (ref 1.6–2.6)
MCH RBC QN AUTO: 26.3 PG (ref 27–31)
MCHC RBC AUTO-ENTMCNC: 31.7 G/DL (ref 33–36)
MCV RBC AUTO: 83.2 FL (ref 80–94)
MICROCYTES BLD QL SMEAR: ABNORMAL
MONOCYTES NFR BLD MANUAL: 0.12 X10(3)/MCL (ref 0.1–1.3)
MONOCYTES NFR BLD MANUAL: 3 % (ref 2–11)
NEUTROPHILS NFR BLD MANUAL: 85 % (ref 47–80)
NEUTS BAND NFR BLD MANUAL: 3 % (ref 0–11)
NRBC BLD AUTO-RTO: 0 %
OVALOCYTES (OLG): ABNORMAL
PHOSPHATE SERPL-MCNC: 2.6 MG/DL (ref 2.3–4.7)
PLATELET # BLD AUTO: 180 X10(3)/MCL (ref 130–400)
PLATELET # BLD EST: ADEQUATE 10*3/UL
PMV BLD AUTO: 11 FL (ref 7.4–10.4)
POCT GLUCOSE: 114 MG/DL (ref 70–110)
POCT GLUCOSE: 125 MG/DL (ref 70–110)
POCT GLUCOSE: 99 MG/DL (ref 70–110)
POTASSIUM SERPL-SCNC: 4.2 MMOL/L (ref 3.5–5.1)
POTASSIUM SERPL-SCNC: 4.4 MMOL/L (ref 3.5–5.1)
PROT SERPL-MCNC: 6.6 GM/DL (ref 5.8–7.6)
RBC # BLD AUTO: 3.34 X10(6)/MCL (ref 4.2–5.4)
SODIUM SERPL-SCNC: 130 MMOL/L (ref 136–145)
SODIUM SERPL-SCNC: 132 MMOL/L (ref 136–145)
WBC # BLD AUTO: 4.09 X10(3)/MCL (ref 4.5–11.5)

## 2024-07-28 PROCEDURE — 27000221 HC OXYGEN, UP TO 24 HOURS

## 2024-07-28 PROCEDURE — 94761 N-INVAS EAR/PLS OXIMETRY MLT: CPT

## 2024-07-28 PROCEDURE — 63600175 PHARM REV CODE 636 W HCPCS: Performed by: FAMILY MEDICINE

## 2024-07-28 PROCEDURE — 80053 COMPREHEN METABOLIC PANEL: CPT

## 2024-07-28 PROCEDURE — 85027 COMPLETE CBC AUTOMATED: CPT

## 2024-07-28 PROCEDURE — 36415 COLL VENOUS BLD VENIPUNCTURE: CPT

## 2024-07-28 PROCEDURE — 83735 ASSAY OF MAGNESIUM: CPT

## 2024-07-28 PROCEDURE — 25000003 PHARM REV CODE 250

## 2024-07-28 PROCEDURE — 99900035 HC TECH TIME PER 15 MIN (STAT)

## 2024-07-28 PROCEDURE — 25000003 PHARM REV CODE 250: Performed by: FAMILY MEDICINE

## 2024-07-28 PROCEDURE — 63600175 PHARM REV CODE 636 W HCPCS

## 2024-07-28 PROCEDURE — 21400001 HC TELEMETRY ROOM

## 2024-07-28 PROCEDURE — 85025 COMPLETE CBC W/AUTO DIFF WBC: CPT

## 2024-07-28 PROCEDURE — 84100 ASSAY OF PHOSPHORUS: CPT

## 2024-07-28 RX ORDER — SODIUM CHLORIDE 9 MG/ML
INJECTION, SOLUTION INTRAVENOUS CONTINUOUS
Status: DISCONTINUED | OUTPATIENT
Start: 2024-07-28 | End: 2024-07-30 | Stop reason: HOSPADM

## 2024-07-28 RX ADMIN — SODIUM CHLORIDE: 9 INJECTION, SOLUTION INTRAVENOUS at 09:07

## 2024-07-28 RX ADMIN — HEPARIN SODIUM 5000 UNITS: 5000 INJECTION, SOLUTION INTRAVENOUS; SUBCUTANEOUS at 02:07

## 2024-07-28 RX ADMIN — PIPERACILLIN SODIUM AND TAZOBACTAM SODIUM 4.5 G: 4; .5 INJECTION, POWDER, LYOPHILIZED, FOR SOLUTION INTRAVENOUS at 02:07

## 2024-07-28 RX ADMIN — HEPARIN SODIUM 5000 UNITS: 5000 INJECTION, SOLUTION INTRAVENOUS; SUBCUTANEOUS at 09:07

## 2024-07-28 RX ADMIN — HEPARIN SODIUM 5000 UNITS: 5000 INJECTION, SOLUTION INTRAVENOUS; SUBCUTANEOUS at 05:07

## 2024-07-28 RX ADMIN — LISINOPRIL 10 MG: 10 TABLET ORAL at 08:07

## 2024-07-28 RX ADMIN — DOXYCYCLINE 100 MG: 100 INJECTION, POWDER, LYOPHILIZED, FOR SOLUTION INTRAVENOUS at 11:07

## 2024-07-28 RX ADMIN — PIPERACILLIN SODIUM AND TAZOBACTAM SODIUM 4.5 G: 4; .5 INJECTION, POWDER, LYOPHILIZED, FOR SOLUTION INTRAVENOUS at 06:07

## 2024-07-28 RX ADMIN — SODIUM CHLORIDE: 9 INJECTION, SOLUTION INTRAVENOUS at 08:07

## 2024-07-28 RX ADMIN — ATORVASTATIN CALCIUM 10 MG: 10 TABLET, FILM COATED ORAL at 08:07

## 2024-07-28 RX ADMIN — METOPROLOL SUCCINATE 12.5 MG: 25 TABLET, EXTENDED RELEASE ORAL at 08:07

## 2024-07-28 RX ADMIN — PANTOPRAZOLE SODIUM 40 MG: 40 INJECTION, POWDER, FOR SOLUTION INTRAVENOUS at 08:07

## 2024-07-28 RX ADMIN — DOXYCYCLINE 100 MG: 100 INJECTION, POWDER, LYOPHILIZED, FOR SOLUTION INTRAVENOUS at 09:07

## 2024-07-28 RX ADMIN — PIPERACILLIN SODIUM AND TAZOBACTAM SODIUM 4.5 G: 4; .5 INJECTION, POWDER, LYOPHILIZED, FOR SOLUTION INTRAVENOUS at 10:07

## 2024-07-28 RX ADMIN — POLYETHYLENE GLYCOL 3350 17 G: 17 POWDER, FOR SOLUTION ORAL at 08:07

## 2024-07-28 NOTE — PLAN OF CARE
Plan of Care    Patient's family yesterday decided to pursue hospice care.  I had an extensive conversation with patient and patient's family to answer any questions and given a clarification in regard to patient's current state of health and prognosis.  I again went over the risks benefits and alternatives to the treatment of patient is obstruction. I explained to them that given her old age, nutrition status, overall comorbidities, and past surgical history, patient is not an ideal surgical candidate and will likely need a midline incision approach to resolve her obstruction.  While there is a chance that we can accomplish the surgery, patient is at a high risk of remaining intubated and sedated postoperatively, having wound complications, need further procedures, becomes septic requiring invasive measures.  According to the son, patient's family and patient had a discussion yesterday where the patient decided not to pursue these and rather go home and be comfortable with her family.  This is further underscored from the conversation this morning with the family.  I clarified that there is a risk of needing colostomy if a large bowel lesion was seen and patient's son endorse that his mom would not want these measures done.  At the conclusion of the conversation, patient and patient's family were amenable to pursuing hospice care and had all their questions answered.  I informed them they can always call back the surgery team if they have any more questions or concerns.  Patient's family patient verbalized understanding.      Misael Farfan MD  [facility] General Surgery

## 2024-07-28 NOTE — PROCEDURES
"Fifi Chacon is a 88 y.o. female patient.    Temp: 98.7 °F (37.1 °C) (07/27/24 1917)  Pulse: 76 (07/27/24 2001)  Resp: 20 (07/27/24 2001)  BP: (!) 152/63 (07/27/24 1953)  SpO2: 100 % (07/27/24 2001)  Weight: 69.1 kg (152 lb 5.4 oz) (? accuracy of bedscale) (07/26/24 1237)  Height: 5' 2" (157.5 cm) (07/18/24 2201)       Procedure Preformed with Dr. Speedy Rocha.   Central Line    Date/Time: 7/27/2024 8:51 PM    Performed by: Misael Farfan MD  Authorized by: Misael Farfan MD    Location procedure was performed:  [facility] GENERAL SURGERY  Consent Done ?:  Yes  Time out complete?: Verified correct patient, procedure, equipment, staff, and site/side    Indications:  Hemodialysis  Anesthesia:  Local infiltration  Local anesthetic:  Lidocaine 1% without epinephrine  Anesthetic total (ml):  10  Preparation:  Skin prepped with ChloraPrep  Skin prep agent dried: Skin prep agent completely dried prior to procedure    Sterile barriers: All five maximal sterile barriers used - gloves, gown, cap, mask and large sterile sheet    Hand hygiene: Hand hygiene performed immediately prior to central venous catheter insertion    Location:  Right internal jugular  Catheter type:  Triple lumen  Catheter size:  12 Fr (12.5F)  Inserted Catheter Length (cm):  16  Ultrasound guidance: Yes    Vessel Caliber:  Medium   patent  Comprressibility:  Normal  Guidewire confirmed in vessel.    Steril sheath on probe.    Sterile gel used.  Manometry: Yes    Number of attempts:  2  Securement:  Line sutured  Complications: No    Estimated blood loss (mL):  10  XRay:  Placement verified by x-ray  Adverse Events:  None      7/27/2024    Misael Farfan MD  [facility] General Surgery    "
yes...

## 2024-07-28 NOTE — PT/OT/SLP DISCHARGE
"Physical Therapy Discharge Summary/Missed NOTE    Name: Fifi Chacon  MRN: [id number]   Principal Problem: Small bowel obstruction   1. GRACIE (acute kidney injury)    2. Weakness    3. Vomiting    4. Severe sepsis    5. Chest pain    6. Elevated troponin    7. Small bowel obstruction    8. Acute urinary retention    9. Nausea and vomiting, unspecified vomiting type    10. Volume depletion, gastrointestinal loss    11. Chest pain, unspecified type       Patient Active Problem List   Diagnosis    Vaginal prolapse    Pessary maintenance    Hematuria    Hypertension    Atrophy of vagina    Gastroesophageal reflux disease    History of iron deficiency    Hydroureter    Rheumatoid arthritis    Vitamin D deficiency    Anxiety    Depressive disorder    Renal cyst    CAD (coronary artery disease)    Paroxysmal A-fib    Severe aortic stenosis    Acute cystitis without hematuria    Small bowel obstruction    Severe sepsis    GRACIE (acute kidney injury)    Pneumonia    Elevated troponin    Malnutrition    Nausea and vomiting    Volume depletion, gastrointestinal loss    Acute urinary retention    Chest pain      Recommendations - per last treatment session     Therapy Intensity Recommendations at Discharge: Moderate Intensity Therapy  Discharge Equipment Recommendations:  (TBD - currently - hospital bed)     Assessment:     Refer to prior Physical Therapy note for patient's most recent functional status, goal achievement and therapists' recommendations.    Attempted to see pt this date for tx session; however, family refused and stated "we don't want her to continue therapy, we are taking her home on hospice."    Patient/family declined further therapy services.    Objective     GOALS:  Multidisciplinary Problems       Physical Therapy Goals          Problem: Physical Therapy    Goal Priority Disciplines Outcome Goal Variances Interventions   Physical Therapy Goal     PT, PT/OT Progressing     Description: REVIEWED/CONTINUED " 2024  Goals to be met by: DISCHARGE  Patient will increase functional independence with mobility by performin. Supine to sit with Contact Guard Assistance - ONGOING  2. Sit to stand transfer with Contact Guard Assistance - ONGOING  3. Gait  x 20 feet with Minimal Assistance using Rolling Walker - ONGOING  4. Bilateral LE general ROM ex's w/ active-assist x10 reps each - all joints/planes - ONGOING                     Plan     Patient discharged from acute PT Services on 2024.    Patient Discharged to: hospice per MD in patient's room at time of attempt to tx pt.    2024

## 2024-07-28 NOTE — CONSULTS
Consult for hospice noted. [facility] was contacted yesterday. Referral packet and order have been sent to [facility] via [facility]. Verified with Dr. Foster that patient doesn't need outpatient dialysis.

## 2024-07-28 NOTE — PROGRESS NOTES
[facility] Medicine Wards   Progress Note      Resident Team: [facility] Family Medicine List   Attending Physician: Julieta Barnett MD  Resident: Fortunato Foster  Intern: [facility] Length of Stay: 9 days    Subjective   Brief HPI:  Fifi Chacon is a 88 y.o. female with history of paroxysmal afib on Eliquis, CAD s/p coronary angiogram on 10/13/23, severe aortic stenosis, hypertension, hyperlipidemia, anxiety admitted for small bowel obstruction and sepsis.         Interval History:   Vital Signs stable, no acute overnight events. Ng tube in place and NPO. Family members present this morning. Reports no discomfort. Sodium decreased from 134 to 130, will D/C clinimix at this time. Ng tube in place with 10cc of gastric fluid. Denies fever, n/v, CP, SOB, or abdominal pain.    Family members got in touch with [facility] and have set up everything already to be ready late afternoon Tuesday.        Review of Systems:  Review of Systems   Constitutional:  Negative for fever.   Respiratory:  Negative for cough and shortness of breath.    Cardiovascular:  Negative for chest pain.   Gastrointestinal:  Positive for constipation.        Objective     Vital Signs (Most Recent):  Temp: 98.3 °F (36.8 °C) (07/28/24 0755)  Pulse: 77 (07/28/24 0755)  Resp: 18 (07/28/24 0755)  BP: (!) 149/62 (07/28/24 0755)  SpO2: 98 % (07/28/24 0755) Vital Signs (24h Range):  Temp:  [92.5 °F (33.6 °C)-100.3 °F (37.9 °C)] 98.3 °F (36.8 °C)  Pulse:  [68-81] 77  Resp:  [18-22] 18  SpO2:  [96 %-100 %] 98 %  BP: (105-162)/(56-75) 149/62     Physical Examination:  Physical Exam  Constitutional:       General: She is not in acute distress.  HENT:      Head: Normocephalic and atraumatic.      Nose:      Comments: R NG tube in place with 10 cc of gastric fluid collected  Cardiovascular:      Rate and Rhythm: Normal rate and regular rhythm.      Pulses: Normal pulses.      Comments: 2/6 systolic murmur on 2nd R intercostal space  Pulmonary:      Effort:  Pulmonary effort is normal.      Breath sounds: Normal breath sounds. No wheezing.   Abdominal:      General: Abdomen is flat. There is no distension.      Palpations: Abdomen is soft.      Tenderness: There is no abdominal tenderness. There is no guarding.   Musculoskeletal:         General: No swelling.      Right lower leg: No edema.      Left lower leg: No edema.   Skin:     General: Skin is warm and dry.      Comments: 2x1cm blood blister on R lateral Malleolus   Neurological:      Mental Status: She is alert.         Laboratory:  Most Recent Data:  CBC:   Recent Labs   Lab 07/27/24  0310 07/28/24  0350   WBC 4.21* 4.09*   HGB 8.2* 8.8*   HCT 25.8* 27.8*    180     CMP:   Recent Labs   Lab 07/28/24  0350   CALCIUM 8.9   ALBUMIN 2.1*   *   K 4.2   CO2 23      BUN 24.3*   CREATININE 0.98   ALKPHOS 70   ALT 19   AST 39*   BILITOT 0.6         Microbiology Data Reviewed: yes  Pertinent Findings:  Blood culture with 1/4 bottles + Staph capitis and Staph warneri  Urine culture with no growth       Current Medications:     Infusions:   Amino acid 4.25% - dextrose 5% (CLINIMIX-E) solution (1L provides 42.5 gm AA, 50 gm CHO (170 kcal/L dextrose), Na 35, K 30, Mg 5, Ca 4.5, Acetate 70, Cl 39, Phos 15)   Intravenous Continuous 75 mL/hr at 07/27/24 1009 New Bag at 07/27/24 1009        Scheduled:   atorvastatin  10 mg Oral Daily    doxycycline IV (PEDS and ADULTS)  100 mg Intravenous Q12H    heparin (porcine)  5,000 Units Subcutaneous Q8H    lisinopriL  10 mg Oral Daily    metoprolol succinate  12.5 mg Oral Daily    pantoprazole  40 mg Intravenous Daily    piperacillin-tazobactam (Zosyn) IV (PEDS and ADULTS) (extended infusion is not appropriate)  4.5 g Intravenous Q8H    polyethylene glycol  17 g Oral Daily        PRN:    Current Facility-Administered Medications:     0.9% NaCl, , Intravenous, PRN    acetaminophen, 650 mg, Oral, Q8H PRN    dextrose 10%, 12.5 g, Intravenous, PRN    dextrose 10%, 25 g,  Intravenous, PRN    glucagon (human recombinant), 1 mg, Intramuscular, PRN    glucose, 16 g, Oral, PRN    glucose, 24 g, Oral, PRN    hydrALAZINE, 10 mg, Intravenous, Q6H PRN    melatonin, 6 mg, Oral, Nightly PRN    metoprolol, 5 mg, Intravenous, Q5 Min PRN    naloxone, 0.02 mg, Intravenous, PRN    ondansetron, 4 mg, Intravenous, Q8H PRN    prochlorperazine, 5 mg, Intravenous, Q6H PRN    sodium chloride 0.9%, 10 mL, Intravenous, Q12H PRN    Antibiotics and Day Number of Therapy:  Vanc, Zosyn and doxycyline started 7/18 - end 7/28  Stop Vanc 7/23    Intake/Output Summary (Last 24 hours) at 7/28/2024 0808  Last data filed at 7/28/2024 0533  Gross per 24 hour   Intake --   Output 2200 ml   Net -2200 ml       Lines/Drains/Airways       Drain  Duration                  NG/OG Tube 07/26/24 1415 16 Fr. Right nostril 1 day              Peripheral Intravenous Line  Duration                  Peripheral IV - Single Lumen 07/25/24 1300 20 G Anterior;Left Forearm 2 days         Peripheral IV - Single Lumen 07/27/24 2200 20 G Anterior;Right Forearm <1 day                    Assessment and Plan      Small bowel obstruction   Nausea, vomiting  - Surgery following, counseled patient for ex-lap vs palliative/hospice care, patient/family prefer hospice care at this time  - anti-emetics PRN in place, protonix   - NPO, NG tube in place  - No BM with enema yesterday  - [facility] got in touch with family, has scheduled bed delivery Tuesday 7/30/2024     Severe sepsis 2/2 Right lower lobe pneumonia, possible aspiration  - Continue Zosyn/Doxy regimen for concern for aspiration pneumonia - D10  - MRSA PCR neg   - Lactic acid resolved   - blood cultures x2 collected 7/18: 1/2 bottles gram + cocci, probable staph. BCID panel with Staph.  - COVID/Flu neg  - IS ordered      Moderate Malnutrition  - Moderate malnutrition related to acute small bowel obstruction seen by mild fat depletion, moderate muscle depletion and 1-2% weight loss  -  Present for 1 week   - Nutrition consulted for ppn  - NPO at this time with NG tube  - hyponatremia today 134 to 131 today, will discontinue clinimix at this time and start NS 90ml/hr     Normocytic anemia - stable  - Stable this AM  - Continue to monitor with AM cbc     Questionable right paratracheal/mass/opacity on CXR  - CT chest showed reticular, nodular and hazy opacities in the right posterior upper lobe and both lower lobes with areas of consolidation in the lower lobes, larger in the right. This is consistent with multilobar consolidation. And fluid-filled dilatation of the distal esophagus which may be the result of reflux disease or related to distal esophageal stricture      Severe aortic stenosis  Coronary artery disease  Hypertension  Hyperlipidemia  - last Echo 10/2023 EF 65-70%, severe aortic stenosis  - Resumed home Metoprolol.   - Resume home ACEi and statin given elevated BP   - consider repeat Echo in future if showing signs of fluid overload     Paroxysmal atrial fibrillation  - Continue home Eliquis now tolerating PO medications  - Currently NSR  - Continue home metoprolol; Lopressor pushes available prn  - Telemetry ordered     Anxiety  - Resume home Lexapro     History of falls  Chronic dizziness, at baseline  - admission 7/1/2024 for fall, currently in SNF for rehab, likely discharge back to SNF when able  - previously walked with a cane, currently requiring rolling walker  - Patient and family will stop OT and PT at this time, discontinued  - fall precautions, delirium precautions  - urine trial/purewick     Volume Depletion   -Secondary to acute small bowel obstruction  -Volume repleted in ED with IVF           CODE STATUS: DNR  Access: PIV  Antibiotics: Zosyn/Doxy Day 10/10  Diet: NPO  DVT Prophylaxis: Eliquis  GI Prophylaxis: Pepcid IV  Fluids: 90ml/hr NS only during daytime, clinnimix 75ml/hr      Disposition: Admit for SBO, no surgical intervention needed at this time. Continue on  Zosyn/Doxy at this time for PNA. NPO at this time with NG tube. Continue home medications today. Nutrition consulted for PPN recs. Case management consulted for discharge planning. Patient and family want hospice at this time rather than surgery, notified Simeon at [facility] and she will come see patient/family for further discussion        La Gomez MD  [facility] Family Medicine [facility]

## 2024-07-29 LAB
ALBUMIN SERPL-MCNC: 2.2 G/DL (ref 3.4–4.8)
ALBUMIN/GLOB SERPL: 0.5 RATIO (ref 1.1–2)
ALP SERPL-CCNC: 70 UNIT/L (ref 40–150)
ALT SERPL-CCNC: 15 UNIT/L (ref 0–55)
ANION GAP SERPL CALC-SCNC: 4 MEQ/L
AST SERPL-CCNC: 33 UNIT/L (ref 5–34)
BASOPHILS # BLD AUTO: 0.02 X10(3)/MCL
BASOPHILS NFR BLD AUTO: 0.5 %
BILIRUB SERPL-MCNC: 0.5 MG/DL
BUN SERPL-MCNC: 20.2 MG/DL (ref 9.8–20.1)
CALCIUM SERPL-MCNC: 9 MG/DL (ref 8.4–10.2)
CHLORIDE SERPL-SCNC: 105 MMOL/L (ref 98–107)
CO2 SERPL-SCNC: 21 MMOL/L (ref 23–31)
CREAT SERPL-MCNC: 1.06 MG/DL (ref 0.55–1.02)
CREAT/UREA NIT SERPL: 19
EOSINOPHIL # BLD AUTO: 0.03 X10(3)/MCL (ref 0–0.9)
EOSINOPHIL NFR BLD AUTO: 0.8 %
ERYTHROCYTE [DISTWIDTH] IN BLOOD BY AUTOMATED COUNT: 15.3 % (ref 11.5–17)
GFR SERPLBLD CREATININE-BSD FMLA CKD-EPI: 51 ML/MIN/1.73/M2
GLOBULIN SER-MCNC: 4.3 GM/DL (ref 2.4–3.5)
GLUCOSE SERPL-MCNC: 86 MG/DL (ref 82–115)
HCT VFR BLD AUTO: 27.8 % (ref 37–47)
HGB BLD-MCNC: 9 G/DL (ref 12–16)
IMM GRANULOCYTES # BLD AUTO: 0.04 X10(3)/MCL (ref 0–0.04)
IMM GRANULOCYTES NFR BLD AUTO: 1.1 %
LYMPHOCYTES # BLD AUTO: 0.29 X10(3)/MCL (ref 0.6–4.6)
LYMPHOCYTES NFR BLD AUTO: 7.8 %
MAGNESIUM SERPL-MCNC: 1.8 MG/DL (ref 1.6–2.6)
MCH RBC QN AUTO: 27 PG (ref 27–31)
MCHC RBC AUTO-ENTMCNC: 32.4 G/DL (ref 33–36)
MCV RBC AUTO: 83.5 FL (ref 80–94)
MONOCYTES # BLD AUTO: 0.24 X10(3)/MCL (ref 0.1–1.3)
MONOCYTES NFR BLD AUTO: 6.5 %
NEUTROPHILS # BLD AUTO: 3.08 X10(3)/MCL (ref 2.1–9.2)
NEUTROPHILS NFR BLD AUTO: 83.3 %
NRBC BLD AUTO-RTO: 0 %
PHOSPHATE SERPL-MCNC: 3 MG/DL (ref 2.3–4.7)
PLATELET # BLD AUTO: 171 X10(3)/MCL (ref 130–400)
PMV BLD AUTO: 11.5 FL (ref 7.4–10.4)
POCT GLUCOSE: 86 MG/DL (ref 70–110)
POCT GLUCOSE: 95 MG/DL (ref 70–110)
POTASSIUM SERPL-SCNC: 4.5 MMOL/L (ref 3.5–5.1)
PROT SERPL-MCNC: 6.5 GM/DL (ref 5.8–7.6)
RBC # BLD AUTO: 3.33 X10(6)/MCL (ref 4.2–5.4)
SODIUM SERPL-SCNC: 130 MMOL/L (ref 136–145)
WBC # BLD AUTO: 3.7 X10(3)/MCL (ref 4.5–11.5)

## 2024-07-29 PROCEDURE — 21400001 HC TELEMETRY ROOM

## 2024-07-29 PROCEDURE — 25000003 PHARM REV CODE 250

## 2024-07-29 PROCEDURE — 63600175 PHARM REV CODE 636 W HCPCS

## 2024-07-29 PROCEDURE — 80053 COMPREHEN METABOLIC PANEL: CPT

## 2024-07-29 PROCEDURE — 27000221 HC OXYGEN, UP TO 24 HOURS

## 2024-07-29 PROCEDURE — 63600175 PHARM REV CODE 636 W HCPCS: Performed by: FAMILY MEDICINE

## 2024-07-29 PROCEDURE — 94761 N-INVAS EAR/PLS OXIMETRY MLT: CPT

## 2024-07-29 PROCEDURE — 36415 COLL VENOUS BLD VENIPUNCTURE: CPT

## 2024-07-29 PROCEDURE — 85025 COMPLETE CBC W/AUTO DIFF WBC: CPT

## 2024-07-29 PROCEDURE — 84100 ASSAY OF PHOSPHORUS: CPT

## 2024-07-29 PROCEDURE — 83735 ASSAY OF MAGNESIUM: CPT

## 2024-07-29 PROCEDURE — 25000003 PHARM REV CODE 250: Performed by: FAMILY MEDICINE

## 2024-07-29 RX ADMIN — LISINOPRIL 10 MG: 10 TABLET ORAL at 09:07

## 2024-07-29 RX ADMIN — HEPARIN SODIUM 5000 UNITS: 5000 INJECTION, SOLUTION INTRAVENOUS; SUBCUTANEOUS at 10:07

## 2024-07-29 RX ADMIN — PANTOPRAZOLE SODIUM 40 MG: 40 INJECTION, POWDER, FOR SOLUTION INTRAVENOUS at 08:07

## 2024-07-29 RX ADMIN — DOXYCYCLINE 100 MG: 100 INJECTION, POWDER, LYOPHILIZED, FOR SOLUTION INTRAVENOUS at 10:07

## 2024-07-29 RX ADMIN — PIPERACILLIN SODIUM AND TAZOBACTAM SODIUM 4.5 G: 4; .5 INJECTION, POWDER, LYOPHILIZED, FOR SOLUTION INTRAVENOUS at 02:07

## 2024-07-29 RX ADMIN — METOPROLOL SUCCINATE 12.5 MG: 25 TABLET, EXTENDED RELEASE ORAL at 09:07

## 2024-07-29 RX ADMIN — HEPARIN SODIUM 5000 UNITS: 5000 INJECTION, SOLUTION INTRAVENOUS; SUBCUTANEOUS at 02:07

## 2024-07-29 RX ADMIN — HEPARIN SODIUM 5000 UNITS: 5000 INJECTION, SOLUTION INTRAVENOUS; SUBCUTANEOUS at 06:07

## 2024-07-29 RX ADMIN — DOXYCYCLINE 100 MG: 100 INJECTION, POWDER, LYOPHILIZED, FOR SOLUTION INTRAVENOUS at 11:07

## 2024-07-29 RX ADMIN — ATORVASTATIN CALCIUM 10 MG: 10 TABLET, FILM COATED ORAL at 09:07

## 2024-07-29 RX ADMIN — SODIUM CHLORIDE: 9 INJECTION, SOLUTION INTRAVENOUS at 09:07

## 2024-07-29 RX ADMIN — POLYETHYLENE GLYCOL 3350 17 G: 17 POWDER, FOR SOLUTION ORAL at 09:07

## 2024-07-29 RX ADMIN — PIPERACILLIN SODIUM AND TAZOBACTAM SODIUM 4.5 G: 4; .5 INJECTION, POWDER, LYOPHILIZED, FOR SOLUTION INTRAVENOUS at 06:07

## 2024-07-29 RX ADMIN — PIPERACILLIN SODIUM AND TAZOBACTAM SODIUM 4.5 G: 4; .5 INJECTION, POWDER, LYOPHILIZED, FOR SOLUTION INTRAVENOUS at 11:07

## 2024-07-29 NOTE — PROGRESS NOTES
Pt accepted for home hospice care by [facility]. Plan to discharge tomorrow once DME has been set up in the home.

## 2024-07-29 NOTE — PLAN OF CARE
07/29/24 1114   Medicare Message   Important Message from Medicare regarding Discharge Appeal Rights Given to patient/caregiver;Explained to patient/caregiver;Signed/date by patient/caregiver   Date IMM was signed 07/29/24   Time IMM was signed 1113

## 2024-07-29 NOTE — PROGRESS NOTES
[facility] Medicine Wards   Progress Note      Resident Team: [facility] Family Medicine List   Attending Physician: Julieta Barnett MD  Resident: Fortunato Foster  Intern: [facility] Length of Stay: 10 days    Subjective   Brief HPI:  Fifi Chacon is a 88 y.o. female with history of paroxysmal afib on Eliquis, CAD s/p coronary angiogram on 10/13/23, severe aortic stenosis, hypertension, hyperlipidemia, anxiety admitted for small bowel obstruction and sepsis.     Interval History:   Vital Signs stable, no acute overnight events. NG tube in place. Family members, daughter, present this morning. Reports no discomfort. Family members got in touch with [facility] and have set up everything already to be ready late afternoon Tuesday.      Objective     Vital Signs (Most Recent):  Temp: 97.5 °F (36.4 °C) (07/29/24 1126)  Pulse: 74 (07/29/24 1126)  Resp: 20 (07/29/24 0805)  BP: 124/67 (07/29/24 1126)  SpO2: 98 % (07/29/24 1126) Vital Signs (24h Range):  Temp:  [96.7 °F (35.9 °C)-99.3 °F (37.4 °C)] 97.5 °F (36.4 °C)  Pulse:  [74-82] 74  Resp:  [18-20] 20  SpO2:  [98 %-99 %] 98 %  BP: (124-154)/(63-69) 124/67     Physical Examination:  Physical Exam  Constitutional:       General: She is not in acute distress.     Comments: Patient resting comfortably in bed with mouth open.    HENT:      Head: Normocephalic and atraumatic.      Nose:      Comments: R NG tube in place  Cardiovascular:      Pulses: Normal pulses.      Heart sounds: Murmur heard.   Pulmonary:      Effort: Pulmonary effort is normal. No respiratory distress.   Abdominal:      General: Abdomen is flat. There is no distension.      Palpations: Abdomen is soft.   Musculoskeletal:      Right lower leg: No edema.      Left lower leg: No edema.   Skin:     General: Skin is warm and dry.   Neurological:      Mental Status: She is lethargic.         Laboratory:  Most Recent Data:  CBC:   Recent Labs   Lab 07/28/24  0350 07/29/24  0223   WBC 4.09* 3.70*   HGB 8.8* 9.0*    HCT 27.8* 27.8*    171     CMP:   Recent Labs   Lab 07/29/24  0538   CALCIUM 9.0   ALBUMIN 2.2*   *   K 4.5   CO2 21*      BUN 20.2*   CREATININE 1.06*   ALKPHOS 70   ALT 15   AST 33   BILITOT 0.5         Microbiology Data Reviewed: yes  Pertinent Findings:  Blood culture with 1/4 bottles + Staph capitis and Staph warneri  Urine culture with no growth       Current Medications:     Infusions:   0.9% NaCl   Intravenous Continuous 90 mL/hr at 07/29/24 0922 New Bag at 07/29/24 0922        Scheduled:   atorvastatin  10 mg Oral Daily    doxycycline IV (PEDS and ADULTS)  100 mg Intravenous Q12H    heparin (porcine)  5,000 Units Subcutaneous Q8H    lisinopriL  10 mg Oral Daily    metoprolol succinate  12.5 mg Oral Daily    pantoprazole  40 mg Intravenous Daily    piperacillin-tazobactam (Zosyn) IV (PEDS and ADULTS) (extended infusion is not appropriate)  4.5 g Intravenous Q8H    polyethylene glycol  17 g Oral Daily        PRN:    Current Facility-Administered Medications:     0.9% NaCl, , Intravenous, PRN    acetaminophen, 650 mg, Oral, Q8H PRN    dextrose 10%, 12.5 g, Intravenous, PRN    dextrose 10%, 25 g, Intravenous, PRN    glucagon (human recombinant), 1 mg, Intramuscular, PRN    glucose, 16 g, Oral, PRN    glucose, 24 g, Oral, PRN    hydrALAZINE, 10 mg, Intravenous, Q6H PRN    melatonin, 6 mg, Oral, Nightly PRN    metoprolol, 5 mg, Intravenous, Q5 Min PRN    naloxone, 0.02 mg, Intravenous, PRN    ondansetron, 4 mg, Intravenous, Q8H PRN    prochlorperazine, 5 mg, Intravenous, Q6H PRN    sodium chloride 0.9%, 10 mL, Intravenous, Q12H PRN    Antibiotics and Day Number of Therapy:  Vanc, Zosyn and doxycyline started 7/18 - end 7/28  Stop Vanc 7/23    Intake/Output Summary (Last 24 hours) at 7/29/2024 1847  Last data filed at 7/29/2024 1813  Gross per 24 hour   Intake 1400 ml   Output 600 ml   Net 800 ml       Lines/Drains/Airways       Drain  Duration                  NG/OG Tube 07/26/24 1415 16 Fr.  Right nostril 3 days              Peripheral Intravenous Line  Duration                  Peripheral IV - Single Lumen 07/29/24 1545 20 G Right Antecubital <1 day                    Assessment and Plan      Small bowel obstruction   Nausea, vomiting  - Surgery following, counseled patient for ex-lap vs palliative/hospice care, patient/family prefer hospice care at this time  - anti-emetics PRN in place, protonix   - NPO, NG tube in place  - No BM with enema yesterday  - [facility] got in touch with family, has scheduled bed delivery Tuesday 7/30/2024     Severe sepsis 2/2 Right lower lobe pneumonia, possible aspiration  - Continue Zosyn/Doxy regimen for concern for aspiration pneumonia - D10. Daughter wanted to continue today. Will reevaluate tomorrow.   - MRSA PCR neg   - Lactic acid resolved   - blood cultures x2 collected 7/18: 1/2 bottles gram + cocci, probable staph. BCID panel with Staph.  - COVID/Flu neg  - IS ordered      Moderate Malnutrition  - Moderate malnutrition related to acute small bowel obstruction seen by mild fat depletion, moderate muscle depletion and 1-2% weight loss  - Present for 1 week   - Nutrition consulted for ppn  - NPO at this time with NG tube  - will discontinue clinimix d/t hyponatremia.      Normocytic anemia - stable  - Stable this AM     Questionable right paratracheal/mass/opacity on CXR  - CT chest showed reticular, nodular and hazy opacities in the right posterior upper lobe and both lower lobes with areas of consolidation in the lower lobes, larger in the right. This is consistent with multilobar consolidation. And fluid-filled dilatation of the distal esophagus which may be the result of reflux disease or related to distal esophageal stricture   - no further intervention at this time      Severe aortic stenosis  Coronary artery disease  Hypertension  Hyperlipidemia  - last Echo 10/2023 EF 65-70%, severe aortic stenosis  - Resumed home Metoprolol.   - Resume home ACEi  and statin given elevated BP   - consider repeat Echo in future if showing signs of fluid overload     Paroxysmal atrial fibrillation  - hold home Eliquis, will continue heparin while patient is inpatient   - Continue home metoprolol; Lopressor pushes available prn  - Telemetry discontinued        History of falls  Chronic dizziness, at baseline  - admission 7/1/2024 for fall, currently in SNF for rehab, likely discharge back to SNF when able  - previously walked with a cane, currently requiring rolling walker  - Patient and family will stop OT and PT at this time, discontinued  - fall precautions, delirium precautions  - urine trial/purewick       CODE STATUS: DNR  Access: PIV  Antibiotics: Zosyn/Doxy Day 11  Diet: NPO  DVT Prophylaxis: Eliquis  GI Prophylaxis: Pepcid IV  Fluids: 90ml/hr NS only during daytime      Disposition: limit the amount of family interruption and will discontinue tele, CBGs, daily labs discharge to hospice tomorrow

## 2024-07-29 NOTE — PT/OT/SLP DISCHARGE
Occupational Therapy Discharge Summary    Fifi Chacon  MRN: [id number]   Principal Problem: Small bowel obstruction      Patient Discharged from acute Occupational Therapy on 7/28/2024.  Please refer to prior OT note dated 7/26/2024 for functional status.    Assessment:      Patient transferred to lower level of care secondary to decline in functional status and patient transferring to Hospice care.    Objective:     GOALS:   Multidisciplinary Problems       Occupational Therapy Goals          Problem: Occupational Therapy    Goal Priority Disciplines Outcome Interventions   Occupational Therapy Goal     OT, PT/OT Progressing    Description: Patient will perform EOB grooming in unsupported sitting with Min A. Goal Met 7/23/2024  Patient will perform bed mobility while rolling right/left with Min A.  Patient will perform 5 sit to stand transitions from bed level with Mod A x 2 and RW for preparation of ADLs.  Patient will perform BSC transfer with RW and Mod A.                        Reasons for Discontinuation of Therapy Services  Transfer to alternate level of care. and Patient is unable to continue work toward goals because of medical or psychosocial complications.      Plan:     Patient Discharged to: Palliative Care/Hospice    7/29/2024

## 2024-07-29 NOTE — PLAN OF CARE
Problem: Adult Inpatient Plan of Care  Goal: Plan of Care Review  Outcome: Progressing  Goal: Patient-Specific Goal (Individualized)  Outcome: Progressing  Goal: Absence of Hospital-Acquired Illness or Injury  Outcome: Progressing  Goal: Optimal Comfort and Wellbeing  Outcome: Progressing  Goal: Readiness for Transition of Care  Outcome: Progressing     Problem: Sepsis/Septic Shock  Goal: Optimal Coping  Outcome: Progressing  Goal: Absence of Bleeding  Outcome: Progressing  Goal: Blood Glucose Level Within Targeted Range  Outcome: Progressing  Goal: Absence of Infection Signs and Symptoms  Outcome: Progressing  Goal: Optimal Nutrition Intake  Outcome: Progressing     Problem: Fall Injury Risk  Goal: Absence of Fall and Fall-Related Injury  Outcome: Progressing     Problem: Skin Injury Risk Increased  Goal: Skin Health and Integrity  Outcome: Progressing     Problem: Acute Kidney Injury/Impairment  Goal: Fluid and Electrolyte Balance  Outcome: Progressing  Goal: Improved Oral Intake  Outcome: Progressing  Goal: Effective Renal Function  Outcome: Progressing     Problem: Pneumonia  Goal: Fluid Balance  Outcome: Progressing  Goal: Resolution of Infection Signs and Symptoms  Outcome: Progressing  Goal: Effective Oxygenation and Ventilation  Outcome: Progressing     Problem: Infection  Goal: Absence of Infection Signs and Symptoms  Outcome: Progressing

## 2024-07-30 VITALS
WEIGHT: 152.31 LBS | HEART RATE: 70 BPM | DIASTOLIC BLOOD PRESSURE: 54 MMHG | SYSTOLIC BLOOD PRESSURE: 104 MMHG | RESPIRATION RATE: 18 BRPM | BODY MASS INDEX: 28.03 KG/M2 | OXYGEN SATURATION: 98 % | TEMPERATURE: 98 F | HEIGHT: 62 IN

## 2024-07-30 PROCEDURE — 25000003 PHARM REV CODE 250: Performed by: FAMILY MEDICINE

## 2024-07-30 PROCEDURE — 27000221 HC OXYGEN, UP TO 24 HOURS

## 2024-07-30 PROCEDURE — 94761 N-INVAS EAR/PLS OXIMETRY MLT: CPT

## 2024-07-30 PROCEDURE — 25000003 PHARM REV CODE 250

## 2024-07-30 PROCEDURE — 63600175 PHARM REV CODE 636 W HCPCS

## 2024-07-30 PROCEDURE — 63600175 PHARM REV CODE 636 W HCPCS: Performed by: FAMILY MEDICINE

## 2024-07-30 RX ADMIN — SODIUM CHLORIDE: 9 INJECTION, SOLUTION INTRAVENOUS at 10:07

## 2024-07-30 RX ADMIN — PANTOPRAZOLE SODIUM 40 MG: 40 INJECTION, POWDER, FOR SOLUTION INTRAVENOUS at 09:07

## 2024-07-30 RX ADMIN — LISINOPRIL 10 MG: 10 TABLET ORAL at 09:07

## 2024-07-30 RX ADMIN — HEPARIN SODIUM 5000 UNITS: 5000 INJECTION, SOLUTION INTRAVENOUS; SUBCUTANEOUS at 05:07

## 2024-07-30 RX ADMIN — POLYETHYLENE GLYCOL 3350 17 G: 17 POWDER, FOR SOLUTION ORAL at 09:07

## 2024-07-30 RX ADMIN — PIPERACILLIN SODIUM AND TAZOBACTAM SODIUM 4.5 G: 4; .5 INJECTION, POWDER, LYOPHILIZED, FOR SOLUTION INTRAVENOUS at 06:07

## 2024-07-30 RX ADMIN — METOPROLOL SUCCINATE 12.5 MG: 25 TABLET, EXTENDED RELEASE ORAL at 09:07

## 2024-07-30 NOTE — DISCHARGE SUMMARY
[facility] Discharge Summary    Admitting Physician: Rosana Graham MD  Attending Physician: Julieta Barnett MD  Date of Admit: 7/18/2024  Date of Discharge: 7/30/2024    Condition: Serious  DISPOSITION: Hospice/Home    Discharge Diagnoses     Principal Problem:  Small bowel obstruction  Active Hospital Problems    Diagnosis  POA    *Small bowel obstruction [K56.609]  Yes    Nausea and vomiting [R11.2]  Yes    Volume depletion, gastrointestinal loss [E86.9]  Yes    Acute urinary retention [R33.8]  Yes    Chest pain [R07.9]  Yes    Malnutrition [E46]  Yes    Severe sepsis [A41.9, R65.20]  Yes    GRACIE (acute kidney injury) [N17.9]  Yes    Pneumonia [J18.9]  Yes    Elevated troponin [R79.89]  Yes    Acute cystitis without hematuria [N30.00]  Yes    Severe aortic stenosis [I35.0]  Yes      Resolved Hospital Problems   No resolved problems to display.       Patient Active Problem List   Diagnosis    Vaginal prolapse    Pessary maintenance    Hematuria    Hypertension    Atrophy of vagina    Gastroesophageal reflux disease    History of iron deficiency    Hydroureter    Rheumatoid arthritis    Vitamin D deficiency    Anxiety    Depressive disorder    Renal cyst    CAD (coronary artery disease)    Paroxysmal A-fib    Severe aortic stenosis    Acute cystitis without hematuria    Small bowel obstruction    Severe sepsis    GRACIE (acute kidney injury)    Pneumonia    Elevated troponin    Malnutrition    Nausea and vomiting    Volume depletion, gastrointestinal loss    Acute urinary retention    Chest pain       Consultants and Procedures     Consultants:  IP CONSULT TO INTERNAL MEDICINE  IP CONSULT TO SOCIAL WORK/CASE MANAGEMENT  IP CONSULT TO GENERAL SURGERY  IP CONSULT TO SOCIAL WORK/CASE MANAGEMENT  IP CONSULT TO REGISTERED DIETITIAN/NUTRITIONIST  IP CONSULT TO SOCIAL WORK/CASE MANAGEMENT  IP CONSULT TO SOCIAL WORK/CASE MANAGEMENT    Procedures:   * No surgery found *     Brief Admission History      Fifi DIAZ  "Ines is a 88 y.o. female with history of paroxysmal afib on Eliquis, CAD s/p coronary angiogram on 10/13/23, severe aortic stenosis, hypertension, hyperlipidemia, anxiety admitted for small bowel obstruction and sepsis.     Hospital Course with Pertinent Findings     Patient admitted for SBO. NG tube placed. Her condition deteriorated and was evaluated by surgery team, however considered high risk for surgical intervention. She was treated with antibiotics throughout admission. Family made decision to make her comfort care. [facility] was consulted and she was stable for discharge home with hospice.     Discharge physical exam:  Vitals  BP: 124/71  Temp: 98.5 °F (36.9 °C)  Temp Source: Oral  Pulse: 77  Resp: 18  SpO2: 99 %  Height: 5' 2" (157.5 cm)  Weight: 69.1 kg (152 lb 5.4 oz) (? accuracy of bedscale)    Physical Exam  Constitutional:       General: She is not in acute distress.     Comments: Patient resting comfortably in bed with mouth open.    HENT:      Head: Normocephalic and atraumatic.      Nose:      Comments: R NG tube in place  Cardiovascular:      Pulses: Normal pulses.      Heart sounds: Murmur heard.   Pulmonary:      Effort: Pulmonary effort is normal. No respiratory distress.   Abdominal:      General: Abdomen is flat. There is no distension.      Palpations: Abdomen is soft.   Musculoskeletal:      Right lower leg: No edema.      Left lower leg: No edema.   Skin:     General: Skin is warm and dry.   Neurological:      Mental Status: She is lethargic.         TIME SPENT ON DISCHARGE: 60 minutes    Discharge Medications        Medication List        ASK your doctor about these medications      apixaban 5 mg Tab  Commonly known as: ELIQUIS  Take 1 tablet (5 mg total) by mouth 2 (two) times daily.     aspirin 81 MG Chew  Take 1 tablet (81 mg total) by mouth once daily.     EScitalopram oxalate 10 MG tablet  Commonly known as: LEXAPRO  Take 1 tablet (10 mg total) by mouth once daily.   "   famotidine 20 MG tablet  Commonly known as: PEPCID  Take 1 tablet (20 mg total) by mouth 2 (two) times daily.     lisinopriL 5 MG tablet  Commonly known as: PRINIVIL,ZESTRIL  Take 2 tablets (10 mg total) by mouth once daily.     melatonin 5 mg  Commonly known as: MELATIN  Take 1 tablet (5 mg total) by mouth nightly.     metoprolol succinate 25 MG 24 hr tablet  Commonly known as: TOPROL-XL  Take 0.5 tablets (12.5 mg total) by mouth once daily.     multivit with min-folic acid 200 mcg Chew  One A Day Vitamin     ondansetron 4 MG tablet  Commonly known as: ZOFRAN     pantoprazole 40 MG tablet  Commonly known as: PROTONIX  pantoprazole 40 mg tablet,delayed release, Strength: 40 mg     pravastatin 20 MG tablet  Commonly known as: PRAVACHOL  pravastatin 20 mg tablet  Take 1 tablet every day by oral route. Strength: 20 mg              Discharge Information:     Hospice will manage medications      Oriana Prajapati MD  [facility]

## 2024-07-30 NOTE — NURSING
Pt has smears of blood in NG tube. 900mL emptied out of canister. Green in color. Flushed with 30mL of water. Connected to low intermittent wall suction. Pt has no complaints at this time and no signs of discomfort. Radha CARSON notified.

## 2024-07-30 NOTE — PROGRESS NOTES
Pt discharging home under the care of [facility]. DME in place. Discharge Summary submitted to [facility] via [facility]. Pt will transport via [facility].

## 2024-07-30 NOTE — PT/OT/SLP PROGRESS
Patient is sober since 9/1/2015   Physical Therapy    Missed Treatment Session    Patient Name:  Fifi Chacon   MRN:  [id number]      Patient not seen at this time secondary to Off the floor for procedure/surgery.   In cath lab for coronary angiogram.    Will follow-up as patient is available to participate and as therapists' schedule allows.

## 2024-07-30 NOTE — PROVIDER TRANSFER
TRANSITION OF CARE PROGRESS NOTE    I have received checkout from [facility] Class of 2025: Oriana Prajapati MD regarding this patient.     HO I assessment:  Admission diagnosis: SBO  Overnight management: NPO. Continue Zosyn and doxycycline.  Code status: DNR    Please call [phone] from 07/29/2024 7PM to 7/30/2024 7AM if any issues arise.    Dana Nicholson MD  [facility]        Patient/Caregiver provided printed discharge information.

## 2025-07-19 NOTE — ASSESSMENT & PLAN NOTE
Patient meets ASPEN criteria for moderate malnutrition of acute illness or injury per RD assessment as evidenced by:  Energy Intake (Malnutrition): less than 75% for greater than or equal to 1 month  Weight Loss (Malnutrition): 1-2% in 1 week  Subcutaneous Fat (Malnutrition): mild depletion  Muscle Mass (Malnutrition): moderate depletion           A minimum of two characteristics is recommended for diagnosis of either severe or non-severe malnutrition.    Discharged by forensics

## (undated) DEVICE — TUBING HP AIRLSS ROT ADPT 30IN

## (undated) DEVICE — DRSNG POLYSKIN TRNSPAR 4X4.75

## (undated) DEVICE — GUIDEWIRE EMERALD 3MM 175X5CM

## (undated) DEVICE — DEVICE BASIXCOMPAK INFL 20ML

## (undated) DEVICE — OMNIPAQUE 350MG 150ML VIAL

## (undated) DEVICE — CATH OPTITORQUE RADIAL 5FR

## (undated) DEVICE — DEVICE MYNX CONTROL 5FR 10ML

## (undated) DEVICE — INTRODUCER TRNSRADIAL 6F 10CM

## (undated) DEVICE — HEMOSTAT VASC BAND REG 24CM

## (undated) DEVICE — NDL BLUNT FILL 18G 1IN

## (undated) DEVICE — SET EXT NAMIC ARTERIAL 12IN

## (undated) DEVICE — INTRODUCER CATH 5F 11CM

## (undated) DEVICE — CATH JL4 5FR

## (undated) DEVICE — GUIDEWIRE STD .035X180CM ANG

## (undated) DEVICE — COVER CIV-FLEX PROBE US 58IN

## (undated) DEVICE — CATH GUID LNCH AR1 5F 100CM

## (undated) DEVICE — Device

## (undated) DEVICE — GUIDEWIRE OMNI J TIP 185CM

## (undated) DEVICE — INTRO KIT MICPUNC STIFF CANN

## (undated) DEVICE — CATH JR4 5FR

## (undated) DEVICE — DRAPE RADIAL BRACHIAL 29X42IN